# Patient Record
Sex: FEMALE | Race: WHITE | Employment: OTHER | URBAN - METROPOLITAN AREA
[De-identification: names, ages, dates, MRNs, and addresses within clinical notes are randomized per-mention and may not be internally consistent; named-entity substitution may affect disease eponyms.]

---

## 2017-02-07 ENCOUNTER — ANESTHESIA (OUTPATIENT)
Dept: SURGERY | Facility: CLINIC | Age: 67
End: 2017-02-07

## 2017-02-07 ENCOUNTER — HOSPITAL ENCOUNTER (OUTPATIENT)
Facility: CLINIC | Age: 67
Discharge: HOME | End: 2017-02-07
Attending: ANESTHESIOLOGY | Admitting: ANESTHESIOLOGY

## 2017-02-07 ENCOUNTER — SURGERY (OUTPATIENT)
Age: 67
End: 2017-02-07

## 2017-02-07 ENCOUNTER — ANESTHESIA EVENT (OUTPATIENT)
Dept: SURGERY | Facility: CLINIC | Age: 67
End: 2017-02-07

## 2017-02-08 NOTE — ANESTHESIA PROCEDURE NOTES
Procedures  Pt returns for evaluation of Intrathecal pump.  Site appears well healed.  Interrogation reveals nl functioning parameters.  Pt. Offers informed consent for pump reprogramming and refill.  Sterile prep of abdomen over implant site with betadine x3.   Refill kit utilized and local anesthetic infiltrated.  22 G non-coring needle advanced into membrane with ease.  Aspiration of residual volume which correlates with  prediction.  Refilled with compounding pharmacy solution on scanned record.  No evidence of bleeding at site.  Pump reprogrammed.  Refill date 6/3/2017

## 2017-06-02 ENCOUNTER — ANESTHESIA EVENT (OUTPATIENT)
Dept: SURGERY | Facility: CLINIC | Age: 67
End: 2017-06-02

## 2017-06-02 ENCOUNTER — HOSPITAL ENCOUNTER (OUTPATIENT)
Facility: CLINIC | Age: 67
Discharge: HOME | End: 2017-06-02
Attending: ANESTHESIOLOGY | Admitting: ANESTHESIOLOGY
Payer: COMMERCIAL

## 2017-06-02 ENCOUNTER — ANESTHESIA (OUTPATIENT)
Dept: SURGERY | Facility: CLINIC | Age: 67
End: 2017-06-02

## 2017-06-02 ENCOUNTER — SURGERY (OUTPATIENT)
Age: 67
End: 2017-06-02

## 2017-06-02 PROCEDURE — 40000010 ZZH STATISTIC ANES STAT CODE-CRNA PER MINUTE: Performed by: ANESTHESIOLOGY

## 2017-06-02 PROCEDURE — 27210995 ZZH RX 272

## 2017-06-02 PROCEDURE — 95990 SPIN/BRAIN PUMP REFIL & MAIN: CPT | Performed by: ANESTHESIOLOGY

## 2017-06-02 PROCEDURE — 25800025 ZZH RX 258

## 2017-06-02 PROCEDURE — 25000128 H RX IP 250 OP 636

## 2017-07-22 ENCOUNTER — APPOINTMENT (OUTPATIENT)
Dept: GENERAL RADIOLOGY | Facility: CLINIC | Age: 67
End: 2017-07-22
Attending: INTERNAL MEDICINE
Payer: COMMERCIAL

## 2017-07-22 ENCOUNTER — APPOINTMENT (OUTPATIENT)
Dept: CT IMAGING | Facility: CLINIC | Age: 67
End: 2017-07-22
Attending: INTERNAL MEDICINE
Payer: COMMERCIAL

## 2017-07-22 ENCOUNTER — HOSPITAL ENCOUNTER (OUTPATIENT)
Facility: CLINIC | Age: 67
Setting detail: OBSERVATION
Discharge: HOME | End: 2017-07-23
Attending: INTERNAL MEDICINE | Admitting: INTERNAL MEDICINE
Payer: COMMERCIAL

## 2017-07-22 DIAGNOSIS — S01.01XA LACERATION OF SCALP, INITIAL ENCOUNTER: ICD-10-CM

## 2017-07-22 DIAGNOSIS — S09.90XA CLOSED HEAD INJURY, INITIAL ENCOUNTER: ICD-10-CM

## 2017-07-22 DIAGNOSIS — R19.7 DIARRHEA, UNSPECIFIED TYPE: Primary | ICD-10-CM

## 2017-07-22 DIAGNOSIS — G40.909 RECURRENT SEIZURES (H): ICD-10-CM

## 2017-07-22 PROBLEM — R56.9 SEIZURE (H): Status: ACTIVE | Noted: 2017-07-22

## 2017-07-22 LAB
ALBUMIN SERPL-MCNC: 4.2 G/DL (ref 3.4–5)
ALBUMIN UR-MCNC: NEGATIVE MG/DL
ALP SERPL-CCNC: 125 U/L (ref 40–150)
ALT SERPL W P-5'-P-CCNC: 19 U/L (ref 0–50)
ANION GAP SERPL CALCULATED.3IONS-SCNC: 4 MMOL/L (ref 3–14)
APPEARANCE UR: CLEAR
AST SERPL W P-5'-P-CCNC: 26 U/L (ref 0–45)
BASE EXCESS BLDV CALC-SCNC: 6.8 MMOL/L
BASOPHILS # BLD AUTO: 0 10E9/L (ref 0–0.2)
BASOPHILS NFR BLD AUTO: 0.3 %
BILIRUB SERPL-MCNC: 0.4 MG/DL (ref 0.2–1.3)
BILIRUB UR QL STRIP: NEGATIVE
BUN SERPL-MCNC: 16 MG/DL (ref 7–30)
CALCIUM SERPL-MCNC: 9.4 MG/DL (ref 8.5–10.1)
CHLORIDE SERPL-SCNC: 101 MMOL/L (ref 94–109)
CO2 SERPL-SCNC: 32 MMOL/L (ref 20–32)
COLOR UR AUTO: YELLOW
CREAT SERPL-MCNC: 1.11 MG/DL (ref 0.52–1.04)
DIFFERENTIAL METHOD BLD: NORMAL
EOSINOPHIL # BLD AUTO: 0.3 10E9/L (ref 0–0.7)
EOSINOPHIL NFR BLD AUTO: 4.4 %
ERYTHROCYTE [DISTWIDTH] IN BLOOD BY AUTOMATED COUNT: 13.8 % (ref 10–15)
GFR SERPL CREATININE-BSD FRML MDRD: 49 ML/MIN/1.7M2
GLUCOSE SERPL-MCNC: 85 MG/DL (ref 70–99)
GLUCOSE UR STRIP-MCNC: NEGATIVE MG/DL
HCO3 BLDV-SCNC: 34 MMOL/L (ref 21–28)
HCT VFR BLD AUTO: 39 % (ref 35–47)
HGB BLD-MCNC: 12.4 G/DL (ref 11.7–15.7)
HGB UR QL STRIP: ABNORMAL
IMM GRANULOCYTES # BLD: 0 10E9/L (ref 0–0.4)
IMM GRANULOCYTES NFR BLD: 0.3 %
KETONES UR STRIP-MCNC: NEGATIVE MG/DL
LACTATE BLD-SCNC: 0.8 MMOL/L (ref 0.7–2.1)
LEUKOCYTE ESTERASE UR QL STRIP: NEGATIVE
LYMPHOCYTES # BLD AUTO: 1.7 10E9/L (ref 0.8–5.3)
LYMPHOCYTES NFR BLD AUTO: 29.8 %
MCH RBC QN AUTO: 29.9 PG (ref 26.5–33)
MCHC RBC AUTO-ENTMCNC: 31.8 G/DL (ref 31.5–36.5)
MCV RBC AUTO: 94 FL (ref 78–100)
MONOCYTES # BLD AUTO: 0.3 10E9/L (ref 0–1.3)
MONOCYTES NFR BLD AUTO: 4.5 %
MUCOUS THREADS #/AREA URNS LPF: PRESENT /LPF
NEUTROPHILS # BLD AUTO: 3.5 10E9/L (ref 1.6–8.3)
NEUTROPHILS NFR BLD AUTO: 60.7 %
NITRATE UR QL: NEGATIVE
NRBC # BLD AUTO: 0 10*3/UL
NRBC BLD AUTO-RTO: 0 /100
O2/TOTAL GAS SETTING VFR VENT: 4 %
OXYHGB MFR BLDV: 44 %
PCO2 BLDV: 62 MM HG (ref 40–50)
PH BLDV: 7.34 PH (ref 7.32–7.43)
PH UR STRIP: 6 PH (ref 5–7)
PLATELET # BLD AUTO: 251 10E9/L (ref 150–450)
PO2 BLDV: 25 MM HG (ref 25–47)
POTASSIUM SERPL-SCNC: 4.3 MMOL/L (ref 3.4–5.3)
PROT SERPL-MCNC: 8.1 G/DL (ref 6.8–8.8)
RBC # BLD AUTO: 4.15 10E12/L (ref 3.8–5.2)
RBC #/AREA URNS AUTO: 3 /HPF (ref 0–2)
SODIUM SERPL-SCNC: 137 MMOL/L (ref 133–144)
SP GR UR STRIP: 1.01 (ref 1–1.03)
SQUAMOUS #/AREA URNS AUTO: <1 /HPF (ref 0–1)
TROPONIN I SERPL-MCNC: NORMAL UG/L (ref 0–0.04)
URN SPEC COLLECT METH UR: ABNORMAL
UROBILINOGEN UR STRIP-MCNC: 0 MG/DL (ref 0–2)
WBC # BLD AUTO: 5.7 10E9/L (ref 4–11)
WBC #/AREA URNS AUTO: 1 /HPF (ref 0–2)

## 2017-07-22 PROCEDURE — 80175 DRUG SCREEN QUAN LAMOTRIGINE: CPT | Performed by: INTERNAL MEDICINE

## 2017-07-22 PROCEDURE — 99285 EMERGENCY DEPT VISIT HI MDM: CPT | Mod: 25

## 2017-07-22 PROCEDURE — 93005 ELECTROCARDIOGRAM TRACING: CPT

## 2017-07-22 PROCEDURE — 87040 BLOOD CULTURE FOR BACTERIA: CPT | Performed by: INTERNAL MEDICINE

## 2017-07-22 PROCEDURE — 36415 COLL VENOUS BLD VENIPUNCTURE: CPT

## 2017-07-22 PROCEDURE — 99207 ZZC CDG-MDM COMPONENT: MEETS MODERATE - UP CODED: CPT | Performed by: INTERNAL MEDICINE

## 2017-07-22 PROCEDURE — 70450 CT HEAD/BRAIN W/O DYE: CPT

## 2017-07-22 PROCEDURE — 71020 XR CHEST 2 VW: CPT

## 2017-07-22 PROCEDURE — 12001 RPR S/N/AX/GEN/TRNK 2.5CM/<: CPT

## 2017-07-22 PROCEDURE — 81001 URINALYSIS AUTO W/SCOPE: CPT | Performed by: INTERNAL MEDICINE

## 2017-07-22 PROCEDURE — 84484 ASSAY OF TROPONIN QUANT: CPT | Performed by: INTERNAL MEDICINE

## 2017-07-22 PROCEDURE — 82805 BLOOD GASES W/O2 SATURATION: CPT | Performed by: INTERNAL MEDICINE

## 2017-07-22 PROCEDURE — 85025 COMPLETE CBC W/AUTO DIFF WBC: CPT | Performed by: INTERNAL MEDICINE

## 2017-07-22 PROCEDURE — 99220 ZZC INITIAL OBSERVATION CARE,LEVL III: CPT | Performed by: INTERNAL MEDICINE

## 2017-07-22 PROCEDURE — 25000132 ZZH RX MED GY IP 250 OP 250 PS 637: Performed by: INTERNAL MEDICINE

## 2017-07-22 PROCEDURE — 80053 COMPREHEN METABOLIC PANEL: CPT | Performed by: INTERNAL MEDICINE

## 2017-07-22 PROCEDURE — 80299 QUANTITATIVE ASSAY DRUG: CPT | Performed by: INTERNAL MEDICINE

## 2017-07-22 PROCEDURE — 83605 ASSAY OF LACTIC ACID: CPT | Performed by: INTERNAL MEDICINE

## 2017-07-22 RX ORDER — LAMOTRIGINE 100 MG/1
100 TABLET ORAL ONCE
Status: COMPLETED | OUTPATIENT
Start: 2017-07-22 | End: 2017-07-22

## 2017-07-22 RX ORDER — ALBUTEROL SULFATE 0.83 MG/ML
2.5 SOLUTION RESPIRATORY (INHALATION) EVERY 4 HOURS PRN
Status: DISCONTINUED | OUTPATIENT
Start: 2017-07-22 | End: 2017-07-23 | Stop reason: HOSPADM

## 2017-07-22 RX ORDER — ONDANSETRON 4 MG/1
4 TABLET, ORALLY DISINTEGRATING ORAL EVERY 6 HOURS PRN
Status: DISCONTINUED | OUTPATIENT
Start: 2017-07-22 | End: 2017-07-23 | Stop reason: HOSPADM

## 2017-07-22 RX ORDER — FELBAMATE 600 MG/5ML
600 SUSPENSION ORAL ONCE
Status: DISCONTINUED | OUTPATIENT
Start: 2017-07-22 | End: 2017-07-22

## 2017-07-22 RX ORDER — IPRATROPIUM BROMIDE AND ALBUTEROL SULFATE 2.5; .5 MG/3ML; MG/3ML
3 SOLUTION RESPIRATORY (INHALATION) EVERY 4 HOURS PRN
Status: DISCONTINUED | OUTPATIENT
Start: 2017-07-22 | End: 2017-07-23 | Stop reason: HOSPADM

## 2017-07-22 RX ORDER — LAMOTRIGINE 100 MG/1
100 TABLET ORAL 3 TIMES DAILY
Status: DISCONTINUED | OUTPATIENT
Start: 2017-07-23 | End: 2017-07-23 | Stop reason: HOSPADM

## 2017-07-22 RX ORDER — NALOXONE HYDROCHLORIDE 0.4 MG/ML
.1-.4 INJECTION, SOLUTION INTRAMUSCULAR; INTRAVENOUS; SUBCUTANEOUS
Status: DISCONTINUED | OUTPATIENT
Start: 2017-07-22 | End: 2017-07-23 | Stop reason: HOSPADM

## 2017-07-22 RX ORDER — FELBAMATE 600 MG/1
600 TABLET ORAL 2 TIMES DAILY
Status: DISCONTINUED | OUTPATIENT
Start: 2017-07-23 | End: 2017-07-23 | Stop reason: HOSPADM

## 2017-07-22 RX ORDER — ACETAMINOPHEN 325 MG/1
650 TABLET ORAL EVERY 4 HOURS PRN
Status: DISCONTINUED | OUTPATIENT
Start: 2017-07-22 | End: 2017-07-23 | Stop reason: HOSPADM

## 2017-07-22 RX ORDER — ONDANSETRON 2 MG/ML
4 INJECTION INTRAMUSCULAR; INTRAVENOUS EVERY 6 HOURS PRN
Status: DISCONTINUED | OUTPATIENT
Start: 2017-07-22 | End: 2017-07-23 | Stop reason: HOSPADM

## 2017-07-22 RX ORDER — FELBAMATE 600 MG/1
600 TABLET ORAL ONCE
Status: COMPLETED | OUTPATIENT
Start: 2017-07-22 | End: 2017-07-22

## 2017-07-22 RX ORDER — SODIUM CHLORIDE 9 MG/ML
INJECTION, SOLUTION INTRAVENOUS CONTINUOUS
Status: DISCONTINUED | OUTPATIENT
Start: 2017-07-22 | End: 2017-07-23 | Stop reason: HOSPADM

## 2017-07-22 RX ADMIN — FELBAMATE 600 MG: 600 TABLET ORAL at 22:32

## 2017-07-22 RX ADMIN — LAMOTRIGINE 100 MG: 100 TABLET ORAL at 22:32

## 2017-07-22 NOTE — IP AVS SNAPSHOT
[facility]    [address]    [facility] [address]    Phone:  [phone]                                       After Visit Summary   7/22/2017    Shana Horne    MRN: [id number]           After Visit Summary Signature Page     I have received my discharge instructions, and my questions have been answered. I have discussed any challenges I see with this plan with the nurse or doctor.    ..........................................................................................................................................  Patient/Patient Representative Signature      ..........................................................................................................................................  Patient Representative Print Name and Relationship to Patient    ..................................................               ................................................  Date                                            Time    ..........................................................................................................................................  Reviewed by Signature/Title    ...................................................              ..............................................  Date                                                            Time

## 2017-07-22 NOTE — IP AVS SNAPSHOT
MRN:[id number]                      After Visit Summary   7/22/2017    Shana Horne    MRN: [id number]           Thank you!     Thank you for choosing [facility] for your care. Our goal is always to provide you with excellent care. Hearing back from our patients is one way we can continue to improve our services. Please take a few minutes to complete the written survey that you may receive in the mail after you visit. If you would like to speak to someone directly about your visit please contact Patient Relations at [phone]. Thank you!          Patient Information     Date Of Birth          1950        About your hospital stay     You were admitted on:  July 22, 2017 You last received care in the:  [facility] Observation Department    You were discharged on:  July 23, 2017        Reason for your hospital stay       You were hospitalized after having a seizure and a fall hitting your head which required 3 staples.  Your mentation improved and your preferred to be discharged to follow up with your outpatient Neurology clinic rather than wait for a Neurology consult here.  You have been having diarrhea for which you deferred any further work up here.  Stool sample ordered were placed and you can return your sample to the [facility] lab or follow up with your PCP for further testing.                  Who to Call     For medical emergencies, please call 911.  For non-urgent questions about your medical care, please call your primary care provider or clinic, [phone]          Attending Provider     Provider Specialty    Shala Piña MD Emergency Medicine    Wong, Sandi Baird MD Internal Medicine    Deangelo, Erlin Manriquez MD Internal Medicine       Primary Care Provider Office Phone # Fax #    Otoniel Capellan [phone]       When to contact your care team       Notify for fever >101.5; black or bloody stools; severe, persistent, or  worsening nausea, vomiting, abdominal pain or distention, diarrhea, constipation; chest pain, difficulty breathing, swelling; dizziness, weakness, lightheadedness, fainting.  Proceed to the nearest emergency room for any urgent concerns.                  After Care Instructions     Activity       Your activity upon discharge: activity as tolerated with supervision in the home.  No driving.            Diet       Follow this diet upon discharge: Orders Placed This Encounter      Advance Diet as Tolerated: Regular Diet Adult            Wound care and dressings       Instructions to care for your wound at home: keep wound clean and dry and may get incision wet in shower but do not soak or scrub.                  Follow-up Appointments     Follow-up and recommended labs and tests        Follow up with your Neurology clinic this week.      Follow up with your PCP in 7-10 days for staple removal.                   Follow-up Information     Follow up with Otoniel Capellan MD In 2 days.    Specialty:  Family Practice    Contact information:    [facility]  [address] [id number] [phone]          Follow up with Sosa Lira MD In 2 days.    Specialty:  Neurology    Contact information:    [facility]  [address]  [facility] [address] [phone]                  Future tests that were ordered for you     C. difficile, PCR           Enteric Bacteria and Virus Panel by [facility] Stool                 Further instructions from your care team       Discharge Instructions  Laceration (Cut)    You were seen today for a laceration (cut).  Your doctor examined your laceration for any problems such a buried foreign body (like glass, a splinter, or gravel), or injury to blood vessels, tendons, and nerves.  Your doctor may have also rinsed and/or scrubbed your laceration to help prevent an infection.  Your laceration may have been closed with glue, staples  or sutures (stitches).      It may not be possible to find all problems with your laceration on the first visit, and we can't always prevent infections.  Antibiotics are only given when the benefit is more than the risk, and don't prevent all infections. Some lacerations are too high risk to close, and are left open to heal.  All lacerations, no matter how expertly repaired, will cause scarring.    Return to the Emergency Department right away if:    You have more redness, swelling, pain, drainage (pus), a bad smell, or red streaking from your laceration.      You have a fever of 101oF or more.    You have bleeding that you can t stop at home. If your cut starts to bleed, hold pressure on the bleeding area with a clean cloth or put pressure over the bandage.  If the bleeding doesn t stop after using constant pressure for 30 minutes, you should return to the Emergency Department for further treatment.    An area past the laceration is cool, pale, or blue compared with the other side, or has a slower return of color when squeezed.    Your dressing seems too tight or starts to get uncomfortable or painful.    You have loss of normal function or use of an area, such as being unable to straighten or bend a finger normally.    You have a numb area past the laceration.    Return to the Emergency Department or see your regular doctor if:    The laceration starts to come open.     You have something coming out of the cut or a feeling that there is something in the laceration.    Your wound will not heal, or keeps breaking open. There can always be glass, wood, dirt or other things in any wound.  They won t always show up, even on x-rays.  If a wound doesn t heal, this may be why, and it is important to follow-up with your regular doctor.    Home Care:    Take your dressing off in 12 hours, or as instructed by your doctor, to check your laceration. Remove the dressing sooner if it seems too tight or painful, or if it is  "getting numb, tingly, or pale past the dressing.    Gently wash your laceration 2 times a day with clean cloth and soap.     It is okay to shower, but do not let the laceration soak in water.      If your laceration was closed with wound adhesive or strips: pat it dry and leave it open to the air.     For all other repairs: after you wash your laceration, or at least 2 times a day, apply bacitracin or other antibiotic ointment to the laceration, then cover it with a Band-Aid  or gauze.    Keep the laceration clean. Wear gloves or other protective clothing if you are around dirt.    Follow-up:    You need to follow-up with your regular doctor in 9 days.    Your sutures or staples need to be removed in 9 days. Schedule an appointment with your regular doctor to have this done.    Scars:  To help minimize scarring:    Wear sunscreen over the healed laceration when out in the sun.    Massage the area regularly.    You may use Vitamin E oil.    Wait a year.  Most scars will start to fade within a year.    Probiotics: If you have been given an antibiotic, you may want to also take a probiotic pill or eat yogurt with live cultures. Probiotics have \"good bacteria\" to help your intestines stay healthy. Studies have shown that probiotics help prevent diarrhea and other intestine problems (including C. diff infection) when you take antibiotics. You can buy these without a prescription in the pharmacy section of the store.     If you were given a prescription for medicine here today, be sure to read all of the information (including the package insert) that comes with your prescription.  This will include important information about the medicine, its side effects, and any warnings that you need to know about.  The pharmacist who fills the prescription can provide more information and answer questions you may have about the medicine.  If you have questions or concerns that the pharmacist cannot address, please call or return to " the Emergency Department.     Remember that you can always come back to the Emergency Department if you are not able to see your regular doctor in the amount of time listed above, if you get any new symptoms, or if there is anything that worries you.  Discharge Instructions  Recurrent Seizure (Convulsion)    You were seen today for a seizure. The most common reason for a recurrent seizure is having missed a dose of your medication or taken it at a different time than normal. Other things that increase the risk of seizures include fever, sleep deprivation, alcohol, and stress. Although anti-seizure medications (anti-epileptic drugs) work for many people with seizure disorders, some people continue to have seizures even after trying several medications.    You need to follow-up with your doctor within 1-2 days.    Return to the Emergency Department if:     You develop a fever over 101.    You feel much more ill, or develop new symptoms like severe headache.    You have severe nausea or vomiting.    You have trouble walking, seeing, or develop weakness or numbness in your arms or legs.     What can I do to help myself?    Take your medication exactly as directed, at the right times, and the right doses.     If you develop uncomfortable side effects, don't stop taking your anti-seizure medication without first speaking to your physician.     Do not let your prescription run out. Stopping anti-seizure medication abruptly can put your at risk of a seizure.    While taking an anti-seizure medication, do not start taking any other medications including over-the-counter medications and herbal supplements without first checking with your physician because mixing them can be dangerous.    Do not drive until you have been rechecked by your doctor and have been told it is safe to drive.  If you have a seizure while driving you may cause a motor vehicle accident with injury or death to yourself or others.     Do not swim, climb  ladders, or do anything else that would be dangerous if you had another seizure or spell of loss of consciousness, until you are cleared by your doctor.      Check your state driving requirements for patients with seizures on the Epilepsy Foundation Website at www.epilepsyfoundation.org/resources/drivingandtravel.cfm.    Do not drink alcohol.  Drinking alcohol increases the risk of seizures and can interfere with the effect of anti-seizure medications.    Start a seizure calendar to record any seizure triggers, such as days when you were sleep-deprived, stressed, drank alcohol, or (if you are a woman) had your period.    Remember, if one medication does not work for you, either because you cannot tolerate the side effects or because you continue to have seizures, your physician can suggest alternate medications or alternate methods of taking the medication.  If you were given a prescription for medicine here today, be sure to read all of the information (including the package insert) that comes with your prescription.  This will include important information about the medicine, its side effects, and any warnings that you need to know about.  The pharmacist who fills the prescription can provide more information and answer questions you may have about the medicine.  If you have questions or concerns that the pharmacist cannot address, please call or return to the Emergency Department.     Remember that you can always come back to the Emergency Department if you are not able to see your regular doctor in the amount of time listed above, if you get any new symptoms, or if there is anything that worries you.        Pending Results     Date and Time Order Name Status Description    7/22/2017 2120 Lamotrigine Level In process     7/22/2017 2120 Felbamate Level In process     7/22/2017 2107 EKG 12-lead, tracing only Preliminary     7/22/2017 2107 Blood culture Preliminary             Statement of Approval     Ordered        "   17 1056  I have reviewed and agree with all the recommendations and orders detailed in this document.  EFFECTIVE NOW     Approved and electronically signed by:  Jaycee Rodney PA-C             Admission Information     Date & Time Provider Department Dept. Phone    2017 Erlin Bright MD [facility] Observation Department [phone]      Your Vitals Were     Blood Pressure Pulse Temperature Respirations Pulse Oximetry       116/65 (BP Location: Left arm) 84 98.5  F (36.9  C) (Oral) 18 92%       MyChart Information     [employer] lets you send messages to your doctor, view your test results, renew your prescriptions, schedule appointments and more. To sign up, go to [email or web address] . Click on \"Log in\" on the left side of the screen, which will take you to the Welcome page. Then click on \"Sign up Now\" on the right side of the page.     You will be asked to enter the access code listed below, as well as some personal information. Please follow the directions to create your username and password.     Your access code is: [email or web address]  Expires: 10/20/2017 10:37 PM     Your access code will  in 90 days. If you need help or a new code, please call your [facility] clinic or [phone].        Care EveryWhere ID     This is your Care EveryWhere ID. This could be used by other organizations to access your [facility] medical records  [id number]        Equal Access to Services     DAVIDSON KENDALL AH: Hadii darion griggso Sochrisali, waaxda luqadaha, qaybta kaalmada adeegyada, roxi scales . So [facility] [phone].    ATENCIÓN: Si habla español, tiene a early disposición servicios gratuitos de asistencia lingüística. Llame al [phone].    We comply with applicable federal civil rights laws and [place] laws. We do not discriminate on the basis of race, color, national origin, age, disability sex, sexual orientation or gender identity.               Review of " your medicines      START taking        Dose / Directions    TRIPLE ANTIBIOTIC 3.5-400-5000 Oint ointment   Used for:  Laceration of scalp, initial encounter        Dose:  1 applicator   Apply 1 g topically 2 times daily Apply to scalp wound   Quantity:  28.3 g   Refills:  0         CONTINUE these medicines which have NOT CHANGED        Dose / Directions    amitriptyline 75 MG tablet   Commonly known as:  ELAVIL        Dose:  75 mg   Take 75 mg by mouth At Bedtime.   Refills:  0       * FELBATOL PO        Dose:  1200 mg   Take 1,200 mg by mouth every morning   Refills:  0       * FELBATOL PO        Dose:  600 mg   Take 600 mg by mouth 2 times daily At Noon and Evening   Refills:  0       FERROUS GLUCONATE PO        Dose:  324 mg   Take 324 mg by mouth 2 times daily (with meals)   Refills:  0       GABAPENTIN PO        Dose:  200 mg   Take 200 mg by mouth 2 times daily Am and Noon   Refills:  0       lamoTRIgine 100 MG tablet   Commonly known as:  LaMICtal        Dose:  100 mg   Take 100 mg by mouth 3 times daily.   Refills:  0       Multi-vitamin Tabs tablet   Generic drug:  multivitamin, therapeutic with minerals        Dose:  1 tablet   Take 1 tablet by mouth daily.   Refills:  0       SEROQUEL PO        Dose:  25 mg   Take 25 mg by mouth At Bedtime   Refills:  0       simvastatin 40 MG tablet   Commonly known as:  ZOCOR        Dose:  40 mg   Take 40 mg by mouth every morning.   Refills:  0       * Notice:  This list has 2 medication(s) that are the same as other medications prescribed for you. Read the directions carefully, and ask your doctor or other care provider to review them with you.         Where to get your medicines      Some of these will need a paper prescription and others can be bought over the counter. Ask your nurse if you have questions.     You don't need a prescription for these medications     TRIPLE ANTIBIOTIC 3.5-400-5000 Oint ointment                Protect others around you: Learn how to  safely use, store and throw away your medicines at www.disposemymeds.org.             Medication List: This is a list of all your medications and when to take them. Check marks below indicate your daily home schedule. Keep this list as a reference.      Medications           Morning Afternoon Evening Bedtime As Needed    amitriptyline 75 MG tablet   Commonly known as:  ELAVIL   Take 75 mg by mouth At Bedtime.                                * FELBATOL PO   Take 1,200 mg by mouth every morning   Last time this was given:  1,200 mg on 7/23/2017  8:06 AM                                * FELBATOL PO   Take 600 mg by mouth 2 times daily At Noon and Evening   Last time this was given:  1,200 mg on 7/23/2017  8:06 AM                                FERROUS GLUCONATE PO   Take 324 mg by mouth 2 times daily (with meals)                                GABAPENTIN PO   Take 200 mg by mouth 2 times daily Am and Noon   Last time this was given:  200 mg on 7/23/2017  8:06 AM                                lamoTRIgine 100 MG tablet   Commonly known as:  LaMICtal   Take 100 mg by mouth 3 times daily.   Last time this was given:  100 mg on 7/23/2017  8:06 AM                                Multi-vitamin Tabs tablet   Take 1 tablet by mouth daily.   Generic drug:  multivitamin, therapeutic with minerals                                SEROQUEL PO   Take 25 mg by mouth At Bedtime                                simvastatin 40 MG tablet   Commonly known as:  ZOCOR   Take 40 mg by mouth every morning.                                TRIPLE ANTIBIOTIC 3.5-400-5000 Oint ointment   Apply 1 g topically 2 times daily Apply to scalp wound                                * Notice:  This list has 2 medication(s) that are the same as other medications prescribed for you. Read the directions carefully, and ask your doctor or other care provider to review them with you.              More Information        Uncertain Causes of Diarrhea (Adult)    Diarrhea  is when stools are loose and watery. This can be caused by:    Viral infections    Bacterial infections    Food poisoning    Parasites    Irritable bowel syndrome (IBS)    Inflammatory bowel diseases such as ulcerative colitis, Crohn's disease, and celiac disease    Food intolerance, such as to lactose, the sugar found in milk and milk products    Reaction to medicines like antibiotics, laxatives, cancer drugs, and antacids  Along with diarrhea, you may also have:    Abdominal pain and cramping    Nausea and vomiting    Loss of bowel control    Fever and chills    Bloody stools  In some cases, antibiotics may help to treat diarrhea. You may have a stool sample test. This is done to see what is causing your diarrhea, and if antibiotics will help treat it. The results of a stool sample test may take up to 2 days. The healthcare provider may not give you antibiotics until he or she has the stool test results.  Diarrhea can cause dehydration. This is the loss of too much water and other fluids from the body. When this occurs, body fluid must be replaced. This can be done with oral rehydration solutions. Oral rehydration solutions are available at drugstores and grocery stores without a prescription.  Home care  Follow all instructions given by your healthcare provider. Rest at home for the next 24 hours, or until you feel better. Avoid caffeine, tobacco, and alcohol. These can make diarrhea, cramping, and pain worse.  If taking medicines:    Don t take over-the-counter diarrhea or nausea medicines unless your healthcare provider tells you to.    You may use acetaminophen or NSAID medicines like ibuprofen or naproxen to reduce pain and fever. Don t use these if you have chronic liver or kidney disease, or ever had a stomach ulcer or gastrointestinal bleeding. Don't use NSAID medicines if you are already taking one for another condition (like arthritis) or are on daily aspirin therapy (such as for heart disease or after a  stroke). Talk with your healthcare provider first.    If antibiotics were prescribed, be sure you take them until they are finished. Don t stop taking them even when you feel better. Antibiotics must be taken as a full course.  To prevent the spread of illness:    Remember that washing with soap and water and using alcohol-based  is the best way to prevent the spread of infection.    Clean the toilet after each use.    Wash your hands before eating.    Wash your hands before and after preparing food. Keep in mind that people with diarrhea or vomiting should not prepare food for others.    Wash your hands after using cutting boards, countertops, and knives that have been in contact with raw foods.    Wash and then peel fruits and vegetables.    Keep uncooked meats away from cooked and ready-to-eat foods.    Use a food thermometer when cooking. Cook poultry to at least 165 F (74 C). Cook ground meat (beef, veal, pork, lamb) to at least 160 F (71 C). Cook fresh beef, veal, lamb, and pork to at least 145 F (63 C).    Don t eat raw or undercooked eggs (poached or spike side up), poultry, meat, or unpasteurized milk and juices.  Food and drinks  The main goal while treating vomiting or diarrhea is to prevent dehydration. This is done by taking small amounts of liquids often.    Keep in mind that liquids are more important than food right now.    Drink only small amounts of liquids at a time.    Don t force yourself to eat, especially if you are having cramping, vomiting, or diarrhea. Don t eat large amounts at a time, even if you are hungry.    If you eat, avoid fatty, greasy, spicy, or fried foods.    Don t eat dairy foods or drink milk if you have diarrhea. These can make diarrhea worse.  During the first 24 hours you can try:    Oral rehydration solutions. Do not use sports drinks. They have too much sugar and not enough electrolytes.    Soft drinks without caffeine    Ginger ale    Water (plain or  flavored)    Decaf tea or coffee    Clear broth, consommé, or bouillon    Gelatin, popsicles, or frozen fruit juice bars  The second 24 hours, if you are feeling better, you can add:    Hot cereal, plain toast, bread, rolls, or crackers    Plain noodles, rice, mashed potatoes, chicken noodle soup, or rice soup    Unsweetened canned fruit (no pineapple)    Bananas  As you recover:    Limit fat intake to less than 15 grams per day. Don t eat margarine, butter, oils, mayonnaise, sauces, gravies, fried foods, peanut butter, meat, poultry, or fish.    Limit fiber. Don t eat raw or cooked vegetables, fresh fruits except bananas, or bran cereals.    Limit caffeine and chocolate.    Limit dairy.    Don t use spices or seasonings except salt.    Go back to your normal diet over time, as you feel better and your symptoms improve.    If the symptoms come back, go back to a simple diet or clear liquids.  Follow-up care  Follow up with your healthcare provider, or as advised. If a stool sample was taken or cultures were done, call the healthcare provider for the results as instructed.  Call 911  Call 911 if you have any of these symptoms:    Trouble breathing    Confusion    Extreme drowsiness or trouble walking    Loss of consciousness    Rapid heart rate    Chest pain    Stiff neck    Seizure  When to seek medical advice  Call your healthcare provider right away if any of these occur:    Abdominal pain that gets worse    Constant lower right abdominal pain    Continued vomiting and inability to keep liquids down    Diarrhea more than 5 times a day    Blood in vomit or stool    Dark urine or no urine for 8 hours, dry mouth and tongue, tiredness, weakness, or dizziness    Drowsiness    New rash    You don t get better in 2 to 3 days    Fever of 100.4 F (38 C) or higher that doesn t get lower with medicine  Date Last Reviewed: 1/3/2016    9264-9475 [employer]. [address] [place] [address], [place] [address]. All rights  reserved. This information is not intended as a substitute for professional medical care. Always follow your healthcare professional's instructions.

## 2017-07-23 VITALS
HEART RATE: 84 BPM | OXYGEN SATURATION: 90 % | TEMPERATURE: 98 F | SYSTOLIC BLOOD PRESSURE: 124 MMHG | DIASTOLIC BLOOD PRESSURE: 57 MMHG | RESPIRATION RATE: 16 BRPM

## 2017-07-23 PROCEDURE — 25000132 ZZH RX MED GY IP 250 OP 250 PS 637: Performed by: INTERNAL MEDICINE

## 2017-07-23 PROCEDURE — G0378 HOSPITAL OBSERVATION PER HR: HCPCS

## 2017-07-23 PROCEDURE — 25000132 ZZH RX MED GY IP 250 OP 250 PS 637: Performed by: PHYSICIAN ASSISTANT

## 2017-07-23 PROCEDURE — 99217 ZZC OBSERVATION CARE DISCHARGE: CPT | Performed by: PHYSICIAN ASSISTANT

## 2017-07-23 PROCEDURE — 25000128 H RX IP 250 OP 636: Performed by: INTERNAL MEDICINE

## 2017-07-23 RX ORDER — FELBAMATE 600 MG/1
1200 TABLET ORAL EVERY MORNING
Status: DISCONTINUED | OUTPATIENT
Start: 2017-07-23 | End: 2017-07-23 | Stop reason: HOSPADM

## 2017-07-23 RX ORDER — IBUPROFEN 200 MG
1 CAPSULE ORAL 2 TIMES DAILY
Qty: 28.3 G | Refills: 0 | COMMUNITY
Start: 2017-07-23 | End: 2017-10-17

## 2017-07-23 RX ORDER — GABAPENTIN 100 MG/1
200 CAPSULE ORAL 2 TIMES DAILY
Status: DISCONTINUED | OUTPATIENT
Start: 2017-07-23 | End: 2017-07-23 | Stop reason: HOSPADM

## 2017-07-23 RX ADMIN — FELBAMATE 1200 MG: 600 TABLET ORAL at 08:06

## 2017-07-23 RX ADMIN — GABAPENTIN 200 MG: 100 CAPSULE ORAL at 08:06

## 2017-07-23 RX ADMIN — SODIUM CHLORIDE: 9 INJECTION, SOLUTION INTRAVENOUS at 00:21

## 2017-07-23 RX ADMIN — LAMOTRIGINE 100 MG: 100 TABLET ORAL at 08:06

## 2017-07-23 RX ADMIN — SODIUM CHLORIDE: 9 INJECTION, SOLUTION INTRAVENOUS at 09:20

## 2017-07-23 NOTE — PLAN OF CARE
Problem: Discharge Planning  Goal: Discharge Planning (Adult, OB, Behavioral, Peds)  Outcome: Improving  PRIMARY DIAGNOSIS:  FAll, AMS, Seizures  OUTPATIENT/OBSERVATION GOALS TO BE MET BEFORE DISCHARGE:  1. ADLs back to baseline: No                 2.  Activity and level of assistance: patient was up to bathroom with assistance of 2 with elmer steady and patient was able to sit up at bedside and stand without assistance.  Patient had control of upper and lower                               extremities without signs of not being able to hold herself up.  Next time will try uses walker and assess how pt tolerates activity.      3. Pain status:  Patient has head laceration that was sutured in ED.  Denies discomfort at this time.  Does have tylenol ordered for mild pain      4. Return to near baseline physical activity:  Will need to reassess her tolerance to standing and walking.  Was not able to ambulate in ED      Discharge Planner Nurse   Safe discharge environment identified: Yes  Barriers to discharge: family is very involved with her care.  Her  states he helps with her decision making when she is not able to        Entered by: Willa Harper 07/23/2017 1:34 AM     Please review provider order for any additional goals.   Nurse to notify provider when observation goals have been met and patient is ready for discharge.  Pt to be assessed this am by neurology.  Family was given unit business cards before they left.   Did tell family that they can call number on card of unit for an update of patient's status.  Will continue to monitor and assess patient.

## 2017-07-23 NOTE — PLAN OF CARE
Problem: Discharge Planning  Goal: Discharge Planning (Adult, OB, Behavioral, Peds)  Outcome: Improving  Problem: Discharge Planning  Goal: Discharge Planning (Adult, OB, Behavioral, Peds)  Outcome: Improving  PRIMARY DIAGNOSIS:  FAll, AMS, Seizures  OUTPATIENT/OBSERVATION GOALS TO BE MET BEFORE DISCHARGE:  1. ADLs back to baseline: Pt alert and oriented x4, will assess mobility this morning                 2.  Activity and level of assistance: elmer steady was used overnight. Will assess mobility this am. Pt reports being indep at baseline      3. Pain status:  Patient has head laceration that was sutured in ED.  pt reports head discomfort and tingling in toes and feet, scheduled gabapentin given.      4. Return to near baseline physical activity:  Will need to reassess this am.      Discharge Planner Nurse   Safe discharge environment identified: Yes  Barriers to discharge: Neurology consult  family is very involved with her care.  Her  states he helps with her decision making when she is not able to        Entered by: Rosmery Singh RN 7/23/17 0837      Please review provider order for any additional goals.   Nurse to notify provider when observation goals have been met and patient is ready for discharge.     Vitals stable. Pt alert and oriented x4. Baseline indep. Elmer steady used overnight. Will assess mobility this am. Pt reports some head discomfort from laceration and R knee discomfort from fall. Denies need for pain medication. Seizure precautions in place. No seizure activity since admission. Scheduled anticonvulsants given. Pt does report new numbness and tingling in toes and fingers, MD notified. Pt very eager to discharge. Plan for neurology consult this am. Pt resting comfortably. Will continue to monitor and provide supportive cares.

## 2017-07-23 NOTE — ED NOTES
"Pt presents to ED with complaints of 3 grand mal seizures today. Pt has hx of epilepsy but usually has about 3 seizures a year although pt has had diarrhea in the last week and has had about 5 total seizures this week. Pt fell in her room this afternoon and hit her head and was \"bleeding everywhere\" per . Family says pt is usually talkative. Pt responds very slowly but appropriately at this time. Pt on 4L O2 at home. ABCs intact.   "

## 2017-07-23 NOTE — ED NOTES
"[facility]  ED Nurse Handoff Report    Shana Horne is a 66 year old female   ED Chief complaint: Seizures and Fall  . ED Diagnosis:   Final diagnoses:   Closed head injury, initial encounter   Laceration of scalp, initial encounter   Recurrent seizures (H)     Allergies: No Known Allergies    Code Status: Full Code  Activity level - Baseline/Home:  Independent. Activity Level - Current:   Stand with Assist of 2. Lift room needed: No. Bariatric: No   Needed: No   Isolation: No. Infection: Not Applicable.     Vital Signs:   Vitals:    07/22/17 2058 07/22/17 2115 07/22/17 2215 07/22/17 2230   BP: 163/90  (!) 150/91 (!) 149/94   Pulse: 94      Resp: 18      Temp:  97.9  F (36.6  C)     SpO2: 98%   98%       Cardiac Rhythm:  ,      Pain level:    Patient confused: Yes. Patient Falls Risk: Yes.   Elimination Status: Has voided   Patient Report - Initial Complaint: Pt presents to ED with complaints of 3 grand mal seizures today. Pt has hx of epilepsy but usually has about 3 seizures a year although pt has had diarrhea in the last week and has had about 5 total seizures this week. Pt fell in her room this afternoon and hit her head and was \"bleeding everywhere\" per . Family says pt is usually talkative. Pt responds very slowly but appropriately at this time. Pt on 4L O2 at home. ABCs intact. . Focused Assessment: Gastrointestinal - GI WDL:  WDL except; all All Quadrants Palpation: soft/nontender GI Signs/Symptoms: diarrhea   Cognitive/Perceptual/Neuro - Cognitive/Neuro/Behavioral WDL:  WDL except; all Level Of Consciousness: lethargic Arousal Level: arouses to touch/gentle shaking Orientation: oriented x 4 Best Language:  (speech is delayed but appropriate)  Pupils (CN II) - Pupil PERRLA: yes  Motor Strength - Left Upper: 3 - active movement against gravity Right Upper: 3 - active movement against gravity Left Lower: 3 - active movement against gravity Right Lower: 3 - active movement " against gravity  Seizure Episode - Seizure Activity: reported Seizure Presentation:  (grand mal)  Marina Coma Scale - Best Eye Response: 3-->(E3) to speech Best Motor Response: 6-->(M6) obeys commands Best Verbal Response: 5-->(V5) oriented Marina Coma Scale Score: 14  Tests Performed: labs, head CT, chest xray, UA, EKG. Abnormal Results:   Labs Ordered and Resulted from Time of ED Arrival Up to the Time of Departure from the ED   COMPREHENSIVE METABOLIC PANEL - Abnormal; Notable for the following:        Result Value    Creatinine 1.11 (*)     GFR Estimate 49 (*)     GFR Estimate If Black 60 (*)     All other components within normal limits   ROUTINE UA WITH MICROSCOPIC - Abnormal; Notable for the following:     Blood Urine Small (*)     RBC Urine 3 (*)     Mucous Urine Present (*)     All other components within normal limits   BLOOD GAS VENOUS AND OXYHGB - Abnormal; Notable for the following:     PCO2 Venous 62 (*)     Bicarbonate Venous 34 (*)     All other components within normal limits   CBC WITH PLATELETS DIFFERENTIAL   TROPONIN I   LACTIC ACID WHOLE BLOOD   FELBAMATE LEVEL   LAMOTRIGINE LEVEL   CARDIAC CONTINUOUS MONITORING   PULSE OXIMETRY NURSING   BLOOD CULTURE     .   Treatments provided: 4L O2 NC  Family Comments: daughter and  at bedside  OBS brochure/video discussed/provided to patient:  Yes  ED Medications:   Medications   lamoTRIgine (LaMICtal) tablet 100 mg (100 mg Oral Given 7/22/17 2232)   felbamate (FELBATOL) tablet 600 mg (600 mg Oral Given 7/22/17 2232)     Drips infusing:  No         ED Nurse Name/Phone Number: Yenni Lakecorinne,   11:00 PM    RECEIVING UNIT ED HANDOFF REVIEW    Above ED Nurse Handoff Report was reviewed: Yes  Reviewed by: Willa Harper on July 22, 2017 at 11:29 PM

## 2017-07-23 NOTE — PLAN OF CARE
Problem: Discharge Planning  Goal: Discharge Planning (Adult, OB, Behavioral, Peds)  Outcome: Adequate for Discharge Date Met:  07/23/17  Patient's After Visit Summary was reviewed with patient   Patient verbalized understanding of After Visit Summary, recommended follow up and was given an opportunity to ask questions.   Discharge medications sent home with patient/family: Not applicable   Discharged with spouse          OBSERVATION patient END time: 1130

## 2017-07-23 NOTE — ED PROVIDER NOTES
History     Chief Complaint:  Multiple seizures    HPI   History limited secondary to mental status change. History provided by family.     Shana Horne is a 66 year old female with a history of epilepsy and COPD (on 4 liters of at home oxygen) who presents after multiple seizures. Family notes, the patient has been experiencing worsening balance issues for some time, but has not been followed for this medically. For the past 2 weeks, the patient has been experiencing diarrhea and has not been eating normally. This morning around 0400, the patient was found on the bathroom floor after having a seizure. She had another seizure around 1330 this afternoon while speaking with her daughter. Around 45 minutes ago, the patient's  heard a thud in the bedroom and found the patient on the ground confused, disoriented, and bleeding from her forehead. Currently, the patient is not behaving at her baseline per family. Family states that usually after a seizure she sleeps for around 24 hours. The patient usually has around 3 seizures a year and has had 3 seizures in the past 24 hours and 5 in the past week. She has been taking all of her seizure medications. No fevers.     Cardiac Risk Factors   Sex: female   Tobacco: Negative   Hypertension: Negative  Diabetes: Negative  Hyperlipidemia: Negative  Family History: Negative    Allergies:  No known drug allergies.     Medications:    Felbatol  Lamictal  Simvastatin  Multivitamin  Elavil  Fluoxetine     Past Medical History:    Arthritis  Blood transfusion  Chronic pain  Seizures  COPD    Past Surgical History:    Colonoscopy x2  Gyn surgery  Insert pump morphine  Mouth surgery  Orthopedic surgery left foot  Revise pump surgery    Family History:    History reviewed. No pertinent family history.    Social History:  Marital Status:   Presents to the ED with her  and daughter  Tobacco Use: 0.50 packs/day  Alcohol Use: negative  PCP: BERNARDA WAITE      Review of Systems   Unable to perform ROS: Mental status change     Physical Exam   First Vitals:  BP: 163/90 mmHg  Heart Rate: 94   Resp: 18  SpO2: 98% RA  Temp: 97.9  F (oral)       Physical Exam   Constitutional: She appears listless. She is cooperative.   HENT:   Head:       Right Ear: Tympanic membrane normal.   Left Ear: Tympanic membrane normal.   Mouth/Throat: Oropharynx is clear and moist and mucous membranes are normal.   1.5 cm irregular laceration anterior scalp in hairline, gapes slightly   Eyes: Conjunctivae are normal.   Neck: Normal range of motion.   Cardiovascular: Regular rhythm and normal heart sounds.    Pulmonary/Chest: Effort normal and breath sounds normal.   Abdominal: Soft. Normal appearance and bowel sounds are normal. There is no rebound and no guarding.   Musculoskeletal: Normal range of motion.   Lymphadenopathy:     She has no cervical adenopathy.   Neurological: She appears listless.   Unable to tell me the name of her daughter    Skin: Skin is warm and dry.       Emergency Department Course   ECG:  @ 2116  Indication: seizures  Vent. Rate 90 bpm. OR interval 160 ms. QRS duration 100 ms. QT/QTc 350/428 ms. P-R-T axis 83 71 58.   Normal sinus rhythm. Septal infarct, age undetermined. Abnormal ECG.    Read @ 2118 by Dr. Piña.    Imaging:  Radiographic findings were communicated with the patient who voiced understanding of the findings.    Chest XR, PA & LAT  Cardiomegaly, hyperinflated lung zones, and a stimulator  device with its tip projecting left lower cervical region are noted.  There is also some prominent interstitial markings which appear  chronic. There is also a calcified granuloma in the left base. No  alveolar infiltrates are present. No pleural effusions.   Preliminary radiology read.      Head CT w/o contrast  Normal CT scan of the head.  Preliminary radiology read.      Laboratory:  UA: Clear yellow urine, blood small, RBC 3 (H), mucous present, otherwise  WNL    CBC:  WBC 5.7, HGB 12.4, , otherwise WNL  CMP: creatinine 1.11 (H), GFR 49 (L), otherwise WNL   Troponin: <0.015  Blood gas venous and oxyhgb: pH 7.34 PCO2 62 (H) PO2 25 Bicarbonate 34 (H) Oxyhemoglobin 44 Base excess 6.8 FlO2 4 liters/minute.     Lactic acid: 0.8  Blood culture: pending  Felbamate level: pending  Lamotrigine level: pending    Interventions:  () Lamictal, 100 mg, PO  () Felbamate, 600 mg, PO    Procedures:     Laceration Repair      LACERATION:  A simple clean 1.5 cm laceration.    LOCATION:  Scalp.    PREPARATION:  Irrigation and Scrubbing with Normal Saline and Shur Clens.    DEBRIDEMENT:  no debridement.    CLOSURE:  Wound was closed with 3 Staples.      Emergency Department Course:  Nursing notes and vitals reviewed.  I performed an exam of the patient as documented above.   EKG was done, interpretation as above.  A peripheral IV was established. Blood was drawn from the patient. This was sent for laboratory testing, findings above.   Urine sample was obtained and sent for laboratory analysis, findings above.  The patient was sent for a head CT and chest x-ray while in the emergency department, findings above.   I performed a laceration repair as documented above.  Findings and plan explained to the patient's family who consents to admission.   () I discussed the patient with Dr. Wong of the hospitalist service, who will admit the patient to a observation bed for further monitoring, evaluation, and treatment.  I personally reviewed the laboratory results with the patient and answered all related questions prior to discharge.     Impression & Plan    Medical Decision Makin year old woman with a complex past medical history including oxygen dependent COPD and epilepsy brought in by family with worsening balance issues, diarrhea, now increased frequency of seizures and disorientation. After evaluation here, the etiology of her symptoms is not entirely clear. There is  no definitive evidence of dehydration or electrolyte abnormality. She does have a mildly elevated creatinine, but there are no old values for comparison. There is no acidosis or elevation of the BUN to creatinine ratio. Drug levels of antiepileptics were ordered and are pending. CT of the head does not show any evidence of intracranial bleed. She is oxygenating adequately on her supplemental oxygen and chest x-ray does not show any acute infiltrate. While here, the patient continued to be altered according to family. Initially, her  requested discharge home, but on trying to get the patient up for discharge, she was unable to bear weight. I spoke with the family again, and they agreed that we should keep her for observation. I discussed he case with Dr. Wong who will accept the patient to the observation service for further evaluation and management.     Diagnosis:    ICD-10-CM    1. Closed head injury, initial encounter S09.90XA    2. Laceration of scalp, initial encounter S01.01XA    3. Recurrent seizures (H) G40.909        Disposition:  Admit to observation.    I, Justin Ag, am serving as a scribe on 7/22/2017 at 8:55 PM to personally document services performed by Dr. Piña based on my observations and the provider's statements to me.      Tomas Sorto, Shala Alatorre MD  07/23/17 0028

## 2017-07-23 NOTE — DISCHARGE INSTRUCTIONS
Discharge Instructions  Laceration (Cut)    You were seen today for a laceration (cut).  Your doctor examined your laceration for any problems such a buried foreign body (like glass, a splinter, or gravel), or injury to blood vessels, tendons, and nerves.  Your doctor may have also rinsed and/or scrubbed your laceration to help prevent an infection.  Your laceration may have been closed with glue, staples or sutures (stitches).      It may not be possible to find all problems with your laceration on the first visit, and we can't always prevent infections.  Antibiotics are only given when the benefit is more than the risk, and don't prevent all infections. Some lacerations are too high risk to close, and are left open to heal.  All lacerations, no matter how expertly repaired, will cause scarring.    Return to the Emergency Department right away if:    You have more redness, swelling, pain, drainage (pus), a bad smell, or red streaking from your laceration.      You have a fever of 101oF or more.    You have bleeding that you can t stop at home. If your cut starts to bleed, hold pressure on the bleeding area with a clean cloth or put pressure over the bandage.  If the bleeding doesn t stop after using constant pressure for 30 minutes, you should return to the Emergency Department for further treatment.    An area past the laceration is cool, pale, or blue compared with the other side, or has a slower return of color when squeezed.    Your dressing seems too tight or starts to get uncomfortable or painful.    You have loss of normal function or use of an area, such as being unable to straighten or bend a finger normally.    You have a numb area past the laceration.    Return to the Emergency Department or see your regular doctor if:    The laceration starts to come open.     You have something coming out of the cut or a feeling that there is something in the laceration.    Your wound will not heal, or keeps breaking  "open. There can always be glass, wood, dirt or other things in any wound.  They won t always show up, even on x-rays.  If a wound doesn t heal, this may be why, and it is important to follow-up with your regular doctor.    Home Care:    Take your dressing off in 12 hours, or as instructed by your doctor, to check your laceration. Remove the dressing sooner if it seems too tight or painful, or if it is getting numb, tingly, or pale past the dressing.    Gently wash your laceration 2 times a day with clean cloth and soap.     It is okay to shower, but do not let the laceration soak in water.      If your laceration was closed with wound adhesive or strips: pat it dry and leave it open to the air.     For all other repairs: after you wash your laceration, or at least 2 times a day, apply bacitracin or other antibiotic ointment to the laceration, then cover it with a Band-Aid  or gauze.    Keep the laceration clean. Wear gloves or other protective clothing if you are around dirt.    Follow-up:    You need to follow-up with your regular doctor in 9 days.    Your sutures or staples need to be removed in 9 days. Schedule an appointment with your regular doctor to have this done.    Scars:  To help minimize scarring:    Wear sunscreen over the healed laceration when out in the sun.    Massage the area regularly.    You may use Vitamin E oil.    Wait a year.  Most scars will start to fade within a year.    Probiotics: If you have been given an antibiotic, you may want to also take a probiotic pill or eat yogurt with live cultures. Probiotics have \"good bacteria\" to help your intestines stay healthy. Studies have shown that probiotics help prevent diarrhea and other intestine problems (including C. diff infection) when you take antibiotics. You can buy these without a prescription in the pharmacy section of the store.     If you were given a prescription for medicine here today, be sure to read all of the information " (including the package insert) that comes with your prescription.  This will include important information about the medicine, its side effects, and any warnings that you need to know about.  The pharmacist who fills the prescription can provide more information and answer questions you may have about the medicine.  If you have questions or concerns that the pharmacist cannot address, please call or return to the Emergency Department.     Remember that you can always come back to the Emergency Department if you are not able to see your regular doctor in the amount of time listed above, if you get any new symptoms, or if there is anything that worries you.  Discharge Instructions  Recurrent Seizure (Convulsion)    You were seen today for a seizure. The most common reason for a recurrent seizure is having missed a dose of your medication or taken it at a different time than normal. Other things that increase the risk of seizures include fever, sleep deprivation, alcohol, and stress. Although anti-seizure medications (anti-epileptic drugs) work for many people with seizure disorders, some people continue to have seizures even after trying several medications.    You need to follow-up with your doctor within 1-2 days.    Return to the Emergency Department if:     You develop a fever over 101.    You feel much more ill, or develop new symptoms like severe headache.    You have severe nausea or vomiting.    You have trouble walking, seeing, or develop weakness or numbness in your arms or legs.     What can I do to help myself?    Take your medication exactly as directed, at the right times, and the right doses.     If you develop uncomfortable side effects, don't stop taking your anti-seizure medication without first speaking to your physician.     Do not let your prescription run out. Stopping anti-seizure medication abruptly can put your at risk of a seizure.    While taking an anti-seizure medication, do not start  taking any other medications including over-the-counter medications and herbal supplements without first checking with your physician because mixing them can be dangerous.    Do not drive until you have been rechecked by your doctor and have been told it is safe to drive.  If you have a seizure while driving you may cause a motor vehicle accident with injury or death to yourself or others.     Do not swim, climb ladders, or do anything else that would be dangerous if you had another seizure or spell of loss of consciousness, until you are cleared by your doctor.      Check your state driving requirements for patients with seizures on the [employer]ile[employer] Website at www.epilepsyfoundation.org/resources/drivingandtravel.cfm.    Do not drink alcohol.  Drinking alcohol increases the risk of seizures and can interfere with the effect of anti-seizure medications.    Start a seizure calendar to record any seizure triggers, such as days when you were sleep-deprived, stressed, drank alcohol, or (if you are a woman) had your period.    Remember, if one medication does not work for you, either because you cannot tolerate the side effects or because you continue to have seizures, your physician can suggest alternate medications or alternate methods of taking the medication.  If you were given a prescription for medicine here today, be sure to read all of the information (including the package insert) that comes with your prescription.  This will include important information about the medicine, its side effects, and any warnings that you need to know about.  The pharmacist who fills the prescription can provide more information and answer questions you may have about the medicine.  If you have questions or concerns that the pharmacist cannot address, please call or return to the Emergency Department.     Remember that you can always come back to the Emergency Department if you are not able to see your regular doctor in the  amount of time listed above, if you get any new symptoms, or if there is anything that worries you.

## 2017-07-23 NOTE — PLAN OF CARE
Problem: Discharge Planning  Goal: Discharge Planning (Adult, OB, Behavioral, Peds)  Outcome: Improving  ROOM # 215     Living Situation (if not independent, order SW consult): lives with   [facility]     Activity level at baseline: ind  Activity level on admit: assist of 2 staff        Patient registered to observation; given Patient Bill of Rights; given the opportunity to ask questions about observation status and their plan of care.  Patient has been oriented to the observation room, bathroom and call light is in place.     Discussed discharge goals and expectations with patient/family.  They were made aware of a neurology consult for Sunday.   asked when MD would be here and did tell him that hopefully md would be here by noon but was unable to say time for sure.  He did voice concerns about her not being seen and incurring costs being on the observation unit.  Did give him and his daughter unit business cards with phone numbers.  Md had been called regarding their concerns and he said that there was nothing that would be changed at this time and that Dr Mcnamara and staff would see patient in am.  This was reiterated to patient and family.  Patient is hungry and is eating at this time.  Advancing diet.

## 2017-07-23 NOTE — PROGRESS NOTES
When asked of family to describe patient's seizures that she has, daughter stated that patient will start to jerk and stare off without focusing.  Then her eyes may roll back and she becomes stiff or rigid of her extremities.  Daughter states that this may last a minute or more.  Patient will then slowly awaken and not remember event.  She will also be very fatigued.  This may last for 3 hours or more.  Family states she has had epilepsy for over 28 years.  Will continue to monitor and assess.

## 2017-07-23 NOTE — PLAN OF CARE
Problem: Discharge Planning  Goal: Discharge Planning (Adult, OB, Behavioral, Peds)  Outcome: Improving  PRIMARY DIAGNOSIS:  FAll, AMS, Seizures  OUTPATIENT/OBSERVATION GOALS TO BE MET BEFORE DISCHARGE:  1. ADLs back to baseline: No      2. Activity and level of assistance: Up with maximum assistance. Consider SW and/or PT evaluation.  Patient unable to bear weight to stand when in ED.       3. Pain status: Improved-controlled with oral pain medications.  Patient has head laceration that was sutured in ED.  Denies discomfort at this time.  Does have tylenol ordered for mild pain      4. Return to near baseline physical activity:  Will need to reassess her tolerance to standing and walking.  Was not able to ambulate in ED          Discharge Planner Nurse   Safe discharge environment identified: Yes  Barriers to discharge: family is very involved with her care.  Her  states he helps with her decision making when she is not able to        Entered by: Willa Harper 07/23/2017 1:34 AM     Please review provider order for any additional goals.   Nurse to notify provider when observation goals have been met and patient is ready for discharge.     She will be reassessed in am by neurology.  Gave family unit business cards.   had a lot of questions in regards to when patient would be seen today.  Did reiterate with him about neurology would be seeing her today.  Tried to offer answers within scope of this writer's practice.  Will continue to monitor and assess patient.

## 2017-07-23 NOTE — PROGRESS NOTES
"Pt walked in patrick, tolerated well. Daughter reports pt is back to baseline. When asking pt's family about bringing in home oxygen for discharge. Pt's family reported \"we live close by, she doesn't need it\". When RN asked how far away pt's family reported \"10 minutes\". Pt is on 4L O2 baseline. Pt reported \"I do it all the time\". PA notified and discussed risk of refusing to use home oxygen for discharge/travel\".  "

## 2017-07-23 NOTE — H&P
[facility]  Hospitalist Admission Note  Name: Shana Horne    MRN: [id number]  YOB: 1950    Age: 66 year old  Date of admission: 7/22/2017  Primary care provider: Otoniel Capellan    Chief Complaint:  Seizures, Weakness, Diarrhea    Assessment and Plan:     Shana Horne is a 66 year old female with PMH including seizure disorder, RSD with chronic pain (morphine pump), COPD on home oxygen (4L continuously) and depression who was admitted 07/22/17 with 1-2 weeks of diarrhea and increased seizures (2-3 today) now with profound weakness.    1. Seizures: History of seizure disorder.  Normally has 4-5 total seizures per year but lately they seem to be increasing.  Had 2 or 3 seizures today which resulted in significant weakness and prompted ER visit.  She is confused at this time, suspect she is still post ictal. She was given her regular antiepileptics in the ER.  No further seizure activity since here.  Will monitor overnight.  Neurology will also be consulted to see if medications need to be adjusted.  Of note  is very nervous about changes with meds as any changes have resulted in a period of increased seizures in the past.  If she is clear by tomorrow could consider discharge and follow up with her regular Neurologist through the [facility].  - Continue PTA Felbamate and Lamotrigine  - Neurology consult  - Seizure Precautions    2. Encephalopathy: Patient is confused and cannot provide accurate history.  She had seizures today as above.  Labs largely unremarkable.  She is afebrile.  I suspect that her encephalopathy is due to a post ictal statue.  See above for treatment.    3. Diffuse Weakness: Has had imbalance for the past few weeks but had been able to ambulate.  After seizures today was unable to ambulate independently.  Still unable to do this in the ER.  Suspect that this is due to seizures.  CT head without acute abnormalities.  Will monitor, if weakness is  persistent can have PT see her.    4. Diarrhea: Reportedly 1-2 weeks of diarrhea.  Patient cannot give further information about this.   states she has been eating little other than sweets for the past few weeks.  No fever, leukocytosis and abdominal exam benign.  Monitor and if significant diarrhea then can check stool studies.  - NS at 125 mL/hour    5. COPD: On home oxygen at 4L continuously.  Denies recent changes in breathing, CXR without acute infiltrates.  PRN Duonebs and Albuterol nebs ordered.  She does have mild hypercarbia but expect this is chronic.  She is still smoking.  - Continue supplemental oxygen    6. Depression: Hold Amitriptyline and Prozac given observation status.    7. Tobacco Use Disorder: Not interested in quitting per her family, still actively smoking.    DVT Prophylaxis: Low Risk/Ambulatory with no VTE prophylaxis indicated  Code Status: Full Code  Discharge Dispo: Admit to observation status  Estimated Disch Date / # of Days until Disch: Suspect less than 48 hours      History of Present Illness:  Shana Horne is a 66 year old female with PMH including seizure disorder, RSD with chronic pain (morphine pump), COPD on home oxygen (4L continuously) and depression who was admitted 07/22/17 with 1-2 weeks of diarrhea and increased seizures (2-3 today) now with profound weakness.  History was obtained through patient interview (very limited), discussion with patient's  and daughter, chart review and discussion with Dr. Piña in the ER.    The patient has been unsteady for the past few weeks.  She has still been ambulating but her family notes that her equilibrium seems off.  She has also not been eating well.  Her  reports that she really has just been eating sweets and not drinking much.  She has had diarrhea for the past 1-2 weeks but has not been complaining of fevers, chills or abdominal pain.      Today her  found her in the bathroom after a seizure  between 4 and 5 AM.  Usually when she has a seizure she is completely wiped out for the rest of the day.  She had another seizure at about 1:30 PM today.  Around 7-8 PM he heard a loud thump and found her on the base of the bed with blood on her head as well as in the bathroom.  He is not sure if she had another seizure at that time as he did not witness it.  At that time she was so weak she could not ambulate independently so he brought her to the ER.      She normally has 4-5 seizures per year.  Her daughter moved out in May and had been her primary care giver.  She did have a few seizures around that time but things seemed to settle down.  She has either her daughter  give her her medications to ensure that she is taking them.  She has not missed medications recently.  She last saw her Neurologist about 2 months ago, at that time no medication changes were made.      The patient repeatedly states she wants to go home and cannot really provide any history.     Past Medical History:  Past Medical History:   Diagnosis Date     Arthritis      Blood transfusion     38 yrs ago     Chronic pain     painful feet     Numbness and tingling     fingertips occas     Seizures (H)     LAST SEIZURE 2 MOS AGO     Past Surgical History:  Past Surgical History:   Procedure Laterality Date     COLONOSCOPY  11/20/2014    Dr. Schaeffer [facility]     COLONOSCOPY N/A 11/20/2014    Procedure: COLONOSCOPY;  Surgeon: Steven Schaeffer MD;  Location:  GI     GYN SURGERY      tear repair after delivery child     INSERT PUMP MORPHINE      pump replacement x2     MOUTH SURGERY      numerous times     ORTHOPEDIC SURGERY      foot surg left     REVISE PUMP MORPHINE  11/29/2011    Procedure:REVISE PUMP MORPHINE; Pump Replacement, abdominal area; Surgeon:ALIRIO RIVERA; Location: OR     Social History:  Social History   Substance Use Topics     Smoking status: Current Every Day Smoker     Packs/day: 0.50     Smokeless tobacco: Not on file      Alcohol use No     Social History     Social History Narrative     Family History:  Reviewed and non contributory to the case.    Allergies:  No Known Allergies  Medications:  No current facility-administered medications for this encounter.      Current Outpatient Prescriptions   Medication     felbamate (FELBATOL) 600 MG tablet     LamoTRIgine (LAMICTAL) 100 MG tablet     simvastatin (ZOCOR) 40 MG tablet     Multiple Vitamin (MULTI-VITAMIN) per tablet     amitriptyline (ELAVIL) 75 MG tablet     FLUoxetine (PROZAC) 40 MG capsule      Review of Systems:  A Comprehensive greater than 10 system review of systems was carried out.  Pertinent positives and negatives are noted above.  Otherwise negative for contributory information.     Physical Exam:  Blood pressure (!) 149/94, pulse 94, temperature 97.9  F (36.6  C), resp. rate 18, SpO2 98 %.  Wt Readings from Last 1 Encounters:   11/20/14 54.4 kg (120 lb)     Exam:   General: Alert, awake, confused but no acute distress.  HEENT: NC/AT, eyes anicteric and without injection, EOMI, face symmetric.  Dentition WNL, MMM.  Cardiac: RRR, normal S1, S2.  No murmurs/g/r.  No LE edema  Pulmonary: Normal chest rise, normal work of breathing.  Decreased breath sounds diffusely but no wheezing or crackles  Abdomen: soft, non-tender, non-distended.  Normoactive BS.  No guarding or rebound tenderness.  Pain pump in left mid/lower abdomen  Extremities: no deformities.  Warm, well perfused.  Skin: no rashes or lesions noted.  Warm and Dry.  Neuro: No focal deficits noted.  Slow to respond and at times speech slightly slurred.  Spontaneously moving all extremities in bed but diffusely weak and unable to ambulate  Psych: Flat affect. Oriented to person and place but not situation.    Data Reviewed Today:  Imaging:  Results for orders placed or performed during the hospital encounter of 07/22/17   Head CT w/o contrast    Narrative    CT SCAN OF THE HEAD WITHOUT CONTRAST   7/22/2017 9:49  PM     HISTORY: Seizures, altered mental status.    TECHNIQUE:  Axial images of the head and coronal reformations without  IV contrast material.  Radiation dose for this scan was reduced using  automated exposure control, adjustment of the mA and/or kV according  to patient size, or iterative reconstruction technique.    COMPARISON: None.    FINDINGS:  The ventricles are normal in size, shape and configuration.   The brain parenchyma and subarachnoid spaces are normal. There is no  evidence of intracranial hemorrhage, mass, acute infarct or anomaly.     The visualized portions of the sinuses and mastoids appear normal.  There is no evidence of trauma.      Impression    IMPRESSION: Normal CT scan of the head.         DENIA ROY MD   Chest XR,  PA & LAT    Narrative    CHEST TWO VIEWS    7/22/2017 10:00 PM     HISTORY: COPD, altered mental status.    COMPARISON: None.      Impression    IMPRESSION: Cardiomegaly, hyperinflated lung zones, and a stimulator  device with its tip projecting left lower cervical region are noted.  There is also some prominent interstitial markings which appear  chronic. There is also a calcified granuloma in the left base. No  alveolar infiltrates are present. No pleural effusions.     DENIA ROY MD     Labs:    Recent Labs  Lab 07/22/17  2120   WBC 5.7   HGB 12.4   HCT 39.0   MCV 94          Recent Labs  Lab 07/22/17  2120      POTASSIUM 4.3   CHLORIDE 101   CO2 32   ANIONGAP 4   GLC 85   BUN 16   CR 1.11*   GFRESTIMATED 49*   GFRESTBLACK 60*   BIBIANA 9.4       Sandi Wong MD  Hospitalist  [facility]

## 2017-07-23 NOTE — DISCHARGE SUMMARY
[facility] Observation Unit Discharge Summary          Shana Horne MRN# [id number]   Age: 66 year old YOB: 1950     Date of Admission:  7/22/2017  Date of Discharge::  7/23/2017  Admitting Physician:  Sandi Wong MD  Discharge Physician:  Jaycee Rodney PA-C  Primary Physician: Otoniel Capellan     Primary Discharge Diagnoses:   Recurrent Seizure  Closed Head Injury with Scalp Laceration s/p 3 staples  Encephalopathy, resolved  Diffuse Weakness, resolved  Diarrhea     Secondary Discharge Diagnoses:   Epilepsy  RSD with chronic Morphine pain pump  COPD on home oxygen 4 liters  Depression  Tobacco Use     Hospital Course:   For detail history, please refer to H & P from 7/22/2017. In brief, this is a 66 year old female with PMH including seizure disorder, RSD with chronic pain (morphine pump), COPD on home oxygen (4L continuously) and depression who was admitted 07/22/17 with 1-2 weeks of diarrhea and increased seizures (2-3 the day of admission) with profound weakness.    Patient is known to have a hx of a seizure disorder.  Normally has 4-5 total seizures per year but lately they seem to be increasing.  Had 2 or 3 seizures on 7/22 which resulted in significant weakness and prompted ER visit.  She was confused at the time of admission with diffuse weakness, suspected to be post ictal.  CT head and CXR were negative.  She received 3 staples to her scalp for a laceration. She was given her regular antiepileptics in the ER.      Patient was admitted to the observation unit.  She remained hemodynamically stable and afebrile.  She had no further seizure activity.  Her mental status improved to her baseline and she was able to ambulate and tolerate a diet.  She had one episode of diarrhea without abdominal pain.  Neurology had been consulted.  Patient and family preferred to discharge home to follow up with their regular Neurology clinic.  No medication changes were made.   Lamotrigine and Felbamate levels are pending at the time of discharge.  Patient and family deferred any further work up for her diarrhea while hospitalized.   Patient is chronically on 4 liters of oxygen at baseline for COPD which was stable while hospitalized.  Family presented for discharge and did not bring an oxygen tank.  Family and patient refused to use oxygen while transporting the patient home.  I educated them on the risks of death transporting without oxygen.  They understood and requested discharge.  Patient to follow up with Neurology within one week.  She is to follow up with her PCP for staple removal within 7-10 days.    Procedures/Imaging:     Results for orders placed or performed during the hospital encounter of 07/22/17   Head CT w/o contrast    Narrative    CT SCAN OF THE HEAD WITHOUT CONTRAST   7/22/2017 9:49 PM     HISTORY: Seizures, altered mental status.    TECHNIQUE:  Axial images of the head and coronal reformations without  IV contrast material.  Radiation dose for this scan was reduced using  automated exposure control, adjustment of the mA and/or kV according  to patient size, or iterative reconstruction technique.    COMPARISON: None.    FINDINGS:  The ventricles are normal in size, shape and configuration.   The brain parenchyma and subarachnoid spaces are normal. There is no  evidence of intracranial hemorrhage, mass, acute infarct or anomaly.     The visualized portions of the sinuses and mastoids appear normal.  There is no evidence of trauma.      Impression    IMPRESSION: Normal CT scan of the head.         DENIA ROY MD   Chest XR,  PA & LAT    Narrative    CHEST TWO VIEWS    7/22/2017 10:00 PM     HISTORY: COPD, altered mental status.    COMPARISON: None.      Impression    IMPRESSION: Cardiomegaly, hyperinflated lung zones, and a stimulator  device with its tip projecting left lower cervical region are noted.  There is also some prominent interstitial markings which  appear  chronic. There is also a calcified granuloma in the left base. No  alveolar infiltrates are present. No pleural effusions.     DENIA ROY MD       Consultations:   None    Code Status:   Full    Allergies:   No Known Allergies     Subjective:   Patient reports a mild headache at the site of the suture placement.  No chest pain, shortness of breath, abdominal pain, nausea, emesis.  She had one loose stool today.  Tolerated a diet well and ambulated without difficulty.  Reports she does not like her current Neurologist and has seen several in the past she has not cared for in that group.  Although, she does not want to stay to obtain a Neurology consult here today.  Would rather discharge home with follow up with her home clinic and PCP.    Physical Exam:   /65 (BP Location: Left arm)  Pulse 84  Temp 98.5  F (36.9  C) (Oral)  Resp 18  SpO2 92%  Temp (24hrs), Av.4  F (36.9  C), Min:97.9  F (36.6  C), Max:98.8  F (37.1  C)    Blood pressure 116/65, pulse 84, temperature 98.5  F (36.9  C), temperature source Oral, resp. rate 18, SpO2 92 %. on 4L.    General: Alert, interactive, NAD, lying in bed.   at the bedside  HEENT: Anterior mid scalp with 3 staples in place with dried blood present.  Resp: clear to auscultation bilaterally, no crackles or wheezes  Cardiac: regular rate and rhythm, no murmur  Abdomen: Soft, nontender, nondistended. +BS.  No HSM or masses, no rebound or guarding.  Pump in place under the skin.  Extremities: No LE edema  Skin: Warm and dry  Neuro: Alert & oriented x 3, Cns 2-12 intact, moves all extremities equally     Discharge Medicatios:        Discharge Medication List as of 2017 10:57 AM      START taking these medications    Details   Neomycin-Bacitracin-Polymyxin (TRIPLE ANTIBIOTIC) 3.5-400-5000 OINT ointment Apply 1 g topically 2 times daily Apply to scalp woundDisp-28.3 g, [id number]         CONTINUE these medications which have NOT CHANGED    Details    QUEtiapine Fumarate (SEROQUEL PO) Take 25 mg by mouth At Bedtime, Historical      FERROUS GLUCONATE PO Take 324 mg by mouth 2 times daily (with meals), Historical      GABAPENTIN PO Take 200 mg by mouth 2 times daily Am and Noon, Historical      !! Felbamate (FELBATOL PO) Take 1,200 mg by mouth every morning, Historical      !! Felbamate (FELBATOL PO) Take 600 mg by mouth 2 times daily At Noon and Evening, Historical      LamoTRIgine (LAMICTAL) 100 MG tablet Take 100 mg by mouth 3 times daily., Historical      simvastatin (ZOCOR) 40 MG tablet Take 40 mg by mouth every morning., Historical      amitriptyline (ELAVIL) 75 MG tablet Take 75 mg by mouth At Bedtime., Historical      Multiple Vitamin (MULTI-VITAMIN) per tablet Take 1 tablet by mouth daily., Historical       !! - Potential duplicate medications found. Please discuss with provider.          Instructions Given to Patient as Discharge:     Discharge Procedure Orders  Follow-up and recommended labs and tests    Order Comments: Follow up with your Neurology clinic this week.      Follow up with your PCP in 7-10 days for staple removal.     Activity   Order Comments: Your activity upon discharge: activity as tolerated with supervision in the home.  No driving.   Order Specific Question Answer Comments   Is discharge order? Yes      When to contact your care team   Order Comments: Notify for fever >101.5; black or bloody stools; severe, persistent, or worsening nausea, vomiting, abdominal pain or distention, diarrhea, constipation; chest pain, difficulty breathing, swelling; dizziness, weakness, lightheadedness, fainting.  Proceed to the nearest emergency room for any urgent concerns.     Wound care and dressings   Order Comments: Instructions to care for your wound at home: keep wound clean and dry and may get incision wet in shower but do not soak or scrub.     Reason for your hospital stay   Order Comments: You were hospitalized after having a seizure and a  fall hitting your head which required 3 staples.  Your mentation improved and your preferred to be discharged to follow up with your outpatient Neurology clinic rather than wait for a Neurology consult here.  You have been having diarrhea for which you deferred any further work up here.  Stool sample ordered were placed and you can return your sample to the [facility] lab or follow up with your PCP for further testing.     Full Code     C. difficile, PCR   Standing Status: Future  Standing Exp. Date: 09/21/17     Enteric Bacteria and Virus Panel by [facility] Stool   Standing Status: Future  Standing Exp. Date: 07/23/18     Diet   Order Comments: Follow this diet upon discharge: Orders Placed This Encounter     Advance Diet as Tolerated: Regular Diet Adult   Order Specific Question Answer Comments   Is discharge order? Yes          Pending Tests at Discharge:   Lamotrigine Level  Felbamate Level    Discharge Disposition:   Discharged to home     Jaycee Rodney MS, PA-C  Hospitalist Service  Pager [phone]    >30 minutes was spent in discharge planning, care coordination, physical examination and medication reconciliation on the date of discharge, 7/23/2017

## 2017-07-23 NOTE — ED NOTES
Discussed with pt family about options for staying in observation or going home. My recommendation was to stay since pt is still a heavy two person transfer. While getting up to commode pt was unable to hold own body weight without assistance. Discussed layout of home with  and how he was planning on getting her into the car and up the stairs at home since they live in a split level house.  said they would use wheelchair and he would carry her. Daughter expressing concern as well about her father being able to keep her in bed all night without her trying to get up and having another accident. Allowed family time to discuss their decision and spoke with MD about my concerns. Pt still has not returned to baseline neuro status per hx from family. Still strong speech delays and lethargy.

## 2017-07-23 NOTE — PROGRESS NOTES
Md was made aware of pt have low bp readings with normal hr and map of 68.  No orders, this is documented as her baseline from previous notes.  Also states until she has a loose stool not to proceed with isolation, to let md know when or if she has a loose stool.

## 2017-07-23 NOTE — PHARMACY-ADMISSION MEDICATION HISTORY
Admission medication history interview status for this patient is complete. See [facility] admission navigator for allergy information, prior to admission medications and immunization status.     Medication history interview source(s):daughter  Medication history resources (including written lists, pill bottles, clinic record):None    Changes made to PTA medication list:  Added: Seroquel, Gabapentin, Ferrous Gluconate  Deleted: Prozac  Changed: Felbatol     Actions taken by pharmacist (provider contacted, etc):None     Additional medication history information:called [facility] [phone] to verify dose of seroquel, gabapentin, Ferrous Glucoante    Medication reconciliation/reorder completed by provider prior to medication history? Yes    For patients on insulin therapy: no (Yes/No)   Lantus/levemir/NPH/Mix 70/30 dose: _____ in AM/PM or twice daily   Sliding scale Novolog Y/N   If Yes, do you have a baseline novolog pre-meal dose: ______units with meals   Patients eat three meals a day: Y/N   Any Barriers to therapy: cost of medications/comfortable with giving injections (if applicable)/ comfortable and confident with current diabetes regimen       Prior to Admission medications    Medication Sig Last Dose Taking? Auth Provider   QUEtiapine Fumarate (SEROQUEL PO) Take 25 mg by mouth At Bedtime 7/21/2017 at Unknown time Yes Unknown, Entered By History   FERROUS GLUCONATE PO Take 324 mg by mouth 2 times daily (with meals) 7/22/2017 at am Yes Unknown, Entered By History   GABAPENTIN PO Take 200 mg by mouth 2 times daily Am and Noon 7/22/2017 at 2nd-1200 Yes Unknown, Entered By History   Felbamate (FELBATOL PO) Take 1,200 mg by mouth every morning 7/22/2017 at AM Yes Unknown, Entered By History   Felbamate (FELBATOL PO) Take 600 mg by mouth 2 times daily At Noon and Evening 7/22/2017 at 2230 Yes Unknown, Entered By History   LamoTRIgine (LAMICTAL) 100 MG tablet Take 100 mg by mouth 3 times daily. 7/22/2017 at 3rd-2230 Yes  Reported, Patient   simvastatin (ZOCOR) 40 MG tablet Take 40 mg by mouth every morning. 7/22/2017 at am Yes Reported, Patient   amitriptyline (ELAVIL) 75 MG tablet Take 75 mg by mouth At Bedtime. 7/21/2017 at Unknown time Yes Reported, Patient   Multiple Vitamin (MULTI-VITAMIN) per tablet Take 1 tablet by mouth daily. More than a month at Unknown time  Reported, Patient

## 2017-07-24 LAB
INTERPRETATION ECG - MUSE: NORMAL
LAMOTRIGINE SERPL-MCNC: 7 UG/ML

## 2017-07-26 LAB — FELBAMATE SERPL-MCNC: 118 UG/ML

## 2017-07-28 LAB
BACTERIA SPEC CULT: NO GROWTH
Lab: NORMAL
MICRO REPORT STATUS: NORMAL
SPECIMEN SOURCE: NORMAL

## 2017-10-10 ENCOUNTER — HOSPITAL ENCOUNTER (OUTPATIENT)
Facility: CLINIC | Age: 67
Discharge: HOME | End: 2017-10-10
Attending: ANESTHESIOLOGY | Admitting: ANESTHESIOLOGY
Payer: COMMERCIAL

## 2017-10-10 ENCOUNTER — SURGERY (OUTPATIENT)
Age: 67
End: 2017-10-10

## 2017-10-10 ENCOUNTER — ANESTHESIA (OUTPATIENT)
Dept: SURGERY | Facility: CLINIC | Age: 67
End: 2017-10-10
Payer: COMMERCIAL

## 2017-10-10 ENCOUNTER — ANESTHESIA EVENT (OUTPATIENT)
Dept: SURGERY | Facility: CLINIC | Age: 67
End: 2017-10-10
Payer: COMMERCIAL

## 2017-10-10 PROCEDURE — 40000010 ZZH STATISTIC ANES STAT CODE-CRNA PER MINUTE: Performed by: ANESTHESIOLOGY

## 2017-10-10 PROCEDURE — 96522 REFILL/MAINT PUMP/RESVR SYST: CPT

## 2017-10-10 PROCEDURE — 25000128 H RX IP 250 OP 636

## 2017-10-10 PROCEDURE — 25800025 ZZH RX 258

## 2017-10-10 PROCEDURE — 27210995 ZZH RX 272

## 2017-10-18 NOTE — PHARMACY-ADMISSION MEDICATION HISTORY
Admission medication history interview status for this patient is complete. See [facility] admission navigator for allergy information, prior to admission medications and immunization status.     PTA med list completed by pre-admitting nurse,   Rebecca Meza RN Tue Oct 17, 2017 11:56 AM       Prior to Admission medications    Medication Sig Last Dose Taking? Auth Provider   QUEtiapine Fumarate (SEROQUEL PO) Take 25 mg by mouth At Bedtime  Yes Unknown, Entered By History   FERROUS GLUCONATE PO Take 324 mg by mouth 2 times daily (with meals)  Yes Unknown, Entered By History   GABAPENTIN PO Take 200 mg by mouth 2 times daily Am and Noon  Yes Unknown, Entered By History   Felbamate (FELBATOL PO) Take 1,200 mg by mouth every morning  Yes Unknown, Entered By History   Felbamate (FELBATOL PO) Take 600 mg by mouth 2 times daily At Noon and Evening  Yes Unknown, Entered By History   LamoTRIgine (LAMICTAL) 100 MG tablet Take 100 mg by mouth 3 times daily.  Yes Reported, Patient   simvastatin (ZOCOR) 40 MG tablet Take 40 mg by mouth At Bedtime   Yes Reported, Patient   Multiple Vitamin (MULTI-VITAMIN) per tablet Take 1 tablet by mouth daily.  Yes Reported, Patient   amitriptyline (ELAVIL) 75 MG tablet Take 75 mg by mouth At Bedtime.  Yes Reported, Patient

## 2017-10-19 ENCOUNTER — HOSPITAL ENCOUNTER (INPATIENT)
Facility: CLINIC | Age: 67
LOS: 1 days | Discharge: HOME | DRG: 041 | End: 2017-10-20
Attending: ORTHOPAEDIC SURGERY | Admitting: INTERNAL MEDICINE
Payer: COMMERCIAL

## 2017-10-19 ENCOUNTER — ANESTHESIA EVENT (OUTPATIENT)
Dept: SURGERY | Facility: CLINIC | Age: 67
DRG: 041 | End: 2017-10-19
Payer: COMMERCIAL

## 2017-10-19 ENCOUNTER — ANESTHESIA (OUTPATIENT)
Dept: SURGERY | Facility: CLINIC | Age: 67
DRG: 041 | End: 2017-10-19
Payer: COMMERCIAL

## 2017-10-19 DIAGNOSIS — M25.571 ARTHRALGIA OF RIGHT FOOT: Primary | ICD-10-CM

## 2017-10-19 PROBLEM — R56.9 SEIZURES (H): Status: ACTIVE | Noted: 2017-10-19

## 2017-10-19 LAB
ANION GAP SERPL CALCULATED.3IONS-SCNC: 4 MMOL/L (ref 3–14)
BUN SERPL-MCNC: 23 MG/DL (ref 7–30)
CALCIUM SERPL-MCNC: 8.6 MG/DL (ref 8.5–10.1)
CHLORIDE SERPL-SCNC: 100 MMOL/L (ref 94–109)
CO2 SERPL-SCNC: 31 MMOL/L (ref 20–32)
CREAT SERPL-MCNC: 1.02 MG/DL (ref 0.52–1.04)
ERYTHROCYTE [DISTWIDTH] IN BLOOD BY AUTOMATED COUNT: 13.9 % (ref 10–15)
GFR SERPL CREATININE-BSD FRML MDRD: 54 ML/MIN/1.7M2
GLUCOSE SERPL-MCNC: 115 MG/DL (ref 70–99)
HCT VFR BLD AUTO: 32.4 % (ref 35–47)
HGB BLD-MCNC: 10.5 G/DL (ref 11.7–15.7)
MAGNESIUM SERPL-MCNC: 2.1 MG/DL (ref 1.6–2.3)
MCH RBC QN AUTO: 30 PG (ref 26.5–33)
MCHC RBC AUTO-ENTMCNC: 32.4 G/DL (ref 31.5–36.5)
MCV RBC AUTO: 93 FL (ref 78–100)
PLATELET # BLD AUTO: 220 10E9/L (ref 150–450)
POTASSIUM SERPL-SCNC: 4.6 MMOL/L (ref 3.4–5.3)
RBC # BLD AUTO: 3.5 10E12/L (ref 3.8–5.2)
SODIUM SERPL-SCNC: 135 MMOL/L (ref 133–144)
WBC # BLD AUTO: 5.5 10E9/L (ref 4–11)

## 2017-10-19 PROCEDURE — 80048 BASIC METABOLIC PNL TOTAL CA: CPT | Performed by: INTERNAL MEDICINE

## 2017-10-19 PROCEDURE — 12000007 ZZH R&B INTERMEDIATE

## 2017-10-19 PROCEDURE — 99207 ZZC CDG-MDM COMPONENT: MEETS LOW - DOWN CODED: CPT | Performed by: INTERNAL MEDICINE

## 2017-10-19 PROCEDURE — 71000027 ZZH RECOVERY PHASE 2 EACH 15 MINS: Performed by: ORTHOPAEDIC SURGERY

## 2017-10-19 PROCEDURE — 25000128 H RX IP 250 OP 636: Performed by: ANESTHESIOLOGY

## 2017-10-19 PROCEDURE — 85027 COMPLETE CBC AUTOMATED: CPT | Performed by: INTERNAL MEDICINE

## 2017-10-19 PROCEDURE — 25800025 ZZH RX 258: Performed by: INTERNAL MEDICINE

## 2017-10-19 PROCEDURE — 83735 ASSAY OF MAGNESIUM: CPT | Performed by: INTERNAL MEDICINE

## 2017-10-19 PROCEDURE — 36000050 ZZH SURGERY LEVEL 2 1ST 30 MIN: Performed by: ORTHOPAEDIC SURGERY

## 2017-10-19 PROCEDURE — 37000009 ZZH ANESTHESIA TECHNICAL FEE, EACH ADDTL 15 MIN: Performed by: ORTHOPAEDIC SURGERY

## 2017-10-19 PROCEDURE — 25000132 ZZH RX MED GY IP 250 OP 250 PS 637: Performed by: INTERNAL MEDICINE

## 2017-10-19 PROCEDURE — 0Y6R0Z0 DETACHMENT AT RIGHT 2ND TOE, COMPLETE, OPEN APPROACH: ICD-10-PCS | Performed by: ORTHOPAEDIC SURGERY

## 2017-10-19 PROCEDURE — 25000125 ZZHC RX 250: Performed by: NURSE ANESTHETIST, CERTIFIED REGISTERED

## 2017-10-19 PROCEDURE — 88311 DECALCIFY TISSUE: CPT | Mod: 26 | Performed by: ORTHOPAEDIC SURGERY

## 2017-10-19 PROCEDURE — 36415 COLL VENOUS BLD VENIPUNCTURE: CPT | Performed by: INTERNAL MEDICINE

## 2017-10-19 PROCEDURE — 88311 DECALCIFY TISSUE: CPT | Performed by: ORTHOPAEDIC SURGERY

## 2017-10-19 PROCEDURE — 99222 1ST HOSP IP/OBS MODERATE 55: CPT | Performed by: INTERNAL MEDICINE

## 2017-10-19 PROCEDURE — 27210794 ZZH OR GENERAL SUPPLY STERILE: Performed by: ORTHOPAEDIC SURGERY

## 2017-10-19 PROCEDURE — 40000306 ZZH STATISTIC PRE PROC ASSESS II: Performed by: ORTHOPAEDIC SURGERY

## 2017-10-19 PROCEDURE — 36000052 ZZH SURGERY LEVEL 2 EA 15 ADDTL MIN: Performed by: ORTHOPAEDIC SURGERY

## 2017-10-19 PROCEDURE — 37000008 ZZH ANESTHESIA TECHNICAL FEE, 1ST 30 MIN: Performed by: ORTHOPAEDIC SURGERY

## 2017-10-19 PROCEDURE — 25000566 ZZH SEVOFLURANE, EA 15 MIN: Performed by: ORTHOPAEDIC SURGERY

## 2017-10-19 PROCEDURE — 25000132 ZZH RX MED GY IP 250 OP 250 PS 637: Performed by: ORTHOPAEDIC SURGERY

## 2017-10-19 PROCEDURE — 71000013 ZZH RECOVERY PHASE 1 LEVEL 1 EA ADDTL HR: Performed by: ORTHOPAEDIC SURGERY

## 2017-10-19 PROCEDURE — 88305 TISSUE EXAM BY PATHOLOGIST: CPT | Performed by: ORTHOPAEDIC SURGERY

## 2017-10-19 PROCEDURE — 88305 TISSUE EXAM BY PATHOLOGIST: CPT | Mod: 26 | Performed by: ORTHOPAEDIC SURGERY

## 2017-10-19 PROCEDURE — 25000128 H RX IP 250 OP 636: Performed by: NURSE ANESTHETIST, CERTIFIED REGISTERED

## 2017-10-19 PROCEDURE — 25000125 ZZHC RX 250: Performed by: ANESTHESIOLOGY

## 2017-10-19 PROCEDURE — 25000125 ZZHC RX 250: Performed by: ORTHOPAEDIC SURGERY

## 2017-10-19 PROCEDURE — 71000012 ZZH RECOVERY PHASE 1 LEVEL 1 FIRST HR: Performed by: ORTHOPAEDIC SURGERY

## 2017-10-19 PROCEDURE — 25000128 H RX IP 250 OP 636: Performed by: INTERNAL MEDICINE

## 2017-10-19 RX ORDER — NALOXONE HYDROCHLORIDE 0.4 MG/ML
.1-.4 INJECTION, SOLUTION INTRAMUSCULAR; INTRAVENOUS; SUBCUTANEOUS
Status: DISCONTINUED | OUTPATIENT
Start: 2017-10-19 | End: 2017-10-20 | Stop reason: HOSPADM

## 2017-10-19 RX ORDER — ONDANSETRON 4 MG/1
4 TABLET, ORALLY DISINTEGRATING ORAL EVERY 6 HOURS PRN
Status: DISCONTINUED | OUTPATIENT
Start: 2017-10-19 | End: 2017-10-20 | Stop reason: HOSPADM

## 2017-10-19 RX ORDER — POTASSIUM CL/LIDO/0.9 % NACL 10MEQ/0.1L
10 INTRAVENOUS SOLUTION, PIGGYBACK (ML) INTRAVENOUS
Status: DISCONTINUED | OUTPATIENT
Start: 2017-10-19 | End: 2017-10-20 | Stop reason: HOSPADM

## 2017-10-19 RX ORDER — FENTANYL CITRATE 50 UG/ML
INJECTION, SOLUTION INTRAMUSCULAR; INTRAVENOUS PRN
Status: DISCONTINUED | OUTPATIENT
Start: 2017-10-19 | End: 2017-10-19

## 2017-10-19 RX ORDER — ONDANSETRON 2 MG/ML
INJECTION INTRAMUSCULAR; INTRAVENOUS PRN
Status: DISCONTINUED | OUTPATIENT
Start: 2017-10-19 | End: 2017-10-19

## 2017-10-19 RX ORDER — SODIUM CHLORIDE, SODIUM LACTATE, POTASSIUM CHLORIDE, CALCIUM CHLORIDE 600; 310; 30; 20 MG/100ML; MG/100ML; MG/100ML; MG/100ML
INJECTION, SOLUTION INTRAVENOUS CONTINUOUS
Status: DISCONTINUED | OUTPATIENT
Start: 2017-10-19 | End: 2017-10-19 | Stop reason: HOSPADM

## 2017-10-19 RX ORDER — GABAPENTIN 100 MG/1
200 CAPSULE ORAL
Status: DISCONTINUED | OUTPATIENT
Start: 2017-10-19 | End: 2017-10-20 | Stop reason: HOSPADM

## 2017-10-19 RX ORDER — DROPERIDOL 2.5 MG/ML
0.62 INJECTION, SOLUTION INTRAMUSCULAR; INTRAVENOUS
Status: DISCONTINUED | OUTPATIENT
Start: 2017-10-19 | End: 2017-10-19 | Stop reason: RX

## 2017-10-19 RX ORDER — FELBAMATE 600 MG/1
1200 TABLET ORAL EVERY MORNING
Status: DISCONTINUED | OUTPATIENT
Start: 2017-10-20 | End: 2017-10-20 | Stop reason: HOSPADM

## 2017-10-19 RX ORDER — MAGNESIUM SULFATE HEPTAHYDRATE 40 MG/ML
4 INJECTION, SOLUTION INTRAVENOUS EVERY 4 HOURS PRN
Status: DISCONTINUED | OUTPATIENT
Start: 2017-10-19 | End: 2017-10-20 | Stop reason: HOSPADM

## 2017-10-19 RX ORDER — EPHEDRINE SULFATE 50 MG/ML
INJECTION, SOLUTION INTRAVENOUS PRN
Status: DISCONTINUED | OUTPATIENT
Start: 2017-10-19 | End: 2017-10-19

## 2017-10-19 RX ORDER — LAMOTRIGINE 100 MG/1
100 TABLET ORAL 3 TIMES DAILY
Status: DISCONTINUED | OUTPATIENT
Start: 2017-10-19 | End: 2017-10-20 | Stop reason: HOSPADM

## 2017-10-19 RX ORDER — FENTANYL CITRATE 50 UG/ML
25-50 INJECTION, SOLUTION INTRAMUSCULAR; INTRAVENOUS
Status: DISCONTINUED | OUTPATIENT
Start: 2017-10-19 | End: 2017-10-19 | Stop reason: HOSPADM

## 2017-10-19 RX ORDER — CEFAZOLIN SODIUM 1 G/3ML
INJECTION, POWDER, FOR SOLUTION INTRAMUSCULAR; INTRAVENOUS PRN
Status: DISCONTINUED | OUTPATIENT
Start: 2017-10-19 | End: 2017-10-19

## 2017-10-19 RX ORDER — DIMENHYDRINATE 50 MG/ML
25 INJECTION, SOLUTION INTRAMUSCULAR; INTRAVENOUS
Status: DISCONTINUED | OUTPATIENT
Start: 2017-10-19 | End: 2017-10-19 | Stop reason: HOSPADM

## 2017-10-19 RX ORDER — DEXTROSE MONOHYDRATE, SODIUM CHLORIDE, AND POTASSIUM CHLORIDE 50; 1.49; 4.5 G/1000ML; G/1000ML; G/1000ML
INJECTION, SOLUTION INTRAVENOUS CONTINUOUS
Status: DISCONTINUED | OUTPATIENT
Start: 2017-10-19 | End: 2017-10-20 | Stop reason: HOSPADM

## 2017-10-19 RX ORDER — ONDANSETRON 4 MG/1
4 TABLET, ORALLY DISINTEGRATING ORAL EVERY 30 MIN PRN
Status: DISCONTINUED | OUTPATIENT
Start: 2017-10-19 | End: 2017-10-19 | Stop reason: HOSPADM

## 2017-10-19 RX ORDER — POTASSIUM CHLORIDE 7.45 MG/ML
10 INJECTION INTRAVENOUS
Status: DISCONTINUED | OUTPATIENT
Start: 2017-10-19 | End: 2017-10-20 | Stop reason: HOSPADM

## 2017-10-19 RX ORDER — POTASSIUM CHLORIDE 1500 MG/1
20-40 TABLET, EXTENDED RELEASE ORAL
Status: DISCONTINUED | OUTPATIENT
Start: 2017-10-19 | End: 2017-10-20 | Stop reason: HOSPADM

## 2017-10-19 RX ORDER — POTASSIUM CHLORIDE 1.5 G/1.58G
20-40 POWDER, FOR SOLUTION ORAL
Status: DISCONTINUED | OUTPATIENT
Start: 2017-10-19 | End: 2017-10-20 | Stop reason: HOSPADM

## 2017-10-19 RX ORDER — OXYCODONE HYDROCHLORIDE 5 MG/1
5-10 TABLET ORAL
Status: COMPLETED | OUTPATIENT
Start: 2017-10-19 | End: 2017-10-19

## 2017-10-19 RX ORDER — ACETAMINOPHEN 10 MG/ML
1000 INJECTION, SOLUTION INTRAVENOUS ONCE
Status: COMPLETED | OUTPATIENT
Start: 2017-10-19 | End: 2017-10-19

## 2017-10-19 RX ORDER — LABETALOL HYDROCHLORIDE 5 MG/ML
10 INJECTION, SOLUTION INTRAVENOUS
Status: DISCONTINUED | OUTPATIENT
Start: 2017-10-19 | End: 2017-10-19 | Stop reason: HOSPADM

## 2017-10-19 RX ORDER — ACETAMINOPHEN 325 MG/1
650 TABLET ORAL EVERY 4 HOURS PRN
Qty: 100 TABLET | Refills: 0 | Status: SHIPPED | OUTPATIENT
Start: 2017-10-19 | End: 2018-01-01

## 2017-10-19 RX ORDER — ONDANSETRON 2 MG/ML
4 INJECTION INTRAMUSCULAR; INTRAVENOUS EVERY 6 HOURS PRN
Status: DISCONTINUED | OUTPATIENT
Start: 2017-10-19 | End: 2017-10-20 | Stop reason: HOSPADM

## 2017-10-19 RX ORDER — LIDOCAINE 40 MG/G
CREAM TOPICAL
Status: DISCONTINUED | OUTPATIENT
Start: 2017-10-19 | End: 2017-10-19 | Stop reason: HOSPADM

## 2017-10-19 RX ORDER — GLYCOPYRROLATE 0.2 MG/ML
INJECTION, SOLUTION INTRAMUSCULAR; INTRAVENOUS PRN
Status: DISCONTINUED | OUTPATIENT
Start: 2017-10-19 | End: 2017-10-19

## 2017-10-19 RX ORDER — LORAZEPAM 2 MG/ML
1 INJECTION INTRAMUSCULAR
Status: ACTIVE | OUTPATIENT
Start: 2017-10-19 | End: 2017-10-20

## 2017-10-19 RX ORDER — FELBAMATE 600 MG/1
600 TABLET ORAL
Status: DISCONTINUED | OUTPATIENT
Start: 2017-10-19 | End: 2017-10-20 | Stop reason: HOSPADM

## 2017-10-19 RX ORDER — BUPIVACAINE HYDROCHLORIDE AND EPINEPHRINE 2.5; 5 MG/ML; UG/ML
INJECTION, SOLUTION EPIDURAL; INFILTRATION; INTRACAUDAL; PERINEURAL PRN
Status: DISCONTINUED | OUTPATIENT
Start: 2017-10-19 | End: 2017-10-19 | Stop reason: HOSPADM

## 2017-10-19 RX ORDER — DEXAMETHASONE SODIUM PHOSPHATE 4 MG/ML
INJECTION, SOLUTION INTRA-ARTICULAR; INTRALESIONAL; INTRAMUSCULAR; INTRAVENOUS; SOFT TISSUE PRN
Status: DISCONTINUED | OUTPATIENT
Start: 2017-10-19 | End: 2017-10-19

## 2017-10-19 RX ORDER — MEPERIDINE HYDROCHLORIDE 25 MG/ML
12.5 INJECTION INTRAMUSCULAR; INTRAVENOUS; SUBCUTANEOUS EVERY 5 MIN PRN
Status: DISCONTINUED | OUTPATIENT
Start: 2017-10-19 | End: 2017-10-19 | Stop reason: HOSPADM

## 2017-10-19 RX ORDER — DIAZEPAM 10 MG/2ML
2.5 INJECTION, SOLUTION INTRAMUSCULAR; INTRAVENOUS
Status: DISCONTINUED | OUTPATIENT
Start: 2017-10-19 | End: 2017-10-19 | Stop reason: HOSPADM

## 2017-10-19 RX ORDER — PROPOFOL 10 MG/ML
INJECTION, EMULSION INTRAVENOUS PRN
Status: DISCONTINUED | OUTPATIENT
Start: 2017-10-19 | End: 2017-10-19

## 2017-10-19 RX ORDER — PROCHLORPERAZINE 25 MG
12.5 SUPPOSITORY, RECTAL RECTAL EVERY 12 HOURS PRN
Status: DISCONTINUED | OUTPATIENT
Start: 2017-10-19 | End: 2017-10-20 | Stop reason: HOSPADM

## 2017-10-19 RX ORDER — ALBUTEROL SULFATE 0.83 MG/ML
2.5 SOLUTION RESPIRATORY (INHALATION) EVERY 4 HOURS PRN
Status: DISCONTINUED | OUTPATIENT
Start: 2017-10-19 | End: 2017-10-19 | Stop reason: HOSPADM

## 2017-10-19 RX ORDER — ONDANSETRON 2 MG/ML
4 INJECTION INTRAMUSCULAR; INTRAVENOUS EVERY 30 MIN PRN
Status: DISCONTINUED | OUTPATIENT
Start: 2017-10-19 | End: 2017-10-19 | Stop reason: HOSPADM

## 2017-10-19 RX ORDER — OXYCODONE HYDROCHLORIDE 5 MG/1
5-10 TABLET ORAL
Qty: 30 TABLET | Refills: 0 | Status: SHIPPED | OUTPATIENT
Start: 2017-10-19 | End: 2018-01-01

## 2017-10-19 RX ORDER — LORAZEPAM 2 MG/ML
1 INJECTION INTRAMUSCULAR ONCE
Status: COMPLETED | OUTPATIENT
Start: 2017-10-19 | End: 2017-10-19

## 2017-10-19 RX ORDER — PROCHLORPERAZINE MALEATE 5 MG
5 TABLET ORAL EVERY 6 HOURS PRN
Status: DISCONTINUED | OUTPATIENT
Start: 2017-10-19 | End: 2017-10-20 | Stop reason: HOSPADM

## 2017-10-19 RX ORDER — CEPHALEXIN 500 MG/1
500 CAPSULE ORAL 4 TIMES DAILY
Qty: 40 CAPSULE | Refills: 0 | Status: SHIPPED | OUTPATIENT
Start: 2017-10-19 | End: 2018-01-01

## 2017-10-19 RX ORDER — POTASSIUM CHLORIDE 29.8 MG/ML
20 INJECTION INTRAVENOUS
Status: DISCONTINUED | OUTPATIENT
Start: 2017-10-19 | End: 2017-10-20 | Stop reason: HOSPADM

## 2017-10-19 RX ADMIN — FENTANYL CITRATE 25 MCG: 50 INJECTION INTRAMUSCULAR; INTRAVENOUS at 14:22

## 2017-10-19 RX ADMIN — GLYCOPYRROLATE 0.1 MG: 0.2 INJECTION, SOLUTION INTRAMUSCULAR; INTRAVENOUS at 13:26

## 2017-10-19 RX ADMIN — DEXAMETHASONE SODIUM PHOSPHATE 4 MG: 4 INJECTION, SOLUTION INTRA-ARTICULAR; INTRALESIONAL; INTRAMUSCULAR; INTRAVENOUS; SOFT TISSUE at 13:26

## 2017-10-19 RX ADMIN — OXYCODONE HYDROCHLORIDE 10 MG: 5 TABLET ORAL at 17:49

## 2017-10-19 RX ADMIN — SODIUM CHLORIDE, POTASSIUM CHLORIDE, SODIUM LACTATE AND CALCIUM CHLORIDE: 600; 310; 30; 20 INJECTION, SOLUTION INTRAVENOUS at 12:37

## 2017-10-19 RX ADMIN — ONDANSETRON 4 MG: 2 INJECTION INTRAMUSCULAR; INTRAVENOUS at 13:26

## 2017-10-19 RX ADMIN — EPHEDRINE SULFATE 10 MG: 50 INJECTION, SOLUTION INTRAVENOUS at 13:37

## 2017-10-19 RX ADMIN — FENTANYL CITRATE 50 MCG: 50 INJECTION, SOLUTION INTRAMUSCULAR; INTRAVENOUS at 13:26

## 2017-10-19 RX ADMIN — ACETAMINOPHEN 1000 MG: 10 INJECTION, SOLUTION INTRAVENOUS at 15:13

## 2017-10-19 RX ADMIN — PHENYLEPHRINE HYDROCHLORIDE 200 MCG: 10 INJECTION, SOLUTION INTRAMUSCULAR; INTRAVENOUS; SUBCUTANEOUS at 13:54

## 2017-10-19 RX ADMIN — LORAZEPAM 1 MG: 2 INJECTION INTRAMUSCULAR; INTRAVENOUS at 19:59

## 2017-10-19 RX ADMIN — PHENYLEPHRINE HYDROCHLORIDE 200 MCG: 10 INJECTION, SOLUTION INTRAMUSCULAR; INTRAVENOUS; SUBCUTANEOUS at 13:45

## 2017-10-19 RX ADMIN — SODIUM CHLORIDE, POTASSIUM CHLORIDE, SODIUM LACTATE AND CALCIUM CHLORIDE: 600; 310; 30; 20 INJECTION, SOLUTION INTRAVENOUS at 14:03

## 2017-10-19 RX ADMIN — FENTANYL CITRATE 25 MCG: 50 INJECTION INTRAMUSCULAR; INTRAVENOUS at 14:47

## 2017-10-19 RX ADMIN — FENTANYL CITRATE 25 MCG: 50 INJECTION INTRAMUSCULAR; INTRAVENOUS at 14:50

## 2017-10-19 RX ADMIN — HYDROMORPHONE HYDROCHLORIDE 0.5 MG: 1 INJECTION, SOLUTION INTRAMUSCULAR; INTRAVENOUS; SUBCUTANEOUS at 15:27

## 2017-10-19 RX ADMIN — PROPOFOL 200 MG: 10 INJECTION, EMULSION INTRAVENOUS at 13:26

## 2017-10-19 RX ADMIN — FELBAMATE 600 MG: 600 TABLET ORAL at 22:49

## 2017-10-19 RX ADMIN — FENTANYL CITRATE 50 MCG: 50 INJECTION, SOLUTION INTRAMUSCULAR; INTRAVENOUS at 14:07

## 2017-10-19 RX ADMIN — CEFAZOLIN 2 G: 1 INJECTION, POWDER, FOR SOLUTION INTRAMUSCULAR; INTRAVENOUS at 13:39

## 2017-10-19 RX ADMIN — EPHEDRINE SULFATE 15 MG: 50 INJECTION, SOLUTION INTRAVENOUS at 13:34

## 2017-10-19 RX ADMIN — LAMOTRIGINE 100 MG: 100 TABLET ORAL at 22:50

## 2017-10-19 RX ADMIN — POTASSIUM CHLORIDE, DEXTROSE MONOHYDRATE AND SODIUM CHLORIDE: 150; 5; 450 INJECTION, SOLUTION INTRAVENOUS at 20:57

## 2017-10-19 RX ADMIN — HYDROMORPHONE HYDROCHLORIDE 0.5 MG: 1 INJECTION, SOLUTION INTRAMUSCULAR; INTRAVENOUS; SUBCUTANEOUS at 15:41

## 2017-10-19 RX ADMIN — Medication 30 MG: at 13:26

## 2017-10-19 RX ADMIN — FENTANYL CITRATE 25 MCG: 50 INJECTION INTRAMUSCULAR; INTRAVENOUS at 14:26

## 2017-10-19 ASSESSMENT — COPD QUESTIONNAIRES
CAT_SEVERITY: MODERATE
COPD: 1

## 2017-10-19 ASSESSMENT — LIFESTYLE VARIABLES: TOBACCO_USE: 1

## 2017-10-19 ASSESSMENT — ACTIVITIES OF DAILY LIVING (ADL): ADLS_ACUITY_SCORE: 22

## 2017-10-19 ASSESSMENT — ENCOUNTER SYMPTOMS: SEIZURES: 1

## 2017-10-19 NOTE — IP AVS SNAPSHOT
[place] [id number] [facility]    [address]    [facility] [address]    Phone:  [phone]    Fax:  [phone]                                       After Visit Summary   10/19/2017    Shana Horne    MRN: [id number]           After Visit Summary Signature Page     I have received my discharge instructions, and my questions have been answered. I have discussed any challenges I see with this plan with the nurse or doctor.    ..........................................................................................................................................  Patient/Patient Representative Signature      ..........................................................................................................................................  Patient Representative Print Name and Relationship to Patient    ..................................................               ................................................  Date                                            Time    ..........................................................................................................................................  Reviewed by Signature/Title    ...................................................              ..............................................  Date                                                            Time

## 2017-10-19 NOTE — ADDENDUM NOTE
Addendum  created 10/19/17 0837 by Gurpreet Farooq MD    Anesthesia Event edited, Procedure Event Log accessed

## 2017-10-19 NOTE — DISCHARGE INSTRUCTIONS
Post Operative Instructions for DR. LOUANN DANIELLE M.D.  [facility]    DIET and NUTRITIONAL SUPPLEMENTATION:  1. Begin with liquids and light foods (jello, soups, etc).  Rehydration is important the first few days after surgery.  Gradually resume a normal diet; avoiding foods with a label.   2. To aid in both surgical wound and bone healing here are several recommendations:  ?    Eat 3 or more servings per day of foods high in Protein and Calories.              ? Examples include: fish, poultry, meat, pork, nuts, and dairy products  ?    Take Calcium 1500 mg per day from a combination of dairy products and Calcium supplements  ?    Take Vitamin C 500 mg 3 times per day (total of 1500 mg) or eat 5 servings of citrus fruits   ?    Take Zinc 50 mg 2 times per day (total of 100 mg)  ?    Take Betacarotene 25,000 IU each day  3. It is best not to smoke, use tobacco, or nicotine patches during your healing period.    FOR 24 HOURS FOLLOWING SURGERY:  1. Be in the care of a responsible adult.  2. Do not drive or operate any machinery.  3. Do not make important personal or business decisions or sign legal documents.  4. Avoid alcoholic beverages.    MEDICATIONS:  1. Strong oral pain medication has been prescribed for the first few days. Use only as directed.  2. Do not drink alcoholic beverages while using this medication.  3. When taking pain medication, be careful as you walk or climb stairs. Mild dizziness is not unusual.  4. Continue to take your routine medications as prescribed by your internist/family practitioner. Please ask if you are unsure about continuing these medications after surgery.  5. Do not take any medications that have not been prescribed by your physician.    ACTIVITIES:  1. While seated or lying, keep the operated limb at the level of your heart, toes at the level of your nose, for 23 hours per day for three full days after surgery.  This is the time when the most swelling occurs while  the wound attempts to seal.   2. Over the next 10 days if the limb begins to throb, you may have been up for too long or been trying to do too much. Take a break and elevate your leg.   3. Place ice packs over the operative site on the cast or dressing.  Use for 20 minutes every 2-3 hours while you are awake-it will help alleviate pain and swelling.  4. Return to work:  timing depends on your type of employment.    WOUND CARE:   1. Unless you have been instructed by Dr. Arce, do not remove your cast or dressings.  2. To help keep your dressing clean and dry use a waterproof cast sock (Dry-pro)                  [email or web address] or a plastic bag and duct tape for bathing.  3. Do not place foreign objects into your cast or dressing even if you are itchy-they can get stuck and cause ulcers and infections.  Use a hairdryer if skin becomes moist from sweat.    WHEN TO CALL YOUR PHYSICIAN:  1. While a low-grade fever in the first 24-48 post-operative hours is not unusual, a temperature above 101.50F, an elevation in temperature more than 3 days after surgery, or associated with chills, merits evaluation.   2. Increasing or a change in type of pain, swelling, numbness, pain with constant toe stretching.  3. Continuous drainage or bleeding from the incision site. A small amount is expected.  4. Any other worrisome condition.    FOLLOW-UP CARE:  If you have not scheduled a follow-up appointment, please call my  at [phone] or email her at   [email or web address] or through our patient portal.  To improve your overall experience routine care dressing or cast changes may be scheduled with an orthopedic assistant.      Follow up appointment_______________________________________________________________________    Nurse signature____________________________________________Date________________Time_________    Patient  signature___________________________________________Dare________________Time_________      Additional information: IF taking pain medications  You may have been prescribed a multi-modal pain medication regimen.  Inform your physician of any drug allergies.  Longer-Acting Narcotic (example MS contin)                 Directions:  take 1 tablet by mouth every 12 hours only if needed for pain.  Long-Acting Anti-Inflammatory (example ibuprofen, motrin)                 Directions:  take 1 tablet by mouth every 8 hours for the next five days and then only if needed for pain.  Short-Acting Narcotic (example oxycodone immediate release, dilaudid)                 Directions:  take prescribed amount by mouth every 3 hours as needed for breakthrough pain.    What is breakthrough pain?  Breakthrough pain is pain that is NOT controlled by the longer-acting pain pill.    Directions for Use:  1. Start taking the longer-acting pain medication at bedtime on the day of your surgery; regardless of if you   have pain.  Since this medication is taken every 12 hours, you don t want to wait until the middle of the night to start it, or you will continue to have to take it in the middle of the night to stay on schedule.      2. DO NOT CRUSH, CHEW OR DISSOLVE THE TABLET.    3. If you have uncontrolled pain (breakthrough) during the 12 hours, take the short acting pain pill as prescribed.    4. Some patients need to take both the longer acting and short acting pain pills for the first few days to control their pain.    5. ALWAYS take with food (soup, jello, fruit or nuts).    6. Pain pills can cause constipation.  Start taking the Miralax one packet or capful daily the morning after surgery.  Continue until you stop using narcotics.    7. If the pain pill causes nausea, take the medication for nausea (if prescribed).  Wait for one hour after taking the anti-nausea medication before you resume your pain pills.    8. If you have itching, take  over-the-counter diphenhydramine (Benadryl) as directed on the label.  If you have a rash or hives, do not resume your pain medication until an allergic reaction is ruled out.  Call physician for further instructions.    9. STOP TAKING THE MEDICATIONS AND CALL YOUR DOCTOR IF:       ?  You have uncontrolled nausea and or vomiting     ?  You have a rash or hives     ?   IF YOU HAVE TROUBLE BREATHING OR SWALLOWING, CALL 911           GENERAL ANESTHESIA OR SEDATION ADULT DISCHARGE INSTRUCTIONS   SPECIAL PRECAUTIONS FOR 24 HOURS AFTER SURGERY    IT IS NOT UNUSUAL TO FEEL LIGHT-HEADED OR FAINT, UP TO 24 HOURS AFTER SURGERY OR WHILE TAKING PAIN MEDICATION.  IF YOU HAVE THESE SYMPTOMS; SIT FOR A FEW MINUTES BEFORE STANDING AND HAVE SOMEONE ASSIST YOU WHEN YOU GET UP TO WALK OR USE THE BATHROOM.    YOU SHOULD REST AND RELAX FOR THE NEXT 24 HOURS AND YOU MUST MAKE ARRANGEMENTS TO HAVE SOMEONE STAY WITH YOU FOR AT LEAST 24 HOURS AFTER YOUR DISCHARGE.  AVOID HAZARDOUS AND STRENUOUS ACTIVITIES.  DO NOT MAKE IMPORTANT DECISIONS FOR 24 HOURS.    DO NOT DRIVE ANY VEHICLE OR OPERATE MECHANICAL EQUIPMENT FOR 24 HOURS FOLLOWING THE END OF YOUR SURGERY.  EVEN THOUGH YOU MAY FEEL NORMAL, YOUR REACTIONS MAY BE AFFECTED BY THE MEDICATION YOU HAVE RECEIVED.    DO NOT DRINK ALCOHOLIC BEVERAGES FOR 24 HOURS FOLLOWING YOUR SURGERY.    DRINK CLEAR LIQUIDS (APPLE JUICE, GINGER ALE, 7-UP, BROTH, ETC.).  PROGRESS TO YOUR REGULAR DIET AS YOU FEEL ABLE.    YOU MAY HAVE A DRY MOUTH, A SORE THROAT, MUSCLES ACHES OR TROUBLE SLEEPING.  THESE SHOULD GO AWAY AFTER 24 HOURS.    CALL YOUR DOCTOR FOR ANY OF THE FOLLOWING:  SIGNS OF INFECTION (FEVER, GROWING TENDERNESS AT THE SURGERY SITE, A LARGE AMOUNT OF DRAINAGE OR BLEEDING, SEVERE PAIN, FOUL-SMELLING DRAINAGE, REDNESS OR SWELLING.    IT HAS BEEN OVER 8 TO 10 HOURS SINCE SURGERY AND YOU ARE STILL NOT ABLE TO URINATE (PASS WATER).     Maximum acetaminophen (Tylenol) dose from all sources should not exceed  4 grams (4000 mg) per day.  You received 1,000mg of IV tylenol at 3:15pm. You received oxycodone at _______.

## 2017-10-19 NOTE — ANESTHESIA POSTPROCEDURE EVALUATION
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 Arce    Anesthesia Type:  General, LMA    Note:  Anesthesia Post Evaluation    Patient location during evaluation: PACU  Patient participation: Able to fully participate in evaluation  Level of consciousness: awake  Pain management: adequate  Airway patency: patent  Cardiovascular status: acceptable  Respiratory status: acceptable  Hydration status: acceptable  PONV: none             Last vitals:  Vitals:    10/19/17 1141 10/19/17 1226 10/19/17 1410   BP: 154/83 141/79 101/64   Pulse: 72     Resp: 18  23   Temp: 97.7  F (36.5  C)  98.1  F (36.7  C)   SpO2: 96%  94%         Electronically Signed By: Gurpreet Farooq MD  October 19, 2017  2:24 PM

## 2017-10-19 NOTE — OR NURSING
Pt noted with seizure like activity, family at bedside during seizure. Pt tachycardic and hypertensive. Dr. Farooq at bedside to assess pt. Per Dr. Farooq pt needs to be admitted to medical floor. Dr. Arce notified. Pt currently unresponsive, vital signs stable, awaiting 3rd floor bed.

## 2017-10-19 NOTE — ANESTHESIA CARE TRANSFER NOTE
Patient: Shana Horne    Procedure(s):  right 2nd toe amputation          - Wound Class: III-Contaminated    Diagnosis: staphylococcal arthritis  Diagnosis Additional Information: No value filed.    Anesthesia Type:   General, LMA     Note:  Airway :Face Mask  Patient transferred to:PACU  Handoff Report: Identifed the Patient, Identified the Reponsible Provider, Reviewed the pertinent medical history, Discussed the surgical course, Reviewed Intra-OP anesthesia mangement and issues during anesthesia, Set expectations for post-procedure period and Allowed opportunity for questions and acknowledgement of understanding      Vitals: (Last set prior to Anesthesia Care Transfer)    CRNA VITALS  10/19/2017 1336 - 10/19/2017 1414      10/19/2017             NIBP: (!)  74/49    NIBP Mean: 61                Electronically Signed By: JIM Boyce CRNA  October 19, 2017  2:14 PM

## 2017-10-19 NOTE — OR NURSING
Pt moved to cart, currently moaning but not responsive. Pain medications and discharge instructions gone over with patient and family prior to seizure. Family will take medications home with them tonight.

## 2017-10-19 NOTE — IP AVS SNAPSHOT
MRN:[id number]                      After Visit Summary   10/19/2017    Shana Horne    MRN: [id number]           Thank you!     Thank you for choosing [facility] for your care. Our goal is always to provide you with excellent care. Hearing back from our patients is one way we can continue to improve our services. Please take a few minutes to complete the written survey that you may receive in the mail after you visit. If you would like to speak to someone directly about your visit please contact Patient Relations at [phone]. Thank you!          Patient Information     Date Of Birth          1950        Designated Caregiver       Most Recent Value    Caregiver    Will someone help with your care after discharge? yes    Name of designated caregiver Spouse Dru     Phone number of caregiver same as pt     Caregiver address same as pt [place]       About your hospital stay     You were admitted on:  October 19, 2017 You last received care in the:  [facility]    You were discharged on:  October 20, 2017        Reason for your hospital stay       Seizures after surgery                  Who to Call     For medical emergencies, please call 911.  For non-urgent questions about your medical care, please call your primary care provider or clinic, [phone]  For questions related to your surgery, please call your surgery clinic        Attending Provider     Provider Specialty    Montrell Arce MD Orthopedics    Elijah Gu MD Internal Medicine       Primary Care Provider Office Phone # Fax #    Otoniel Capellan [phone]      After Care Instructions     Activity       Your activity upon discharge: activity as tolerated, restrictions per Ortho            Diet       Follow this diet upon discharge: Regular            Discharge Instructions       Review discharge instructions as directed by Provider.             Elevate affected extremity           No dressing change       until follow up clinic appointment.            Oxygen Adult       Renew Home Oxygen Order  Renew previous prescription.  Expected treatment length is indefinite (99 months).    Attending Provider: Elijah Liriano  Physician signature: See electronic signature associated with these discharge orders  Date of Order: October 20, 2017            Weight bearing status - Partial       Heel only                  Follow-up Appointments     Follow-up and recommended labs and tests        Follow up with Dr. Arce as directed                  Your next 10 appointments already scheduled     Feb 02, 2018   Procedure with Gurpreet Farooq MD   [facility] (--)    [address] [facility] [id number] [phone]              Further instructions from your care team       Post Operative Instructions for DR. LOUANN ARCE M.D.  [facility]    DIET and NUTRITIONAL SUPPLEMENTATION:  1. Begin with liquids and light foods (jello, soups, etc).  Rehydration is important the first few days after surgery.  Gradually resume a normal diet; avoiding foods with a label.   2. To aid in both surgical wound and bone healing here are several recommendations:  ?    Eat 3 or more servings per day of foods high in Protein and Calories.              ? Examples include: fish, poultry, meat, pork, nuts, and dairy products  ?    Take Calcium 1500 mg per day from a combination of dairy products and Calcium supplements  ?    Take Vitamin C 500 mg 3 times per day (total of 1500 mg) or eat 5 servings of citrus fruits   ?    Take Zinc 50 mg 2 times per day (total of 100 mg)  ?    Take Betacarotene 25,000 IU each day  3. It is best not to smoke, use tobacco, or nicotine patches during your healing period.    FOR 24 HOURS FOLLOWING SURGERY:  1. Be in the care of a responsible adult.  2. Do not drive or operate any machinery.  3. Do not make  important personal or business decisions or sign legal documents.  4. Avoid alcoholic beverages.    MEDICATIONS:  1. Strong oral pain medication has been prescribed for the first few days. Use only as directed.  2. Do not drink alcoholic beverages while using this medication.  3. When taking pain medication, be careful as you walk or climb stairs. Mild dizziness is not unusual.  4. Continue to take your routine medications as prescribed by your internist/family practitioner. Please ask if you are unsure about continuing these medications after surgery.  5. Do not take any medications that have not been prescribed by your physician.    ACTIVITIES:  1. While seated or lying, keep the operated limb at the level of your heart, toes at the level of your nose, for 23 hours per day for three full days after surgery.  This is the time when the most swelling occurs while the wound attempts to seal.   2. Over the next 10 days if the limb begins to throb, you may have been up for too long or been trying to do too much. Take a break and elevate your leg.   3. Place ice packs over the operative site on the cast or dressing.  Use for 20 minutes every 2-3 hours while you are awake-it will help alleviate pain and swelling.  4. Return to work:  timing depends on your type of employment.    WOUND CARE:   1. Unless you have been instructed by Dr. rAce, do not remove your cast or dressings.  2. To help keep your dressing clean and dry use a waterproof cast sock (Dry-pro)                  [email or web address] or a plastic bag and duct tape for bathing.  3. Do not place foreign objects into your cast or dressing even if you are itchy-they can get stuck and cause ulcers and infections.  Use a hairdryer if skin becomes moist from sweat.    WHEN TO CALL YOUR PHYSICIAN:  1. While a low-grade fever in the first 24-48 post-operative hours is not unusual, a temperature above 101.50F, an elevation in temperature more than 3 days after  surgery, or associated with chills, merits evaluation.   2. Increasing or a change in type of pain, swelling, numbness, pain with constant toe stretching.  3. Continuous drainage or bleeding from the incision site. A small amount is expected.  4. Any other worrisome condition.    FOLLOW-UP CARE:  If you have not scheduled a follow-up appointment, please call my  at [phone] or email her at   [email or web address] or through our patient portal.  To improve your overall experience routine care dressing or cast changes may be scheduled with an orthopedic assistant.      Follow up appointment_______________________________________________________________________    Nurse signature____________________________________________Date________________Time_________    Patient signature___________________________________________Dare________________Time_________      Additional information: IF taking pain medications  You may have been prescribed a multi-modal pain medication regimen.  Inform your physician of any drug allergies.  Longer-Acting Narcotic (example MS contin)                 Directions:  take 1 tablet by mouth every 12 hours only if needed for pain.  Long-Acting Anti-Inflammatory (example ibuprofen, motrin)                 Directions:  take 1 tablet by mouth every 8 hours for the next five days and then only if needed for pain.  Short-Acting Narcotic (example oxycodone immediate release, dilaudid)                 Directions:  take prescribed amount by mouth every 3 hours as needed for breakthrough pain.    What is breakthrough pain?  Breakthrough pain is pain that is NOT controlled by the longer-acting pain pill.    Directions for Use:  1. Start taking the longer-acting pain medication at bedtime on the day of your surgery; regardless of if you   have pain.  Since this medication is taken every 12 hours, you don t want to wait until the middle of the night to start it, or you will continue to have to take  it in the middle of the night to stay on schedule.      2. DO NOT CRUSH, CHEW OR DISSOLVE THE TABLET.    3. If you have uncontrolled pain (breakthrough) during the 12 hours, take the short acting pain pill as prescribed.    4. Some patients need to take both the longer acting and short acting pain pills for the first few days to control their pain.    5. ALWAYS take with food (soup, jello, fruit or nuts).    6. Pain pills can cause constipation.  Start taking the Miralax one packet or capful daily the morning after surgery.  Continue until you stop using narcotics.    7. If the pain pill causes nausea, take the medication for nausea (if prescribed).  Wait for one hour after taking the anti-nausea medication before you resume your pain pills.    8. If you have itching, take over-the-counter diphenhydramine (Benadryl) as directed on the label.  If you have a rash or hives, do not resume your pain medication until an allergic reaction is ruled out.  Call physician for further instructions.    9. STOP TAKING THE MEDICATIONS AND CALL YOUR DOCTOR IF:       ?  You have uncontrolled nausea and or vomiting     ?  You have a rash or hives     ?   IF YOU HAVE TROUBLE BREATHING OR SWALLOWING, CALL 911           GENERAL ANESTHESIA OR SEDATION ADULT DISCHARGE INSTRUCTIONS   SPECIAL PRECAUTIONS FOR 24 HOURS AFTER SURGERY    IT IS NOT UNUSUAL TO FEEL LIGHT-HEADED OR FAINT, UP TO 24 HOURS AFTER SURGERY OR WHILE TAKING PAIN MEDICATION.  IF YOU HAVE THESE SYMPTOMS; SIT FOR A FEW MINUTES BEFORE STANDING AND HAVE SOMEONE ASSIST YOU WHEN YOU GET UP TO WALK OR USE THE BATHROOM.    YOU SHOULD REST AND RELAX FOR THE NEXT 24 HOURS AND YOU MUST MAKE ARRANGEMENTS TO HAVE SOMEONE STAY WITH YOU FOR AT LEAST 24 HOURS AFTER YOUR DISCHARGE.  AVOID HAZARDOUS AND STRENUOUS ACTIVITIES.  DO NOT MAKE IMPORTANT DECISIONS FOR 24 HOURS.    DO NOT DRIVE ANY VEHICLE OR OPERATE MECHANICAL EQUIPMENT FOR 24 HOURS FOLLOWING THE END OF YOUR SURGERY.  EVEN THOUGH  "YOU MAY FEEL NORMAL, YOUR REACTIONS MAY BE AFFECTED BY THE MEDICATION YOU HAVE RECEIVED.    DO NOT DRINK ALCOHOLIC BEVERAGES FOR 24 HOURS FOLLOWING YOUR SURGERY.    DRINK CLEAR LIQUIDS (APPLE JUICE, GINGER ALE, 7-UP, BROTH, ETC.).  PROGRESS TO YOUR REGULAR DIET AS YOU FEEL ABLE.    YOU MAY HAVE A DRY MOUTH, A SORE THROAT, MUSCLES ACHES OR TROUBLE SLEEPING.  THESE SHOULD GO AWAY AFTER 24 HOURS.    CALL YOUR DOCTOR FOR ANY OF THE FOLLOWING:  SIGNS OF INFECTION (FEVER, GROWING TENDERNESS AT THE SURGERY SITE, A LARGE AMOUNT OF DRAINAGE OR BLEEDING, SEVERE PAIN, FOUL-SMELLING DRAINAGE, REDNESS OR SWELLING.    IT HAS BEEN OVER 8 TO 10 HOURS SINCE SURGERY AND YOU ARE STILL NOT ABLE TO URINATE (PASS WATER).     Maximum acetaminophen (Tylenol) dose from all sources should not exceed 4 grams (4000 mg) per day.  You received 1,000mg of IV tylenol at 3:15pm. You received oxycodone at _______.              Pending Results     Date and Time Order Name Status Description    10/19/2017 1347 Surgical pathology exam In process             Statement of Approval     Ordered          10/20/17 1417  I have reviewed and agree with all the recommendations and orders detailed in this document.  EFFECTIVE NOW     Approved and electronically signed by:  Elijah Liriano MD             Admission Information     Date & Time Provider Department Dept. Phone    10/19/2017 Elijah Gu MD [place] [address] Medical Surgical [phone]      Your Vitals Were     Blood Pressure Pulse Temperature Respirations Height Weight    108/55 (BP Location: Right arm) 72 99  F (37.2  C) (Oral) 20 1.651 m (5' 5\") 47.2 kg (104 lb)    Pulse Oximetry BMI (Body Mass Index)                92% 17.31 kg/m2          MyChart Information     "[employer] lets you send messages to your doctor, view your test results, renew your prescriptions, schedule appointments and more. To sign up, go to [email or web address] . Click on \"Log in\" on the left side of the " "screen, which will take you to the Welcome page. Then click on \"Sign up Now\" on the right side of the page.     You will be asked to enter the access code listed below, as well as some personal information. Please follow the directions to create your username and password.     Your access code is: [email or web address]  Expires: 10/20/2017 10:37 PM     Your access code will  in 90 days. If you need help or a new code, please call your [facility] clinic or [phone].        Care EveryWhere ID     This is your Care EveryWhere ID. This could be used by other organizations to access your [facility] medical records  [id number]        Equal Access to Services     ELAINE KENDALL : Tim Cornejo, roya wilson, clemencia blanchard, roxi mclaughlin. So [facility] [phone].    ATENCIÓN: Si habla español, tiene a early disposición servicios gratuitos de asistencia lingüística. Llame al [phone].    We comply with applicable federal civil rights laws and [place] laws. We do not discriminate on the basis of race, color, national origin, age, disability, sex, sexual orientation, or gender identity.               Review of your medicines      START taking        Dose / Directions    acetaminophen 325 MG tablet   Commonly known as:  TYLENOL   Used for:  Arthralgia of right foot        Dose:  650 mg   Take 2 tablets (650 mg) by mouth every 4 hours as needed for other (mild pain)   Quantity:  100 tablet   Refills:  0       cephALEXin 500 MG capsule   Commonly known as:  KEFLEX   Used for:  Arthralgia of right foot        Dose:  500 mg   Take 1 capsule (500 mg) by mouth 4 times daily   Quantity:  40 capsule   Refills:  0       oxyCODONE 5 MG IR tablet   Commonly known as:  ROXICODONE   Used for:  Arthralgia of right foot        Dose:  5-10 mg   Take 1-2 tablets (5-10 mg) by mouth every 3 hours as needed for pain or other (Moderate to Severe)   Quantity:  30 tablet   Refills:  0       "   CONTINUE these medicines which have NOT CHANGED        Dose / Directions    amitriptyline 75 MG tablet   Commonly known as:  ELAVIL        Dose:  75 mg   Take 75 mg by mouth At Bedtime.   Refills:  0       * FELBATOL PO        Dose:  1200 mg   Take 1,200 mg by mouth every morning   Refills:  0       * FELBATOL PO        Dose:  600 mg   Take 600 mg by mouth 2 times daily At Noon and Evening   Refills:  0       FERROUS GLUCONATE PO        Dose:  324 mg   Take 324 mg by mouth 2 times daily (with meals)   Refills:  0       GABAPENTIN PO        Dose:  200 mg   Take 200 mg by mouth 2 times daily Am and Noon   Refills:  0       lamoTRIgine 100 MG tablet   Commonly known as:  LaMICtal        Dose:  100 mg   Take 100 mg by mouth 3 times daily.   Refills:  0       Multi-vitamin Tabs tablet   Generic drug:  multivitamin, therapeutic with minerals        Dose:  1 tablet   Take 1 tablet by mouth daily.   Refills:  0       SEROQUEL PO        Dose:  25 mg   Take 25 mg by mouth At Bedtime   Refills:  0       simvastatin 40 MG tablet   Commonly known as:  ZOCOR        Dose:  40 mg   Take 40 mg by mouth At Bedtime   Refills:  0       * Notice:  This list has 2 medication(s) that are the same as other medications prescribed for you. Read the directions carefully, and ask your doctor or other care provider to review them with you.         Where to get your medicines      These medications were sent to [facility] [address] [place] [id number] [facility] [address]     Phone:  [phone]     acetaminophen 325 MG tablet    cephALEXin 500 MG capsule         Some of these will need a paper prescription and others can be bought over the counter. Ask your nurse if you have questions.     Bring a paper prescription for each of these medications     oxyCODONE 5 MG IR tablet               ANTIBIOTIC INSTRUCTION     You've Been Prescribed an Antibiotic - Now What?  Your healthcare team  thinks that you or your loved one might have an infection. Some infections can be treated with antibiotics, which are powerful, life-saving drugs. Like all medications, antibiotics have side effects and should only be used when necessary. There are some important things you should know about your antibiotic treatment.      Your healthcare team may run tests before you start taking an antibiotic.    Your team may take samples (e.g., from your blood, urine or other areas) to run tests to look for bacteria. These test can be important to determine if you need an antibiotic at all and, if you do, which antibiotic will work best.      Within a few days, your healthcare team might change or even stop your antibiotic.    Your team may start you on an antibiotic while they are working to find out what is making you sick.    Your team might change your antibiotic because test results show that a different antibiotic would be better to treat your infection.    In some cases, once your team has more information, they learn that you do not need an antibiotic at all. They may find out that you don't have an infection, or that the antibiotic you're taking won't work against your infection. For example, an infection caused by a virus can't be treated with antibiotics. Staying on an antibiotic when you don't need it is more likely to be harmful than helpful.      You may experience side effects from your antibiotic.    Like all medications, antibiotics have side effects. Some of these can be serious.    Let you healthcare team know if you have any known allergies when you are admitted to the hospital.    One significant side effect of nearly all antibiotics is the risk of severe and sometimes deadly diarrhea caused by Clostridium difficile (C. Difficile). This occurs when a person takes antibiotics because some good germs are destroyed. Antibiotic use allows C. diificile to take over, putting patients at high risk for this serious  infection.    As a patient or caregiver, it is important to understand your or your loved one's antibiotic treatment. It is especially important for caregivers to speak up when patients can't speak for themselves. Here are some important questions to ask your healthcare team.    What infection is this antibiotic treating and how do you know I have that infection?    What side effects might occur from this antibiotic?    How long will I need to take this antibiotic?    Is it safe to take this antibiotic with other medications or supplements (e.g., vitamins) that I am taking?     Are there any special directions I need to know about taking this antibiotic? For example, should I take it with food?    How will I be monitored to know whether my infection is responding to the antibiotic?    What tests may help to make sure the right antibiotic is prescribed for me?      Information provided by:  www.cdc.gov/getsmart  U.S. Department of Health and Human Services  Centers for disease Control and Prevention  National Center for Emerging and Zoonotic Infectious Diseases  Division of Healthcare Quality Promotion         Protect others around you: Learn how to safely use, store and throw away your medicines at www.disposemymeds.org.             Medication List: This is a list of all your medications and when to take them. Check marks below indicate your daily home schedule. Keep this list as a reference.      Medications           Morning Afternoon Evening Bedtime As Needed    acetaminophen 325 MG tablet   Commonly known as:  TYLENOL   Take 2 tablets (650 mg) by mouth every 4 hours as needed for other (mild pain)                                   amitriptyline 75 MG tablet   Commonly known as:  ELAVIL   Take 75 mg by mouth At Bedtime.                                   cephALEXin 500 MG capsule   Commonly known as:  KEFLEX   Take 1 capsule (500 mg) by mouth 4 times daily                                * FELBATOL PO   Take 1,200  mg by mouth every morning   Last time this was given:  600 mg on 10/20/2017  2:06 PM                                   * FELBATOL PO   Take 600 mg by mouth 2 times daily At Noon and Evening   Last time this was given:  600 mg on 10/20/2017  2:06 PM                                      FERROUS GLUCONATE PO   Take 324 mg by mouth 2 times daily (with meals)                                      GABAPENTIN PO   Take 200 mg by mouth 2 times daily Am and Noon   Last time this was given:  200 mg on 10/20/2017  2:05 PM                                   lamoTRIgine 100 MG tablet   Commonly known as:  LaMICtal   Take 100 mg by mouth 3 times daily.   Last time this was given:  100 mg on 10/20/2017  2:19 PM                                         Multi-vitamin Tabs tablet   Take 1 tablet by mouth daily.   Generic drug:  multivitamin, therapeutic with minerals                                   oxyCODONE 5 MG IR tablet   Commonly known as:  ROXICODONE   Take 1-2 tablets (5-10 mg) by mouth every 3 hours as needed for pain or other (Moderate to Severe)   Last time this was given:  10 mg on 10/20/2017  2:17 PM                                   SEROQUEL PO   Take 25 mg by mouth At Bedtime                                   simvastatin 40 MG tablet   Commonly known as:  ZOCOR   Take 40 mg by mouth At Bedtime                                   * Notice:  This list has 2 medication(s) that are the same as other medications prescribed for you. Read the directions carefully, and ask your doctor or other care provider to review them with you.

## 2017-10-19 NOTE — BRIEF OP NOTE
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

## 2017-10-19 NOTE — OR NURSING
Patient noted to have seizure like activity.  Family and this nurse at bedside.  Patient now sleeping.

## 2017-10-20 ENCOUNTER — APPOINTMENT (OUTPATIENT)
Dept: GENERAL RADIOLOGY | Facility: CLINIC | Age: 67
DRG: 041 | End: 2017-10-20
Attending: INTERNAL MEDICINE
Payer: COMMERCIAL

## 2017-10-20 VITALS
HEART RATE: 72 BPM | TEMPERATURE: 99 F | WEIGHT: 104 LBS | BODY MASS INDEX: 17.33 KG/M2 | DIASTOLIC BLOOD PRESSURE: 55 MMHG | HEIGHT: 65 IN | SYSTOLIC BLOOD PRESSURE: 108 MMHG | RESPIRATION RATE: 20 BRPM | OXYGEN SATURATION: 92 %

## 2017-10-20 LAB
MAGNESIUM SERPL-MCNC: 2.2 MG/DL (ref 1.6–2.3)
POTASSIUM SERPL-SCNC: 4.5 MMOL/L (ref 3.4–5.3)

## 2017-10-20 PROCEDURE — 83735 ASSAY OF MAGNESIUM: CPT | Performed by: INTERNAL MEDICINE

## 2017-10-20 PROCEDURE — 36415 COLL VENOUS BLD VENIPUNCTURE: CPT | Performed by: INTERNAL MEDICINE

## 2017-10-20 PROCEDURE — 71010 XR CHEST PORT 1 VW: CPT

## 2017-10-20 PROCEDURE — 25000132 ZZH RX MED GY IP 250 OP 250 PS 637: Performed by: INTERNAL MEDICINE

## 2017-10-20 PROCEDURE — 99238 HOSP IP/OBS DSCHRG MGMT 30/<: CPT | Performed by: INTERNAL MEDICINE

## 2017-10-20 PROCEDURE — 90662 IIV NO PRSV INCREASED AG IM: CPT | Performed by: INTERNAL MEDICINE

## 2017-10-20 PROCEDURE — 90732 PPSV23 VACC 2 YRS+ SUBQ/IM: CPT | Performed by: INTERNAL MEDICINE

## 2017-10-20 PROCEDURE — 25000128 H RX IP 250 OP 636: Performed by: INTERNAL MEDICINE

## 2017-10-20 PROCEDURE — 84132 ASSAY OF SERUM POTASSIUM: CPT | Performed by: INTERNAL MEDICINE

## 2017-10-20 RX ORDER — CEPHALEXIN 500 MG/1
500 CAPSULE ORAL 3 TIMES DAILY
Status: DISCONTINUED | OUTPATIENT
Start: 2017-10-20 | End: 2017-10-20 | Stop reason: HOSPADM

## 2017-10-20 RX ORDER — OXYCODONE HYDROCHLORIDE 5 MG/1
5-10 TABLET ORAL
Status: DISCONTINUED | OUTPATIENT
Start: 2017-10-20 | End: 2017-10-20 | Stop reason: HOSPADM

## 2017-10-20 RX ORDER — MULTIPLE VITAMINS W/ MINERALS TAB 9MG-400MCG
1 TAB ORAL DAILY
Status: DISCONTINUED | OUTPATIENT
Start: 2017-10-20 | End: 2017-10-20 | Stop reason: HOSPADM

## 2017-10-20 RX ADMIN — PNEUMOCOCCAL VACCINE POLYVALENT 0.5 ML
25; 25; 25; 25; 25; 25; 25; 25; 25; 25; 25; 25; 25; 25; 25; 25; 25; 25; 25; 25; 25; 25; 25 INJECTION, SOLUTION INTRAMUSCULAR; SUBCUTANEOUS at 15:27

## 2017-10-20 RX ADMIN — FELBAMATE 1200 MG: 600 TABLET ORAL at 08:19

## 2017-10-20 RX ADMIN — GABAPENTIN 200 MG: 100 CAPSULE ORAL at 14:05

## 2017-10-20 RX ADMIN — GABAPENTIN 200 MG: 100 CAPSULE ORAL at 08:19

## 2017-10-20 RX ADMIN — OXYCODONE HYDROCHLORIDE 10 MG: 5 TABLET ORAL at 14:17

## 2017-10-20 RX ADMIN — FELBAMATE 600 MG: 600 TABLET ORAL at 14:06

## 2017-10-20 RX ADMIN — LAMOTRIGINE 100 MG: 100 TABLET ORAL at 08:19

## 2017-10-20 RX ADMIN — GABAPENTIN 200 MG: 100 CAPSULE ORAL at 00:49

## 2017-10-20 RX ADMIN — MULTIPLE VITAMINS W/ MINERALS TAB 1 TABLET: TAB at 14:06

## 2017-10-20 RX ADMIN — INFLUENZA A VIRUSA/MICHIGAN/45/2015 X-275 (H1N1) ANTIGEN (FORMALDEHYDE INACTIVATED), INFLUENZA A VIRUS A/HONG KONG/4801/2014 X-263B (H3N2) ANTIGEN (FORMALDEHYDE INACTIVATED), AND INFLUENZA B VIRUS B/BRISBANE/60/2008 ANTIGEN (FORMALDEHYDE INACTIVATED) 0.5 ML: 60; 60; 60 INJECTION, SUSPENSION INTRAMUSCULAR at 15:28

## 2017-10-20 RX ADMIN — LAMOTRIGINE 100 MG: 100 TABLET ORAL at 14:19

## 2017-10-20 ASSESSMENT — ACTIVITIES OF DAILY LIVING (ADL)
ADLS_ACUITY_SCORE: 22

## 2017-10-20 NOTE — H&P
CHIEF COMPLAINT:  Seizures.      HISTORY OF PRESENT ILLNESS:  Mrs. Shana Horne is a 67-year-old woman with a history of seizure disorder who initially presented today for an outpatient toe amputation.  She had been having problems with a hammertoe, and had her right second toe amputated.  Postoperatively, she was getting ready to go home and she had a seizure.  Her family reports that she has seizures periodically, and the last one was a couple of weeks ago.  A few months ago she had a day where she had three or four seizures.  Her seizure activity is quite variable.  Her family administers her medications and she has received all of them as scheduled.  She is now postictal and unable to give any history.  She had two more seizures in the postoperative area which were brief in duration.      PAST MEDICAL HISTORY:   1.  Regional pain syndrome of the left foot.  This began after a ganglion cyst removal many years ago.  She has had an intrathecal pump with morphine infusion for many years which has helped control her pain.   2.  Epilepsy.  She has been treated for 45 years.  She reports that stressful situations certainly can contribute to her having seizures.  She has also had VNS therapy, which is apparently some sort of device that is used to control seizures when the patient feels an aura.   3.  Gait instability with frequent falls.   4.  Knee arthroscopy.   5.  Bilateral tubal ligation.   6.  Rectovaginal fistula.   7.  Chronic obstructive pulmonary disease, chronically on 4 liters of oxygen per nasal cannula.   8.  Dyslipidemia.      MEDICATIONS:   1.  Morphine and bupivacaine by intrathecal pump.   2.  Amitriptyline 75 mg a day.   3.  Felbamate 1200 mg in the morning, 600 mg at noon, and 600 mg in the evening.   4.  Ferrous gluconate 324 mg twice a day.   5.  Gabapentin 200 mg twice a day.   6.  Lamictal 100 mg three times a day.   7.  Multivitamin 1 tab per day.   8.  Seroquel 25 mg at bedtime.   9.   Simvastatin 40 mg a day.      ALLERGIES:  No known drug allergies.      FAMILY HISTORY:  Her father had Alzheimer's and seizure disorder.      SOCIAL HISTORY:  She continues to be a smoker.  She lives with her .  Her daughter previously was her caregiver, but moved out a few months ago, and she is present today.      REVIEW OF SYSTEMS:  Unable to obtain due to the patient's mental status.      PHYSICAL EXAM:   VITAL SIGNS:  Blood pressure is 131/71, pulse 72, respirations 12, oxygen saturation 98% on 4 liters by nasal oxygen, temperature 98.2.   GENERAL:  She is somnolent, not rouseable.   EYES:  Pupils are round and equal.  Conjunctivae, sclerae and lids are within normal limits.   NECK:  Supple, with no mass, no thyromegaly.   OROPHARYNX:  Unable to examine oropharynx or teeth.  Lips are within normal limits.   HEART:  Regular rhythm.  No murmurs.   LUNGS:  Clear to auscultation bilaterally with normal respiratory effort.   ABDOMEN:  Soft.  Active bowel sounds.   EXTREMITIES:  There is no edema.  There is a walking boot on the right foot with second toe amputated.  Left foot is deviated a bit laterally.   NEUROLOGIC:  Unable to assess due to somnolence.   SKIN:  No rashes are noted on exam.      LABS:  None.      I discussed the case with Dr. Farooq from Anesthesia.  I reviewed previous medical records in Epic, and also discussed with the patient's  and daughter.      ASSESSMENT AND PLAN:  Ms. Horne is a 67-year-old woman with a history of seizure disorder, regional pain syndrome and COPD, who presents with seizures after a toe amputation.   1.  Seizures:  These seem most likely to have been brought on by the stress related to her surgery.  Her  says that she has a prolonged postictal period after her seizures, and sometimes is somnolent for 24 hours.  They report that she has been taking her medications as prescribed, and they are not aware of any adverse effects of benzodiazepines.  Since  she has now had three seizures in the PACU and one on the medical floor, we will give her a dose of Ativan.  She is too somnolent to take her oral medications, and there are not any IV equivalents.  We will try to manage with benzodiazepines overnight, and as she recovers her mental status, hopefully we will be able to resume her oral medications.  It sounds like she has had very difficult to control seizures, and the family does not want to make any changes in her medication regimen.  We will closely monitor her respiratory status.  We will check electrolytes and blood counts to try to assess if there is any acute issue which, beyond stress, is contributing to her seizures.   2.  Chronic pain of the left foot, and postoperative right second toe amputation:  Certainly, she will have some increased pain related to her surgery.  Dr. Farooq has cared for her in the outpatient setting for her chronic pain and intrathecal pump for over 20 years.  He increased the infusion of the pain medication after surgery.  We will therefore try to avoid introducing any other pain medications postoperatively.  I will continue with wound cares as ordered by Dr. Arce.   3.  Chronic obstructive pulmonary disease:  Her respiratory status appears stable.  Continue supplemental oxygen and closely monitor.         DENIA RHODES MD             D: 10/19/2017 20:15   T: 10/20/2017 04:07   MT: JANEL#[id number]      Name:     CRYSTAL PIERCE   MRN:      [id number]        Account:      [id number]   :      1950           Admitted:     [id number]      Document: [id number]       cc: Montrell Farooq MD

## 2017-10-20 NOTE — PLAN OF CARE
Problem: Seizure Disorder/Epilepsy (Adult)  Goal: Signs and Symptoms of Listed Potential Problems Will be Absent, Minimized or Managed (Seizure Disorder/Epilepsy)  Signs and symptoms of listed potential problems will be absent, minimized or managed by discharge/transition of care (reference Seizure Disorder/Epilepsy (Adult) CPG).   Outcome: Improving  AOX3 but forgetful. On 4 liters nasal canula. Gabapentin administered this shift for 7/10 foot pain. Neuro q4 stable. IV Fluids infusing at 75 ml/hr. Up with assist of 1. On clear liquid. Left foot elevated and iced.

## 2017-10-20 NOTE — CONSULTS
"CLINICAL NUTRITION SERVICES  -  ASSESSMENT NOTE      Malnutrition: Non-severe        REASON FOR ASSESSMENT  Shana Horne is a 67 year old female seen by Registered Dietitian for Admission Nutrition Risk Screen - Unintentional weight loss of 10# or more in past 2 months, Reduced oral intake over the last month.      NUTRITION HISTORY  - Information obtained from patient and EMR.  - Patient reports following a regular diet at home.  Typical pattern is meals TID.    - Patient denies a decrease in appetite, frequency of meals, or portion sizes PTA.   - She reports consuming a \"chocolate protein shake\" inconsistently though is not able to recall brand name.  - NKFA.       CURRENT NUTRITION ORDERS  Diet Order:     Regular    Current Intake/Tolerance:  Ordered a large breakfast meal while in room.        PHYSICAL FINDINGS  Observed  See fat and muscle loss below, patient denies full physical exam therefore limited  Obtained from Chart/Interdisciplinary Team  2nd R toe amputation 10/19/2017    ANTHROPOMETRICS  Height: 5' 5\"  Weight: 47.3 kg (104#)  Body mass index is 17.31 kg/(m^2).  Weight Status:  Underweight BMI <18.5  IBW: 56.8 kg (125#)  % IBW: 83%  Weight History:  Wt Readings from Last 10 Encounters:   10/19/17 47.2 kg (104 lb)   11/20/14 54.4 kg (120 lb)   11/29/11 54.4 kg (120 lb)   - Patient denies recent wt loss.  Quite vague when discussing.  Comments \"I lost a lot of weight awhile back\".  Was not able to recall when wt loss occurred or amount of weight lost.  Patient believes this may have been \"years ago\".  Denies wt loss over the past year, suspect chronically underweight.     LABS  Labs reviewed    MEDICATIONS  Medications reviewed    Dosing Weight 47.3 kg - admit wt    ASSESSED NUTRITION NEEDS PER APPROVED PRACTICE GUIDELINES:  Estimated Energy Needs: 3967-9644+ kcals (25-30 Kcal/Kg+)  Justification: repletion  Estimated Protein Needs: 57-71 grams protein (1.2-1.5 g pro/Kg)  Justification: preservation " of lean body mass  Estimated Fluid Needs: 1120-2316 mL (1 mL/Kcal)  Justification: maintenance and per provider pending fluid status    MALNUTRITION:  % Weight Loss:  None noted --> patient denies w/in the past year  % Intake:  <75% for >/= 3 months (non-severe malnutrition) --> suspect chronically decreased oral intakes to maintain wt at 83% IBW  Subcutaneous Fat Loss:  Unable to observe  Muscle Loss:  Clavicle bone region at least mild depletion and Acromion bone region at least mild depletion  Fluid Retention:  None noted    Malnutrition Diagnosis: Non-Severe malnutrition  In Context of:  Chronic illness or disease    NUTRITION DIAGNOSIS:  Inadequate oral intake related to suspected baseline decreased appetite as evidenced by chronically underweight with BMI of 17 kg/m^2 and at 83% of IBW.      NUTRITION INTERVENTIONS  Recommendations / Nutrition Prescription  Change to high calorie/high protein diet order.     Prn Ensure with meals --> patient denies scheduled supplements.     Daily MVI/M.      Implementation  Nutrition education: Provided education on importance of nutrition for strength/maintenance and healing given recent procedure as above.  Encouraged calorically dense food items and a protein source with each meal.  Reviewed protein sources and also Ensure supplement.     Medical Food Supplement, Multivitamin/Mineral: As above.     Collaboration and Referral of Nutrition care: To discuss POC with team during rounds.       Nutrition Goals  Patient to consume at least 50-75% of meals BID-TID or the equivalent with use of supplements.       MONITORING AND EVALUATION:  Progress towards goals will be monitored and evaluated per protocol and Practice Guidelines        Jo Manzanares RD, LD  Clinical Dietitian  3rd floor/ICU: [phone]  All other floors: [phone]  Weekend/holiday: [phone]

## 2017-10-20 NOTE — PLAN OF CARE
Problem: Patient Care Overview  Goal: Plan of Care/Patient Progress Review  Outcome: Improving  Patient had seizure at 2000, Ativan IV given. Patient woke up at 2300, alert to self only. Continue to monitor.

## 2017-10-20 NOTE — OP NOTE
PREOPERATIVE DIAGNOSES:   1.  Right chronic second toe open proximal interphalangeal joint dislocation.   2.  Right chronic second toe infection.   3.  Right forefoot bunion deformity.   4.  Chronic recalcitrant chronic pain disorder.      SURGICAL PROCEDURE:  Right second toe amputation at metatarsophalangeal joint.      SURGEON:  Montrell Arce MD      ANESTHESIA:  General and local ray block.      COMPLICATIONS:  None.      BLOOD LOSS:  Minimal.      TOURNIQUET TIME:  10 minutes.      INDICATIONS:  Ms. Shana Horne had presented to my office with an open PIP joint dislocation that probed down to bone.  I discussed with her and her family the risks, benefits, advantages, disadvantages, complications and alternatives, the possibility of performing the amputation through the proximal phalanx versus a full-level amputation, if they would, I felt it was best.  She understood and agreed to proceed.      PROCEDURE IN DETAIL:  She was appropriately prepped, draped and positioned on the operating table, and after satisfactory induction of anesthesia and administration of IV antibiotics and observation of universal timeout protocols, I made a tennis racquet incision on the proximal phalanx, taken sharply through the skin and subcutaneous tissues, care taken to identify and protect sensory nerves, hemostasis obtained with electrocautery.  I identified that the second metatarsal head was itself dorsiflexed, and the second MTP joint deviated into varus.  I decided, at this point, given the chronicity of the infection and her chronic pain disorder, that a second toe amputation was her safest option at the MTP joint.  I released the collaterals in the extensor and flexor tendons and excised the second toe at the base, thoroughly irrigated the wound, fashioned the flaps, identified the sensory nerves and pulled them and allowed them to retract, and then released the tourniquet and maintained meticulous hemostasis.  The wound  was irrigated again, closed in layers, sterilely dressed.  She was transferred to the recovery room in excellent condition.  All lap, sponge and needle counts were correct at the end of the case.        PLAN:  We will place her on an empiric antibiotic of Keflex.  Given that her toe infection looked like it had been chronic and distal to the level of the amputation, I felt this was the best choice for her.  She will come back in a week for a dressing change, 3 weeks for stitch removal, and we will follow up if she has any further troubles.         LOUANN DANIELLE MD             D: 10/19/2017 14:16   T: 10/20/2017 09:55   MT:       Name:     CRYSTAL PIERCE   MRN:      [id number]        Account:        [id number]   :      1950           Procedure Date: 10/19/2017      Document: [id number]

## 2017-10-20 NOTE — PROGRESS NOTES
D/c'd to home- daughter Briseida providing transport- at baseline uses [facility] at [facility]- states does not need for ride home as only lives 6 miles away .   Reviewed AVS with pt/daughter & both voice understanding. Daughter will make F/U appt with dr frazier . Has ace wrap & velcro & heel boot on. Transfers with SBA & heel touch to rt . Has filled RX's already at home as daughter took home last melia post-op

## 2017-10-20 NOTE — PHARMACY - DISCHARGE MEDICATION RECONCILIATION
Discharge medication review for this patient is complete.   See EPIC for allergy information, prior to admission medications and immunization status.   Pharmacist assisted with medication reconciliation of discharge medications with PTA medications.    MD was contacted with any questions/concerns:None    Additional medication history information:None    Discharge Medication List     Review of your medicines      START taking       Dose / Directions    acetaminophen 325 MG tablet   Commonly known as:  TYLENOL   Used for:  Arthralgia of right foot        Dose:  650 mg   Take 2 tablets (650 mg) by mouth every 4 hours as needed for other (mild pain)   Quantity:  100 tablet   Refills:  0       cephALEXin 500 MG capsule   Commonly known as:  KEFLEX   Used for:  Arthralgia of right foot        Dose:  500 mg   Take 1 capsule (500 mg) by mouth 4 times daily   Quantity:  40 capsule   Refills:  0       oxyCODONE 5 MG IR tablet   Commonly known as:  ROXICODONE   Used for:  Arthralgia of right foot        Dose:  5-10 mg   Take 1-2 tablets (5-10 mg) by mouth every 3 hours as needed for pain or other (Moderate to Severe)   Quantity:  30 tablet   Refills:  0         CONTINUE these medicines which have NOT CHANGED       Dose / Directions    amitriptyline 75 MG tablet   Commonly known as:  ELAVIL        Dose:  75 mg   Take 75 mg by mouth At Bedtime.   Refills:  0       * FELBATOL PO        Dose:  1200 mg   Take 1,200 mg by mouth every morning   Refills:  0       * FELBATOL PO        Dose:  600 mg   Take 600 mg by mouth 2 times daily At Noon and Evening   Refills:  0       FERROUS GLUCONATE PO        Dose:  324 mg   Take 324 mg by mouth 2 times daily (with meals)   Refills:  0       GABAPENTIN PO        Dose:  200 mg   Take 200 mg by mouth 2 times daily Am and Noon   Refills:  0       lamoTRIgine 100 MG tablet   Commonly known as:  LaMICtal        Dose:  100 mg   Take 100 mg by mouth 3 times daily.   Refills:  0       Multi-vitamin  Tabs tablet   Generic drug:  multivitamin, therapeutic with minerals        Dose:  1 tablet   Take 1 tablet by mouth daily.   Refills:  0       SEROQUEL PO        Dose:  25 mg   Take 25 mg by mouth At Bedtime   Refills:  0       simvastatin 40 MG tablet   Commonly known as:  ZOCOR        Dose:  40 mg   Take 40 mg by mouth At Bedtime   Refills:  0       * Notice:  This list has 2 medication(s) that are the same as other medications prescribed for you. Read the directions carefully, and ask your doctor or other care provider to review them with you.         Where to get your medicines      These medications were sent to [facility] [place], [place] - [address] [address] [facility]  [address] [address] [facility] [address]     Phone:  [phone]      acetaminophen 325 MG tablet     cephALEXin 500 MG capsule         Some of these will need a paper prescription and others can be bought over the counter. Ask your nurse if you have questions.     Bring a paper prescription for each of these medications      oxyCODONE 5 MG IR tablet

## 2017-10-20 NOTE — PLAN OF CARE
Problem: Seizure Disorder/Epilepsy (Adult)  Goal: Signs and Symptoms of Listed Potential Problems Will be Absent, Minimized or Managed (Seizure Disorder/Epilepsy)  Signs and symptoms of listed potential problems will be absent, minimized or managed by discharge/transition of care (reference Seizure Disorder/Epilepsy (Adult) CPG).   RN SHIFT SUMMARY :  DX/STORY : admit post op 10/19 with rt 2nd toe amp. >>had 2-3  seizures post op   HX : epilepsy, chronic pain-,has implanted Morphine pump   LABS/PROTOCOLS :   TELE : d/c'ed   ASSESS : a/o, no seizure activity witnessed , rates rt foot & rt hand pain as 5-8 -had oxy x1 + has morphine pump   TEACHING : discussed POC with pt/daughter   PLAN : at baseline is from home in [facility] - spouse Oliverio cares for her. Has [facility] at baseline   D/C goal:  Today

## 2017-10-20 NOTE — DISCHARGE SUMMARY
"Discharge Summary    Shana Horne MRN# [id number]   YOB: 1950 Age: 67 year old     Date of Admission:  10/19/2017  Date of Discharge:  10/20/2017  Admitting Physician:  Elijah Gu MD  Discharge Physician:  Elijah Liriano MD  Discharging Service:  Hospitalist     Home clinic: [facility]  Primary Provider: Otoniel Capellan          Discharge Diagnosis:   1.  Seizures after right second toe amputation  2.  S//p right second toe amputation  3.  Chronic hypoxia due to COPD, on home oxygen 4 lpm  4.  Seizure disorder  5.  Dyslipidemia             Discharge Disposition:   Discharged to home           Allergies:   No Known Allergies             Condition on Discharge:   Discharge condition: Stable   Discharge vitals: Blood pressure 108/55, pulse 72, temperature 99  F (37.2  C), temperature source Oral, resp. rate 20, height 1.651 m (5' 5\"), weight 47.2 kg (104 lb), SpO2 92 %.   Code status on discharge: Full Code   Physical exam on day of discharge:   GENERAL:  Comfortable. Cooperative.  PSYCH: pleasant, oriented, No acute distress.  EYES: PERRLA, Normal conjunctiva.  HEART:  Regular rate and rhythm. No JVD. Pulses normal. No edema.  LUNGS:  Clear to auscultation, normal Respiratory effort.  ABDOMEN:  Soft, no hepatosplenomegaly, normal bowel sounds.  EXTREMETIES: No clubbing, cyanosis or ischemia  SKIN:  Dry to touch, No rash.         History of Present Illness and Hospital Course:     See detailed admission note for full details.  Mrs. Shana Horne is a 67-year-old woman with a history of seizure disorder.  She presented to [facility] on 10/19/17 for an outpatient right second toe amputation.  She had been having problems with a hammertoe, and had her right second toe amputated.  Postoperatively, she was getting ready to go home and she had a group of seizures.  Her family reports that she has seizures periodically, and the last one was a couple of weeks ago.  A few months " ago she had a day where she had three or four seizures.  Her seizure activity is quite variable.  Her family administers her medications and she has received all of them as scheduled.  She was admitted to the hospital for monitoring after her seizures.  Her post-ictal period was not as long as expected.  She is back at baseline except for expected pain in her right foot.  She will discharge home this afternoon.           Procedures / Imaging:   None           Consultations:   No consultations were requested during this admission             Pending Results:   None           Discharge Instructions and Follow-Up:   Discharge diet: Regular   Discharge activity: Activity as tolerated   Discharge follow-up: Follow up with Dr. Arce as directed   Outpatient therapy: None    Other instructions: Wound care per Dr. Arce             Discharge Medications:   Current Discharge Medication List      START taking these medications    Details   oxyCODONE (ROXICODONE) 5 MG IR tablet Take 1-2 tablets (5-10 mg) by mouth every 3 hours as needed for pain or other (Moderate to Severe)  Qty: 30 tablet, Refills: 0    Associated Diagnoses: Arthralgia of right foot      acetaminophen (TYLENOL) 325 MG tablet Take 2 tablets (650 mg) by mouth every 4 hours as needed for other (mild pain)  Qty: 100 tablet, Refills: 0    Associated Diagnoses: Arthralgia of right foot      cephALEXin (KEFLEX) 500 MG capsule Take 1 capsule (500 mg) by mouth 4 times daily  Qty: 40 capsule, Refills: 0    Associated Diagnoses: Arthralgia of right foot         CONTINUE these medications which have NOT CHANGED    Details   QUEtiapine Fumarate (SEROQUEL PO) Take 25 mg by mouth At Bedtime      FERROUS GLUCONATE PO Take 324 mg by mouth 2 times daily (with meals)      GABAPENTIN PO Take 200 mg by mouth 2 times daily Am and Noon      !! Felbamate (FELBATOL PO) Take 1,200 mg by mouth every morning      !! Felbamate (FELBATOL PO) Take 600 mg by mouth 2 times daily At  Noon and Evening      LamoTRIgine (LAMICTAL) 100 MG tablet Take 100 mg by mouth 3 times daily.      simvastatin (ZOCOR) 40 MG tablet Take 40 mg by mouth At Bedtime       Multiple Vitamin (MULTI-VITAMIN) per tablet Take 1 tablet by mouth daily.      amitriptyline (ELAVIL) 75 MG tablet Take 75 mg by mouth At Bedtime.       !! - Potential duplicate medications found. Please discuss with provider.             Total time spent in face to face contact with the patient and coordinating discharge was:  25 Minutes

## 2017-10-23 LAB — COPATH REPORT: NORMAL

## 2018-01-01 ENCOUNTER — COMMUNICATION - HEALTHEAST (OUTPATIENT)
Dept: GERIATRICS | Facility: CLINIC | Age: 68
End: 2018-01-01

## 2018-01-01 ENCOUNTER — OFFICE VISIT - HEALTHEAST (OUTPATIENT)
Dept: GERIATRICS | Facility: CLINIC | Age: 68
End: 2018-01-01

## 2018-01-01 ENCOUNTER — HOSPITAL ENCOUNTER (OUTPATIENT)
Facility: CLINIC | Age: 68
Discharge: HOME | End: 2018-04-11
Attending: ANESTHESIOLOGY | Admitting: ANESTHESIOLOGY
Payer: COMMERCIAL

## 2018-01-01 ENCOUNTER — APPOINTMENT (OUTPATIENT)
Dept: GENERAL RADIOLOGY | Facility: CLINIC | Age: 68
DRG: 870 | End: 2018-01-01
Attending: EMERGENCY MEDICINE
Payer: COMMERCIAL

## 2018-01-01 ENCOUNTER — APPOINTMENT (OUTPATIENT)
Dept: GENERAL RADIOLOGY | Facility: CLINIC | Age: 68
DRG: 870 | End: 2018-01-01
Attending: INTERNAL MEDICINE
Payer: COMMERCIAL

## 2018-01-01 ENCOUNTER — HOSPITAL ENCOUNTER (OUTPATIENT)
Facility: CLINIC | Age: 68
End: 2018-01-01
Payer: COMMERCIAL

## 2018-01-01 ENCOUNTER — ANESTHESIA (OUTPATIENT)
Dept: ORTHOPEDICS | Facility: CLINIC | Age: 68
DRG: 870 | End: 2018-01-01
Payer: COMMERCIAL

## 2018-01-01 ENCOUNTER — TELEPHONE (OUTPATIENT)
Dept: PHARMACY | Facility: OTHER | Age: 68
End: 2018-01-01

## 2018-01-01 ENCOUNTER — AMBULATORY - HEALTHEAST (OUTPATIENT)
Dept: GERIATRICS | Facility: CLINIC | Age: 68
End: 2018-01-01

## 2018-01-01 ENCOUNTER — ANESTHESIA EVENT (OUTPATIENT)
Dept: INTENSIVE CARE | Facility: CLINIC | Age: 68
DRG: 870 | End: 2018-01-01
Payer: COMMERCIAL

## 2018-01-01 ENCOUNTER — HOSPITAL ENCOUNTER (OUTPATIENT)
Facility: CLINIC | Age: 68
Discharge: HOME | End: 2018-10-05
Attending: SURGERY | Admitting: SURGERY
Payer: COMMERCIAL

## 2018-01-01 ENCOUNTER — RECORDS - HEALTHEAST (OUTPATIENT)
Dept: LAB | Facility: CLINIC | Age: 68
End: 2018-01-01

## 2018-01-01 ENCOUNTER — ANESTHESIA (OUTPATIENT)
Dept: INTENSIVE CARE | Facility: CLINIC | Age: 68
DRG: 870 | End: 2018-01-01
Payer: COMMERCIAL

## 2018-01-01 ENCOUNTER — APPOINTMENT (OUTPATIENT)
Dept: ULTRASOUND IMAGING | Facility: CLINIC | Age: 68
DRG: 870 | End: 2018-01-01
Attending: EMERGENCY MEDICINE
Payer: COMMERCIAL

## 2018-01-01 ENCOUNTER — ANESTHESIA (OUTPATIENT)
Dept: SURGERY | Facility: CLINIC | Age: 68
End: 2018-01-01
Payer: COMMERCIAL

## 2018-01-01 ENCOUNTER — APPOINTMENT (OUTPATIENT)
Dept: GENERAL RADIOLOGY | Facility: CLINIC | Age: 68
DRG: 870 | End: 2018-01-01
Attending: HOSPITALIST
Payer: COMMERCIAL

## 2018-01-01 ENCOUNTER — SURGERY (OUTPATIENT)
Age: 68
End: 2018-01-01

## 2018-01-01 ENCOUNTER — APPOINTMENT (OUTPATIENT)
Dept: SPEECH THERAPY | Facility: CLINIC | Age: 68
DRG: 870 | End: 2018-01-01
Attending: INTERNAL MEDICINE
Payer: COMMERCIAL

## 2018-01-01 ENCOUNTER — APPOINTMENT (OUTPATIENT)
Dept: CT IMAGING | Facility: CLINIC | Age: 68
DRG: 870 | End: 2018-01-01
Attending: EMERGENCY MEDICINE
Payer: COMMERCIAL

## 2018-01-01 ENCOUNTER — HOSPITAL ENCOUNTER (INPATIENT)
Facility: CLINIC | Age: 68
LOS: 17 days | Discharge: NURSING, REHAB OR LONG-TERM CARE | DRG: 870 | End: 2018-07-09
Attending: EMERGENCY MEDICINE | Admitting: INTERNAL MEDICINE
Payer: COMMERCIAL

## 2018-01-01 ENCOUNTER — APPOINTMENT (OUTPATIENT)
Dept: SURGERY | Facility: PHYSICIAN GROUP | Age: 68
End: 2018-01-01
Payer: COMMERCIAL

## 2018-01-01 ENCOUNTER — ANESTHESIA EVENT (OUTPATIENT)
Dept: SURGERY | Facility: CLINIC | Age: 68
End: 2018-01-01
Payer: COMMERCIAL

## 2018-01-01 ENCOUNTER — ANESTHESIA EVENT (OUTPATIENT)
Dept: ORTHOPEDICS | Facility: CLINIC | Age: 68
DRG: 870 | End: 2018-01-01
Payer: COMMERCIAL

## 2018-01-01 ENCOUNTER — APPOINTMENT (OUTPATIENT)
Dept: CT IMAGING | Facility: CLINIC | Age: 68
DRG: 870 | End: 2018-01-01
Attending: INTERNAL MEDICINE
Payer: COMMERCIAL

## 2018-01-01 ENCOUNTER — APPOINTMENT (OUTPATIENT)
Dept: SPEECH THERAPY | Facility: CLINIC | Age: 68
DRG: 870 | End: 2018-01-01
Payer: COMMERCIAL

## 2018-01-01 VITALS
OXYGEN SATURATION: 90 % | TEMPERATURE: 98.1 F | WEIGHT: 133 LBS | SYSTOLIC BLOOD PRESSURE: 135 MMHG | HEIGHT: 64 IN | DIASTOLIC BLOOD PRESSURE: 70 MMHG | BODY MASS INDEX: 22.71 KG/M2 | RESPIRATION RATE: 16 BRPM

## 2018-01-01 VITALS
HEIGHT: 63 IN | BODY MASS INDEX: 22.79 KG/M2 | SYSTOLIC BLOOD PRESSURE: 134 MMHG | TEMPERATURE: 98.9 F | HEART RATE: 81 BPM | DIASTOLIC BLOOD PRESSURE: 74 MMHG | RESPIRATION RATE: 16 BRPM | OXYGEN SATURATION: 91 % | WEIGHT: 128.6 LBS

## 2018-01-01 DIAGNOSIS — G90.529 COMPLEX REGIONAL PAIN SYNDROME TYPE 1 OF LOWER EXTREMITY, UNSPECIFIED LATERALITY: ICD-10-CM

## 2018-01-01 DIAGNOSIS — R53.81 PHYSICAL DECONDITIONING: ICD-10-CM

## 2018-01-01 DIAGNOSIS — R41.0 CONFUSION: ICD-10-CM

## 2018-01-01 DIAGNOSIS — G40.909 SEIZURE DISORDER (H): ICD-10-CM

## 2018-01-01 DIAGNOSIS — J18.9 PNEUMONIA OF RIGHT LUNG DUE TO INFECTIOUS ORGANISM, UNSPECIFIED PART OF LUNG: ICD-10-CM

## 2018-01-01 DIAGNOSIS — F03.90 DEMENTIA (H): ICD-10-CM

## 2018-01-01 DIAGNOSIS — J96.10 CHRONIC RESPIRATORY FAILURE (H): ICD-10-CM

## 2018-01-01 DIAGNOSIS — J96.01 ACUTE RESPIRATORY FAILURE WITH HYPOXIA AND HYPERCAPNIA (H): ICD-10-CM

## 2018-01-01 DIAGNOSIS — J18.9 COMMUNITY ACQUIRED PNEUMONIA OF LEFT LUNG, UNSPECIFIED PART OF LUNG: Primary | ICD-10-CM

## 2018-01-01 DIAGNOSIS — J44.9 COPD (CHRONIC OBSTRUCTIVE PULMONARY DISEASE) (H): ICD-10-CM

## 2018-01-01 DIAGNOSIS — J44.1 COPD EXACERBATION (H): ICD-10-CM

## 2018-01-01 DIAGNOSIS — J42 CHRONIC BRONCHITIS, UNSPECIFIED CHRONIC BRONCHITIS TYPE (H): ICD-10-CM

## 2018-01-01 DIAGNOSIS — J96.02 ACUTE RESPIRATORY FAILURE WITH HYPOXIA AND HYPERCAPNIA (H): ICD-10-CM

## 2018-01-01 DIAGNOSIS — J96.21 ACUTE AND CHRONIC RESPIRATORY FAILURE WITH HYPOXIA (H): ICD-10-CM

## 2018-01-01 DIAGNOSIS — M62.81 GENERALIZED MUSCLE WEAKNESS: ICD-10-CM

## 2018-01-01 LAB
ABO + RH BLD: NORMAL
ABO + RH BLD: NORMAL
ACID FAST STN SPEC QL: NORMAL
ACID FAST STN SPEC QL: NORMAL
ALBUMIN SERPL-MCNC: 1.9 G/DL (ref 3.4–5)
ALBUMIN SERPL-MCNC: 3.7 G/DL (ref 3.4–5)
ALBUMIN SERPL-MCNC: 3.9 G/DL (ref 3.5–5)
ALBUMIN UR-MCNC: 100 MG/DL
ALP SERPL-CCNC: 108 U/L (ref 45–120)
ALP SERPL-CCNC: 119 U/L (ref 40–150)
ALP SERPL-CCNC: 74 U/L (ref 40–150)
ALT SERPL W P-5'-P-CCNC: 16 U/L (ref 0–45)
ALT SERPL W P-5'-P-CCNC: 27 U/L (ref 0–50)
ALT SERPL W P-5'-P-CCNC: 28 U/L (ref 0–50)
AMMONIA PLAS-SCNC: 17 UMOL/L (ref 10–50)
ANION GAP SERPL CALCULATED.3IONS-SCNC: 11 MMOL/L (ref 5–18)
ANION GAP SERPL CALCULATED.3IONS-SCNC: 2 MMOL/L (ref 3–14)
ANION GAP SERPL CALCULATED.3IONS-SCNC: 3 MMOL/L (ref 3–14)
ANION GAP SERPL CALCULATED.3IONS-SCNC: 4 MMOL/L (ref 3–14)
ANION GAP SERPL CALCULATED.3IONS-SCNC: 4 MMOL/L (ref 3–14)
ANION GAP SERPL CALCULATED.3IONS-SCNC: 5 MMOL/L (ref 3–14)
ANION GAP SERPL CALCULATED.3IONS-SCNC: 8 MMOL/L (ref 3–14)
ANION GAP SERPL CALCULATED.3IONS-SCNC: 8 MMOL/L (ref 3–14)
APPEARANCE UR: CLEAR
AST SERPL W P-5'-P-CCNC: 18 U/L (ref 0–40)
AST SERPL W P-5'-P-CCNC: 25 U/L (ref 0–45)
AST SERPL W P-5'-P-CCNC: 27 U/L (ref 0–45)
BACTERIA SPEC CULT: ABNORMAL
BACTERIA SPEC CULT: NO GROWTH
BACTERIA SPEC CULT: NORMAL
BASE DEFICIT BLDA-SCNC: NORMAL MMOL/L
BASE EXCESS BLDA CALC-SCNC: 0.9 MMOL/L
BASE EXCESS BLDA CALC-SCNC: 1.7 MMOL/L
BASE EXCESS BLDA CALC-SCNC: 14.2 MMOL/L
BASE EXCESS BLDA CALC-SCNC: 2.4 MMOL/L
BASE EXCESS BLDA CALC-SCNC: 2.8 MMOL/L
BASE EXCESS BLDA CALC-SCNC: 2.9 MMOL/L
BASE EXCESS BLDA CALC-SCNC: 3.8 MMOL/L
BASE EXCESS BLDA CALC-SCNC: 4.4 MMOL/L
BASE EXCESS BLDA CALC-SCNC: 5.5 MMOL/L
BASE EXCESS BLDA CALC-SCNC: 5.9 MMOL/L
BASE EXCESS BLDA CALC-SCNC: 7.9 MMOL/L
BASE EXCESS BLDA CALC-SCNC: NORMAL MMOL/L
BASOPHILS # BLD AUTO: 0 10E9/L (ref 0–0.2)
BASOPHILS # BLD AUTO: 0 THOU/UL (ref 0–0.2)
BASOPHILS # BLD AUTO: 0.1 10E9/L (ref 0–0.2)
BASOPHILS NFR BLD AUTO: 0 %
BASOPHILS NFR BLD AUTO: 0 % (ref 0–2)
BASOPHILS NFR BLD AUTO: 0.1 %
BASOPHILS NFR BLD AUTO: 0.2 %
BASOPHILS NFR BLD AUTO: 0.3 %
BASOPHILS NFR BLD AUTO: 0.3 %
BILIRUB SERPL-MCNC: 0.3 MG/DL (ref 0–1)
BILIRUB SERPL-MCNC: 0.4 MG/DL (ref 0.2–1.3)
BILIRUB SERPL-MCNC: <0.1 MG/DL (ref 0.2–1.3)
BILIRUB UR QL STRIP: NEGATIVE
BLD GP AB SCN SERPL QL: NORMAL
BLOOD BANK CMNT PATIENT-IMP: NORMAL
BRONCHOSCOPY: NORMAL
BUN SERPL-MCNC: 10 MG/DL (ref 7–30)
BUN SERPL-MCNC: 11 MG/DL (ref 7–30)
BUN SERPL-MCNC: 13 MG/DL (ref 7–30)
BUN SERPL-MCNC: 18 MG/DL (ref 7–30)
BUN SERPL-MCNC: 21 MG/DL (ref 7–30)
BUN SERPL-MCNC: 23 MG/DL (ref 7–30)
BUN SERPL-MCNC: 25 MG/DL (ref 7–30)
BUN SERPL-MCNC: 27 MG/DL (ref 7–30)
BUN SERPL-MCNC: 28 MG/DL (ref 8–22)
BUN SERPL-MCNC: 29 MG/DL (ref 7–30)
BUN SERPL-MCNC: 29 MG/DL (ref 7–30)
BUN SERPL-MCNC: 31 MG/DL (ref 7–30)
BUN SERPL-MCNC: 34 MG/DL (ref 7–30)
BUN SERPL-MCNC: 34 MG/DL (ref 7–30)
BUN SERPL-MCNC: 35 MG/DL (ref 7–30)
BUN SERPL-MCNC: 36 MG/DL (ref 7–30)
BUN SERPL-MCNC: 37 MG/DL (ref 7–30)
BUN SERPL-MCNC: 38 MG/DL (ref 7–30)
BUN SERPL-MCNC: 9 MG/DL (ref 7–30)
CALCIUM SERPL-MCNC: 10 MG/DL (ref 8.5–10.5)
CALCIUM SERPL-MCNC: 7.5 MG/DL (ref 8.5–10.1)
CALCIUM SERPL-MCNC: 7.6 MG/DL (ref 8.5–10.1)
CALCIUM SERPL-MCNC: 7.7 MG/DL (ref 8.5–10.1)
CALCIUM SERPL-MCNC: 7.8 MG/DL (ref 8.5–10.1)
CALCIUM SERPL-MCNC: 7.8 MG/DL (ref 8.5–10.1)
CALCIUM SERPL-MCNC: 7.9 MG/DL (ref 8.5–10.1)
CALCIUM SERPL-MCNC: 8 MG/DL (ref 8.5–10.1)
CALCIUM SERPL-MCNC: 8.4 MG/DL (ref 8.5–10.1)
CALCIUM SERPL-MCNC: 8.5 MG/DL (ref 8.5–10.1)
CALCIUM SERPL-MCNC: 8.5 MG/DL (ref 8.5–10.1)
CALCIUM SERPL-MCNC: 8.6 MG/DL (ref 8.5–10.1)
CALCIUM SERPL-MCNC: 8.7 MG/DL (ref 8.5–10.1)
CALCIUM SERPL-MCNC: 8.8 MG/DL (ref 8.5–10.1)
CALCIUM SERPL-MCNC: 8.9 MG/DL (ref 8.5–10.1)
CALCIUM SERPL-MCNC: 9 MG/DL (ref 8.5–10.1)
CALCIUM SERPL-MCNC: 9 MG/DL (ref 8.5–10.1)
CALCIUM SERPL-MCNC: 9.1 MG/DL (ref 8.5–10.1)
CALCIUM SERPL-MCNC: 9.7 MG/DL (ref 8.5–10.1)
CHLORIDE BLD-SCNC: 99 MMOL/L (ref 98–107)
CHLORIDE SERPL-SCNC: 100 MMOL/L (ref 94–109)
CHLORIDE SERPL-SCNC: 104 MMOL/L (ref 94–109)
CHLORIDE SERPL-SCNC: 105 MMOL/L (ref 94–109)
CHLORIDE SERPL-SCNC: 108 MMOL/L (ref 94–109)
CHLORIDE SERPL-SCNC: 109 MMOL/L (ref 94–109)
CHLORIDE SERPL-SCNC: 109 MMOL/L (ref 94–109)
CHLORIDE SERPL-SCNC: 111 MMOL/L (ref 94–109)
CHLORIDE SERPL-SCNC: 95 MMOL/L (ref 94–109)
CHLORIDE SERPL-SCNC: 97 MMOL/L (ref 94–109)
CHLORIDE SERPL-SCNC: 98 MMOL/L (ref 94–109)
CHLORIDE SERPL-SCNC: 99 MMOL/L (ref 94–109)
CHLORIDE SERPL-SCNC: 99 MMOL/L (ref 94–109)
CK SERPL-CCNC: 50 U/L (ref 30–225)
CO2 SERPL-SCNC: 27 MMOL/L (ref 20–32)
CO2 SERPL-SCNC: 28 MMOL/L (ref 20–32)
CO2 SERPL-SCNC: 29 MMOL/L (ref 20–32)
CO2 SERPL-SCNC: 29 MMOL/L (ref 20–32)
CO2 SERPL-SCNC: 29 MMOL/L (ref 22–31)
CO2 SERPL-SCNC: 30 MMOL/L (ref 20–32)
CO2 SERPL-SCNC: 30 MMOL/L (ref 20–32)
CO2 SERPL-SCNC: 32 MMOL/L (ref 20–32)
CO2 SERPL-SCNC: 33 MMOL/L (ref 20–32)
CO2 SERPL-SCNC: 33 MMOL/L (ref 20–32)
CO2 SERPL-SCNC: 34 MMOL/L (ref 20–32)
CO2 SERPL-SCNC: 37 MMOL/L (ref 20–32)
CO2 SERPL-SCNC: 39 MMOL/L (ref 20–32)
CO2 SERPL-SCNC: 40 MMOL/L (ref 20–32)
CO2 SERPL-SCNC: 43 MMOL/L (ref 20–32)
CO2 SERPL-SCNC: 44 MMOL/L (ref 20–32)
CO2 SERPL-SCNC: 44 MMOL/L (ref 20–32)
COLOR UR AUTO: YELLOW
COPATH REPORT: NORMAL
CREAT SERPL-MCNC: 0.62 MG/DL (ref 0.52–1.04)
CREAT SERPL-MCNC: 0.63 MG/DL (ref 0.52–1.04)
CREAT SERPL-MCNC: 0.64 MG/DL (ref 0.52–1.04)
CREAT SERPL-MCNC: 0.65 MG/DL (ref 0.52–1.04)
CREAT SERPL-MCNC: 0.66 MG/DL (ref 0.52–1.04)
CREAT SERPL-MCNC: 0.67 MG/DL (ref 0.52–1.04)
CREAT SERPL-MCNC: 0.68 MG/DL (ref 0.52–1.04)
CREAT SERPL-MCNC: 0.68 MG/DL (ref 0.52–1.04)
CREAT SERPL-MCNC: 0.69 MG/DL (ref 0.52–1.04)
CREAT SERPL-MCNC: 0.7 MG/DL (ref 0.52–1.04)
CREAT SERPL-MCNC: 0.72 MG/DL (ref 0.52–1.04)
CREAT SERPL-MCNC: 0.72 MG/DL (ref 0.52–1.04)
CREAT SERPL-MCNC: 0.73 MG/DL (ref 0.52–1.04)
CREAT SERPL-MCNC: 0.73 MG/DL (ref 0.52–1.04)
CREAT SERPL-MCNC: 0.86 MG/DL (ref 0.52–1.04)
CREAT SERPL-MCNC: 0.89 MG/DL (ref 0.6–1.1)
CREAT SERPL-MCNC: 1.06 MG/DL (ref 0.52–1.04)
CRP SERPL-MCNC: 16.6 MG/L (ref 0–8)
CRP SERPL-MCNC: 6.9 MG/L (ref 0–8)
CRP SERPL-MCNC: 6.9 MG/L (ref 0–8)
DIFFERENTIAL METHOD BLD: ABNORMAL
EOSINOPHIL # BLD AUTO: 0 10E9/L (ref 0–0.7)
EOSINOPHIL # BLD AUTO: 0.1 10E9/L (ref 0–0.7)
EOSINOPHIL # BLD AUTO: 0.3 THOU/UL (ref 0–0.4)
EOSINOPHIL NFR BLD AUTO: 0 %
EOSINOPHIL NFR BLD AUTO: 0.1 %
EOSINOPHIL NFR BLD AUTO: 0.2 %
EOSINOPHIL NFR BLD AUTO: 0.3 %
EOSINOPHIL NFR BLD AUTO: 0.3 %
EOSINOPHIL NFR BLD AUTO: 0.4 %
EOSINOPHIL NFR BLD AUTO: 0.8 %
EOSINOPHIL NFR BLD AUTO: 0.9 %
EOSINOPHIL NFR BLD AUTO: 1 %
EOSINOPHIL NFR BLD AUTO: 1.2 %
EOSINOPHIL NFR BLD AUTO: 1.3 %
EOSINOPHIL NFR BLD AUTO: 4 % (ref 0–6)
ERYTHROCYTE [DISTWIDTH] IN BLOOD BY AUTOMATED COUNT: 13.7 % (ref 11–14.5)
ERYTHROCYTE [DISTWIDTH] IN BLOOD BY AUTOMATED COUNT: 13.8 % (ref 10–15)
ERYTHROCYTE [DISTWIDTH] IN BLOOD BY AUTOMATED COUNT: 13.9 % (ref 10–15)
ERYTHROCYTE [DISTWIDTH] IN BLOOD BY AUTOMATED COUNT: 14 % (ref 10–15)
ERYTHROCYTE [DISTWIDTH] IN BLOOD BY AUTOMATED COUNT: 14.2 % (ref 10–15)
ERYTHROCYTE [DISTWIDTH] IN BLOOD BY AUTOMATED COUNT: 14.3 % (ref 10–15)
ERYTHROCYTE [DISTWIDTH] IN BLOOD BY AUTOMATED COUNT: 14.3 % (ref 10–15)
ERYTHROCYTE [DISTWIDTH] IN BLOOD BY AUTOMATED COUNT: 14.4 % (ref 10–15)
ERYTHROCYTE [DISTWIDTH] IN BLOOD BY AUTOMATED COUNT: 14.4 % (ref 10–15)
ERYTHROCYTE [SEDIMENTATION RATE] IN BLOOD BY WESTERGREN METHOD: 26 MM/H (ref 0–30)
FUNGUS SPEC CULT: ABNORMAL
FUNGUS SPEC CULT: ABNORMAL
GFR SERPL CREATININE-BSD FRML MDRD: 52 ML/MIN/1.7M2
GFR SERPL CREATININE-BSD FRML MDRD: 66 ML/MIN/1.7M2
GFR SERPL CREATININE-BSD FRML MDRD: 80 ML/MIN/1.7M2
GFR SERPL CREATININE-BSD FRML MDRD: 80 ML/MIN/1.7M2
GFR SERPL CREATININE-BSD FRML MDRD: 81 ML/MIN/1.7M2
GFR SERPL CREATININE-BSD FRML MDRD: 82 ML/MIN/1.7M2
GFR SERPL CREATININE-BSD FRML MDRD: 84 ML/MIN/1.7M2
GFR SERPL CREATININE-BSD FRML MDRD: 85 ML/MIN/1.7M2
GFR SERPL CREATININE-BSD FRML MDRD: 86 ML/MIN/1.7M2
GFR SERPL CREATININE-BSD FRML MDRD: 87 ML/MIN/1.7M2
GFR SERPL CREATININE-BSD FRML MDRD: 89 ML/MIN/1.7M2
GFR SERPL CREATININE-BSD FRML MDRD: 89 ML/MIN/1.7M2
GFR SERPL CREATININE-BSD FRML MDRD: >60 ML/MIN/1.73M2
GFR SERPL CREATININE-BSD FRML MDRD: >90 ML/MIN/1.7M2
GLUCOSE BLD-MCNC: 109 MG/DL (ref 70–125)
GLUCOSE BLDC GLUCOMTR-MCNC: 101 MG/DL (ref 70–99)
GLUCOSE BLDC GLUCOMTR-MCNC: 101 MG/DL (ref 70–99)
GLUCOSE BLDC GLUCOMTR-MCNC: 102 MG/DL (ref 70–99)
GLUCOSE BLDC GLUCOMTR-MCNC: 105 MG/DL (ref 70–99)
GLUCOSE BLDC GLUCOMTR-MCNC: 107 MG/DL (ref 70–99)
GLUCOSE BLDC GLUCOMTR-MCNC: 109 MG/DL (ref 70–99)
GLUCOSE BLDC GLUCOMTR-MCNC: 110 MG/DL (ref 70–99)
GLUCOSE BLDC GLUCOMTR-MCNC: 110 MG/DL (ref 70–99)
GLUCOSE BLDC GLUCOMTR-MCNC: 111 MG/DL (ref 70–99)
GLUCOSE BLDC GLUCOMTR-MCNC: 112 MG/DL (ref 70–99)
GLUCOSE BLDC GLUCOMTR-MCNC: 113 MG/DL (ref 70–99)
GLUCOSE BLDC GLUCOMTR-MCNC: 114 MG/DL (ref 70–99)
GLUCOSE BLDC GLUCOMTR-MCNC: 116 MG/DL (ref 70–99)
GLUCOSE BLDC GLUCOMTR-MCNC: 116 MG/DL (ref 70–99)
GLUCOSE BLDC GLUCOMTR-MCNC: 118 MG/DL (ref 70–99)
GLUCOSE BLDC GLUCOMTR-MCNC: 118 MG/DL (ref 70–99)
GLUCOSE BLDC GLUCOMTR-MCNC: 119 MG/DL (ref 70–99)
GLUCOSE BLDC GLUCOMTR-MCNC: 119 MG/DL (ref 70–99)
GLUCOSE BLDC GLUCOMTR-MCNC: 120 MG/DL (ref 70–99)
GLUCOSE BLDC GLUCOMTR-MCNC: 121 MG/DL (ref 70–99)
GLUCOSE BLDC GLUCOMTR-MCNC: 121 MG/DL (ref 70–99)
GLUCOSE BLDC GLUCOMTR-MCNC: 122 MG/DL (ref 70–99)
GLUCOSE BLDC GLUCOMTR-MCNC: 122 MG/DL (ref 70–99)
GLUCOSE BLDC GLUCOMTR-MCNC: 123 MG/DL (ref 70–99)
GLUCOSE BLDC GLUCOMTR-MCNC: 125 MG/DL (ref 70–99)
GLUCOSE BLDC GLUCOMTR-MCNC: 125 MG/DL (ref 70–99)
GLUCOSE BLDC GLUCOMTR-MCNC: 126 MG/DL (ref 70–99)
GLUCOSE BLDC GLUCOMTR-MCNC: 127 MG/DL (ref 70–99)
GLUCOSE BLDC GLUCOMTR-MCNC: 132 MG/DL (ref 70–99)
GLUCOSE BLDC GLUCOMTR-MCNC: 133 MG/DL (ref 70–99)
GLUCOSE BLDC GLUCOMTR-MCNC: 134 MG/DL (ref 70–99)
GLUCOSE BLDC GLUCOMTR-MCNC: 134 MG/DL (ref 70–99)
GLUCOSE BLDC GLUCOMTR-MCNC: 135 MG/DL (ref 70–99)
GLUCOSE BLDC GLUCOMTR-MCNC: 136 MG/DL (ref 70–99)
GLUCOSE BLDC GLUCOMTR-MCNC: 139 MG/DL (ref 70–99)
GLUCOSE BLDC GLUCOMTR-MCNC: 139 MG/DL (ref 70–99)
GLUCOSE BLDC GLUCOMTR-MCNC: 142 MG/DL (ref 70–99)
GLUCOSE BLDC GLUCOMTR-MCNC: 142 MG/DL (ref 70–99)
GLUCOSE BLDC GLUCOMTR-MCNC: 143 MG/DL (ref 70–99)
GLUCOSE BLDC GLUCOMTR-MCNC: 144 MG/DL (ref 70–99)
GLUCOSE BLDC GLUCOMTR-MCNC: 144 MG/DL (ref 70–99)
GLUCOSE BLDC GLUCOMTR-MCNC: 147 MG/DL (ref 70–99)
GLUCOSE BLDC GLUCOMTR-MCNC: 148 MG/DL (ref 70–99)
GLUCOSE BLDC GLUCOMTR-MCNC: 148 MG/DL (ref 70–99)
GLUCOSE BLDC GLUCOMTR-MCNC: 152 MG/DL (ref 70–99)
GLUCOSE BLDC GLUCOMTR-MCNC: 153 MG/DL (ref 70–99)
GLUCOSE BLDC GLUCOMTR-MCNC: 153 MG/DL (ref 70–99)
GLUCOSE BLDC GLUCOMTR-MCNC: 154 MG/DL (ref 70–99)
GLUCOSE BLDC GLUCOMTR-MCNC: 154 MG/DL (ref 70–99)
GLUCOSE BLDC GLUCOMTR-MCNC: 159 MG/DL (ref 70–99)
GLUCOSE BLDC GLUCOMTR-MCNC: 162 MG/DL (ref 70–99)
GLUCOSE BLDC GLUCOMTR-MCNC: 163 MG/DL (ref 70–99)
GLUCOSE BLDC GLUCOMTR-MCNC: 167 MG/DL (ref 70–99)
GLUCOSE BLDC GLUCOMTR-MCNC: 175 MG/DL (ref 70–99)
GLUCOSE BLDC GLUCOMTR-MCNC: 186 MG/DL (ref 70–99)
GLUCOSE BLDC GLUCOMTR-MCNC: 227 MG/DL (ref 70–99)
GLUCOSE BLDC GLUCOMTR-MCNC: 230 MG/DL (ref 70–99)
GLUCOSE BLDC GLUCOMTR-MCNC: 87 MG/DL (ref 70–99)
GLUCOSE BLDC GLUCOMTR-MCNC: 91 MG/DL (ref 70–99)
GLUCOSE BLDC GLUCOMTR-MCNC: 94 MG/DL (ref 70–99)
GLUCOSE BLDC GLUCOMTR-MCNC: 96 MG/DL (ref 70–99)
GLUCOSE BLDC GLUCOMTR-MCNC: 96 MG/DL (ref 70–99)
GLUCOSE BLDC GLUCOMTR-MCNC: 97 MG/DL (ref 70–99)
GLUCOSE BLDC GLUCOMTR-MCNC: 97 MG/DL (ref 70–99)
GLUCOSE BLDC GLUCOMTR-MCNC: 99 MG/DL (ref 70–99)
GLUCOSE SERPL-MCNC: 100 MG/DL (ref 70–99)
GLUCOSE SERPL-MCNC: 100 MG/DL (ref 70–99)
GLUCOSE SERPL-MCNC: 105 MG/DL (ref 70–99)
GLUCOSE SERPL-MCNC: 106 MG/DL (ref 70–99)
GLUCOSE SERPL-MCNC: 107 MG/DL (ref 70–99)
GLUCOSE SERPL-MCNC: 110 MG/DL (ref 70–99)
GLUCOSE SERPL-MCNC: 115 MG/DL (ref 70–99)
GLUCOSE SERPL-MCNC: 116 MG/DL (ref 70–99)
GLUCOSE SERPL-MCNC: 117 MG/DL (ref 70–99)
GLUCOSE SERPL-MCNC: 119 MG/DL (ref 70–99)
GLUCOSE SERPL-MCNC: 121 MG/DL (ref 70–99)
GLUCOSE SERPL-MCNC: 126 MG/DL (ref 70–99)
GLUCOSE SERPL-MCNC: 135 MG/DL (ref 70–99)
GLUCOSE SERPL-MCNC: 141 MG/DL (ref 70–99)
GLUCOSE SERPL-MCNC: 148 MG/DL (ref 70–99)
GLUCOSE SERPL-MCNC: 152 MG/DL (ref 70–99)
GLUCOSE SERPL-MCNC: 94 MG/DL (ref 70–99)
GLUCOSE SERPL-MCNC: 99 MG/DL (ref 70–99)
GLUCOSE UR STRIP-MCNC: NEGATIVE MG/DL
GP B STREP AG SPEC QL: NORMAL
GP B STREP AG SPEC QL: NORMAL
GRAM STN SPEC: ABNORMAL
GRAM STN SPEC: NORMAL
HBA1C MFR BLD: 5.8 % (ref 0–5.6)
HCO3 BLD-SCNC: 27 MMOL/L (ref 21–28)
HCO3 BLD-SCNC: 28 MMOL/L (ref 21–28)
HCO3 BLD-SCNC: 29 MMOL/L (ref 21–28)
HCO3 BLD-SCNC: 30 MMOL/L (ref 21–28)
HCO3 BLD-SCNC: 31 MMOL/L (ref 21–28)
HCO3 BLD-SCNC: 32 MMOL/L (ref 21–28)
HCO3 BLD-SCNC: 33 MMOL/L (ref 21–28)
HCO3 BLD-SCNC: 34 MMOL/L (ref 21–28)
HCO3 BLD-SCNC: 40 MMOL/L (ref 21–28)
HCO3 BLD-SCNC: NORMAL MMOL/L (ref 21–28)
HCT VFR BLD AUTO: 30.5 % (ref 35–47)
HCT VFR BLD AUTO: 30.6 % (ref 35–47)
HCT VFR BLD AUTO: 31.2 % (ref 35–47)
HCT VFR BLD AUTO: 31.4 % (ref 35–47)
HCT VFR BLD AUTO: 31.7 % (ref 35–47)
HCT VFR BLD AUTO: 31.8 % (ref 35–47)
HCT VFR BLD AUTO: 31.9 % (ref 35–47)
HCT VFR BLD AUTO: 31.9 % (ref 35–47)
HCT VFR BLD AUTO: 32.8 % (ref 35–47)
HCT VFR BLD AUTO: 32.8 % (ref 35–47)
HCT VFR BLD AUTO: 32.9 % (ref 35–47)
HCT VFR BLD AUTO: 32.9 % (ref 35–47)
HCT VFR BLD AUTO: 33 % (ref 35–47)
HCT VFR BLD AUTO: 33.2 % (ref 35–47)
HCT VFR BLD AUTO: 33.6 % (ref 35–47)
HCT VFR BLD AUTO: 34.2 % (ref 35–47)
HCT VFR BLD AUTO: 35.3 % (ref 35–47)
HCT VFR BLD AUTO: 38 % (ref 35–47)
HCT VFR BLD AUTO: 44.8 % (ref 35–47)
HGB BLD-MCNC: 10 G/DL (ref 11.7–15.7)
HGB BLD-MCNC: 10 G/DL (ref 11.7–15.7)
HGB BLD-MCNC: 10.1 G/DL (ref 11.7–15.7)
HGB BLD-MCNC: 10.2 G/DL (ref 11.7–15.7)
HGB BLD-MCNC: 10.2 G/DL (ref 11.7–15.7)
HGB BLD-MCNC: 10.3 G/DL (ref 11.7–15.7)
HGB BLD-MCNC: 10.3 G/DL (ref 11.7–15.7)
HGB BLD-MCNC: 10.7 G/DL (ref 11.7–15.7)
HGB BLD-MCNC: 10.9 G/DL (ref 11.7–15.7)
HGB BLD-MCNC: 12.1 G/DL (ref 12–16)
HGB BLD-MCNC: 14.1 G/DL (ref 11.7–15.7)
HGB BLD-MCNC: 9.4 G/DL (ref 11.7–15.7)
HGB BLD-MCNC: 9.6 G/DL (ref 11.7–15.7)
HGB BLD-MCNC: 9.7 G/DL (ref 11.7–15.7)
HGB BLD-MCNC: 9.7 G/DL (ref 11.7–15.7)
HGB BLD-MCNC: 9.8 G/DL (ref 11.7–15.7)
HGB BLD-MCNC: 9.9 G/DL (ref 11.7–15.7)
HGB UR QL STRIP: ABNORMAL
IMM GRANULOCYTES # BLD: 0 10E9/L (ref 0–0.4)
IMM GRANULOCYTES # BLD: 0.1 10E9/L (ref 0–0.4)
IMM GRANULOCYTES NFR BLD: 0.2 %
IMM GRANULOCYTES NFR BLD: 0.3 %
IMM GRANULOCYTES NFR BLD: 0.4 %
IMM GRANULOCYTES NFR BLD: 0.5 %
IMM GRANULOCYTES NFR BLD: 0.5 %
IMM GRANULOCYTES NFR BLD: 0.6 %
IMM GRANULOCYTES NFR BLD: 0.7 %
IMM GRANULOCYTES NFR BLD: 0.8 %
INR PPP: 0.97 (ref 0.86–1.14)
INTERPRETATION ECG - MUSE: NORMAL
INTERPRETATION ECG - MUSE: NORMAL
KETONES UR STRIP-MCNC: NEGATIVE MG/DL
KOH PREP SPEC: NORMAL
L PNEUMO1 AG UR QL IA: NORMAL
LACTATE BLD-SCNC: 1 MMOL/L (ref 0.7–2)
LACTATE BLD-SCNC: 1.1 MMOL/L (ref 0.7–2)
LACTATE BLD-SCNC: NORMAL MMOL/L (ref 0.7–2)
LAMOTRIGINE SERPL-MCNC: 6.7 UG/ML (ref 2.5–15)
LEUKOCYTE ESTERASE UR QL STRIP: NEGATIVE
LYMPHOCYTES # BLD AUTO: 0.7 10E9/L (ref 0.8–5.3)
LYMPHOCYTES # BLD AUTO: 0.8 10E9/L (ref 0.8–5.3)
LYMPHOCYTES # BLD AUTO: 1 10E9/L (ref 0.8–5.3)
LYMPHOCYTES # BLD AUTO: 1 10E9/L (ref 0.8–5.3)
LYMPHOCYTES # BLD AUTO: 1.1 10E9/L (ref 0.8–5.3)
LYMPHOCYTES # BLD AUTO: 1.2 10E9/L (ref 0.8–5.3)
LYMPHOCYTES # BLD AUTO: 1.8 10E9/L (ref 0.8–5.3)
LYMPHOCYTES # BLD AUTO: 1.9 10E9/L (ref 0.8–5.3)
LYMPHOCYTES # BLD AUTO: 1.9 10E9/L (ref 0.8–5.3)
LYMPHOCYTES # BLD AUTO: 2 10E9/L (ref 0.8–5.3)
LYMPHOCYTES # BLD AUTO: 2.2 10E9/L (ref 0.8–5.3)
LYMPHOCYTES # BLD AUTO: 2.2 10E9/L (ref 0.8–5.3)
LYMPHOCYTES # BLD AUTO: 2.3 10E9/L (ref 0.8–5.3)
LYMPHOCYTES # BLD AUTO: 2.4 10E9/L (ref 0.8–5.3)
LYMPHOCYTES # BLD AUTO: 2.6 THOU/UL (ref 0.8–4.4)
LYMPHOCYTES NFR BLD AUTO: 10 %
LYMPHOCYTES NFR BLD AUTO: 13.1 %
LYMPHOCYTES NFR BLD AUTO: 15.6 %
LYMPHOCYTES NFR BLD AUTO: 16.7 %
LYMPHOCYTES NFR BLD AUTO: 18.5 %
LYMPHOCYTES NFR BLD AUTO: 18.9 %
LYMPHOCYTES NFR BLD AUTO: 19.7 %
LYMPHOCYTES NFR BLD AUTO: 20 %
LYMPHOCYTES NFR BLD AUTO: 21.1 %
LYMPHOCYTES NFR BLD AUTO: 24 %
LYMPHOCYTES NFR BLD AUTO: 25.4 %
LYMPHOCYTES NFR BLD AUTO: 36 % (ref 20–40)
LYMPHOCYTES NFR BLD AUTO: 4.7 %
LYMPHOCYTES NFR BLD AUTO: 5.4 %
LYMPHOCYTES NFR BLD AUTO: 6.4 %
LYMPHOCYTES NFR BLD AUTO: 7.1 %
LYMPHOCYTES NFR BLD AUTO: 7.4 %
LYMPHOCYTES NFR BLD AUTO: 7.5 %
LYMPHOCYTES NFR BLD AUTO: 9.9 %
Lab: ABNORMAL
Lab: ABNORMAL
Lab: NORMAL
MAGNESIUM SERPL-MCNC: 2 MG/DL (ref 1.6–2.3)
MAGNESIUM SERPL-MCNC: 2.1 MG/DL (ref 1.6–2.3)
MAGNESIUM SERPL-MCNC: 2.2 MG/DL (ref 1.6–2.3)
MAGNESIUM SERPL-MCNC: 2.2 MG/DL (ref 1.6–2.3)
MAGNESIUM SERPL-MCNC: 2.2 MG/DL (ref 1.8–2.6)
MCH RBC QN AUTO: 29.8 PG (ref 27–34)
MCH RBC QN AUTO: 29.9 PG (ref 26.5–33)
MCH RBC QN AUTO: 30 PG (ref 26.5–33)
MCH RBC QN AUTO: 30.1 PG (ref 26.5–33)
MCH RBC QN AUTO: 30.1 PG (ref 26.5–33)
MCH RBC QN AUTO: 30.2 PG (ref 26.5–33)
MCH RBC QN AUTO: 30.3 PG (ref 26.5–33)
MCH RBC QN AUTO: 30.4 PG (ref 26.5–33)
MCH RBC QN AUTO: 30.7 PG (ref 26.5–33)
MCH RBC QN AUTO: 30.7 PG (ref 26.5–33)
MCH RBC QN AUTO: 30.8 PG (ref 26.5–33)
MCH RBC QN AUTO: 30.9 PG (ref 26.5–33)
MCH RBC QN AUTO: 31.1 PG (ref 26.5–33)
MCH RBC QN AUTO: 31.3 PG (ref 26.5–33)
MCH RBC QN AUTO: 31.4 PG (ref 26.5–33)
MCHC RBC AUTO-ENTMCNC: 30.1 G/DL (ref 31.5–36.5)
MCHC RBC AUTO-ENTMCNC: 30.4 G/DL (ref 31.5–36.5)
MCHC RBC AUTO-ENTMCNC: 30.6 G/DL (ref 31.5–36.5)
MCHC RBC AUTO-ENTMCNC: 30.6 G/DL (ref 31.5–36.5)
MCHC RBC AUTO-ENTMCNC: 30.7 G/DL (ref 31.5–36.5)
MCHC RBC AUTO-ENTMCNC: 30.8 G/DL (ref 31.5–36.5)
MCHC RBC AUTO-ENTMCNC: 30.9 G/DL (ref 31.5–36.5)
MCHC RBC AUTO-ENTMCNC: 31 G/DL (ref 31.5–36.5)
MCHC RBC AUTO-ENTMCNC: 31.1 G/DL (ref 31.5–36.5)
MCHC RBC AUTO-ENTMCNC: 31.2 G/DL (ref 31.5–36.5)
MCHC RBC AUTO-ENTMCNC: 31.3 G/DL (ref 31.5–36.5)
MCHC RBC AUTO-ENTMCNC: 31.4 G/DL (ref 31.5–36.5)
MCHC RBC AUTO-ENTMCNC: 31.4 G/DL (ref 31.5–36.5)
MCHC RBC AUTO-ENTMCNC: 31.5 G/DL (ref 31.5–36.5)
MCHC RBC AUTO-ENTMCNC: 31.8 G/DL (ref 32–36)
MCV RBC AUTO: 100 FL (ref 78–100)
MCV RBC AUTO: 101 FL (ref 78–100)
MCV RBC AUTO: 94 FL (ref 80–100)
MCV RBC AUTO: 97 FL (ref 78–100)
MCV RBC AUTO: 97 FL (ref 78–100)
MCV RBC AUTO: 98 FL (ref 78–100)
MCV RBC AUTO: 99 FL (ref 78–100)
MONOCYTES # BLD AUTO: 0.2 10E9/L (ref 0–1.3)
MONOCYTES # BLD AUTO: 0.3 10E9/L (ref 0–1.3)
MONOCYTES # BLD AUTO: 0.4 10E9/L (ref 0–1.3)
MONOCYTES # BLD AUTO: 0.5 10E9/L (ref 0–1.3)
MONOCYTES # BLD AUTO: 0.5 THOU/UL (ref 0–0.9)
MONOCYTES # BLD AUTO: 0.6 10E9/L (ref 0–1.3)
MONOCYTES # BLD AUTO: 0.7 10E9/L (ref 0–1.3)
MONOCYTES NFR BLD AUTO: 1.7 %
MONOCYTES NFR BLD AUTO: 1.8 %
MONOCYTES NFR BLD AUTO: 1.9 %
MONOCYTES NFR BLD AUTO: 2.5 %
MONOCYTES NFR BLD AUTO: 2.6 %
MONOCYTES NFR BLD AUTO: 2.9 %
MONOCYTES NFR BLD AUTO: 3.3 %
MONOCYTES NFR BLD AUTO: 3.7 %
MONOCYTES NFR BLD AUTO: 3.7 %
MONOCYTES NFR BLD AUTO: 4.6 %
MONOCYTES NFR BLD AUTO: 4.9 %
MONOCYTES NFR BLD AUTO: 5.5 %
MONOCYTES NFR BLD AUTO: 5.7 %
MONOCYTES NFR BLD AUTO: 6 %
MONOCYTES NFR BLD AUTO: 6 %
MONOCYTES NFR BLD AUTO: 6.1 %
MONOCYTES NFR BLD AUTO: 6.5 %
MONOCYTES NFR BLD AUTO: 6.8 %
MONOCYTES NFR BLD AUTO: 7 % (ref 2–10)
MRSA DNA SPEC QL NAA+PROBE: NEGATIVE
MUCOUS THREADS #/AREA URNS LPF: PRESENT /LPF
MYCOBACTERIUM SPEC CULT: NORMAL
MYCOBACTERIUM SPEC CULT: NORMAL
NEUTROPHILS # BLD AUTO: 12.9 10E9/L (ref 1.6–8.3)
NEUTROPHILS # BLD AUTO: 13.8 10E9/L (ref 1.6–8.3)
NEUTROPHILS # BLD AUTO: 14.8 10E9/L (ref 1.6–8.3)
NEUTROPHILS # BLD AUTO: 16.1 10E9/L (ref 1.6–8.3)
NEUTROPHILS # BLD AUTO: 3.7 THOU/UL (ref 2–7.7)
NEUTROPHILS # BLD AUTO: 5.2 10E9/L (ref 1.6–8.3)
NEUTROPHILS # BLD AUTO: 5.6 10E9/L (ref 1.6–8.3)
NEUTROPHILS # BLD AUTO: 5.7 10E9/L (ref 1.6–8.3)
NEUTROPHILS # BLD AUTO: 6.3 10E9/L (ref 1.6–8.3)
NEUTROPHILS # BLD AUTO: 6.9 10E9/L (ref 1.6–8.3)
NEUTROPHILS # BLD AUTO: 6.9 10E9/L (ref 1.6–8.3)
NEUTROPHILS # BLD AUTO: 7 10E9/L (ref 1.6–8.3)
NEUTROPHILS # BLD AUTO: 7.3 10E9/L (ref 1.6–8.3)
NEUTROPHILS # BLD AUTO: 7.8 10E9/L (ref 1.6–8.3)
NEUTROPHILS # BLD AUTO: 7.9 10E9/L (ref 1.6–8.3)
NEUTROPHILS # BLD AUTO: 8.1 10E9/L (ref 1.6–8.3)
NEUTROPHILS # BLD AUTO: 8.6 10E9/L (ref 1.6–8.3)
NEUTROPHILS # BLD AUTO: 9.1 10E9/L (ref 1.6–8.3)
NEUTROPHILS # BLD AUTO: 9.6 10E9/L (ref 1.6–8.3)
NEUTROPHILS NFR BLD AUTO: 52 % (ref 50–70)
NEUTROPHILS NFR BLD AUTO: 66.9 %
NEUTROPHILS NFR BLD AUTO: 68 %
NEUTROPHILS NFR BLD AUTO: 71.2 %
NEUTROPHILS NFR BLD AUTO: 72.7 %
NEUTROPHILS NFR BLD AUTO: 72.8 %
NEUTROPHILS NFR BLD AUTO: 73.2 %
NEUTROPHILS NFR BLD AUTO: 73.7 %
NEUTROPHILS NFR BLD AUTO: 77.7 %
NEUTROPHILS NFR BLD AUTO: 79.6 %
NEUTROPHILS NFR BLD AUTO: 81 %
NEUTROPHILS NFR BLD AUTO: 85.3 %
NEUTROPHILS NFR BLD AUTO: 85.8 %
NEUTROPHILS NFR BLD AUTO: 89 %
NEUTROPHILS NFR BLD AUTO: 89.4 %
NEUTROPHILS NFR BLD AUTO: 90 %
NEUTROPHILS NFR BLD AUTO: 90.9 %
NEUTROPHILS NFR BLD AUTO: 91.5 %
NEUTROPHILS NFR BLD AUTO: 92.7 %
NITRATE UR QL: NEGATIVE
NRBC # BLD AUTO: 0 10*3/UL
NRBC BLD AUTO-RTO: 0 /100
NT-PROBNP SERPL-MCNC: 1290 PG/ML (ref 0–900)
O2/TOTAL GAS SETTING VFR VENT: 15 %
O2/TOTAL GAS SETTING VFR VENT: ABNORMAL %
O2/TOTAL GAS SETTING VFR VENT: NORMAL %
OXYHGB MFR BLD: 86 % (ref 92–100)
OXYHGB MFR BLD: 92 % (ref 92–100)
OXYHGB MFR BLD: 92 % (ref 92–100)
OXYHGB MFR BLD: 93 % (ref 92–100)
OXYHGB MFR BLD: 94 % (ref 92–100)
OXYHGB MFR BLD: 95 % (ref 92–100)
OXYHGB MFR BLD: 96 % (ref 92–100)
OXYHGB MFR BLD: 96 % (ref 92–100)
OXYHGB MFR BLD: 97 % (ref 92–100)
OXYHGB MFR BLD: 97 % (ref 92–100)
OXYHGB MFR BLD: 99 % (ref 92–100)
PCO2 BLD: 51 MM HG (ref 35–45)
PCO2 BLD: 52 MM HG (ref 35–45)
PCO2 BLD: 53 MM HG (ref 35–45)
PCO2 BLD: 53 MM HG (ref 35–45)
PCO2 BLD: 54 MM HG (ref 35–45)
PCO2 BLD: 54 MM HG (ref 35–45)
PCO2 BLD: 55 MM HG (ref 35–45)
PCO2 BLD: 57 MM HG (ref 35–45)
PCO2 BLD: 59 MM HG (ref 35–45)
PCO2 BLD: NORMAL MM HG (ref 35–45)
PH BLD: 7.33 PH (ref 7.35–7.45)
PH BLD: 7.34 PH (ref 7.35–7.45)
PH BLD: 7.35 PH (ref 7.35–7.45)
PH BLD: 7.36 PH (ref 7.35–7.45)
PH BLD: 7.36 PH (ref 7.35–7.45)
PH BLD: 7.37 PH (ref 7.35–7.45)
PH BLD: 7.38 PH (ref 7.35–7.45)
PH BLD: 7.42 PH (ref 7.35–7.45)
PH BLD: 7.46 PH (ref 7.35–7.45)
PH BLD: NORMAL PH (ref 7.35–7.45)
PH UR STRIP: 5 PH (ref 5–7)
PHOSPHATE SERPL-MCNC: 1.8 MG/DL (ref 2.5–4.5)
PHOSPHATE SERPL-MCNC: 2.6 MG/DL (ref 2.5–4.5)
PHOSPHATE SERPL-MCNC: 2.9 MG/DL (ref 2.5–4.5)
PHOSPHATE SERPL-MCNC: 3 MG/DL (ref 2.5–4.5)
PHOSPHATE SERPL-MCNC: 3.1 MG/DL (ref 2.5–4.5)
PHOSPHATE SERPL-MCNC: 3.5 MG/DL (ref 2.5–4.5)
PHOSPHATE SERPL-MCNC: 4 MG/DL (ref 2.5–4.5)
PLATELET # BLD AUTO: 226 10E9/L (ref 150–450)
PLATELET # BLD AUTO: 226 10E9/L (ref 150–450)
PLATELET # BLD AUTO: 231 10E9/L (ref 150–450)
PLATELET # BLD AUTO: 239 10E9/L (ref 150–450)
PLATELET # BLD AUTO: 264 10E9/L (ref 150–450)
PLATELET # BLD AUTO: 287 10E9/L (ref 150–450)
PLATELET # BLD AUTO: 287 THOU/UL (ref 140–440)
PLATELET # BLD AUTO: 299 10E9/L (ref 150–450)
PLATELET # BLD AUTO: 306 10E9/L (ref 150–450)
PLATELET # BLD AUTO: 319 10E9/L (ref 150–450)
PLATELET # BLD AUTO: 355 10E9/L (ref 150–450)
PLATELET # BLD AUTO: 359 10E9/L (ref 150–450)
PLATELET # BLD AUTO: 364 10E9/L (ref 150–450)
PLATELET # BLD AUTO: 369 10E9/L (ref 150–450)
PLATELET # BLD AUTO: 374 10E9/L (ref 150–450)
PLATELET # BLD AUTO: 378 10E9/L (ref 150–450)
PLATELET # BLD AUTO: 400 10E9/L (ref 150–450)
PLATELET # BLD AUTO: 403 10E9/L (ref 150–450)
PLATELET # BLD AUTO: 412 10E9/L (ref 150–450)
PMV BLD AUTO: 9.2 FL (ref 8.5–12.5)
PO2 BLD: 102 MM HG (ref 80–105)
PO2 BLD: 140 MM HG (ref 80–105)
PO2 BLD: 52 MM HG (ref 80–105)
PO2 BLD: 63 MM HG (ref 80–105)
PO2 BLD: 66 MM HG (ref 80–105)
PO2 BLD: 69 MM HG (ref 80–105)
PO2 BLD: 74 MM HG (ref 80–105)
PO2 BLD: 76 MM HG (ref 80–105)
PO2 BLD: 83 MM HG (ref 80–105)
PO2 BLD: 87 MM HG (ref 80–105)
PO2 BLD: 93 MM HG (ref 80–105)
PO2 BLD: NORMAL MM HG (ref 80–105)
POTASSIUM BLD-SCNC: 4 MMOL/L (ref 3.5–5)
POTASSIUM SERPL-SCNC: 2.9 MMOL/L (ref 3.4–5.3)
POTASSIUM SERPL-SCNC: 3.2 MMOL/L (ref 3.4–5.3)
POTASSIUM SERPL-SCNC: 3.4 MMOL/L (ref 3.4–5.3)
POTASSIUM SERPL-SCNC: 3.6 MMOL/L (ref 3.4–5.3)
POTASSIUM SERPL-SCNC: 3.6 MMOL/L (ref 3.4–5.3)
POTASSIUM SERPL-SCNC: 3.8 MMOL/L (ref 3.4–5.3)
POTASSIUM SERPL-SCNC: 3.8 MMOL/L (ref 3.4–5.3)
POTASSIUM SERPL-SCNC: 3.9 MMOL/L (ref 3.4–5.3)
POTASSIUM SERPL-SCNC: 4 MMOL/L (ref 3.4–5.3)
POTASSIUM SERPL-SCNC: 4.2 MMOL/L (ref 3.4–5.3)
POTASSIUM SERPL-SCNC: 4.3 MMOL/L (ref 3.4–5.3)
POTASSIUM SERPL-SCNC: 4.4 MMOL/L (ref 3.4–5.3)
POTASSIUM SERPL-SCNC: 4.4 MMOL/L (ref 3.4–5.3)
POTASSIUM SERPL-SCNC: 4.5 MMOL/L (ref 3.4–5.3)
POTASSIUM SERPL-SCNC: 4.8 MMOL/L (ref 3.4–5.3)
PROCALCITONIN SERPL-MCNC: 0.17 NG/ML
PROCALCITONIN SERPL-MCNC: 0.35 NG/ML
PROCALCITONIN SERPL-MCNC: 0.43 NG/ML
PROT SERPL-MCNC: 5.5 G/DL (ref 6.8–8.8)
PROT SERPL-MCNC: 7.4 G/DL (ref 6–8)
PROT SERPL-MCNC: 9.1 G/DL (ref 6.8–8.8)
RBC # BLD AUTO: 3.05 10E12/L (ref 3.8–5.2)
RBC # BLD AUTO: 3.11 10E12/L (ref 3.8–5.2)
RBC # BLD AUTO: 3.12 10E12/L (ref 3.8–5.2)
RBC # BLD AUTO: 3.13 10E12/L (ref 3.8–5.2)
RBC # BLD AUTO: 3.2 10E12/L (ref 3.8–5.2)
RBC # BLD AUTO: 3.22 10E12/L (ref 3.8–5.2)
RBC # BLD AUTO: 3.25 10E12/L (ref 3.8–5.2)
RBC # BLD AUTO: 3.28 10E12/L (ref 3.8–5.2)
RBC # BLD AUTO: 3.28 10E12/L (ref 3.8–5.2)
RBC # BLD AUTO: 3.29 10E12/L (ref 3.8–5.2)
RBC # BLD AUTO: 3.31 10E12/L (ref 3.8–5.2)
RBC # BLD AUTO: 3.32 10E12/L (ref 3.8–5.2)
RBC # BLD AUTO: 3.32 10E12/L (ref 3.8–5.2)
RBC # BLD AUTO: 3.35 10E12/L (ref 3.8–5.2)
RBC # BLD AUTO: 3.43 10E12/L (ref 3.8–5.2)
RBC # BLD AUTO: 3.49 10E12/L (ref 3.8–5.2)
RBC # BLD AUTO: 3.62 10E12/L (ref 3.8–5.2)
RBC # BLD AUTO: 4.06 MILL/UL (ref 3.8–5.4)
RBC # BLD AUTO: 4.58 10E12/L (ref 3.8–5.2)
RBC #/AREA URNS AUTO: 38 /HPF (ref 0–2)
S PNEUM AG SPEC QL: ABNORMAL
S PNEUM AG SPEC QL: NORMAL
SODIUM SERPL-SCNC: 136 MMOL/L (ref 133–144)
SODIUM SERPL-SCNC: 138 MMOL/L (ref 133–144)
SODIUM SERPL-SCNC: 139 MMOL/L (ref 133–144)
SODIUM SERPL-SCNC: 139 MMOL/L (ref 133–144)
SODIUM SERPL-SCNC: 139 MMOL/L (ref 136–145)
SODIUM SERPL-SCNC: 140 MMOL/L (ref 133–144)
SODIUM SERPL-SCNC: 141 MMOL/L (ref 133–144)
SODIUM SERPL-SCNC: 142 MMOL/L (ref 133–144)
SODIUM SERPL-SCNC: 142 MMOL/L (ref 133–144)
SODIUM SERPL-SCNC: 143 MMOL/L (ref 133–144)
SODIUM SERPL-SCNC: 146 MMOL/L (ref 133–144)
SODIUM SERPL-SCNC: 147 MMOL/L (ref 133–144)
SOURCE: ABNORMAL
SP GR UR STRIP: 1.02 (ref 1–1.03)
SPECIMEN EXP DATE BLD: NORMAL
SPECIMEN SOURCE: ABNORMAL
SPECIMEN SOURCE: NORMAL
TRIGL SERPL-MCNC: 64 MG/DL
TROPONIN I BLD-MCNC: 0 UG/L (ref 0–0.1)
TSH SERPL DL<=0.005 MIU/L-ACNC: 0.23 MU/L (ref 0.4–4)
TSH SERPL DL<=0.005 MIU/L-ACNC: 2.12 UIU/ML (ref 0.3–5)
UROBILINOGEN UR STRIP-MCNC: 0 MG/DL (ref 0–2)
VANCOMYCIN SERPL-MCNC: 8.2 MG/L
VIT B12 SERPL-MCNC: 757 PG/ML (ref 213–816)
WBC # BLD AUTO: 10 10E9/L (ref 4–11)
WBC # BLD AUTO: 10.2 10E9/L (ref 4–11)
WBC # BLD AUTO: 10.2 10E9/L (ref 4–11)
WBC # BLD AUTO: 10.6 10E9/L (ref 4–11)
WBC # BLD AUTO: 10.6 10E9/L (ref 4–11)
WBC # BLD AUTO: 11 10E9/L (ref 4–11)
WBC # BLD AUTO: 11 10E9/L (ref 4–11)
WBC # BLD AUTO: 14.5 10E9/L (ref 4–11)
WBC # BLD AUTO: 15.1 10E9/L (ref 4–11)
WBC # BLD AUTO: 16 10E9/L (ref 4–11)
WBC # BLD AUTO: 18.9 10E9/L (ref 4–11)
WBC # BLD AUTO: 6.5 10E9/L (ref 4–11)
WBC # BLD AUTO: 7.2 10E9/L (ref 4–11)
WBC # BLD AUTO: 7.7 10E9/L (ref 4–11)
WBC # BLD AUTO: 8.1 10E9/L (ref 4–11)
WBC # BLD AUTO: 8.5 10E9/L (ref 4–11)
WBC # BLD AUTO: 9.5 10E9/L (ref 4–11)
WBC # BLD AUTO: 9.6 10E9/L (ref 4–11)
WBC #/AREA URNS AUTO: 1 /HPF (ref 0–5)
WBC: 7.3 THOU/UL (ref 4–11)

## 2018-01-01 PROCEDURE — 94668 MNPJ CHEST WALL SBSQ: CPT

## 2018-01-01 PROCEDURE — 80048 BASIC METABOLIC PNL TOTAL CA: CPT | Performed by: INTERNAL MEDICINE

## 2018-01-01 PROCEDURE — 94660 CPAP INITIATION&MGMT: CPT

## 2018-01-01 PROCEDURE — 25000132 ZZH RX MED GY IP 250 OP 250 PS 637: Performed by: INTERNAL MEDICINE

## 2018-01-01 PROCEDURE — 00000146 ZZHCL STATISTIC GLUCOSE BY METER IP

## 2018-01-01 PROCEDURE — 71250 CT THORAX DX C-: CPT

## 2018-01-01 PROCEDURE — 84132 ASSAY OF SERUM POTASSIUM: CPT | Performed by: INTERNAL MEDICINE

## 2018-01-01 PROCEDURE — 93010 ELECTROCARDIOGRAM REPORT: CPT | Performed by: INTERNAL MEDICINE

## 2018-01-01 PROCEDURE — 25000128 H RX IP 250 OP 636: Performed by: INTERNAL MEDICINE

## 2018-01-01 PROCEDURE — 80053 COMPREHEN METABOLIC PANEL: CPT | Performed by: HOSPITALIST

## 2018-01-01 PROCEDURE — 40000983 ZZH STATISTIC HFNC ADULT NON-CPAP

## 2018-01-01 PROCEDURE — 99232 SBSQ HOSP IP/OBS MODERATE 35: CPT | Performed by: CLINICAL NURSE SPECIALIST

## 2018-01-01 PROCEDURE — 36600 WITHDRAWAL OF ARTERIAL BLOOD: CPT

## 2018-01-01 PROCEDURE — 99233 SBSQ HOSP IP/OBS HIGH 50: CPT | Performed by: INTERNAL MEDICINE

## 2018-01-01 PROCEDURE — 70450 CT HEAD/BRAIN W/O DYE: CPT

## 2018-01-01 PROCEDURE — 40000275 ZZH STATISTIC RCP TIME EA 10 MIN

## 2018-01-01 PROCEDURE — 25000132 ZZH RX MED GY IP 250 OP 250 PS 637

## 2018-01-01 PROCEDURE — 99291 CRITICAL CARE FIRST HOUR: CPT | Performed by: SURGERY

## 2018-01-01 PROCEDURE — 99291 CRITICAL CARE FIRST HOUR: CPT | Performed by: INTERNAL MEDICINE

## 2018-01-01 PROCEDURE — 25000125 ZZHC RX 250: Performed by: INTERNAL MEDICINE

## 2018-01-01 PROCEDURE — 86900 BLOOD TYPING SEROLOGIC ABO: CPT | Performed by: EMERGENCY MEDICINE

## 2018-01-01 PROCEDURE — 87086 URINE CULTURE/COLONY COUNT: CPT | Performed by: EMERGENCY MEDICINE

## 2018-01-01 PROCEDURE — 94640 AIRWAY INHALATION TREATMENT: CPT

## 2018-01-01 PROCEDURE — 25000128 H RX IP 250 OP 636: Performed by: EMERGENCY MEDICINE

## 2018-01-01 PROCEDURE — 94003 VENT MGMT INPAT SUBQ DAY: CPT

## 2018-01-01 PROCEDURE — 94002 VENT MGMT INPAT INIT DAY: CPT

## 2018-01-01 PROCEDURE — 40000257 ZZH STATISTIC CONSULT NO CHARGE VASC ACCESS

## 2018-01-01 PROCEDURE — 25000132 ZZH RX MED GY IP 250 OP 250 PS 637: Performed by: HOSPITALIST

## 2018-01-01 PROCEDURE — 94640 AIRWAY INHALATION TREATMENT: CPT | Mod: 76

## 2018-01-01 PROCEDURE — 27210300 ZZH CANNULA HIGH FLOW, ADULT

## 2018-01-01 PROCEDURE — 87205 SMEAR GRAM STAIN: CPT | Performed by: INTERNAL MEDICINE

## 2018-01-01 PROCEDURE — 92610 EVALUATE SWALLOWING FUNCTION: CPT | Mod: GN

## 2018-01-01 PROCEDURE — 25000125 ZZHC RX 250: Performed by: NURSE ANESTHETIST, CERTIFIED REGISTERED

## 2018-01-01 PROCEDURE — 87641 MR-STAPH DNA AMP PROBE: CPT | Performed by: INTERNAL MEDICINE

## 2018-01-01 PROCEDURE — 12000007 ZZH R&B INTERMEDIATE

## 2018-01-01 PROCEDURE — 85025 COMPLETE CBC W/AUTO DIFF WBC: CPT | Performed by: INTERNAL MEDICINE

## 2018-01-01 PROCEDURE — 20000003 ZZH R&B ICU

## 2018-01-01 PROCEDURE — 85025 COMPLETE CBC W/AUTO DIFF WBC: CPT | Performed by: HOSPITALIST

## 2018-01-01 PROCEDURE — 99223 1ST HOSP IP/OBS HIGH 75: CPT | Mod: AI | Performed by: INTERNAL MEDICINE

## 2018-01-01 PROCEDURE — 96365 THER/PROPH/DIAG IV INF INIT: CPT | Mod: 59

## 2018-01-01 PROCEDURE — 82805 BLOOD GASES W/O2 SATURATION: CPT | Performed by: INTERNAL MEDICINE

## 2018-01-01 PROCEDURE — 25000128 H RX IP 250 OP 636: Performed by: NURSE ANESTHETIST, CERTIFIED REGISTERED

## 2018-01-01 PROCEDURE — 27210437 ZZH NUTRITION PRODUCT SEMIELEM INTERMED LITER

## 2018-01-01 PROCEDURE — 88305 TISSUE EXAM BY PATHOLOGIST: CPT | Mod: 26 | Performed by: INTERNAL MEDICINE

## 2018-01-01 PROCEDURE — 27210429 ZZH NUTRITION PRODUCT INTERMEDIATE LITER

## 2018-01-01 PROCEDURE — 99231 SBSQ HOSP IP/OBS SF/LOW 25: CPT | Performed by: CLINICAL NURSE SPECIALIST

## 2018-01-01 PROCEDURE — C1772 INFUSION PUMP, PROGRAMMABLE: HCPCS | Performed by: ANESTHESIOLOGY

## 2018-01-01 PROCEDURE — 25000566 ZZH SEVOFLURANE, EA 15 MIN: Performed by: ANESTHESIOLOGY

## 2018-01-01 PROCEDURE — 99207 ZZC CDG-MDM COMPONENT: MEETS LOW - DOWN CODED: CPT | Performed by: INTERNAL MEDICINE

## 2018-01-01 PROCEDURE — 87116 MYCOBACTERIA CULTURE: CPT | Performed by: INTERNAL MEDICINE

## 2018-01-01 PROCEDURE — 83735 ASSAY OF MAGNESIUM: CPT | Performed by: EMERGENCY MEDICINE

## 2018-01-01 PROCEDURE — 87070 CULTURE OTHR SPECIMN AEROBIC: CPT | Performed by: INTERNAL MEDICINE

## 2018-01-01 PROCEDURE — 80175 DRUG SCREEN QUAN LAMOTRIGINE: CPT | Performed by: EMERGENCY MEDICINE

## 2018-01-01 PROCEDURE — 86140 C-REACTIVE PROTEIN: CPT | Performed by: INTERNAL MEDICINE

## 2018-01-01 PROCEDURE — 71045 X-RAY EXAM CHEST 1 VIEW: CPT

## 2018-01-01 PROCEDURE — 84100 ASSAY OF PHOSPHORUS: CPT | Performed by: INTERNAL MEDICINE

## 2018-01-01 PROCEDURE — 25000128 H RX IP 250 OP 636

## 2018-01-01 PROCEDURE — 27210995 ZZH RX 272

## 2018-01-01 PROCEDURE — 83735 ASSAY OF MAGNESIUM: CPT | Performed by: INTERNAL MEDICINE

## 2018-01-01 PROCEDURE — 82140 ASSAY OF AMMONIA: CPT | Performed by: EMERGENCY MEDICINE

## 2018-01-01 PROCEDURE — 95990 SPIN/BRAIN PUMP REFIL & MAIN: CPT | Performed by: ANESTHESIOLOGY

## 2018-01-01 PROCEDURE — 72170 X-RAY EXAM OF PELVIS: CPT

## 2018-01-01 PROCEDURE — 37000009 ZZH ANESTHESIA TECHNICAL FEE, EACH ADDTL 15 MIN: Performed by: ANESTHESIOLOGY

## 2018-01-01 PROCEDURE — 87077 CULTURE AEROBIC IDENTIFY: CPT | Performed by: INTERNAL MEDICINE

## 2018-01-01 PROCEDURE — 40000986 XR ABDOMEN PORT 1 VW

## 2018-01-01 PROCEDURE — 36415 COLL VENOUS BLD VENIPUNCTURE: CPT | Performed by: INTERNAL MEDICINE

## 2018-01-01 PROCEDURE — 31500 INSERT EMERGENCY AIRWAY: CPT

## 2018-01-01 PROCEDURE — 84478 ASSAY OF TRIGLYCERIDES: CPT | Performed by: INTERNAL MEDICINE

## 2018-01-01 PROCEDURE — 82805 BLOOD GASES W/O2 SATURATION: CPT | Performed by: HOSPITALIST

## 2018-01-01 PROCEDURE — 25000128 H RX IP 250 OP 636: Performed by: ANESTHESIOLOGY

## 2018-01-01 PROCEDURE — 31645 BRNCHSC W/THER ASPIR 1ST: CPT | Performed by: INTERNAL MEDICINE

## 2018-01-01 PROCEDURE — 84145 PROCALCITONIN (PCT): CPT | Performed by: HOSPITALIST

## 2018-01-01 PROCEDURE — 36620 INSERTION CATHETER ARTERY: CPT | Performed by: INTERNAL MEDICINE

## 2018-01-01 PROCEDURE — 71000027 ZZH RECOVERY PHASE 2 EACH 15 MINS: Performed by: ANESTHESIOLOGY

## 2018-01-01 PROCEDURE — 86140 C-REACTIVE PROTEIN: CPT | Performed by: HOSPITALIST

## 2018-01-01 PROCEDURE — 93971 EXTREMITY STUDY: CPT | Mod: LT

## 2018-01-01 PROCEDURE — 87015 SPECIMEN INFECT AGNT CONCNTJ: CPT | Performed by: INTERNAL MEDICINE

## 2018-01-01 PROCEDURE — 40000671 ZZH STATISTIC ANESTHESIA CASE

## 2018-01-01 PROCEDURE — 40000914 ZZH STATISTIC SITTER, DAY HOURS

## 2018-01-01 PROCEDURE — 99233 SBSQ HOSP IP/OBS HIGH 50: CPT | Performed by: HOSPITALIST

## 2018-01-01 PROCEDURE — 84145 PROCALCITONIN (PCT): CPT | Performed by: INTERNAL MEDICINE

## 2018-01-01 PROCEDURE — 40000010 ZZH STATISTIC ANES STAT CODE-CRNA PER MINUTE: Performed by: ANESTHESIOLOGY

## 2018-01-01 PROCEDURE — 99232 SBSQ HOSP IP/OBS MODERATE 35: CPT | Performed by: INTERNAL MEDICINE

## 2018-01-01 PROCEDURE — 81001 URINALYSIS AUTO W/SCOPE: CPT | Performed by: EMERGENCY MEDICINE

## 2018-01-01 PROCEDURE — 82565 ASSAY OF CREATININE: CPT | Performed by: INTERNAL MEDICINE

## 2018-01-01 PROCEDURE — 85610 PROTHROMBIN TIME: CPT | Performed by: EMERGENCY MEDICINE

## 2018-01-01 PROCEDURE — 87210 SMEAR WET MOUNT SALINE/INK: CPT | Performed by: INTERNAL MEDICINE

## 2018-01-01 PROCEDURE — 40000916 ZZH STATISTIC SITTER, NIGHT HOURS

## 2018-01-01 PROCEDURE — 40000225 ZZH STATISTIC SLP WARD VISIT: Performed by: SPEECH-LANGUAGE PATHOLOGIST

## 2018-01-01 PROCEDURE — 71000012 ZZH RECOVERY PHASE 1 LEVEL 1 FIRST HR: Performed by: ANESTHESIOLOGY

## 2018-01-01 PROCEDURE — 82803 BLOOD GASES ANY COMBINATION: CPT | Performed by: EMERGENCY MEDICINE

## 2018-01-01 PROCEDURE — 83605 ASSAY OF LACTIC ACID: CPT | Performed by: EMERGENCY MEDICINE

## 2018-01-01 PROCEDURE — 85652 RBC SED RATE AUTOMATED: CPT | Performed by: EMERGENCY MEDICINE

## 2018-01-01 PROCEDURE — 5A1955Z RESPIRATORY VENTILATION, GREATER THAN 96 CONSECUTIVE HOURS: ICD-10-PCS | Performed by: INTERNAL MEDICINE

## 2018-01-01 PROCEDURE — 99239 HOSP IP/OBS DSCHRG MGMT >30: CPT | Performed by: INTERNAL MEDICINE

## 2018-01-01 PROCEDURE — 40000274 ZZH STATISTIC RCP CONSULT EA 30 MIN

## 2018-01-01 PROCEDURE — 88108 CYTOPATH CONCENTRATE TECH: CPT | Mod: 26 | Performed by: INTERNAL MEDICINE

## 2018-01-01 PROCEDURE — 62362 IMPLANT SPINE INFUSION PUMP: CPT | Mod: 80 | Performed by: SURGERY

## 2018-01-01 PROCEDURE — 27211040 ZZH CONTINUOUS NEBULIZER MICRO PUMP

## 2018-01-01 PROCEDURE — 87106 FUNGI IDENTIFICATION YEAST: CPT | Performed by: INTERNAL MEDICINE

## 2018-01-01 PROCEDURE — 87040 BLOOD CULTURE FOR BACTERIA: CPT | Performed by: EMERGENCY MEDICINE

## 2018-01-01 PROCEDURE — 73552 X-RAY EXAM OF FEMUR 2/>: CPT | Mod: LT

## 2018-01-01 PROCEDURE — 87899 AGENT NOS ASSAY W/OPTIC: CPT | Performed by: INTERNAL MEDICINE

## 2018-01-01 PROCEDURE — 87206 SMEAR FLUORESCENT/ACID STAI: CPT | Performed by: INTERNAL MEDICINE

## 2018-01-01 PROCEDURE — 37000008 ZZH ANESTHESIA TECHNICAL FEE, 1ST 30 MIN: Performed by: ANESTHESIOLOGY

## 2018-01-01 PROCEDURE — 25000125 ZZHC RX 250

## 2018-01-01 PROCEDURE — 86901 BLOOD TYPING SEROLOGIC RH(D): CPT | Performed by: EMERGENCY MEDICINE

## 2018-01-01 PROCEDURE — 99291 CRITICAL CARE FIRST HOUR: CPT | Mod: 25 | Performed by: INTERNAL MEDICINE

## 2018-01-01 PROCEDURE — 94667 MNPJ CHEST WALL 1ST: CPT

## 2018-01-01 PROCEDURE — 80202 ASSAY OF VANCOMYCIN: CPT | Performed by: INTERNAL MEDICINE

## 2018-01-01 PROCEDURE — 99207 ZZC APP CREDIT; MD BILLING SHARED VISIT: CPT | Performed by: INTERNAL MEDICINE

## 2018-01-01 PROCEDURE — 80053 COMPREHEN METABOLIC PANEL: CPT | Performed by: EMERGENCY MEDICINE

## 2018-01-01 PROCEDURE — 83880 ASSAY OF NATRIURETIC PEPTIDE: CPT | Performed by: EMERGENCY MEDICINE

## 2018-01-01 PROCEDURE — 40000281 ZZH STATISTIC TRANSPORT TIME EA 15 MIN

## 2018-01-01 PROCEDURE — 40000306 ZZH STATISTIC PRE PROC ASSESS II: Performed by: ANESTHESIOLOGY

## 2018-01-01 PROCEDURE — 88108 CYTOPATH CONCENTRATE TECH: CPT | Performed by: INTERNAL MEDICINE

## 2018-01-01 PROCEDURE — 87102 FUNGUS ISOLATION CULTURE: CPT | Performed by: INTERNAL MEDICINE

## 2018-01-01 PROCEDURE — 84443 ASSAY THYROID STIM HORMONE: CPT | Performed by: EMERGENCY MEDICINE

## 2018-01-01 PROCEDURE — 84484 ASSAY OF TROPONIN QUANT: CPT

## 2018-01-01 PROCEDURE — 40000809 ZZH STATISTIC NO DOCUMENTATION TO SUPPORT CHARGE

## 2018-01-01 PROCEDURE — 25000125 ZZHC RX 250: Performed by: EMERGENCY MEDICINE

## 2018-01-01 PROCEDURE — 96375 TX/PRO/DX INJ NEW DRUG ADDON: CPT

## 2018-01-01 PROCEDURE — 40000225 ZZH STATISTIC SLP WARD VISIT

## 2018-01-01 PROCEDURE — 93005 ELECTROCARDIOGRAM TRACING: CPT

## 2018-01-01 PROCEDURE — 85025 COMPLETE CBC W/AUTO DIFF WBC: CPT | Performed by: EMERGENCY MEDICINE

## 2018-01-01 PROCEDURE — 36000093 ZZH SURGERY LEVEL 4 1ST 30 MIN: Performed by: ANESTHESIOLOGY

## 2018-01-01 PROCEDURE — 25000131 ZZH RX MED GY IP 250 OP 636 PS 637: Performed by: INTERNAL MEDICINE

## 2018-01-01 PROCEDURE — 83605 ASSAY OF LACTIC ACID: CPT | Performed by: INTERNAL MEDICINE

## 2018-01-01 PROCEDURE — 86850 RBC ANTIBODY SCREEN: CPT | Performed by: EMERGENCY MEDICINE

## 2018-01-01 PROCEDURE — 94669 MECHANICAL CHEST WALL OSCILL: CPT

## 2018-01-01 PROCEDURE — 27210195 ZZH KIT POWER PICC DOUBLE LUMEN

## 2018-01-01 PROCEDURE — 27210794 ZZH OR GENERAL SUPPLY STERILE: Performed by: ANESTHESIOLOGY

## 2018-01-01 PROCEDURE — 83036 HEMOGLOBIN GLYCOSYLATED A1C: CPT | Performed by: INTERNAL MEDICINE

## 2018-01-01 PROCEDURE — 40000915 ZZH STATISTIC SITTER, EVENING HOURS

## 2018-01-01 PROCEDURE — 99221 1ST HOSP IP/OBS SF/LOW 40: CPT | Performed by: CLINICAL NURSE SPECIALIST

## 2018-01-01 PROCEDURE — 36000063 ZZH SURGERY LEVEL 4 EA 15 ADDTL MIN: Performed by: ANESTHESIOLOGY

## 2018-01-01 PROCEDURE — 40000986 XR CHEST PORT 1 VW

## 2018-01-01 PROCEDURE — 87640 STAPH A DNA AMP PROBE: CPT | Performed by: INTERNAL MEDICINE

## 2018-01-01 PROCEDURE — 0BC78ZZ EXTIRPATION OF MATTER FROM LEFT MAIN BRONCHUS, VIA NATURAL OR ARTIFICIAL OPENING ENDOSCOPIC: ICD-10-PCS | Performed by: INTERNAL MEDICINE

## 2018-01-01 PROCEDURE — 92526 ORAL FUNCTION THERAPY: CPT | Mod: GN | Performed by: SPEECH-LANGUAGE PATHOLOGIST

## 2018-01-01 PROCEDURE — 82550 ASSAY OF CK (CPK): CPT | Performed by: INTERNAL MEDICINE

## 2018-01-01 PROCEDURE — 88305 TISSUE EXAM BY PATHOLOGIST: CPT | Performed by: INTERNAL MEDICINE

## 2018-01-01 PROCEDURE — 99285 EMERGENCY DEPT VISIT HI MDM: CPT | Mod: 25

## 2018-01-01 PROCEDURE — 31624 DX BRONCHOSCOPE/LAVAGE: CPT | Performed by: INTERNAL MEDICINE

## 2018-01-01 PROCEDURE — 25000125 ZZHC RX 250: Performed by: ANESTHESIOLOGY

## 2018-01-01 PROCEDURE — 00000155 ZZHCL STATISTIC H-CELL BLOCK W/STAIN: Performed by: INTERNAL MEDICINE

## 2018-01-01 PROCEDURE — 12000000 ZZH R&B MED SURG/OB

## 2018-01-01 PROCEDURE — 82805 BLOOD GASES W/O2 SATURATION: CPT | Performed by: EMERGENCY MEDICINE

## 2018-01-01 PROCEDURE — G0463 HOSPITAL OUTPT CLINIC VISIT: HCPCS

## 2018-01-01 PROCEDURE — 36569 INSJ PICC 5 YR+ W/O IMAGING: CPT

## 2018-01-01 PROCEDURE — 36415 COLL VENOUS BLD VENIPUNCTURE: CPT | Performed by: EMERGENCY MEDICINE

## 2018-01-01 DEVICE — PUMP SYNCHROMED II 20ML 8637-20: Type: IMPLANTABLE DEVICE | Site: ABDOMEN | Status: FUNCTIONAL

## 2018-01-01 RX ORDER — ALBUTEROL SULFATE 0.83 MG/ML
2.5 SOLUTION RESPIRATORY (INHALATION) 4 TIMES DAILY
Status: DISCONTINUED | OUTPATIENT
Start: 2018-01-01 | End: 2018-01-01

## 2018-01-01 RX ORDER — CEFTRIAXONE 2 G/1
2 INJECTION, POWDER, FOR SOLUTION INTRAMUSCULAR; INTRAVENOUS ONCE
Status: COMPLETED | OUTPATIENT
Start: 2018-01-01 | End: 2018-01-01

## 2018-01-01 RX ORDER — IPRATROPIUM BROMIDE AND ALBUTEROL SULFATE 2.5; .5 MG/3ML; MG/3ML
3 SOLUTION RESPIRATORY (INHALATION)
Status: COMPLETED | OUTPATIENT
Start: 2018-01-01 | End: 2018-01-01

## 2018-01-01 RX ORDER — IPRATROPIUM BROMIDE AND ALBUTEROL SULFATE 2.5; .5 MG/3ML; MG/3ML
3 SOLUTION RESPIRATORY (INHALATION)
Status: DISCONTINUED | OUTPATIENT
Start: 2018-01-01 | End: 2018-01-01 | Stop reason: HOSPADM

## 2018-01-01 RX ORDER — ALBUTEROL SULFATE 0.83 MG/ML
2.5 SOLUTION RESPIRATORY (INHALATION)
Status: DISCONTINUED | OUTPATIENT
Start: 2018-01-01 | End: 2018-01-01 | Stop reason: HOSPADM

## 2018-01-01 RX ORDER — ALBUTEROL SULFATE 0.83 MG/ML
2.5 SOLUTION RESPIRATORY (INHALATION) EVERY 4 HOURS
Status: DISCONTINUED | OUTPATIENT
Start: 2018-01-01 | End: 2018-01-01

## 2018-01-01 RX ORDER — MULTIPLE VITAMINS W/ MINERALS TAB 9MG-400MCG
1 TAB ORAL DAILY
Status: ON HOLD | COMMUNITY
End: 2019-01-01

## 2018-01-01 RX ORDER — SODIUM CHLORIDE, SODIUM LACTATE, POTASSIUM CHLORIDE, CALCIUM CHLORIDE 600; 310; 30; 20 MG/100ML; MG/100ML; MG/100ML; MG/100ML
INJECTION, SOLUTION INTRAVENOUS CONTINUOUS
Status: DISCONTINUED | OUTPATIENT
Start: 2018-01-01 | End: 2018-01-01 | Stop reason: HOSPADM

## 2018-01-01 RX ORDER — CEFAZOLIN SODIUM 1 G/50ML
1250 SOLUTION INTRAVENOUS EVERY 24 HOURS
Status: DISCONTINUED | OUTPATIENT
Start: 2018-01-01 | End: 2018-01-01

## 2018-01-01 RX ORDER — FENTANYL CITRATE 50 UG/ML
INJECTION, SOLUTION INTRAMUSCULAR; INTRAVENOUS PRN
Status: DISCONTINUED | OUTPATIENT
Start: 2018-01-01 | End: 2018-01-01

## 2018-01-01 RX ORDER — METHYLPREDNISOLONE SODIUM SUCCINATE 125 MG/2ML
125 INJECTION, POWDER, LYOPHILIZED, FOR SOLUTION INTRAMUSCULAR; INTRAVENOUS ONCE
Status: COMPLETED | OUTPATIENT
Start: 2018-01-01 | End: 2018-01-01

## 2018-01-01 RX ORDER — HYDROMORPHONE HYDROCHLORIDE 1 MG/ML
.3-.5 INJECTION, SOLUTION INTRAMUSCULAR; INTRAVENOUS; SUBCUTANEOUS EVERY 10 MIN PRN
Status: DISCONTINUED | OUTPATIENT
Start: 2018-01-01 | End: 2018-01-01 | Stop reason: HOSPADM

## 2018-01-01 RX ORDER — METHYLPREDNISOLONE SODIUM SUCCINATE 40 MG/ML
40 INJECTION, POWDER, LYOPHILIZED, FOR SOLUTION INTRAMUSCULAR; INTRAVENOUS EVERY 24 HOURS
Status: DISCONTINUED | OUTPATIENT
Start: 2018-01-01 | End: 2018-01-01

## 2018-01-01 RX ORDER — LORAZEPAM 2 MG/ML
1 INJECTION INTRAMUSCULAR EVERY 4 HOURS PRN
Status: DISCONTINUED | OUTPATIENT
Start: 2018-01-01 | End: 2018-01-01

## 2018-01-01 RX ORDER — MEPERIDINE HYDROCHLORIDE 50 MG/ML
12.5 INJECTION INTRAMUSCULAR; INTRAVENOUS; SUBCUTANEOUS
Status: DISCONTINUED | OUTPATIENT
Start: 2018-01-01 | End: 2018-01-01 | Stop reason: HOSPADM

## 2018-01-01 RX ORDER — LIDOCAINE HYDROCHLORIDE 10 MG/ML
INJECTION, SOLUTION INFILTRATION; PERINEURAL PRN
Status: DISCONTINUED | OUTPATIENT
Start: 2018-01-01 | End: 2018-01-01

## 2018-01-01 RX ORDER — POTASSIUM CHLORIDE 1500 MG/1
20-40 TABLET, EXTENDED RELEASE ORAL
Status: DISCONTINUED | OUTPATIENT
Start: 2018-01-01 | End: 2018-01-01 | Stop reason: HOSPADM

## 2018-01-01 RX ORDER — PROPOFOL 10 MG/ML
INJECTION, EMULSION INTRAVENOUS
Status: COMPLETED
Start: 2018-01-01 | End: 2018-01-01

## 2018-01-01 RX ORDER — PROPOFOL 10 MG/ML
INJECTION, EMULSION INTRAVENOUS PRN
Status: DISCONTINUED | OUTPATIENT
Start: 2018-01-01 | End: 2018-01-01

## 2018-01-01 RX ORDER — PREDNISONE 20 MG/1
40 TABLET ORAL DAILY
Status: DISCONTINUED | OUTPATIENT
Start: 2018-01-01 | End: 2018-01-01

## 2018-01-01 RX ORDER — LORAZEPAM 2 MG/ML
1-2 INJECTION INTRAMUSCULAR
Status: DISCONTINUED | OUTPATIENT
Start: 2018-01-01 | End: 2018-01-01

## 2018-01-01 RX ORDER — IPRATROPIUM BROMIDE AND ALBUTEROL SULFATE 2.5; .5 MG/3ML; MG/3ML
3 SOLUTION RESPIRATORY (INHALATION) 4 TIMES DAILY
Status: DISCONTINUED | OUTPATIENT
Start: 2018-01-01 | End: 2018-01-01

## 2018-01-01 RX ORDER — GABAPENTIN 100 MG/1
200 CAPSULE ORAL 2 TIMES DAILY WITH MEALS
COMMUNITY
End: 2018-01-01

## 2018-01-01 RX ORDER — HYDROMORPHONE HYDROCHLORIDE 1 MG/ML
.3-.5 INJECTION, SOLUTION INTRAMUSCULAR; INTRAVENOUS; SUBCUTANEOUS
Status: DISCONTINUED | OUTPATIENT
Start: 2018-01-01 | End: 2018-01-01 | Stop reason: HOSPADM

## 2018-01-01 RX ORDER — NICOTINE POLACRILEX 4 MG
15-30 LOZENGE BUCCAL
Status: DISCONTINUED | OUTPATIENT
Start: 2018-01-01 | End: 2018-01-01 | Stop reason: HOSPADM

## 2018-01-01 RX ORDER — ALBUTEROL SULFATE 5 MG/ML
2.5 SOLUTION RESPIRATORY (INHALATION) EVERY 4 HOURS
Status: DISCONTINUED | OUTPATIENT
Start: 2018-01-01 | End: 2018-01-01

## 2018-01-01 RX ORDER — MULTIVITAMIN,THERAPEUTIC
1 TABLET ORAL DAILY
Status: DISCONTINUED | OUTPATIENT
Start: 2018-01-01 | End: 2018-01-01

## 2018-01-01 RX ORDER — CEFTRIAXONE 1 G/1
1 INJECTION, POWDER, FOR SOLUTION INTRAMUSCULAR; INTRAVENOUS ONCE
Status: DISCONTINUED | OUTPATIENT
Start: 2018-01-01 | End: 2018-01-01

## 2018-01-01 RX ORDER — POTASSIUM CHLORIDE 7.45 MG/ML
10 INJECTION INTRAVENOUS
Status: DISCONTINUED | OUTPATIENT
Start: 2018-01-01 | End: 2018-01-01 | Stop reason: HOSPADM

## 2018-01-01 RX ORDER — VANCOMYCIN HYDROCHLORIDE 1 G/200ML
1000 INJECTION, SOLUTION INTRAVENOUS EVERY 12 HOURS
Status: DISCONTINUED | OUTPATIENT
Start: 2018-01-01 | End: 2018-01-01

## 2018-01-01 RX ORDER — FELBAMATE 600 MG/5ML
600 SUSPENSION ORAL EVERY 8 HOURS SCHEDULED
Status: DISCONTINUED | OUTPATIENT
Start: 2018-01-01 | End: 2018-01-01 | Stop reason: HOSPADM

## 2018-01-01 RX ORDER — NITROGLYCERIN 0.4 MG/1
0.4 TABLET SUBLINGUAL EVERY 5 MIN PRN
Status: DISCONTINUED | OUTPATIENT
Start: 2018-01-01 | End: 2018-01-01 | Stop reason: HOSPADM

## 2018-01-01 RX ORDER — DEXAMETHASONE SODIUM PHOSPHATE 4 MG/ML
INJECTION, SOLUTION INTRA-ARTICULAR; INTRALESIONAL; INTRAMUSCULAR; INTRAVENOUS; SOFT TISSUE PRN
Status: DISCONTINUED | OUTPATIENT
Start: 2018-01-01 | End: 2018-01-01

## 2018-01-01 RX ORDER — ONDANSETRON 2 MG/ML
4 INJECTION INTRAMUSCULAR; INTRAVENOUS EVERY 30 MIN PRN
Status: DISCONTINUED | OUTPATIENT
Start: 2018-01-01 | End: 2018-01-01 | Stop reason: HOSPADM

## 2018-01-01 RX ORDER — PROPOFOL 10 MG/ML
5-75 INJECTION, EMULSION INTRAVENOUS CONTINUOUS
Status: DISCONTINUED | OUTPATIENT
Start: 2018-01-01 | End: 2018-01-01

## 2018-01-01 RX ORDER — LIDOCAINE 40 MG/G
CREAM TOPICAL
Status: DISCONTINUED | OUTPATIENT
Start: 2018-01-01 | End: 2018-01-01

## 2018-01-01 RX ORDER — CEFAZOLIN SODIUM 1 G/50ML
1250 SOLUTION INTRAVENOUS EVERY 12 HOURS
Status: DISCONTINUED | OUTPATIENT
Start: 2018-01-01 | End: 2018-01-01

## 2018-01-01 RX ORDER — ONDANSETRON 4 MG/1
4 TABLET, ORALLY DISINTEGRATING ORAL EVERY 6 HOURS PRN
Status: DISCONTINUED | OUTPATIENT
Start: 2018-01-01 | End: 2018-01-01 | Stop reason: HOSPADM

## 2018-01-01 RX ORDER — NICOTINE POLACRILEX 4 MG
15-30 LOZENGE BUCCAL
Status: DISCONTINUED | OUTPATIENT
Start: 2018-01-01 | End: 2018-01-01

## 2018-01-01 RX ORDER — ONDANSETRON 4 MG/1
4 TABLET, ORALLY DISINTEGRATING ORAL EVERY 30 MIN PRN
Status: DISCONTINUED | OUTPATIENT
Start: 2018-01-01 | End: 2018-01-01 | Stop reason: HOSPADM

## 2018-01-01 RX ORDER — POTASSIUM CHLORIDE 29.8 MG/ML
20 INJECTION INTRAVENOUS
Status: DISCONTINUED | OUTPATIENT
Start: 2018-01-01 | End: 2018-01-01

## 2018-01-01 RX ORDER — NALOXONE HYDROCHLORIDE 0.4 MG/ML
.1-.4 INJECTION, SOLUTION INTRAMUSCULAR; INTRAVENOUS; SUBCUTANEOUS
Status: DISCONTINUED | OUTPATIENT
Start: 2018-01-01 | End: 2018-01-01 | Stop reason: HOSPADM

## 2018-01-01 RX ORDER — DEXTROSE MONOHYDRATE 25 G/50ML
25-50 INJECTION, SOLUTION INTRAVENOUS
Status: DISCONTINUED | OUTPATIENT
Start: 2018-01-01 | End: 2018-01-01 | Stop reason: HOSPADM

## 2018-01-01 RX ORDER — FUROSEMIDE 10 MG/ML
20 INJECTION INTRAMUSCULAR; INTRAVENOUS EVERY 8 HOURS
Status: DISCONTINUED | OUTPATIENT
Start: 2018-01-01 | End: 2018-01-01

## 2018-01-01 RX ORDER — GABAPENTIN 250 MG/5ML
200 SOLUTION ORAL 2 TIMES DAILY
Status: DISCONTINUED | OUTPATIENT
Start: 2018-01-01 | End: 2018-01-01 | Stop reason: HOSPADM

## 2018-01-01 RX ORDER — LAMOTRIGINE 100 MG/1
100 TABLET ORAL 3 TIMES DAILY
Status: DISCONTINUED | OUTPATIENT
Start: 2018-01-01 | End: 2018-01-01 | Stop reason: HOSPADM

## 2018-01-01 RX ORDER — GABAPENTIN 100 MG/1
100 CAPSULE ORAL 2 TIMES DAILY
Status: DISCONTINUED | OUTPATIENT
Start: 2018-01-01 | End: 2018-01-01

## 2018-01-01 RX ORDER — POTASSIUM CHLORIDE 1.5 G/1.58G
20-40 POWDER, FOR SOLUTION ORAL
Status: DISCONTINUED | OUTPATIENT
Start: 2018-01-01 | End: 2018-01-01 | Stop reason: HOSPADM

## 2018-01-01 RX ORDER — FELBAMATE 600 MG/1
600 TABLET ORAL 3 TIMES DAILY
Status: DISCONTINUED | OUTPATIENT
Start: 2018-01-01 | End: 2018-01-01 | Stop reason: ALTCHOICE

## 2018-01-01 RX ORDER — CYCLOSPORINE 0.5 MG/ML
1 EMULSION OPHTHALMIC 2 TIMES DAILY
Status: ON HOLD | COMMUNITY
End: 2019-01-01

## 2018-01-01 RX ORDER — METHYLPREDNISOLONE SODIUM SUCCINATE 40 MG/ML
40 INJECTION, POWDER, LYOPHILIZED, FOR SOLUTION INTRAMUSCULAR; INTRAVENOUS EVERY 12 HOURS
Status: DISCONTINUED | OUTPATIENT
Start: 2018-01-01 | End: 2018-01-01

## 2018-01-01 RX ORDER — AMINO ACIDS/PROTEIN HYDROLYS 11G-40/45
1 LIQUID IN PACKET (ML) ORAL DAILY
Status: DISCONTINUED | OUTPATIENT
Start: 2018-01-01 | End: 2018-01-01 | Stop reason: HOSPADM

## 2018-01-01 RX ORDER — CEFTRIAXONE 2 G/1
2 INJECTION, POWDER, FOR SOLUTION INTRAMUSCULAR; INTRAVENOUS EVERY 24 HOURS
Status: DISCONTINUED | OUTPATIENT
Start: 2018-01-01 | End: 2018-01-01

## 2018-01-01 RX ORDER — POLYETHYLENE GLYCOL 3350 17 G/17G
17 POWDER, FOR SOLUTION ORAL DAILY
Status: DISCONTINUED | OUTPATIENT
Start: 2018-01-01 | End: 2018-01-01 | Stop reason: HOSPADM

## 2018-01-01 RX ORDER — PROPOFOL 10 MG/ML
INJECTION, EMULSION INTRAVENOUS
Status: DISCONTINUED
Start: 2018-01-01 | End: 2018-01-01 | Stop reason: HOSPADM

## 2018-01-01 RX ORDER — ONDANSETRON 2 MG/ML
4 INJECTION INTRAMUSCULAR; INTRAVENOUS EVERY 6 HOURS PRN
Status: DISCONTINUED | OUTPATIENT
Start: 2018-01-01 | End: 2018-01-01 | Stop reason: HOSPADM

## 2018-01-01 RX ORDER — FENTANYL CITRATE 50 UG/ML
25-50 INJECTION, SOLUTION INTRAMUSCULAR; INTRAVENOUS
Status: DISCONTINUED | OUTPATIENT
Start: 2018-01-01 | End: 2018-01-01 | Stop reason: HOSPADM

## 2018-01-01 RX ORDER — SODIUM CHLORIDE 9 MG/ML
INJECTION, SOLUTION INTRAVENOUS CONTINUOUS
Status: DISCONTINUED | OUTPATIENT
Start: 2018-01-01 | End: 2018-01-01 | Stop reason: HOSPADM

## 2018-01-01 RX ORDER — CEFTRIAXONE 1 G/1
1 INJECTION, POWDER, FOR SOLUTION INTRAMUSCULAR; INTRAVENOUS EVERY 24 HOURS
Status: DISCONTINUED | OUTPATIENT
Start: 2018-01-01 | End: 2018-01-01

## 2018-01-01 RX ORDER — CHLORHEXIDINE GLUCONATE ORAL RINSE 1.2 MG/ML
15 SOLUTION DENTAL 2 TIMES DAILY
Status: DISCONTINUED | OUTPATIENT
Start: 2018-01-01 | End: 2018-01-01

## 2018-01-01 RX ORDER — FENTANYL CITRATE 50 UG/ML
75 INJECTION, SOLUTION INTRAMUSCULAR; INTRAVENOUS ONCE
Status: COMPLETED | OUTPATIENT
Start: 2018-01-01 | End: 2018-01-01

## 2018-01-01 RX ORDER — EPHEDRINE SULFATE 50 MG/ML
INJECTION, SOLUTION INTRAVENOUS PRN
Status: DISCONTINUED | OUTPATIENT
Start: 2018-01-01 | End: 2018-01-01

## 2018-01-01 RX ORDER — FUROSEMIDE 10 MG/ML
20 INJECTION INTRAMUSCULAR; INTRAVENOUS EVERY 12 HOURS
Status: DISCONTINUED | OUTPATIENT
Start: 2018-01-01 | End: 2018-01-01

## 2018-01-01 RX ORDER — FAMOTIDINE 20 MG/1
20 TABLET, FILM COATED ORAL 2 TIMES DAILY
Status: DISCONTINUED | OUTPATIENT
Start: 2018-01-01 | End: 2018-01-01 | Stop reason: ALTCHOICE

## 2018-01-01 RX ORDER — IPRATROPIUM BROMIDE AND ALBUTEROL SULFATE 2.5; .5 MG/3ML; MG/3ML
3 SOLUTION RESPIRATORY (INHALATION) 4 TIMES DAILY
Qty: 120 ML | Refills: 0 | Status: SHIPPED | OUTPATIENT
Start: 2018-01-01 | End: 2018-01-01

## 2018-01-01 RX ORDER — CEFEPIME HYDROCHLORIDE 1 G/1
1 INJECTION, POWDER, FOR SOLUTION INTRAMUSCULAR; INTRAVENOUS EVERY 12 HOURS
Status: DISCONTINUED | OUTPATIENT
Start: 2018-01-01 | End: 2018-01-01

## 2018-01-01 RX ORDER — GABAPENTIN 100 MG/1
200 CAPSULE ORAL 2 TIMES DAILY
Status: DISCONTINUED | OUTPATIENT
Start: 2018-01-01 | End: 2018-01-01

## 2018-01-01 RX ORDER — PREDNISONE 20 MG/1
40 TABLET ORAL DAILY
Status: DISCONTINUED | OUTPATIENT
Start: 2018-01-01 | End: 2018-01-01 | Stop reason: HOSPADM

## 2018-01-01 RX ORDER — HYDROMORPHONE HYDROCHLORIDE 1 MG/ML
.3-.5 INJECTION, SOLUTION INTRAMUSCULAR; INTRAVENOUS; SUBCUTANEOUS
Status: DISCONTINUED | OUTPATIENT
Start: 2018-01-01 | End: 2018-01-01

## 2018-01-01 RX ORDER — POTASSIUM CL/LIDO/0.9 % NACL 10MEQ/0.1L
10 INTRAVENOUS SOLUTION, PIGGYBACK (ML) INTRAVENOUS
Status: DISCONTINUED | OUTPATIENT
Start: 2018-01-01 | End: 2018-01-01 | Stop reason: HOSPADM

## 2018-01-01 RX ORDER — LAMOTRIGINE 100 MG/1
100 TABLET ORAL 3 TIMES DAILY
Status: DISCONTINUED | OUTPATIENT
Start: 2018-01-01 | End: 2018-01-01

## 2018-01-01 RX ORDER — ALBUTEROL SULFATE 0.83 MG/ML
2.5 SOLUTION RESPIRATORY (INHALATION)
Qty: 120 ML | Refills: 0 | Status: SHIPPED | OUTPATIENT
Start: 2018-01-01 | End: 2018-01-01

## 2018-01-01 RX ORDER — GINSENG 100 MG
CAPSULE ORAL 2 TIMES DAILY
Status: DISCONTINUED | OUTPATIENT
Start: 2018-01-01 | End: 2018-01-01 | Stop reason: HOSPADM

## 2018-01-01 RX ORDER — LIDOCAINE 40 MG/G
CREAM TOPICAL
Status: DISCONTINUED | OUTPATIENT
Start: 2018-01-01 | End: 2018-01-01 | Stop reason: HOSPADM

## 2018-01-01 RX ORDER — NALOXONE HYDROCHLORIDE 0.4 MG/ML
.1-.4 INJECTION, SOLUTION INTRAMUSCULAR; INTRAVENOUS; SUBCUTANEOUS
Status: DISCONTINUED | OUTPATIENT
Start: 2018-01-01 | End: 2018-01-01

## 2018-01-01 RX ORDER — GABAPENTIN 100 MG/1
200 CAPSULE ORAL 2 TIMES DAILY WITH MEALS
Status: DISCONTINUED | OUTPATIENT
Start: 2018-01-01 | End: 2018-01-01

## 2018-01-01 RX ORDER — PREDNISONE 20 MG/1
TABLET ORAL
Qty: 12 TABLET | Refills: 0 | Status: SHIPPED | OUTPATIENT
Start: 2018-01-01 | End: 2018-01-01

## 2018-01-01 RX ORDER — ACETYLCYSTEINE 200 MG/ML
2 SOLUTION ORAL; RESPIRATORY (INHALATION) EVERY 4 HOURS
Status: DISCONTINUED | OUTPATIENT
Start: 2018-01-01 | End: 2018-01-01

## 2018-01-01 RX ORDER — SODIUM CHLORIDE, SODIUM LACTATE, POTASSIUM CHLORIDE, CALCIUM CHLORIDE 600; 310; 30; 20 MG/100ML; MG/100ML; MG/100ML; MG/100ML
INJECTION, SOLUTION INTRAVENOUS CONTINUOUS PRN
Status: DISCONTINUED | OUTPATIENT
Start: 2018-01-01 | End: 2018-01-01

## 2018-01-01 RX ORDER — OLOPATADINE HYDROCHLORIDE 2 MG/ML
1 SOLUTION/ DROPS OPHTHALMIC 2 TIMES DAILY
Status: ON HOLD | COMMUNITY
End: 2019-01-01

## 2018-01-01 RX ORDER — PROPOFOL 10 MG/ML
5-75 INJECTION, EMULSION INTRAVENOUS CONTINUOUS
Status: DISCONTINUED | OUTPATIENT
Start: 2018-01-01 | End: 2018-01-01 | Stop reason: CLARIF

## 2018-01-01 RX ORDER — CEFAZOLIN SODIUM 1 G/3ML
INJECTION, POWDER, FOR SOLUTION INTRAMUSCULAR; INTRAVENOUS PRN
Status: DISCONTINUED | OUTPATIENT
Start: 2018-01-01 | End: 2018-01-01

## 2018-01-01 RX ORDER — ONDANSETRON 2 MG/ML
INJECTION INTRAMUSCULAR; INTRAVENOUS PRN
Status: DISCONTINUED | OUTPATIENT
Start: 2018-01-01 | End: 2018-01-01

## 2018-01-01 RX ORDER — DEXTROSE MONOHYDRATE 25 G/50ML
25-50 INJECTION, SOLUTION INTRAVENOUS
Status: DISCONTINUED | OUTPATIENT
Start: 2018-01-01 | End: 2018-01-01

## 2018-01-01 RX ADMIN — METHYLPREDNISOLONE SODIUM SUCCINATE 40 MG: 40 INJECTION, POWDER, FOR SOLUTION INTRAMUSCULAR; INTRAVENOUS at 20:43

## 2018-01-01 RX ADMIN — MULTIVITAMIN 15 ML: LIQUID ORAL at 09:31

## 2018-01-01 RX ADMIN — ALBUTEROL SULFATE 2.5 MG: 2.5 SOLUTION RESPIRATORY (INHALATION) at 20:35

## 2018-01-01 RX ADMIN — DEXTROSE AND SODIUM CHLORIDE: 5; 900 INJECTION, SOLUTION INTRAVENOUS at 01:25

## 2018-01-01 RX ADMIN — DEXTRAN 70, AND HYPROMELLOSE 2910 2 DROP: 1; 3 SOLUTION/ DROPS OPHTHALMIC at 10:19

## 2018-01-01 RX ADMIN — METRONIDAZOLE 500 MG: 500 INJECTION, SOLUTION INTRAVENOUS at 12:54

## 2018-01-01 RX ADMIN — GABAPENTIN 200 MG: 250 SOLUTION ORAL at 14:29

## 2018-01-01 RX ADMIN — THERA TABS 1 TABLET: TAB at 08:58

## 2018-01-01 RX ADMIN — RANITIDINE HYDROCHLORIDE 150 MG: 15 SOLUTION ORAL at 08:21

## 2018-01-01 RX ADMIN — IPRATROPIUM BROMIDE AND ALBUTEROL SULFATE 3 ML: .5; 3 SOLUTION RESPIRATORY (INHALATION) at 11:13

## 2018-01-01 RX ADMIN — FAMOTIDINE 20 MG: 20 TABLET, FILM COATED ORAL at 21:38

## 2018-01-01 RX ADMIN — INSULIN ASPART 1 UNITS: 100 INJECTION, SOLUTION INTRAVENOUS; SUBCUTANEOUS at 08:58

## 2018-01-01 RX ADMIN — LAMOTRIGINE 100 MG: 100 TABLET ORAL at 08:22

## 2018-01-01 RX ADMIN — POTASSIUM CHLORIDE 40 MEQ: 1.5 POWDER, FOR SOLUTION ORAL at 08:43

## 2018-01-01 RX ADMIN — PROPOFOL 60 MCG/KG/MIN: 10 INJECTION, EMULSION INTRAVENOUS at 18:17

## 2018-01-01 RX ADMIN — RANITIDINE HYDROCHLORIDE 150 MG: 15 SOLUTION ORAL at 08:14

## 2018-01-01 RX ADMIN — PROPOFOL 25 MCG/KG/MIN: 10 INJECTION, EMULSION INTRAVENOUS at 12:19

## 2018-01-01 RX ADMIN — ALBUTEROL SULFATE 2.5 MG: 2.5 SOLUTION RESPIRATORY (INHALATION) at 07:21

## 2018-01-01 RX ADMIN — PROPOFOL 45 MCG/KG/MIN: 10 INJECTION, EMULSION INTRAVENOUS at 16:03

## 2018-01-01 RX ADMIN — METHYLPREDNISOLONE SODIUM SUCCINATE 40 MG: 40 INJECTION, POWDER, FOR SOLUTION INTRAMUSCULAR; INTRAVENOUS at 09:04

## 2018-01-01 RX ADMIN — ALBUTEROL SULFATE 2.5 MG: 2.5 SOLUTION RESPIRATORY (INHALATION) at 11:34

## 2018-01-01 RX ADMIN — POLYETHYLENE GLYCOL 3350 17 G: 17 POWDER, FOR SOLUTION ORAL at 12:18

## 2018-01-01 RX ADMIN — LAMOTRIGINE 100 MG: 100 TABLET ORAL at 10:31

## 2018-01-01 RX ADMIN — METRONIDAZOLE 500 MG: 500 INJECTION, SOLUTION INTRAVENOUS at 00:28

## 2018-01-01 RX ADMIN — MULTIVITAMIN 15 ML: LIQUID ORAL at 08:23

## 2018-01-01 RX ADMIN — ONDANSETRON 4 MG: 4 TABLET, ORALLY DISINTEGRATING ORAL at 13:42

## 2018-01-01 RX ADMIN — Medication 0.2 MG/HR: at 13:00

## 2018-01-01 RX ADMIN — Medication 1 PACKET: at 09:05

## 2018-01-01 RX ADMIN — METRONIDAZOLE 500 MG: 500 INJECTION, SOLUTION INTRAVENOUS at 18:26

## 2018-01-01 RX ADMIN — GABAPENTIN 200 MG: 250 SOLUTION ORAL at 10:48

## 2018-01-01 RX ADMIN — DEXTROSE AND SODIUM CHLORIDE: 5; 900 INJECTION, SOLUTION INTRAVENOUS at 05:46

## 2018-01-01 RX ADMIN — IPRATROPIUM BROMIDE AND ALBUTEROL SULFATE 3 ML: .5; 3 SOLUTION RESPIRATORY (INHALATION) at 08:35

## 2018-01-01 RX ADMIN — RANITIDINE HYDROCHLORIDE 150 MG: 15 SOLUTION ORAL at 09:05

## 2018-01-01 RX ADMIN — LAMOTRIGINE 100 MG: 100 TABLET ORAL at 08:43

## 2018-01-01 RX ADMIN — ALBUTEROL SULFATE 2.5 MG: 2.5 SOLUTION RESPIRATORY (INHALATION) at 23:57

## 2018-01-01 RX ADMIN — IPRATROPIUM BROMIDE AND ALBUTEROL SULFATE 3 ML: .5; 3 SOLUTION RESPIRATORY (INHALATION) at 19:29

## 2018-01-01 RX ADMIN — ALBUTEROL SULFATE 2.5 MG: 2.5 SOLUTION RESPIRATORY (INHALATION) at 07:09

## 2018-01-01 RX ADMIN — ALBUTEROL SULFATE 2.5 MG: 2.5 SOLUTION RESPIRATORY (INHALATION) at 12:05

## 2018-01-01 RX ADMIN — ONDANSETRON 4 MG: 2 INJECTION INTRAMUSCULAR; INTRAVENOUS at 08:20

## 2018-01-01 RX ADMIN — ALBUTEROL SULFATE 2.5 MG: 2.5 SOLUTION RESPIRATORY (INHALATION) at 23:21

## 2018-01-01 RX ADMIN — METRONIDAZOLE 500 MG: 500 INJECTION, SOLUTION INTRAVENOUS at 05:23

## 2018-01-01 RX ADMIN — GABAPENTIN 200 MG: 250 SOLUTION ORAL at 08:14

## 2018-01-01 RX ADMIN — MULTIVITAMIN 15 ML: LIQUID ORAL at 08:42

## 2018-01-01 RX ADMIN — DEXMEDETOMIDINE HYDROCHLORIDE 0.6 MCG/KG/HR: 100 INJECTION, SOLUTION INTRAVENOUS at 18:14

## 2018-01-01 RX ADMIN — LAMOTRIGINE 100 MG: 100 TABLET ORAL at 22:15

## 2018-01-01 RX ADMIN — VANCOMYCIN HYDROCHLORIDE 1250 MG: 10 INJECTION, POWDER, LYOPHILIZED, FOR SOLUTION INTRAVENOUS at 17:31

## 2018-01-01 RX ADMIN — IPRATROPIUM BROMIDE AND ALBUTEROL SULFATE 3 ML: .5; 3 SOLUTION RESPIRATORY (INHALATION) at 19:37

## 2018-01-01 RX ADMIN — AZITHROMYCIN MONOHYDRATE 250 MG: 500 INJECTION, POWDER, LYOPHILIZED, FOR SOLUTION INTRAVENOUS at 00:22

## 2018-01-01 RX ADMIN — MIDAZOLAM 4 MG/HR: 5 INJECTION INTRAMUSCULAR; INTRAVENOUS at 03:13

## 2018-01-01 RX ADMIN — AMITRIPTYLINE HYDROCHLORIDE 75 MG: 50 TABLET, FILM COATED ORAL at 22:04

## 2018-01-01 RX ADMIN — ENOXAPARIN SODIUM 40 MG: 40 INJECTION SUBCUTANEOUS at 08:12

## 2018-01-01 RX ADMIN — VANCOMYCIN HYDROCHLORIDE 1250 MG: 10 INJECTION, POWDER, LYOPHILIZED, FOR SOLUTION INTRAVENOUS at 04:20

## 2018-01-01 RX ADMIN — GABAPENTIN 200 MG: 250 SOLUTION ORAL at 08:21

## 2018-01-01 RX ADMIN — ALBUTEROL SULFATE 2.5 MG: 2.5 SOLUTION RESPIRATORY (INHALATION) at 12:17

## 2018-01-01 RX ADMIN — GABAPENTIN 200 MG: 100 CAPSULE ORAL at 16:03

## 2018-01-01 RX ADMIN — RANITIDINE HYDROCHLORIDE 150 MG: 15 SOLUTION ORAL at 21:26

## 2018-01-01 RX ADMIN — FELBAMATE 600 MG: 600 SUSPENSION ORAL at 21:03

## 2018-01-01 RX ADMIN — MULTIVITAMIN 15 ML: LIQUID ORAL at 09:05

## 2018-01-01 RX ADMIN — CEFTRIAXONE SODIUM 2 G: 2 INJECTION, POWDER, FOR SOLUTION INTRAMUSCULAR; INTRAVENOUS at 22:07

## 2018-01-01 RX ADMIN — LAMOTRIGINE 100 MG: 100 TABLET ORAL at 16:04

## 2018-01-01 RX ADMIN — CHLORHEXIDINE GLUCONATE 15 ML: 1.2 RINSE ORAL at 08:28

## 2018-01-01 RX ADMIN — CHLORHEXIDINE GLUCONATE 15 ML: 1.2 RINSE ORAL at 09:09

## 2018-01-01 RX ADMIN — METRONIDAZOLE 500 MG: 500 INJECTION, SOLUTION INTRAVENOUS at 11:13

## 2018-01-01 RX ADMIN — METHYLPREDNISOLONE SODIUM SUCCINATE 40 MG: 40 INJECTION, POWDER, FOR SOLUTION INTRAMUSCULAR; INTRAVENOUS at 21:21

## 2018-01-01 RX ADMIN — LAMOTRIGINE 100 MG: 100 TABLET ORAL at 23:49

## 2018-01-01 RX ADMIN — LAMOTRIGINE 100 MG: 100 TABLET ORAL at 17:04

## 2018-01-01 RX ADMIN — FELBAMATE 600 MG: 600 TABLET ORAL at 22:12

## 2018-01-01 RX ADMIN — SODIUM CHLORIDE, POTASSIUM CHLORIDE, SODIUM LACTATE AND CALCIUM CHLORIDE 1000 ML: 600; 310; 30; 20 INJECTION, SOLUTION INTRAVENOUS at 03:08

## 2018-01-01 RX ADMIN — LAMOTRIGINE 100 MG: 100 TABLET ORAL at 16:19

## 2018-01-01 RX ADMIN — HYDROMORPHONE HYDROCHLORIDE 0.5 MG: 1 INJECTION, SOLUTION INTRAMUSCULAR; INTRAVENOUS; SUBCUTANEOUS at 00:46

## 2018-01-01 RX ADMIN — RANITIDINE HYDROCHLORIDE 150 MG: 15 SOLUTION ORAL at 21:10

## 2018-01-01 RX ADMIN — GABAPENTIN 200 MG: 250 SOLUTION ORAL at 08:23

## 2018-01-01 RX ADMIN — METRONIDAZOLE 500 MG: 500 INJECTION, SOLUTION INTRAVENOUS at 17:32

## 2018-01-01 RX ADMIN — DEXMEDETOMIDINE HYDROCHLORIDE 0.6 MCG/KG/HR: 100 INJECTION, SOLUTION INTRAVENOUS at 20:27

## 2018-01-01 RX ADMIN — LAMOTRIGINE 100 MG: 100 TABLET ORAL at 16:48

## 2018-01-01 RX ADMIN — GABAPENTIN 200 MG: 250 SOLUTION ORAL at 08:25

## 2018-01-01 RX ADMIN — METRONIDAZOLE 500 MG: 500 INJECTION, SOLUTION INTRAVENOUS at 11:19

## 2018-01-01 RX ADMIN — ALBUTEROL SULFATE 2.5 MG: 2.5 SOLUTION RESPIRATORY (INHALATION) at 20:00

## 2018-01-01 RX ADMIN — FELBAMATE 600 MG: 600 TABLET ORAL at 17:11

## 2018-01-01 RX ADMIN — GABAPENTIN 200 MG: 250 SOLUTION ORAL at 13:28

## 2018-01-01 RX ADMIN — DORNASE ALFA 2.5 MG: 1 SOLUTION RESPIRATORY (INHALATION) at 20:31

## 2018-01-01 RX ADMIN — ALBUTEROL SULFATE 2.5 MG: 2.5 SOLUTION RESPIRATORY (INHALATION) at 12:20

## 2018-01-01 RX ADMIN — BACITRACIN ZINC: 500 OINTMENT TOPICAL at 22:16

## 2018-01-01 RX ADMIN — ENOXAPARIN SODIUM 40 MG: 40 INJECTION SUBCUTANEOUS at 08:30

## 2018-01-01 RX ADMIN — IPRATROPIUM BROMIDE AND ALBUTEROL SULFATE 3 ML: .5; 3 SOLUTION RESPIRATORY (INHALATION) at 07:30

## 2018-01-01 RX ADMIN — LAMOTRIGINE 100 MG: 100 TABLET ORAL at 17:11

## 2018-01-01 RX ADMIN — DORNASE ALFA 2.5 MG: 1 SOLUTION RESPIRATORY (INHALATION) at 20:25

## 2018-01-01 RX ADMIN — FELBAMATE 600 MG: 600 SUSPENSION ORAL at 05:50

## 2018-01-01 RX ADMIN — LAMOTRIGINE 100 MG: 100 TABLET ORAL at 08:21

## 2018-01-01 RX ADMIN — FELBAMATE 600 MG: 600 TABLET ORAL at 08:14

## 2018-01-01 RX ADMIN — POLYETHYLENE GLYCOL 3350 17 G: 17 POWDER, FOR SOLUTION ORAL at 09:09

## 2018-01-01 RX ADMIN — GABAPENTIN 200 MG: 250 SOLUTION ORAL at 09:05

## 2018-01-01 RX ADMIN — LAMOTRIGINE 100 MG: 100 TABLET ORAL at 16:07

## 2018-01-01 RX ADMIN — AZITHROMYCIN MONOHYDRATE 250 MG: 500 INJECTION, POWDER, LYOPHILIZED, FOR SOLUTION INTRAVENOUS at 23:23

## 2018-01-01 RX ADMIN — METHYLPREDNISOLONE SODIUM SUCCINATE 40 MG: 40 INJECTION, POWDER, FOR SOLUTION INTRAMUSCULAR; INTRAVENOUS at 08:53

## 2018-01-01 RX ADMIN — ALBUTEROL SULFATE 2.5 MG: 2.5 SOLUTION RESPIRATORY (INHALATION) at 07:53

## 2018-01-01 RX ADMIN — PROPOFOL 35 MCG/KG/MIN: 10 INJECTION, EMULSION INTRAVENOUS at 10:37

## 2018-01-01 RX ADMIN — DEXTROSE AND SODIUM CHLORIDE: 5; 900 INJECTION, SOLUTION INTRAVENOUS at 19:41

## 2018-01-01 RX ADMIN — GABAPENTIN 200 MG: 250 SOLUTION ORAL at 18:12

## 2018-01-01 RX ADMIN — CHLORHEXIDINE GLUCONATE 15 ML: 1.2 RINSE ORAL at 20:53

## 2018-01-01 RX ADMIN — IPRATROPIUM BROMIDE AND ALBUTEROL SULFATE 3 ML: .5; 3 SOLUTION RESPIRATORY (INHALATION) at 11:50

## 2018-01-01 RX ADMIN — ALBUTEROL SULFATE 2.5 MG: 2.5 SOLUTION RESPIRATORY (INHALATION) at 23:24

## 2018-01-01 RX ADMIN — GABAPENTIN 200 MG: 100 CAPSULE ORAL at 13:43

## 2018-01-01 RX ADMIN — PROPOFOL 35 MCG/KG/MIN: 10 INJECTION, EMULSION INTRAVENOUS at 17:55

## 2018-01-01 RX ADMIN — FELBAMATE 600 MG: 600 SUSPENSION ORAL at 21:18

## 2018-01-01 RX ADMIN — CHLORHEXIDINE GLUCONATE 15 ML: 1.2 RINSE ORAL at 08:14

## 2018-01-01 RX ADMIN — PROPOFOL 40 MCG/KG/MIN: 10 INJECTION, EMULSION INTRAVENOUS at 21:42

## 2018-01-01 RX ADMIN — GABAPENTIN 200 MG: 100 CAPSULE ORAL at 17:11

## 2018-01-01 RX ADMIN — POTASSIUM PHOSPHATE, MONOBASIC AND POTASSIUM PHOSPHATE, DIBASIC 20 MMOL: 224; 236 INJECTION, SOLUTION, CONCENTRATE INTRAVENOUS at 16:41

## 2018-01-01 RX ADMIN — ALBUTEROL SULFATE 2.5 MG: 2.5 SOLUTION RESPIRATORY (INHALATION) at 03:33

## 2018-01-01 RX ADMIN — FELBAMATE 600 MG: 600 TABLET ORAL at 08:58

## 2018-01-01 RX ADMIN — PROPOFOL 30 MCG/KG/MIN: 10 INJECTION, EMULSION INTRAVENOUS at 03:28

## 2018-01-01 RX ADMIN — CEFTRIAXONE SODIUM 2 G: 2 INJECTION, POWDER, FOR SOLUTION INTRAMUSCULAR; INTRAVENOUS at 22:13

## 2018-01-01 RX ADMIN — DORNASE ALFA 2.5 MG: 1 SOLUTION RESPIRATORY (INHALATION) at 20:45

## 2018-01-01 RX ADMIN — CEFTRIAXONE SODIUM 2 G: 2 INJECTION, POWDER, FOR SOLUTION INTRAMUSCULAR; INTRAVENOUS at 21:50

## 2018-01-01 RX ADMIN — POTASSIUM CHLORIDE 40 MEQ: 1.5 POWDER, FOR SOLUTION ORAL at 10:44

## 2018-01-01 RX ADMIN — ALBUTEROL SULFATE 2.5 MG: 2.5 SOLUTION RESPIRATORY (INHALATION) at 11:42

## 2018-01-01 RX ADMIN — METRONIDAZOLE 500 MG: 500 INJECTION, SOLUTION INTRAVENOUS at 00:54

## 2018-01-01 RX ADMIN — IPRATROPIUM BROMIDE AND ALBUTEROL SULFATE 3 ML: .5; 3 SOLUTION RESPIRATORY (INHALATION) at 15:49

## 2018-01-01 RX ADMIN — PROPOFOL 150 MG: 10 INJECTION, EMULSION INTRAVENOUS at 07:32

## 2018-01-01 RX ADMIN — ALBUTEROL SULFATE 2.5 MG: 2.5 SOLUTION RESPIRATORY (INHALATION) at 03:32

## 2018-01-01 RX ADMIN — CEFAZOLIN 2 G: 1 INJECTION, POWDER, FOR SOLUTION INTRAMUSCULAR; INTRAVENOUS at 07:38

## 2018-01-01 RX ADMIN — ALBUTEROL SULFATE 2.5 MG: 2.5 SOLUTION RESPIRATORY (INHALATION) at 15:14

## 2018-01-01 RX ADMIN — METRONIDAZOLE 500 MG: 500 INJECTION, SOLUTION INTRAVENOUS at 00:47

## 2018-01-01 RX ADMIN — IPRATROPIUM BROMIDE AND ALBUTEROL SULFATE 3 ML: .5; 3 SOLUTION RESPIRATORY (INHALATION) at 21:08

## 2018-01-01 RX ADMIN — FUROSEMIDE 20 MG: 10 INJECTION, SOLUTION INTRAVENOUS at 21:18

## 2018-01-01 RX ADMIN — METRONIDAZOLE 500 MG: 500 INJECTION, SOLUTION INTRAVENOUS at 18:49

## 2018-01-01 RX ADMIN — METRONIDAZOLE 500 MG: 500 INJECTION, SOLUTION INTRAVENOUS at 11:51

## 2018-01-01 RX ADMIN — IPRATROPIUM BROMIDE AND ALBUTEROL SULFATE 3 ML: .5; 3 SOLUTION RESPIRATORY (INHALATION) at 15:57

## 2018-01-01 RX ADMIN — METHYLPREDNISOLONE SODIUM SUCCINATE 40 MG: 40 INJECTION, POWDER, FOR SOLUTION INTRAMUSCULAR; INTRAVENOUS at 08:43

## 2018-01-01 RX ADMIN — METHYLPREDNISOLONE SODIUM SUCCINATE 40 MG: 40 INJECTION, POWDER, FOR SOLUTION INTRAMUSCULAR; INTRAVENOUS at 20:27

## 2018-01-01 RX ADMIN — VANCOMYCIN HYDROCHLORIDE 1250 MG: 10 INJECTION, POWDER, LYOPHILIZED, FOR SOLUTION INTRAVENOUS at 17:29

## 2018-01-01 RX ADMIN — METRONIDAZOLE 500 MG: 500 INJECTION, SOLUTION INTRAVENOUS at 00:40

## 2018-01-01 RX ADMIN — ACETYLCYSTEINE 2 ML: 200 SOLUTION ORAL; RESPIRATORY (INHALATION) at 07:36

## 2018-01-01 RX ADMIN — IPRATROPIUM BROMIDE AND ALBUTEROL SULFATE 3 ML: .5; 3 SOLUTION RESPIRATORY (INHALATION) at 15:42

## 2018-01-01 RX ADMIN — LAMOTRIGINE 100 MG: 100 TABLET ORAL at 08:58

## 2018-01-01 RX ADMIN — AMITRIPTYLINE HYDROCHLORIDE 75 MG: 25 TABLET, FILM COATED ORAL at 22:28

## 2018-01-01 RX ADMIN — GABAPENTIN 200 MG: 250 SOLUTION ORAL at 14:20

## 2018-01-01 RX ADMIN — FELBAMATE 600 MG: 600 SUSPENSION ORAL at 06:03

## 2018-01-01 RX ADMIN — LAMOTRIGINE 100 MG: 100 TABLET ORAL at 21:18

## 2018-01-01 RX ADMIN — CEFTRIAXONE SODIUM 2 G: 2 INJECTION, POWDER, FOR SOLUTION INTRAMUSCULAR; INTRAVENOUS at 23:49

## 2018-01-01 RX ADMIN — FELBAMATE 600 MG: 600 SUSPENSION ORAL at 22:16

## 2018-01-01 RX ADMIN — ENOXAPARIN SODIUM 40 MG: 40 INJECTION SUBCUTANEOUS at 08:23

## 2018-01-01 RX ADMIN — RANITIDINE HYDROCHLORIDE 150 MG: 15 SOLUTION ORAL at 21:18

## 2018-01-01 RX ADMIN — ALBUTEROL SULFATE 2.5 MG: 2.5 SOLUTION RESPIRATORY (INHALATION) at 04:42

## 2018-01-01 RX ADMIN — PREDNISONE 40 MG: 20 TABLET ORAL at 09:05

## 2018-01-01 RX ADMIN — DORNASE ALFA 2.5 MG: 1 SOLUTION RESPIRATORY (INHALATION) at 20:10

## 2018-01-01 RX ADMIN — METHYLPREDNISOLONE SODIUM SUCCINATE 40 MG: 40 INJECTION, POWDER, FOR SOLUTION INTRAMUSCULAR; INTRAVENOUS at 20:53

## 2018-01-01 RX ADMIN — LAMOTRIGINE 100 MG: 100 TABLET ORAL at 22:05

## 2018-01-01 RX ADMIN — FELBAMATE 600 MG: 600 SUSPENSION ORAL at 21:10

## 2018-01-01 RX ADMIN — METRONIDAZOLE 500 MG: 500 INJECTION, SOLUTION INTRAVENOUS at 06:47

## 2018-01-01 RX ADMIN — DORNASE ALFA 2.5 MG: 1 SOLUTION RESPIRATORY (INHALATION) at 23:31

## 2018-01-01 RX ADMIN — METRONIDAZOLE 500 MG: 500 INJECTION, SOLUTION INTRAVENOUS at 06:55

## 2018-01-01 RX ADMIN — FELBAMATE 600 MG: 600 SUSPENSION ORAL at 14:41

## 2018-01-01 RX ADMIN — HYDROMORPHONE HYDROCHLORIDE 0.5 MG: 1 INJECTION, SOLUTION INTRAMUSCULAR; INTRAVENOUS; SUBCUTANEOUS at 08:31

## 2018-01-01 RX ADMIN — ALBUTEROL SULFATE 2.5 MG: 2.5 SOLUTION RESPIRATORY (INHALATION) at 07:58

## 2018-01-01 RX ADMIN — ENOXAPARIN SODIUM 40 MG: 40 INJECTION SUBCUTANEOUS at 08:56

## 2018-01-01 RX ADMIN — ALBUTEROL SULFATE 2.5 MG: 2.5 SOLUTION RESPIRATORY (INHALATION) at 07:36

## 2018-01-01 RX ADMIN — METHYLPREDNISOLONE SODIUM SUCCINATE 40 MG: 40 INJECTION, POWDER, FOR SOLUTION INTRAMUSCULAR; INTRAVENOUS at 09:09

## 2018-01-01 RX ADMIN — ALBUTEROL SULFATE 2.5 MG: 2.5 SOLUTION RESPIRATORY (INHALATION) at 19:13

## 2018-01-01 RX ADMIN — METRONIDAZOLE 500 MG: 500 INJECTION, SOLUTION INTRAVENOUS at 19:07

## 2018-01-01 RX ADMIN — ALBUTEROL SULFATE 2.5 MG: 2.5 SOLUTION RESPIRATORY (INHALATION) at 11:16

## 2018-01-01 RX ADMIN — LAMOTRIGINE 100 MG: 100 TABLET ORAL at 22:07

## 2018-01-01 RX ADMIN — EPHEDRINE SULFATE 5 MG: 50 INJECTION, SOLUTION INTRAVENOUS at 08:06

## 2018-01-01 RX ADMIN — METRONIDAZOLE 500 MG: 500 INJECTION, SOLUTION INTRAVENOUS at 11:45

## 2018-01-01 RX ADMIN — ALBUTEROL SULFATE 2.5 MG: 2.5 SOLUTION RESPIRATORY (INHALATION) at 07:34

## 2018-01-01 RX ADMIN — DORNASE ALFA 2.5 MG: 1 SOLUTION RESPIRATORY (INHALATION) at 20:00

## 2018-01-01 RX ADMIN — FUROSEMIDE 20 MG: 10 INJECTION, SOLUTION INTRAVENOUS at 11:12

## 2018-01-01 RX ADMIN — METHYLPREDNISOLONE SODIUM SUCCINATE 40 MG: 40 INJECTION, POWDER, FOR SOLUTION INTRAMUSCULAR; INTRAVENOUS at 08:20

## 2018-01-01 RX ADMIN — ALBUTEROL SULFATE 2.5 MG: 2.5 SOLUTION RESPIRATORY (INHALATION) at 19:46

## 2018-01-01 RX ADMIN — GABAPENTIN 200 MG: 100 CAPSULE ORAL at 10:30

## 2018-01-01 RX ADMIN — LAMOTRIGINE 100 MG: 100 TABLET ORAL at 09:09

## 2018-01-01 RX ADMIN — FENTANYL CITRATE 50 MCG: 50 INJECTION INTRAMUSCULAR; INTRAVENOUS at 11:17

## 2018-01-01 RX ADMIN — ENOXAPARIN SODIUM 40 MG: 40 INJECTION SUBCUTANEOUS at 09:05

## 2018-01-01 RX ADMIN — METHYLPREDNISOLONE SODIUM SUCCINATE 40 MG: 40 INJECTION, POWDER, FOR SOLUTION INTRAMUSCULAR; INTRAVENOUS at 08:12

## 2018-01-01 RX ADMIN — METRONIDAZOLE 500 MG: 500 INJECTION, SOLUTION INTRAVENOUS at 05:33

## 2018-01-01 RX ADMIN — FAMOTIDINE 20 MG: 10 INJECTION, SOLUTION INTRAVENOUS at 22:15

## 2018-01-01 RX ADMIN — LAMOTRIGINE 100 MG: 100 TABLET ORAL at 16:34

## 2018-01-01 RX ADMIN — ALBUTEROL SULFATE 2.5 MG: 2.5 SOLUTION RESPIRATORY (INHALATION) at 15:51

## 2018-01-01 RX ADMIN — LAMOTRIGINE 100 MG: 100 TABLET ORAL at 16:00

## 2018-01-01 RX ADMIN — ENOXAPARIN SODIUM 40 MG: 40 INJECTION SUBCUTANEOUS at 09:40

## 2018-01-01 RX ADMIN — FELBAMATE 600 MG: 600 TABLET ORAL at 17:04

## 2018-01-01 RX ADMIN — LAMOTRIGINE 100 MG: 100 TABLET ORAL at 22:40

## 2018-01-01 RX ADMIN — AMITRIPTYLINE HYDROCHLORIDE 75 MG: 50 TABLET, FILM COATED ORAL at 21:09

## 2018-01-01 RX ADMIN — AMITRIPTYLINE HYDROCHLORIDE 75 MG: 50 TABLET, FILM COATED ORAL at 22:15

## 2018-01-01 RX ADMIN — LAMOTRIGINE 100 MG: 100 TABLET ORAL at 22:28

## 2018-01-01 RX ADMIN — ALBUTEROL SULFATE 2.5 MG: 2.5 SOLUTION RESPIRATORY (INHALATION) at 15:39

## 2018-01-01 RX ADMIN — PROPOFOL 5 MCG/KG/MIN: 10 INJECTION, EMULSION INTRAVENOUS at 16:36

## 2018-01-01 RX ADMIN — METHYLPREDNISOLONE SODIUM SUCCINATE 40 MG: 40 INJECTION, POWDER, FOR SOLUTION INTRAMUSCULAR; INTRAVENOUS at 21:38

## 2018-01-01 RX ADMIN — FELBAMATE 600 MG: 600 TABLET ORAL at 16:07

## 2018-01-01 RX ADMIN — CEFTRIAXONE SODIUM 2 G: 2 INJECTION, POWDER, FOR SOLUTION INTRAMUSCULAR; INTRAVENOUS at 21:36

## 2018-01-01 RX ADMIN — FELBAMATE 600 MG: 600 SUSPENSION ORAL at 21:34

## 2018-01-01 RX ADMIN — GABAPENTIN 200 MG: 250 SOLUTION ORAL at 08:31

## 2018-01-01 RX ADMIN — GABAPENTIN 200 MG: 250 SOLUTION ORAL at 08:44

## 2018-01-01 RX ADMIN — FELBAMATE 600 MG: 600 TABLET ORAL at 08:22

## 2018-01-01 RX ADMIN — RANITIDINE HYDROCHLORIDE 150 MG: 15 SOLUTION ORAL at 21:34

## 2018-01-01 RX ADMIN — FUROSEMIDE 20 MG: 10 INJECTION, SOLUTION INTRAVENOUS at 22:24

## 2018-01-01 RX ADMIN — ALBUTEROL SULFATE 2.5 MG: 2.5 SOLUTION RESPIRATORY (INHALATION) at 11:11

## 2018-01-01 RX ADMIN — DORNASE ALFA 2.5 MG: 1 SOLUTION RESPIRATORY (INHALATION) at 23:06

## 2018-01-01 RX ADMIN — RANITIDINE HYDROCHLORIDE 150 MG: 15 SOLUTION ORAL at 08:23

## 2018-01-01 RX ADMIN — AMITRIPTYLINE HYDROCHLORIDE 75 MG: 25 TABLET, FILM COATED ORAL at 23:49

## 2018-01-01 RX ADMIN — HYDROMORPHONE HYDROCHLORIDE 0.5 MG: 1 INJECTION, SOLUTION INTRAMUSCULAR; INTRAVENOUS; SUBCUTANEOUS at 11:03

## 2018-01-01 RX ADMIN — ACETYLCYSTEINE 2 ML: 200 SOLUTION ORAL; RESPIRATORY (INHALATION) at 20:31

## 2018-01-01 RX ADMIN — LAMOTRIGINE 100 MG: 100 TABLET ORAL at 22:04

## 2018-01-01 RX ADMIN — FELBAMATE 600 MG: 600 TABLET ORAL at 22:07

## 2018-01-01 RX ADMIN — ALBUTEROL SULFATE 2.5 MG: 2.5 SOLUTION RESPIRATORY (INHALATION) at 23:14

## 2018-01-01 RX ADMIN — LAMOTRIGINE 100 MG: 100 TABLET ORAL at 08:30

## 2018-01-01 RX ADMIN — METHYLPREDNISOLONE SODIUM SUCCINATE 40 MG: 40 INJECTION, POWDER, FOR SOLUTION INTRAMUSCULAR; INTRAVENOUS at 07:57

## 2018-01-01 RX ADMIN — FELBAMATE 600 MG: 600 TABLET ORAL at 08:30

## 2018-01-01 RX ADMIN — FELBAMATE 600 MG: 600 SUSPENSION ORAL at 13:28

## 2018-01-01 RX ADMIN — MULTIVITAMIN 15 ML: LIQUID ORAL at 09:30

## 2018-01-01 RX ADMIN — LAMOTRIGINE 100 MG: 100 TABLET ORAL at 08:14

## 2018-01-01 RX ADMIN — ALBUTEROL SULFATE 2.5 MG: 2.5 SOLUTION RESPIRATORY (INHALATION) at 12:18

## 2018-01-01 RX ADMIN — CHLORHEXIDINE GLUCONATE 15 ML: 1.2 RINSE ORAL at 22:06

## 2018-01-01 RX ADMIN — ACETYLCYSTEINE 2 ML: 200 SOLUTION ORAL; RESPIRATORY (INHALATION) at 12:04

## 2018-01-01 RX ADMIN — ALBUTEROL SULFATE 2.5 MG: 2.5 SOLUTION RESPIRATORY (INHALATION) at 08:15

## 2018-01-01 RX ADMIN — CEFTRIAXONE SODIUM 2 G: 2 INJECTION, POWDER, FOR SOLUTION INTRAMUSCULAR; INTRAVENOUS at 21:18

## 2018-01-01 RX ADMIN — IPRATROPIUM BROMIDE AND ALBUTEROL SULFATE 3 ML: .5; 3 SOLUTION RESPIRATORY (INHALATION) at 11:46

## 2018-01-01 RX ADMIN — LAMOTRIGINE 100 MG: 100 TABLET ORAL at 09:30

## 2018-01-01 RX ADMIN — BACITRACIN ZINC: 500 OINTMENT TOPICAL at 09:31

## 2018-01-01 RX ADMIN — LAMOTRIGINE 100 MG: 100 TABLET ORAL at 16:13

## 2018-01-01 RX ADMIN — GABAPENTIN 200 MG: 100 CAPSULE ORAL at 15:26

## 2018-01-01 RX ADMIN — LAMOTRIGINE 100 MG: 100 TABLET ORAL at 22:18

## 2018-01-01 RX ADMIN — RANITIDINE HYDROCHLORIDE 150 MG: 15 SOLUTION ORAL at 10:48

## 2018-01-01 RX ADMIN — CHLORHEXIDINE GLUCONATE 15 ML: 1.2 RINSE ORAL at 21:23

## 2018-01-01 RX ADMIN — SODIUM CHLORIDE, POTASSIUM CHLORIDE, SODIUM LACTATE AND CALCIUM CHLORIDE: 600; 310; 30; 20 INJECTION, SOLUTION INTRAVENOUS at 07:00

## 2018-01-01 RX ADMIN — DEXTRAN 70, AND HYPROMELLOSE 2910 2 DROP: 1; 3 SOLUTION/ DROPS OPHTHALMIC at 08:29

## 2018-01-01 RX ADMIN — FELBAMATE 600 MG: 600 TABLET ORAL at 23:49

## 2018-01-01 RX ADMIN — HYDROMORPHONE HYDROCHLORIDE 0.5 MG: 1 INJECTION, SOLUTION INTRAMUSCULAR; INTRAVENOUS; SUBCUTANEOUS at 09:57

## 2018-01-01 RX ADMIN — FAMOTIDINE 20 MG: 10 INJECTION, SOLUTION INTRAVENOUS at 09:04

## 2018-01-01 RX ADMIN — ALBUTEROL SULFATE 2.5 MG: 2.5 SOLUTION RESPIRATORY (INHALATION) at 15:31

## 2018-01-01 RX ADMIN — ENOXAPARIN SODIUM 40 MG: 40 INJECTION SUBCUTANEOUS at 08:28

## 2018-01-01 RX ADMIN — ENOXAPARIN SODIUM 40 MG: 40 INJECTION SUBCUTANEOUS at 09:31

## 2018-01-01 RX ADMIN — HYDROMORPHONE HYDROCHLORIDE 0.5 MG: 1 INJECTION, SOLUTION INTRAMUSCULAR; INTRAVENOUS; SUBCUTANEOUS at 12:55

## 2018-01-01 RX ADMIN — MIDAZOLAM 4 MG/HR: 5 INJECTION INTRAMUSCULAR; INTRAVENOUS at 23:36

## 2018-01-01 RX ADMIN — GABAPENTIN 200 MG: 250 SOLUTION ORAL at 13:55

## 2018-01-01 RX ADMIN — ALBUTEROL SULFATE 2.5 MG: 2.5 SOLUTION RESPIRATORY (INHALATION) at 23:13

## 2018-01-01 RX ADMIN — FELBAMATE 600 MG: 600 SUSPENSION ORAL at 22:41

## 2018-01-01 RX ADMIN — CHLORHEXIDINE GLUCONATE 15 ML: 1.2 RINSE ORAL at 08:30

## 2018-01-01 RX ADMIN — METRONIDAZOLE 500 MG: 500 INJECTION, SOLUTION INTRAVENOUS at 12:00

## 2018-01-01 RX ADMIN — LAMOTRIGINE 100 MG: 100 TABLET ORAL at 15:26

## 2018-01-01 RX ADMIN — GABAPENTIN 200 MG: 250 SOLUTION ORAL at 09:10

## 2018-01-01 RX ADMIN — BACITRACIN ZINC: 500 OINTMENT TOPICAL at 08:12

## 2018-01-01 RX ADMIN — FUROSEMIDE 20 MG: 10 INJECTION, SOLUTION INTRAVENOUS at 10:48

## 2018-01-01 RX ADMIN — AMITRIPTYLINE HYDROCHLORIDE 75 MG: 25 TABLET, FILM COATED ORAL at 22:18

## 2018-01-01 RX ADMIN — CHLORHEXIDINE GLUCONATE 15 ML: 1.2 RINSE ORAL at 08:56

## 2018-01-01 RX ADMIN — RANITIDINE HYDROCHLORIDE 150 MG: 15 SOLUTION ORAL at 08:25

## 2018-01-01 RX ADMIN — GABAPENTIN 200 MG: 250 SOLUTION ORAL at 14:21

## 2018-01-01 RX ADMIN — LIDOCAINE HYDROCHLORIDE 30 MG: 10 INJECTION, SOLUTION INFILTRATION; PERINEURAL at 07:32

## 2018-01-01 RX ADMIN — FELBAMATE 600 MG: 600 TABLET ORAL at 21:43

## 2018-01-01 RX ADMIN — GABAPENTIN 200 MG: 250 SOLUTION ORAL at 09:30

## 2018-01-01 RX ADMIN — DEXTROSE AND SODIUM CHLORIDE: 5; 900 INJECTION, SOLUTION INTRAVENOUS at 09:32

## 2018-01-01 RX ADMIN — LAMOTRIGINE 100 MG: 100 TABLET ORAL at 21:44

## 2018-01-01 RX ADMIN — IPRATROPIUM BROMIDE AND ALBUTEROL SULFATE 3 ML: .5; 3 SOLUTION RESPIRATORY (INHALATION) at 11:40

## 2018-01-01 RX ADMIN — SODIUM CHLORIDE 1000 ML: 9 INJECTION, SOLUTION INTRAVENOUS at 00:55

## 2018-01-01 RX ADMIN — Medication 1 PACKET: at 09:31

## 2018-01-01 RX ADMIN — PROPOFOL 30 MCG/KG/MIN: 10 INJECTION, EMULSION INTRAVENOUS at 17:31

## 2018-01-01 RX ADMIN — ONDANSETRON 4 MG: 2 INJECTION INTRAMUSCULAR; INTRAVENOUS at 07:54

## 2018-01-01 RX ADMIN — LAMOTRIGINE 100 MG: 100 TABLET ORAL at 16:06

## 2018-01-01 RX ADMIN — FAMOTIDINE 20 MG: 20 TABLET, FILM COATED ORAL at 22:07

## 2018-01-01 RX ADMIN — DORNASE ALFA 2.5 MG: 1 SOLUTION RESPIRATORY (INHALATION) at 21:26

## 2018-01-01 RX ADMIN — ALBUTEROL SULFATE 2.5 MG: 2.5 SOLUTION RESPIRATORY (INHALATION) at 23:06

## 2018-01-01 RX ADMIN — LAMOTRIGINE 100 MG: 100 TABLET ORAL at 21:02

## 2018-01-01 RX ADMIN — GABAPENTIN 200 MG: 250 SOLUTION ORAL at 13:25

## 2018-01-01 RX ADMIN — IPRATROPIUM BROMIDE AND ALBUTEROL SULFATE 3 ML: .5; 3 SOLUTION RESPIRATORY (INHALATION) at 11:21

## 2018-01-01 RX ADMIN — LAMOTRIGINE 100 MG: 100 TABLET ORAL at 08:23

## 2018-01-01 RX ADMIN — CHLORHEXIDINE GLUCONATE 15 ML: 1.2 RINSE ORAL at 21:34

## 2018-01-01 RX ADMIN — LAMOTRIGINE 100 MG: 100 TABLET ORAL at 10:57

## 2018-01-01 RX ADMIN — METHYLPREDNISOLONE SODIUM SUCCINATE 40 MG: 40 INJECTION, POWDER, FOR SOLUTION INTRAMUSCULAR; INTRAVENOUS at 08:23

## 2018-01-01 RX ADMIN — PROPOFOL 45 MCG/KG/MIN: 10 INJECTION, EMULSION INTRAVENOUS at 03:08

## 2018-01-01 RX ADMIN — AMITRIPTYLINE HYDROCHLORIDE 75 MG: 50 TABLET, FILM COATED ORAL at 21:44

## 2018-01-01 RX ADMIN — LAMOTRIGINE 100 MG: 100 TABLET ORAL at 18:11

## 2018-01-01 RX ADMIN — METHYLPREDNISOLONE SODIUM SUCCINATE 40 MG: 40 INJECTION, POWDER, FOR SOLUTION INTRAMUSCULAR; INTRAVENOUS at 08:28

## 2018-01-01 RX ADMIN — ALBUTEROL SULFATE 2.5 MG: 2.5 SOLUTION RESPIRATORY (INHALATION) at 03:21

## 2018-01-01 RX ADMIN — MULTIVITAMIN 15 ML: LIQUID ORAL at 10:48

## 2018-01-01 RX ADMIN — PROPOFOL 80 MG: 10 INJECTION, EMULSION INTRAVENOUS at 09:17

## 2018-01-01 RX ADMIN — GABAPENTIN 200 MG: 250 SOLUTION ORAL at 14:26

## 2018-01-01 RX ADMIN — IPRATROPIUM BROMIDE AND ALBUTEROL SULFATE 3 ML: .5; 3 SOLUTION RESPIRATORY (INHALATION) at 21:10

## 2018-01-01 RX ADMIN — ENOXAPARIN SODIUM 40 MG: 40 INJECTION SUBCUTANEOUS at 08:20

## 2018-01-01 RX ADMIN — ENOXAPARIN SODIUM 40 MG: 40 INJECTION SUBCUTANEOUS at 10:12

## 2018-01-01 RX ADMIN — BACITRACIN ZINC: 500 OINTMENT TOPICAL at 09:05

## 2018-01-01 RX ADMIN — IPRATROPIUM BROMIDE AND ALBUTEROL SULFATE 3 ML: .5; 3 SOLUTION RESPIRATORY (INHALATION) at 21:09

## 2018-01-01 RX ADMIN — BACITRACIN ZINC: 500 OINTMENT TOPICAL at 22:04

## 2018-01-01 RX ADMIN — HYDROMORPHONE HYDROCHLORIDE 0.5 MG: 1 INJECTION, SOLUTION INTRAMUSCULAR; INTRAVENOUS; SUBCUTANEOUS at 11:42

## 2018-01-01 RX ADMIN — CEFAZOLIN 40 ML GIVEN: 1 INJECTION, POWDER, FOR SOLUTION INTRAMUSCULAR; INTRAVENOUS at 08:09

## 2018-01-01 RX ADMIN — ENOXAPARIN SODIUM 40 MG: 40 INJECTION SUBCUTANEOUS at 08:02

## 2018-01-01 RX ADMIN — AMITRIPTYLINE HYDROCHLORIDE 75 MG: 25 TABLET, FILM COATED ORAL at 21:43

## 2018-01-01 RX ADMIN — AMITRIPTYLINE HYDROCHLORIDE 75 MG: 25 TABLET, FILM COATED ORAL at 22:39

## 2018-01-01 RX ADMIN — IPRATROPIUM BROMIDE AND ALBUTEROL SULFATE 3 ML: .5; 3 SOLUTION RESPIRATORY (INHALATION) at 08:23

## 2018-01-01 RX ADMIN — FELBAMATE 600 MG: 600 SUSPENSION ORAL at 09:33

## 2018-01-01 RX ADMIN — PROPOFOL 35 MCG/KG/MIN: 10 INJECTION, EMULSION INTRAVENOUS at 15:48

## 2018-01-01 RX ADMIN — FELBAMATE 600 MG: 600 SUSPENSION ORAL at 21:47

## 2018-01-01 RX ADMIN — LAMOTRIGINE 100 MG: 100 TABLET ORAL at 22:12

## 2018-01-01 RX ADMIN — FELBAMATE 600 MG: 600 SUSPENSION ORAL at 22:04

## 2018-01-01 RX ADMIN — METHYLPREDNISOLONE SODIUM SUCCINATE 40 MG: 40 INJECTION, POWDER, FOR SOLUTION INTRAMUSCULAR; INTRAVENOUS at 08:14

## 2018-01-01 RX ADMIN — VANCOMYCIN HYDROCHLORIDE 1250 MG: 10 INJECTION, POWDER, LYOPHILIZED, FOR SOLUTION INTRAVENOUS at 04:44

## 2018-01-01 RX ADMIN — DEXMEDETOMIDINE HYDROCHLORIDE 0.6 MCG/KG/HR: 100 INJECTION, SOLUTION INTRAVENOUS at 07:45

## 2018-01-01 RX ADMIN — PREDNISONE 40 MG: 20 TABLET ORAL at 09:30

## 2018-01-01 RX ADMIN — ALBUTEROL SULFATE 2.5 MG: 2.5 SOLUTION RESPIRATORY (INHALATION) at 19:23

## 2018-01-01 RX ADMIN — FAMOTIDINE 20 MG: 20 TABLET, FILM COATED ORAL at 08:57

## 2018-01-01 RX ADMIN — METHYLPREDNISOLONE SODIUM SUCCINATE 40 MG: 40 INJECTION, POWDER, FOR SOLUTION INTRAMUSCULAR; INTRAVENOUS at 11:32

## 2018-01-01 RX ADMIN — METRONIDAZOLE 500 MG: 500 INJECTION, SOLUTION INTRAVENOUS at 23:38

## 2018-01-01 RX ADMIN — CEFTRIAXONE SODIUM 2 G: 2 INJECTION, POWDER, FOR SOLUTION INTRAMUSCULAR; INTRAVENOUS at 22:35

## 2018-01-01 RX ADMIN — LAMOTRIGINE 100 MG: 100 TABLET ORAL at 21:34

## 2018-01-01 RX ADMIN — DEXMEDETOMIDINE HYDROCHLORIDE 0.2 MCG/KG/HR: 100 INJECTION, SOLUTION INTRAVENOUS at 11:11

## 2018-01-01 RX ADMIN — RANITIDINE HYDROCHLORIDE 150 MG: 15 SOLUTION ORAL at 08:29

## 2018-01-01 RX ADMIN — VANCOMYCIN HYDROCHLORIDE 1250 MG: 10 INJECTION, POWDER, LYOPHILIZED, FOR SOLUTION INTRAVENOUS at 06:21

## 2018-01-01 RX ADMIN — FELBAMATE 600 MG: 600 SUSPENSION ORAL at 06:27

## 2018-01-01 RX ADMIN — LAMOTRIGINE 100 MG: 100 TABLET ORAL at 16:37

## 2018-01-01 RX ADMIN — MULTIVITAMIN 15 ML: LIQUID ORAL at 08:14

## 2018-01-01 RX ADMIN — PROPOFOL 60 MCG/KG/MIN: 10 INJECTION, EMULSION INTRAVENOUS at 08:51

## 2018-01-01 RX ADMIN — PROPOFOL 40 MCG/KG/MIN: 10 INJECTION, EMULSION INTRAVENOUS at 02:20

## 2018-01-01 RX ADMIN — HYDROMORPHONE HYDROCHLORIDE 0.5 MG: 1 INJECTION, SOLUTION INTRAMUSCULAR; INTRAVENOUS; SUBCUTANEOUS at 07:05

## 2018-01-01 RX ADMIN — MIDAZOLAM 1 MG/HR: 5 INJECTION INTRAMUSCULAR; INTRAVENOUS at 12:53

## 2018-01-01 RX ADMIN — METRONIDAZOLE 500 MG: 500 INJECTION, SOLUTION INTRAVENOUS at 00:15

## 2018-01-01 RX ADMIN — PROPOFOL 10 MCG/KG/MIN: 10 INJECTION, EMULSION INTRAVENOUS at 09:15

## 2018-01-01 RX ADMIN — Medication 100 MG: at 09:10

## 2018-01-01 RX ADMIN — GABAPENTIN 200 MG: 100 CAPSULE ORAL at 09:03

## 2018-01-01 RX ADMIN — METRONIDAZOLE 500 MG: 500 INJECTION, SOLUTION INTRAVENOUS at 12:19

## 2018-01-01 RX ADMIN — CEFTRIAXONE SODIUM 2 G: 2 INJECTION, POWDER, FOR SOLUTION INTRAMUSCULAR; INTRAVENOUS at 21:40

## 2018-01-01 RX ADMIN — AMITRIPTYLINE HYDROCHLORIDE 75 MG: 50 TABLET, FILM COATED ORAL at 22:38

## 2018-01-01 RX ADMIN — ENOXAPARIN SODIUM 40 MG: 40 INJECTION SUBCUTANEOUS at 09:10

## 2018-01-01 RX ADMIN — ALBUTEROL SULFATE 2.5 MG: 2.5 SOLUTION RESPIRATORY (INHALATION) at 07:44

## 2018-01-01 RX ADMIN — METRONIDAZOLE 500 MG: 500 INJECTION, SOLUTION INTRAVENOUS at 05:03

## 2018-01-01 RX ADMIN — LAMOTRIGINE 100 MG: 100 TABLET ORAL at 08:28

## 2018-01-01 RX ADMIN — FELBAMATE 600 MG: 600 SUSPENSION ORAL at 14:26

## 2018-01-01 RX ADMIN — ALBUTEROL SULFATE 2.5 MG: 2.5 SOLUTION RESPIRATORY (INHALATION) at 03:40

## 2018-01-01 RX ADMIN — ALBUTEROL SULFATE 2.5 MG: 2.5 SOLUTION RESPIRATORY (INHALATION) at 16:02

## 2018-01-01 RX ADMIN — ALBUTEROL SULFATE 2.5 MG: 2.5 SOLUTION RESPIRATORY (INHALATION) at 20:25

## 2018-01-01 RX ADMIN — METHYLPREDNISOLONE SODIUM SUCCINATE 40 MG: 40 INJECTION, POWDER, FOR SOLUTION INTRAMUSCULAR; INTRAVENOUS at 21:40

## 2018-01-01 RX ADMIN — RANITIDINE HYDROCHLORIDE 150 MG: 15 SOLUTION ORAL at 21:47

## 2018-01-01 RX ADMIN — POTASSIUM CHLORIDE 40 MEQ: 1.5 POWDER, FOR SOLUTION ORAL at 06:37

## 2018-01-01 RX ADMIN — IPRATROPIUM BROMIDE AND ALBUTEROL SULFATE 3 ML: .5; 3 SOLUTION RESPIRATORY (INHALATION) at 07:41

## 2018-01-01 RX ADMIN — GABAPENTIN 200 MG: 250 SOLUTION ORAL at 14:01

## 2018-01-01 RX ADMIN — ALBUTEROL SULFATE 2.5 MG: 2.5 SOLUTION RESPIRATORY (INHALATION) at 20:45

## 2018-01-01 RX ADMIN — FAMOTIDINE 20 MG: 10 INJECTION, SOLUTION INTRAVENOUS at 10:12

## 2018-01-01 RX ADMIN — ALBUTEROL SULFATE 2.5 MG: 2.5 SOLUTION RESPIRATORY (INHALATION) at 11:47

## 2018-01-01 RX ADMIN — FELBAMATE 600 MG: 600 SUSPENSION ORAL at 14:20

## 2018-01-01 RX ADMIN — GABAPENTIN 200 MG: 100 CAPSULE ORAL at 08:22

## 2018-01-01 RX ADMIN — PROPOFOL 10 MCG/KG/MIN: 10 INJECTION, EMULSION INTRAVENOUS at 06:54

## 2018-01-01 RX ADMIN — MULTIVITAMIN 15 ML: LIQUID ORAL at 08:28

## 2018-01-01 RX ADMIN — ALBUTEROL SULFATE 2.5 MG: 2.5 SOLUTION RESPIRATORY (INHALATION) at 04:03

## 2018-01-01 RX ADMIN — Medication 0.3 MG/HR: at 09:49

## 2018-01-01 RX ADMIN — ALBUTEROL SULFATE 2.5 MG: 2.5 SOLUTION RESPIRATORY (INHALATION) at 20:28

## 2018-01-01 RX ADMIN — METHYLPREDNISOLONE SODIUM SUCCINATE 40 MG: 40 INJECTION, POWDER, FOR SOLUTION INTRAMUSCULAR; INTRAVENOUS at 20:24

## 2018-01-01 RX ADMIN — RANITIDINE HYDROCHLORIDE 150 MG: 15 SOLUTION ORAL at 22:16

## 2018-01-01 RX ADMIN — ALBUTEROL SULFATE 2.5 MG: 2.5 SOLUTION RESPIRATORY (INHALATION) at 23:20

## 2018-01-01 RX ADMIN — METHYLPREDNISOLONE SODIUM SUCCINATE 40 MG: 40 INJECTION, POWDER, FOR SOLUTION INTRAMUSCULAR; INTRAVENOUS at 20:40

## 2018-01-01 RX ADMIN — THERA TABS 1 TABLET: TAB at 09:09

## 2018-01-01 RX ADMIN — RANITIDINE HYDROCHLORIDE 150 MG: 15 SOLUTION ORAL at 08:44

## 2018-01-01 RX ADMIN — FELBAMATE 600 MG: 600 SUSPENSION ORAL at 13:55

## 2018-01-01 RX ADMIN — IPRATROPIUM BROMIDE AND ALBUTEROL SULFATE 3 ML: .5; 3 SOLUTION RESPIRATORY (INHALATION) at 07:26

## 2018-01-01 RX ADMIN — LAMOTRIGINE 100 MG: 100 TABLET ORAL at 21:09

## 2018-01-01 RX ADMIN — AMITRIPTYLINE HYDROCHLORIDE 75 MG: 50 TABLET, FILM COATED ORAL at 21:02

## 2018-01-01 RX ADMIN — FELBAMATE 600 MG: 600 SUSPENSION ORAL at 14:21

## 2018-01-01 RX ADMIN — METHYLPREDNISOLONE SODIUM SUCCINATE 40 MG: 40 INJECTION, POWDER, FOR SOLUTION INTRAMUSCULAR; INTRAVENOUS at 22:06

## 2018-01-01 RX ADMIN — ALBUTEROL SULFATE 2.5 MG: 2.5 SOLUTION RESPIRATORY (INHALATION) at 23:32

## 2018-01-01 RX ADMIN — Medication 1 PACKET: at 12:09

## 2018-01-01 RX ADMIN — ALBUTEROL SULFATE 2.5 MG: 2.5 SOLUTION RESPIRATORY (INHALATION) at 03:39

## 2018-01-01 RX ADMIN — LAMOTRIGINE 100 MG: 100 TABLET ORAL at 15:49

## 2018-01-01 RX ADMIN — ENOXAPARIN SODIUM 40 MG: 40 INJECTION SUBCUTANEOUS at 08:13

## 2018-01-01 RX ADMIN — FELBAMATE 600 MG: 600 TABLET ORAL at 16:06

## 2018-01-01 RX ADMIN — BACITRACIN ZINC: 500 OINTMENT TOPICAL at 10:57

## 2018-01-01 RX ADMIN — LAMOTRIGINE 100 MG: 100 TABLET ORAL at 16:17

## 2018-01-01 RX ADMIN — FELBAMATE 600 MG: 600 TABLET ORAL at 22:40

## 2018-01-01 RX ADMIN — FELBAMATE 600 MG: 600 SUSPENSION ORAL at 06:47

## 2018-01-01 RX ADMIN — CHLORHEXIDINE GLUCONATE 15 ML: 1.2 RINSE ORAL at 20:44

## 2018-01-01 RX ADMIN — FUROSEMIDE 20 MG: 10 INJECTION, SOLUTION INTRAVENOUS at 22:29

## 2018-01-01 RX ADMIN — BACITRACIN ZINC: 500 OINTMENT TOPICAL at 21:09

## 2018-01-01 RX ADMIN — RANITIDINE HYDROCHLORIDE 150 MG: 15 SOLUTION ORAL at 08:30

## 2018-01-01 RX ADMIN — LAMOTRIGINE 100 MG: 100 TABLET ORAL at 16:52

## 2018-01-01 RX ADMIN — LAMOTRIGINE 100 MG: 100 TABLET ORAL at 09:05

## 2018-01-01 RX ADMIN — METRONIDAZOLE 500 MG: 500 INJECTION, SOLUTION INTRAVENOUS at 17:39

## 2018-01-01 RX ADMIN — ALBUTEROL SULFATE 2.5 MG: 2.5 SOLUTION RESPIRATORY (INHALATION) at 15:54

## 2018-01-01 RX ADMIN — DEXTROSE AND SODIUM CHLORIDE: 5; 900 INJECTION, SOLUTION INTRAVENOUS at 00:23

## 2018-01-01 RX ADMIN — HYDROMORPHONE HYDROCHLORIDE 0.5 MG: 1 INJECTION, SOLUTION INTRAMUSCULAR; INTRAVENOUS; SUBCUTANEOUS at 23:09

## 2018-01-01 RX ADMIN — GABAPENTIN 200 MG: 250 SOLUTION ORAL at 14:00

## 2018-01-01 RX ADMIN — CEFTRIAXONE SODIUM 2 G: 2 INJECTION, POWDER, FOR SOLUTION INTRAMUSCULAR; INTRAVENOUS at 22:32

## 2018-01-01 RX ADMIN — FELBAMATE 600 MG: 600 TABLET ORAL at 22:28

## 2018-01-01 RX ADMIN — CHLORHEXIDINE GLUCONATE 15 ML: 1.2 RINSE ORAL at 08:23

## 2018-01-01 RX ADMIN — METRONIDAZOLE 500 MG: 500 INJECTION, SOLUTION INTRAVENOUS at 06:31

## 2018-01-01 RX ADMIN — ALBUTEROL SULFATE 2.5 MG: 2.5 SOLUTION RESPIRATORY (INHALATION) at 03:42

## 2018-01-01 RX ADMIN — RANITIDINE HYDROCHLORIDE 150 MG: 15 SOLUTION ORAL at 09:30

## 2018-01-01 RX ADMIN — METRONIDAZOLE 500 MG: 500 INJECTION, SOLUTION INTRAVENOUS at 18:16

## 2018-01-01 RX ADMIN — FELBAMATE 600 MG: 600 TABLET ORAL at 15:49

## 2018-01-01 RX ADMIN — ALBUTEROL SULFATE 2.5 MG: 2.5 SOLUTION RESPIRATORY (INHALATION) at 20:20

## 2018-01-01 RX ADMIN — POLYETHYLENE GLYCOL 3350 17 G: 17 POWDER, FOR SOLUTION ORAL at 08:28

## 2018-01-01 RX ADMIN — METHYLPREDNISOLONE SODIUM SUCCINATE 40 MG: 40 INJECTION, POWDER, FOR SOLUTION INTRAMUSCULAR; INTRAVENOUS at 08:24

## 2018-01-01 RX ADMIN — FELBAMATE 600 MG: 600 SUSPENSION ORAL at 14:01

## 2018-01-01 RX ADMIN — METRONIDAZOLE 500 MG: 500 INJECTION, SOLUTION INTRAVENOUS at 17:41

## 2018-01-01 RX ADMIN — FUROSEMIDE 20 MG: 10 INJECTION, SOLUTION INTRAVENOUS at 10:05

## 2018-01-01 RX ADMIN — MULTIVITAMIN 15 ML: LIQUID ORAL at 08:30

## 2018-01-01 RX ADMIN — METRONIDAZOLE 500 MG: 500 INJECTION, SOLUTION INTRAVENOUS at 06:13

## 2018-01-01 RX ADMIN — ALBUTEROL SULFATE 2.5 MG: 2.5 SOLUTION RESPIRATORY (INHALATION) at 15:35

## 2018-01-01 RX ADMIN — ACETYLCYSTEINE 2 ML: 200 SOLUTION ORAL; RESPIRATORY (INHALATION) at 04:02

## 2018-01-01 RX ADMIN — SODIUM CHLORIDE 0.6 MCG/KG/HR: 9 INJECTION, SOLUTION INTRAVENOUS at 06:43

## 2018-01-01 RX ADMIN — IPRATROPIUM BROMIDE AND ALBUTEROL SULFATE 3 ML: .5; 3 SOLUTION RESPIRATORY (INHALATION) at 12:23

## 2018-01-01 RX ADMIN — FELBAMATE 600 MG: 600 SUSPENSION ORAL at 05:57

## 2018-01-01 RX ADMIN — Medication 1 PACKET: at 10:49

## 2018-01-01 RX ADMIN — AZITHROMYCIN MONOHYDRATE 500 MG: 500 INJECTION, POWDER, LYOPHILIZED, FOR SOLUTION INTRAVENOUS at 21:51

## 2018-01-01 RX ADMIN — ALBUTEROL SULFATE 2.5 MG: 2.5 SOLUTION RESPIRATORY (INHALATION) at 04:00

## 2018-01-01 RX ADMIN — DEXTROSE AND SODIUM CHLORIDE: 5; 900 INJECTION, SOLUTION INTRAVENOUS at 01:24

## 2018-01-01 RX ADMIN — AMITRIPTYLINE HYDROCHLORIDE 75 MG: 50 TABLET, FILM COATED ORAL at 21:17

## 2018-01-01 RX ADMIN — IPRATROPIUM BROMIDE AND ALBUTEROL SULFATE 3 ML: .5; 3 SOLUTION RESPIRATORY (INHALATION) at 16:50

## 2018-01-01 RX ADMIN — METRONIDAZOLE 500 MG: 500 INJECTION, SOLUTION INTRAVENOUS at 06:25

## 2018-01-01 RX ADMIN — MIDAZOLAM 3 MG/HR: 5 INJECTION INTRAMUSCULAR; INTRAVENOUS at 01:18

## 2018-01-01 RX ADMIN — MIDAZOLAM 2 MG: 1 INJECTION INTRAMUSCULAR; INTRAVENOUS at 07:28

## 2018-01-01 RX ADMIN — DORNASE ALFA 2.5 MG: 1 SOLUTION RESPIRATORY (INHALATION) at 21:00

## 2018-01-01 RX ADMIN — GABAPENTIN 200 MG: 250 SOLUTION ORAL at 08:32

## 2018-01-01 RX ADMIN — DEXTRAN 70, AND HYPROMELLOSE 2910 2 DROP: 1; 3 SOLUTION/ DROPS OPHTHALMIC at 04:10

## 2018-01-01 RX ADMIN — ENOXAPARIN SODIUM 40 MG: 40 INJECTION SUBCUTANEOUS at 09:04

## 2018-01-01 RX ADMIN — FELBAMATE 600 MG: 600 SUSPENSION ORAL at 18:12

## 2018-01-01 RX ADMIN — IPRATROPIUM BROMIDE AND ALBUTEROL SULFATE 3 ML: .5; 3 SOLUTION RESPIRATORY (INHALATION) at 15:40

## 2018-01-01 RX ADMIN — EPHEDRINE SULFATE 5 MG: 50 INJECTION, SOLUTION INTRAVENOUS at 07:52

## 2018-01-01 RX ADMIN — RANITIDINE HYDROCHLORIDE 150 MG: 15 SOLUTION ORAL at 21:03

## 2018-01-01 RX ADMIN — CHLORHEXIDINE GLUCONATE 15 ML: 1.2 RINSE ORAL at 21:38

## 2018-01-01 RX ADMIN — FUROSEMIDE 20 MG: 10 INJECTION, SOLUTION INTRAVENOUS at 09:57

## 2018-01-01 RX ADMIN — ALBUTEROL SULFATE 2.5 MG: 2.5 SOLUTION RESPIRATORY (INHALATION) at 04:12

## 2018-01-01 RX ADMIN — AZITHROMYCIN MONOHYDRATE 250 MG: 500 INJECTION, POWDER, LYOPHILIZED, FOR SOLUTION INTRAVENOUS at 22:50

## 2018-01-01 RX ADMIN — RANITIDINE HYDROCHLORIDE 150 MG: 15 SOLUTION ORAL at 20:39

## 2018-01-01 RX ADMIN — RANITIDINE HYDROCHLORIDE 150 MG: 15 SOLUTION ORAL at 21:24

## 2018-01-01 RX ADMIN — PROPOFOL 120 MG: 10 INJECTION, EMULSION INTRAVENOUS at 09:10

## 2018-01-01 RX ADMIN — FELBAMATE 600 MG: 600 TABLET ORAL at 09:09

## 2018-01-01 RX ADMIN — FELBAMATE 600 MG: 600 SUSPENSION ORAL at 05:25

## 2018-01-01 RX ADMIN — METHYLPREDNISOLONE SODIUM SUCCINATE 125 MG: 125 INJECTION, POWDER, FOR SOLUTION INTRAMUSCULAR; INTRAVENOUS at 21:10

## 2018-01-01 RX ADMIN — GABAPENTIN 200 MG: 250 SOLUTION ORAL at 14:41

## 2018-01-01 RX ADMIN — FELBAMATE 600 MG: 600 TABLET ORAL at 16:04

## 2018-01-01 RX ADMIN — DORNASE ALFA 2.5 MG: 1 SOLUTION RESPIRATORY (INHALATION) at 19:45

## 2018-01-01 RX ADMIN — METRONIDAZOLE 500 MG: 500 INJECTION, SOLUTION INTRAVENOUS at 17:35

## 2018-01-01 RX ADMIN — LAMOTRIGINE 100 MG: 100 TABLET ORAL at 09:03

## 2018-01-01 RX ADMIN — FELBAMATE 600 MG: 600 SUSPENSION ORAL at 22:15

## 2018-01-01 RX ADMIN — ALBUTEROL SULFATE 2.5 MG: 2.5 SOLUTION RESPIRATORY (INHALATION) at 21:14

## 2018-01-01 RX ADMIN — METRONIDAZOLE 500 MG: 500 INJECTION, SOLUTION INTRAVENOUS at 00:16

## 2018-01-01 RX ADMIN — GABAPENTIN 200 MG: 250 SOLUTION ORAL at 13:49

## 2018-01-01 RX ADMIN — ALBUTEROL SULFATE 2.5 MG: 2.5 SOLUTION RESPIRATORY (INHALATION) at 07:12

## 2018-01-01 RX ADMIN — BACITRACIN ZINC: 500 OINTMENT TOPICAL at 20:27

## 2018-01-01 RX ADMIN — Medication 1 PACKET: at 08:25

## 2018-01-01 RX ADMIN — FELBAMATE 600 MG: 600 TABLET ORAL at 09:03

## 2018-01-01 RX ADMIN — FELBAMATE 600 MG: 600 SUSPENSION ORAL at 05:36

## 2018-01-01 RX ADMIN — VANCOMYCIN HYDROCHLORIDE 1250 MG: 10 INJECTION, POWDER, LYOPHILIZED, FOR SOLUTION INTRAVENOUS at 06:10

## 2018-01-01 RX ADMIN — ALBUTEROL SULFATE 2.5 MG: 2.5 SOLUTION RESPIRATORY (INHALATION) at 19:35

## 2018-01-01 RX ADMIN — GABAPENTIN 200 MG: 250 SOLUTION ORAL at 16:07

## 2018-01-01 RX ADMIN — IPRATROPIUM BROMIDE AND ALBUTEROL SULFATE 3 ML: .5; 3 SOLUTION RESPIRATORY (INHALATION) at 19:51

## 2018-01-01 RX ADMIN — METRONIDAZOLE 500 MG: 500 INJECTION, SOLUTION INTRAVENOUS at 13:54

## 2018-01-01 RX ADMIN — ENOXAPARIN SODIUM 40 MG: 40 INJECTION SUBCUTANEOUS at 08:42

## 2018-01-01 RX ADMIN — IPRATROPIUM BROMIDE AND ALBUTEROL SULFATE 3 ML: .5; 3 SOLUTION RESPIRATORY (INHALATION) at 11:45

## 2018-01-01 RX ADMIN — DEXTROSE AND SODIUM CHLORIDE: 5; 900 INJECTION, SOLUTION INTRAVENOUS at 11:41

## 2018-01-01 RX ADMIN — DEXAMETHASONE SODIUM PHOSPHATE 4 MG: 4 INJECTION, SOLUTION INTRA-ARTICULAR; INTRALESIONAL; INTRAMUSCULAR; INTRAVENOUS; SOFT TISSUE at 07:32

## 2018-01-01 RX ADMIN — CEFEPIME HYDROCHLORIDE 1 G: 1 INJECTION, POWDER, FOR SOLUTION INTRAMUSCULAR; INTRAVENOUS at 04:16

## 2018-01-01 RX ADMIN — PROPOFOL 30 MCG/KG/MIN: 10 INJECTION, EMULSION INTRAVENOUS at 10:42

## 2018-01-01 RX ADMIN — LORAZEPAM 1 MG: 2 INJECTION INTRAMUSCULAR; INTRAVENOUS at 15:52

## 2018-01-01 RX ADMIN — ONDANSETRON 4 MG: 4 TABLET, ORALLY DISINTEGRATING ORAL at 06:46

## 2018-01-01 RX ADMIN — IPRATROPIUM BROMIDE AND ALBUTEROL SULFATE 3 ML: .5; 3 SOLUTION RESPIRATORY (INHALATION) at 19:43

## 2018-01-01 RX ADMIN — PROPOFOL 35 MCG/KG/MIN: 10 INJECTION, EMULSION INTRAVENOUS at 01:17

## 2018-01-01 RX ADMIN — VANCOMYCIN HYDROCHLORIDE 1250 MG: 10 INJECTION, POWDER, LYOPHILIZED, FOR SOLUTION INTRAVENOUS at 06:02

## 2018-01-01 RX ADMIN — METHYLPREDNISOLONE SODIUM SUCCINATE 40 MG: 40 INJECTION, POWDER, FOR SOLUTION INTRAMUSCULAR; INTRAVENOUS at 08:57

## 2018-01-01 RX ADMIN — METRONIDAZOLE 500 MG: 500 INJECTION, SOLUTION INTRAVENOUS at 01:03

## 2018-01-01 RX ADMIN — ALBUTEROL SULFATE 2.5 MG: 2.5 SOLUTION RESPIRATORY (INHALATION) at 23:37

## 2018-01-01 RX ADMIN — FELBAMATE 600 MG: 600 SUSPENSION ORAL at 14:29

## 2018-01-01 RX ADMIN — AMITRIPTYLINE HYDROCHLORIDE 75 MG: 50 TABLET, FILM COATED ORAL at 21:34

## 2018-01-01 RX ADMIN — FENTANYL CITRATE 100 MCG: 50 INJECTION, SOLUTION INTRAMUSCULAR; INTRAVENOUS at 07:32

## 2018-01-01 RX ADMIN — DEXTRAN 70, AND HYPROMELLOSE 2910 2 DROP: 1; 3 SOLUTION/ DROPS OPHTHALMIC at 05:05

## 2018-01-01 RX ADMIN — IPRATROPIUM BROMIDE AND ALBUTEROL SULFATE 3 ML: .5; 3 SOLUTION RESPIRATORY (INHALATION) at 19:33

## 2018-01-01 RX ADMIN — ACETYLCYSTEINE 2 ML: 200 SOLUTION ORAL; RESPIRATORY (INHALATION) at 23:59

## 2018-01-01 RX ADMIN — PROPOFOL 30 MCG/KG/MIN: 10 INJECTION, EMULSION INTRAVENOUS at 20:04

## 2018-01-01 RX ADMIN — ALBUTEROL SULFATE 2.5 MG: 2.5 SOLUTION RESPIRATORY (INHALATION) at 03:29

## 2018-01-01 RX ADMIN — METHYLPREDNISOLONE SODIUM SUCCINATE 40 MG: 40 INJECTION, POWDER, FOR SOLUTION INTRAMUSCULAR; INTRAVENOUS at 09:27

## 2018-01-01 RX ADMIN — CHLORHEXIDINE GLUCONATE 15 ML: 1.2 RINSE ORAL at 21:18

## 2018-01-01 RX ADMIN — AMITRIPTYLINE HYDROCHLORIDE 75 MG: 25 TABLET, FILM COATED ORAL at 22:07

## 2018-01-01 RX ADMIN — FELBAMATE 600 MG: 600 TABLET ORAL at 22:17

## 2018-01-01 RX ADMIN — GABAPENTIN 200 MG: 250 SOLUTION ORAL at 09:32

## 2018-01-01 RX ADMIN — RANITIDINE HYDROCHLORIDE 150 MG: 15 SOLUTION ORAL at 09:33

## 2018-01-01 RX ADMIN — FELBAMATE 600 MG: 600 TABLET ORAL at 10:30

## 2018-01-01 RX ADMIN — AMITRIPTYLINE HYDROCHLORIDE 75 MG: 25 TABLET, FILM COATED ORAL at 22:12

## 2018-01-01 RX ADMIN — CHLORHEXIDINE GLUCONATE 15 ML: 1.2 RINSE ORAL at 21:42

## 2018-01-01 RX ADMIN — AZITHROMYCIN MONOHYDRATE 250 MG: 500 INJECTION, POWDER, LYOPHILIZED, FOR SOLUTION INTRAVENOUS at 22:28

## 2018-01-01 RX ADMIN — CEFTRIAXONE SODIUM 2 G: 2 INJECTION, POWDER, FOR SOLUTION INTRAMUSCULAR; INTRAVENOUS at 22:12

## 2018-01-01 RX ADMIN — RANITIDINE HYDROCHLORIDE 150 MG: 15 SOLUTION ORAL at 20:27

## 2018-01-01 RX ADMIN — FUROSEMIDE 20 MG: 10 INJECTION, SOLUTION INTRAVENOUS at 22:35

## 2018-01-01 RX ADMIN — DEXTROSE AND SODIUM CHLORIDE: 5; 900 INJECTION, SOLUTION INTRAVENOUS at 15:21

## 2018-01-01 RX ADMIN — DORNASE ALFA 2.5 MG: 1 SOLUTION RESPIRATORY (INHALATION) at 20:35

## 2018-01-01 RX ADMIN — FELBAMATE 600 MG: 600 TABLET ORAL at 15:26

## 2018-01-01 RX ADMIN — PROPOFOL 45 MCG/KG/MIN: 10 INJECTION, EMULSION INTRAVENOUS at 22:10

## 2018-01-01 RX ADMIN — ALBUTEROL SULFATE 2.5 MG: 2.5 SOLUTION RESPIRATORY (INHALATION) at 16:15

## 2018-01-01 RX ADMIN — RANITIDINE HYDROCHLORIDE 150 MG: 15 SOLUTION ORAL at 09:24

## 2018-01-01 RX ADMIN — ALBUTEROL SULFATE 2.5 MG: 2.5 SOLUTION RESPIRATORY (INHALATION) at 23:15

## 2018-01-01 RX ADMIN — DORNASE ALFA 2.5 MG: 1 SOLUTION RESPIRATORY (INHALATION) at 20:18

## 2018-01-01 RX ADMIN — LAMOTRIGINE 100 MG: 100 TABLET ORAL at 21:43

## 2018-01-01 RX ADMIN — ALBUTEROL SULFATE 2.5 MG: 2.5 SOLUTION RESPIRATORY (INHALATION) at 15:38

## 2018-01-01 RX ADMIN — FUROSEMIDE 20 MG: 10 INJECTION, SOLUTION INTRAVENOUS at 10:44

## 2018-01-01 RX ADMIN — PROPOFOL 15 MCG/KG/MIN: 10 INJECTION, EMULSION INTRAVENOUS at 05:12

## 2018-01-01 RX ADMIN — ENOXAPARIN SODIUM 40 MG: 40 INJECTION SUBCUTANEOUS at 10:49

## 2018-01-01 RX ADMIN — ALBUTEROL SULFATE 2.5 MG: 2.5 SOLUTION RESPIRATORY (INHALATION) at 07:17

## 2018-01-01 RX ADMIN — ALBUTEROL SULFATE 2.5 MG: 2.5 SOLUTION RESPIRATORY (INHALATION) at 16:14

## 2018-01-01 RX ADMIN — PROPOFOL 40 MCG/KG/MIN: 10 INJECTION, EMULSION INTRAVENOUS at 04:09

## 2018-01-01 RX ADMIN — ALBUTEROL SULFATE 2.5 MG: 2.5 SOLUTION RESPIRATORY (INHALATION) at 11:21

## 2018-01-01 RX ADMIN — CHLORHEXIDINE GLUCONATE 15 ML: 1.2 RINSE ORAL at 08:42

## 2018-01-01 RX ADMIN — FELBAMATE 600 MG: 600 SUSPENSION ORAL at 06:49

## 2018-01-01 RX ADMIN — PROPOFOL 75 MCG/KG/MIN: 10 INJECTION, EMULSION INTRAVENOUS at 12:16

## 2018-01-01 RX ADMIN — CEFTRIAXONE SODIUM 2 G: 2 INJECTION, POWDER, FOR SOLUTION INTRAMUSCULAR; INTRAVENOUS at 21:38

## 2018-01-01 RX ADMIN — RANITIDINE HYDROCHLORIDE 150 MG: 15 SOLUTION ORAL at 20:46

## 2018-01-01 RX ADMIN — LAMOTRIGINE 100 MG: 100 TABLET ORAL at 22:39

## 2018-01-01 RX ADMIN — FELBAMATE 600 MG: 600 SUSPENSION ORAL at 14:00

## 2018-01-01 RX ADMIN — IPRATROPIUM BROMIDE AND ALBUTEROL SULFATE 3 ML: .5; 3 SOLUTION RESPIRATORY (INHALATION) at 15:21

## 2018-01-01 ASSESSMENT — ACTIVITIES OF DAILY LIVING (ADL)
ADLS_ACUITY_SCORE: 24
ADLS_ACUITY_SCORE: 24
ADLS_ACUITY_SCORE: 20
ADLS_ACUITY_SCORE: 24
ADLS_ACUITY_SCORE: 20
ADLS_ACUITY_SCORE: 24
ADLS_ACUITY_SCORE: 20
ADLS_ACUITY_SCORE: 22
ADLS_ACUITY_SCORE: 24
ADLS_ACUITY_SCORE: 22
ADLS_ACUITY_SCORE: 22
ADLS_ACUITY_SCORE: 24
ADLS_ACUITY_SCORE: 22
ADLS_ACUITY_SCORE: 22
ADLS_ACUITY_SCORE: 24
ADLS_ACUITY_SCORE: 24
ADLS_ACUITY_SCORE: 22
ADLS_ACUITY_SCORE: 22
ADLS_ACUITY_SCORE: 24
ADLS_ACUITY_SCORE: 24

## 2018-01-01 ASSESSMENT — ENCOUNTER SYMPTOMS
COUGH: 1
CONFUSION: 1
DIARRHEA: 1
DYSRHYTHMIAS: 0
ARTHRALGIAS: 1
APPETITE CHANGE: 1
ACTIVITY CHANGE: 1
SEIZURES: 1

## 2018-01-01 ASSESSMENT — COPD QUESTIONNAIRES
COPD: 1
CAT_SEVERITY: MODERATE

## 2018-01-01 ASSESSMENT — PAIN DESCRIPTION - DESCRIPTORS
DESCRIPTORS: ACHING
DESCRIPTORS: ACHING

## 2018-01-01 ASSESSMENT — LIFESTYLE VARIABLES: TOBACCO_USE: 1

## 2018-01-13 DIAGNOSIS — G89.4 CHRONIC PAIN SYNDROME: Primary | ICD-10-CM

## 2018-01-13 RX ORDER — OXYCODONE HCL 80 MG/1
80 TABLET, FILM COATED, EXTENDED RELEASE ORAL EVERY 8 HOURS
Qty: 10 TABLET | Refills: 0 | Status: SHIPPED | OUTPATIENT
Start: 2018-01-13 | End: 2018-01-16

## 2018-01-13 RX ORDER — HYDROMORPHONE HYDROCHLORIDE 8 MG/1
8 TABLET ORAL EVERY 6 HOURS PRN
Qty: 16 TABLET | Refills: 0 | Status: SHIPPED | OUTPATIENT
Start: 2018-01-13 | End: 2018-01-17

## 2018-01-16 NOTE — TELEPHONE ENCOUNTER
This was entered in error.  Pt was undergoing withdrawal from low flow of IT medication, but orders did not print in department.  Hand written prescriptions given  Dr. Farooq

## 2018-02-28 ENCOUNTER — DOCUMENTATION ONLY (OUTPATIENT)
Dept: ANESTHESIOLOGY | Facility: CLINIC | Age: 68
End: 2018-02-28

## 2018-02-28 NOTE — PROGRESS NOTES
This note is to document an encounter that occurred on 1/15/2018.  The pt underwent an intrathecal pump medication refill that began at 1730 and was finished at 1800.  The pt had received an allarm of low reservoir volume.  Medication was ordered from the compounding pharmacy.  The pts abdomen was prepped in a sterile fashion with povidone iodine solution.  Local anesthesia was instilled in the skin above the injection port location.  A 22 g non coring needle was introduced into the reservoir.  Remaining solution was removed and the new solution was injected slowly until a volume of 20 cc was instilled.  The needle reomed and the pump was programmed with the new reservoir volume.  Pt tolerated the procedure well.  Dr. Farooq

## 2018-06-22 NOTE — IP AVS SNAPSHOT
"` `           [place] [address] MEDICAL SURGICAL: [phone]                                              INTERAGENCY TRANSFER FORM - NURSING   2018                    Hospital Admission Date: 2018  CRYSTAL PIERCE   : 1950  Sex: Female        Attending Provider: Kermit Montanez III, MD     Allergies:  Nicotine    Infection:  None   Service:  HOSPITALIST    Ht:  1.6 m (5' 3\")   Wt:  58.3 kg (128 lb 9.6 oz)   Admission Wt:  55.5 kg (122 lb 5.7 oz)    BMI:  22.78 kg/m 2   BSA:  1.61 m 2            Patient PCP Information     Provider PCP Type    BERNARDA WAITE MD General      Current Code Status     Date Active Code Status Order ID Comments User Context       Prior      Code Status History     Date Active Date Inactive Code Status Order ID Comments User Context    2018 10:50 AM  Full Code [id number]  Usman Ceron MD Outpatient    2018 12:08 AM 2018 10:50 AM Full Code [id number]  Kermit Montanez III, MD Inpatient    10/20/2017  2:17 PM 2018 12:08 AM Full Code [id number]  Elijah Liriano MD Outpatient    10/19/2017  8:04 PM 10/20/2017  2:17 PM Full Code [id number]  Elijah Gu MD Inpatient    2017 10:36 AM 10/19/2017  8:04 PM Full Code [id number]  Jaycee Rodney PA-C Outpatient    2017 11:48 PM 2017 10:36 AM Full Code [id number]  Sandi Wong MD Inpatient      Advance Directives        Scanned docmt in ACP Activity?           No scanned doc        Hospital Problems as of 2018              Priority Class Noted POA    Bacterial pneumonia Medium  2018 Yes    Acute and chronic respiratory failure with hypoxia (H) Medium  2018 Unknown      Non-Hospital Problems as of 2018              Priority Class Noted    Foot joint pain Medium  2008    Seizure (H) Medium  2017    Seizures (H) Medium  10/19/2017      Immunizations     Name Date      Influenza (High Dose) 3 valent vaccine 10/20/17     Pneumococcal " 23 valent 10/20/17          END      ASSESSMENT     Discharge Profile Flowsheet     EXPECTED DISCHARGE     SKIN      Expected Discharge Date  -- (TBD> Home, ) 06/25/18 1041   Inspection of bony prominences  Full except (identify areas not inspected) 07/09/18 1319    DISCHARGE NEEDS ASSESSMENT     Full except areas not inspected   Hip, left;Hip, right;Buttock, right;Buttock, left;Sacrum;Coccyx 07/09/18 1319    Concerns Comments   frustrated at not knowing a plan going forward- Mtg w/ Palliative today 07/03/18 1134   Inspection under devices  Full 07/09/18 1319    Patient/family verbalizes understanding of discharge plan recommendations?  Yes 07/04/18 1531   Not Inspected under devices  ETT and stabilizer;NG/NJ tube (bridle) 07/02/18 1659    Equipment Currently Used at Home  oxygen 07/04/18 1531   Skin WDL  ex;characteristics 07/09/18 1319    Transportation Available  family or friend will provide;van, wheelchair accessible 07/04/18 1531   Skin Color/Characteristics  bruised (ecchymotic) 07/09/18 1319    Current Discharge Risk  cognitively impaired 07/03/18 1134   Skin Temperature  warm 07/09/18 1319    GASTROINTESTINAL (ADULT,PEDIATRIC,OB)     Skin Moisture  dry 07/09/18 1319    GI WDL  ex;appearance/characteristics 07/09/18 1311   Skin Elasticity  quick return to original state 07/09/18 1319    Abdominal Appearance  rounded 07/09/18 1311   Skin Integrity  bruise(s) 07/09/18 1319    All Quadrants Bowel Sounds  audible and active in all quadrants 07/09/18 1311   SAFETY      Last Bowel Movement  07/09/18 07/09/18 1311   Safety WDL  WDL 07/09/18 1319    GI Signs/Symptoms  fecal incontinence;diarrhea 07/09/18 1311   Safety Factors  bed in low position;wheels locked;call light in reach;upper side rails raised x 2;ID band on 07/09/18 1319    Incontinence Containment Product  incontinence brief;applied 07/09/18 1311   Aspiration Risk Screen  tube feeding 07/09/18 1319    Passing flatus  yes 07/09/18 1311    "Airway Safety Measures  all equipment/monitors on and audible 07/09/18 1319    COMMUNICATION ASSESSMENT     Safety Equipment  oxygen flowmeter 07/09/18 1319    Patient's communication style  spoken language (English or Bilingual) 06/22/18 2023   All Alarms  alarm(s) activated and audible 07/09/18 1319                 Assessment WDL (Within Defined Limits) Definitions           Safety WDL     Effective: 09/28/15    Row Information: <b>WDL Definition:</b> Bed in low position, wheels locked; call light in reach; upper side rails up x 2; ID band on<br> <font color=\"gray\"><i>Item=AS safety wdl>>List=AS safety wdl>>Version=F14</i></font>      Skin WDL     Effective: 09/28/15    Row Information: <b>WDL Definition:</b> Warm; dry; intact; elastic; without discoloration; pressure points without redness<br> <font color=\"gray\"><i>Item=AS skin wdl>>List=AS skin wdl>>Version=F14</i></font>      Vitals     Vital Signs Flowsheet     COMMENTS     Vocalization (extubated patients)  0 07/05/18 0535    Comments  transfer from icu 07/05/18 1451   Muscle Tension  0 07/05/18 0535    VITAL SIGNS     Total  0 07/05/18 0535    Temp  98.9  F (37.2  C) 07/09/18 1033   ANALGESIA SIDE EFFECTS MONITORING      Temp src  Oral 07/09/18 1033   Side Effects Monitoring: Respiratory Quality  R 07/07/18 0802    Resp  16 07/09/18 1033   Side Effects Monitoring: Respiratory Depth  N 07/07/18 0802    Pulse  81 07/08/18 0013   Side Effects Monitoring: Sedation Level  1 07/07/18 0802    Heart Rate  98 07/09/18 1033   HEIGHT AND WEIGHT      Pulse/Heart Rate Source  Monitor 07/09/18 1033   Height  1.6 m (5' 3\") 06/23/18 0144    BP  126/64 07/09/18 1033   Height Method  Stated 06/23/18 0144    BP Location  Left arm 07/09/18 1033   Weight  58.3 kg (128 lb 9.6 oz) 07/09/18 0601    OXYGEN THERAPY     Weight Method  Bed scale 07/09/18 0601    SpO2  92 % 07/09/18 [facility]   Bed Scale  Standard (fitted sheet, draw sheet/ pad, cover/flat sheet, blanket, two pillows) " 07/09/18 0601    O2 Device  Oxymizer cannula 07/09/18 1257   BSA (Calculated - sq m)  1.57 06/23/18 0320    FiO2 (%)  60 % 07/09/18 0743   BMI (Calculated)  21.72 06/23/18 0320    Oxygen Delivery  12 LPM 07/09/18 1257   POSITIONING      Suction Occurrance  1 06/30/18 1138   Body Position  independently positioning 07/09/18 1319    PAIN/COMFORT     Head of Bed (HOB)  HOB at 30-45 degrees 07/09/18 1319    Patient Currently in Pain  yes 07/09/18 1258   Positioning/Transfer Devices  pillows 07/09/18 1319    Preferred Pain Scale  number (Numeric Rating Pain Scale) 07/09/18 1258   Chair  Upright in chair 07/05/18 2343    Patient's Stated Pain Goal  No pain 07/09/18 1258   DAILY CARE      0-10 Pain Scale  6 07/09/18 1258   Activity Management  activity adjusted per tolerance 07/09/18 1319    Word Pain Scale  4 07/08/18 0749   Activity Assistance Provided  assistance, 2 people 07/09/18 1319    Pain Location  Abdomen 07/09/18 1258   Assistive Device Utilized  mechanical lift 07/09/18 1319    Pain Descriptors  Aching 07/09/18 1258   ECG      Pain Intervention(s)  Repositioned;Declines 07/09/18 1258   ECG Rhythm  Sinus rhythm 07/05/18 0803    Response to Interventions  Decrease in pain 07/09/18 1258   Ectopy  None 07/05/18 0803    CRITICAL-CARE PAIN OBSERVATION TOOL (CPOT)     Lead Monitored  Lead II 07/05/18 0803    Facial Expression  0 07/05/18 0535   POINT OF CARE TESTING      Body Movements  0 07/05/18 0535   Puncture Site  fingertip 07/06/18 0415    Compliance w/ventilator (intubated patients)  Extubated 07/05/18 0535   Bedside Glucose (mg/dl )   96 mg/dl 07/06/18 0415            Patient Lines/Drains/Airways Status    Active LINES/DRAINS/AIRWAYS     Name: Placement date: Placement time: Site: Days: Last dressing change:    NG/OG Tube Nasogastric 10 fr Right nostril 07/01/18   1600   Right nostril   7     Urethral Catheter 16 fr 07/01/18   0600      8     Peripheral IV 07/22/17 Right Upper forearm 07/22/17      Upper  forearm   352     Peripheral IV 07/23/17 Left Upper forearm 07/23/17   0210   Upper forearm   351     Wound 07/22/17 Anterior;Upper Forehead Laceration was cleaned and sutured with 3 staples in ED after assessing 07/22/17   2100   Forehead   351     Incision/Surgical Site 11/29/11 Left;Lower Abdomen 11/29/11       2414     Incision/Surgical Site 10/19/17 Right Toe (Comment  which one) 10/19/17   1404    262             Patient Lines/Drains/Airways Status    Active PICC/CVC     Name: Placement date: Placement time: Site: Days: Additional Info Last dressing change:    PICC Double Lumen 06/23/18 Right Basilic 06/23/18   0800   Basilic   16 Internal Lumen Volume (mL): 0.6 mL   07/06/18 0805 (77.72 hrs)         External Cath Length (cm): 2.5 cm            Size (Fr): 5 Fr            Orientation: Right            Extremity Circumference (cm): 22 cm            Catheter Brand: TV Compass power picc            Dressing Intervention: Chlorhexidine patch;Transparent;Securing device;New dressing            Description: Valved;Power PICC            Total Catheter Length (cm) Trimmed: 37 cm            Site Prep: Chlorhexidine            Local Anesthetic: Injectable            Inserted by: Buddy Cody RN            Insertion attempts with ultrasound: 1            Patient Tolerance: Tolerated well            Placement Verification: Other (Comment)            Difficulty with threading line: No            Tip location: SVC/RA Junction            Full barrier precautions done: Yes            Consent Signed: Yes            Time Out performed: Yes            Lot #: [id number]            Use for : Possible Pressors               Intake/Output Detail Report     Date Intake         Output     Net    Shift P.O. I.V. NG/GT IV Piggyback Enteral Total Urine Emesis/NG output Stool Total       Noc 07/07/18 2300 - 07/08/18 0659 -- -- -- -- -- -- 2350 -- -- 2350 -2350    Day 07/08/18 0700 - 07/08/18 1459 -- 20 -- -- -- 20 650 -- -- 650 -630    Shelley  07/08/18 1500 - 07/08/18 2259 -- -- -- -- -- -- -- 600 -- 600 -600    Noc 07/08/18 2300 - 07/09/18 0659 -- -- -- -- -- -- 700 -- -- 700 -700    Day 07/09/18 0700 - 07/09/18 1459 360 -- 255 -- -- 615 500 -- -- 500 115      Last Void/BM       Most Recent Value    Urine Occurrence 1 at 07/09/2018 0900    Stool Occurrence 1 at 07/09/2018 0900      Case Management/Discharge Planning     Case Management/Discharge Planning Flowsheet     REFERRAL INFORMATION     EXPECTED DISCHARGE      Did the Initial Social Work Assessment result in a Social Work Case?  Yes 07/04/18 1531   Expected Discharge Date  -- (TBD> Home, ) 06/25/18 1041    Admission Type  inpatient 07/04/18 1531   ASSESSMENT/CONCERNS TO BE ADDRESSED      Arrived From  home or self-care 07/04/18 1531   Concerns Comments   frustrated at not knowing a plan going forward- Mtg w/ Palliative today 07/03/18 1134    Referral Source  physician 07/04/18 1531   DISCHARGE PLANNING      Reason For Consult  discharge planning 07/04/18 1531   Patient/family verbalizes understanding of discharge plan recommendations?  Yes 07/04/18 1531    Record Reviewed  clinical discipline documentation;history and physical;medical record;patient profile;plan of care 07/04/18 1531   Transportation Available  family or friend will provide;van, wheelchair accessible 07/04/18 1531    CTS Assigned to Case  -- (Gretel EDEN) 07/04/18 1531   Current Discharge Risk  cognitively impaired 07/03/18 1134    LIVING ENVIRONMENT     FINAL RESOURCES      Lives With  spouse 07/04/18 1531   Equipment Currently Used at Home  oxygen 07/04/18 1531    Living Arrangements  house 07/04/18 1531   ABUSE RISK SCREEN      Quality Of Family Relationships  supportive;helpful;involved 07/04/18 1531   QUESTION TO PATIENT:  Has a member of your family or a partner(now or in the past) intimidated, hurt, manipulated, or controlled you in any way?  patient declined to answer or is unable to answer 06/23/18  0044    ASSESSMENT OF FAMILY/SOCIAL SUPPORT     QUESTION TO PATIENT: Do you feel safe going back to the place where you are living?  patient declined to answer or is unable to answer 06/23/18 0044    Marital Status   07/04/18 1531   OTHER      COPING/STRESS     Are you depressed or being treated for depression?  No 06/23/18 1558    Major Change/Loss/Stressor  none 06/23/18 0051

## 2018-06-22 NOTE — IP AVS SNAPSHOT
` `     [place] [address] MEDICAL SURGICAL: [phone]                 INTERAGENCY TRANSFER FORM - NOTES (H&P, Discharge Summary, Consults, Procedures, Therapies)   2018                    Hospital Admission Date: 2018  SHANA HORNE   : 1950  Sex: Female        Patient PCP Information     Provider PCP Type    BERNARDA WAITE MD General         History & Physicals      H&P by Kermit Montanez III, MD at 2018 10:50 PM     Author:  Kermit Montanez III, MD Service:  Hospitalist Author Type:  Physician    Filed:  2018 11:00 PM Date of Service:  2018 10:50 PM Creation Time:  2018 10:49 PM    Status:  Signed :  Kermit Montanez III, MD (Physician)         [facility]    History and Physical  Hospitalist       Date of Admission:  2018[GH1.1]    Assessment & Plan[GH1.2]   Shana Horne is a 67 year old female with a past medical history of dementia, COPD, chronic hypoxic respiratory failure on 4 L, significant seizure disorder, chronic diarrhea, balance impairment and RSD and she presents to the emergency department today with family for evaluation of worsening mental status.    She was found to have hypoxia and a very significant left-sided infiltrate.  She was started on ceftriaxone and azithromycin as well as BiPAP, underwent trauma evaluation and was admitted to the ICU.    Left middle and lower lobe bacterial pneumonia, community-acquired  Acute on chronic hypoxic respiratory failure (4L by NC at baseline)  Sepsis  -Infiltrate is significant but at the moment she is hemodynamically stable  -Admit to ICU  -Continue BiPAP for now, wean as tolerated  -Continue IV fluids  -Urinalysis pending, check urine strep antigen as well    COPD exacerbation  -Given Solu-Medrol in the emergency department  -Continue Solu-Medrol 40 mg every 12, transition to prednisone when able  Frequent neb treatments  -Still smokes 3/4 of a pack a day  according to family, did not get into how she aquires this    Recurrent falls  -Vision is chronic imbalance and falls frequently at home  -CT imaging of her head, as well as x-ray of her femur and pelvis are pending    Seizure disorder  -Significant epilepsy, patient has many recurrent seizures  -Takes Felbamate, Lamictal, Gabapentin at home, do not have IV equivalents  -Resume her oral medications as soon as possible, if extended BiPAP and is not able to take orals it may be reasonable to load her with Keppra prophylactically    RSD/chronic pain syndrome  -She has an implantable morphine pump, no evidence of infection or malfunction    Diarrhea  -Diarrhea is chronic, no blood and no apparent variance from her baseline  -Not see an indication here for C. difficile testing unless this worsens or becomes characteristic        # Pain Assessment:[GH1.1]  Current Pain Score 10/20/2017   Patient currently in pain? -   Pain score (0-10) 7   Pain location -   Pain descriptors -[GH1.2]   Shana izaguirre pain level was assessed and she currently denies pain.       DVT Prophylaxis: Enoxaparin (Lovenox) SQ  Code Status: Full Code, confirmed with family    Disposition: Expected discharge pending clinical improvement. Based on CXR may have longer stay[GH1.1]    Kermit Montanez III[GH1.2], MD[GH1.1]    Primary Care Physician   BERNARDA WAITE    Chief Complaint[GH1.2]   Worsening mental status    History is obtained from the patient's daughter and patient's significant other, ED physician and review of records.[GH1.1]    History of Present Illness[GH1.2]   Shana Horne is a 67 year old female with a past medical history of dementia, COPD, chronic hypoxic respiratory failure on 4 L, significant seizure disorder, chronic diarrhea, balance impairment and RSD and she presents to the emergency department today with family for evaluation of worsening mental status.    According to family, the patient's mental status is never very good.  " She has a lot of balance difficulty and a severe seizure disorder.  She has RSD for which she has an implanted morphine pump.  However they feel she has been a little off for the last 2 weeks and then for the last 2-3 days dramatically worse.  She has been falling more, though they deny head injuries or loss of consciousness.  Her ambulation has declined and her oral intake has been.  Earlier she had diarrhea \"all over the bathroom\".  The patient has said that she needs to go to the hospital but has not been able to define any specific symptoms to her family.  They do think she has had some productive cough for the last day or so.    In the emergency department she was hypoxic to the mid 80s and tachycardic to the 110s.  Initial blood work is significant for WBC count of 18.9, mildly elevated proBNP.  Chest x-ray shows significant left-sided infiltrate occupying approximately two thirds of her left lung.    Patient is not able to provide me any meaningful history at this time.[GH1.1]  Past Medical History[GH1.2]    I have reviewed this patient's medical history and updated it with pertinent information if needed.[GH1.1]   Past Medical History:   Diagnosis Date     Arthritis      Blood transfusion     38 yrs ago     Chronic pain     painful feet     COPD (chronic obstructive pulmonary disease) (H)      Dementia      Gastroesophageal reflux disease      History of blood transfusion      Numbness and tingling     fingertips occas     Other chronic pain     generalized     Oxygen dependent     4l nc cont     Parkinsons disease (H)      Seizures (H)     LAST SEIZURE 2 MOS AGO     Stimulus-induced repetitive discharges on nerve conduction studies[GH1.3]        Past Surgical History[GH1.2]   I have reviewed this patient's surgical history and updated it with pertinent information if needed.[GH1.1]  Past Surgical History:   Procedure Laterality Date     AMPUTATE TOE(S) Right 10/19/2017    Procedure: AMPUTATE TOE(S);  Right " second toe amputation at metatarsophalangeal joint.;  Surgeon: Montrell Arce MD;  Location:  OR     COLONOSCOPY  11/20/2014    Dr. Schaeffer [facility]     COLONOSCOPY N/A 11/20/2014    Procedure: COLONOSCOPY;  Surgeon: Steven Schaeffer MD;  Location:  GI     GYN SURGERY      tear repair after delivery child     INSERT PUMP MORPHINE      pump replacement x2     MOUTH SURGERY      numerous times     ORTHOPEDIC SURGERY      foot surg left     REVISE PUMP MORPHINE  11/29/2011    Procedure:REVISE PUMP MORPHINE; Pump Replacement, abdominal area; Surgeon:ALIRIO RIVERA; Location: OR[GH1.3]       Prior to Admission Medications   Prior to Admission Medications   Prescriptions Last Dose Informant Patient Reported? Taking?   FERROUS GLUCONATE PO   Yes No   Sig: Take 324 mg by mouth 2 times daily (with meals)   Felbamate (FELBATOL PO)   Yes No   Sig: Take 600 mg by mouth 3 times daily    GABAPENTIN PO   Yes No   Sig: Take 200 mg by mouth 2 times daily Am and Noon   LamoTRIgine (LAMICTAL) 100 MG tablet   Yes No   Sig: Take 100 mg by mouth 3 times daily.   Multiple Vitamin (MULTI-VITAMIN) per tablet   Yes No   Sig: Take 1 tablet by mouth daily.   acetaminophen (TYLENOL) 325 MG tablet   No No   Sig: Take 2 tablets (650 mg) by mouth every 4 hours as needed for other (mild pain)   amitriptyline (ELAVIL) 75 MG tablet   Yes No   Sig: Take 75 mg by mouth At Bedtime.   cloNIDine (CATAPRES-TTS3) 0.3 MG/24HR WK patch   No No   Sig: Place 1 patch onto the skin once a week for 7 days   simvastatin (ZOCOR) 40 MG tablet   Yes No   Sig: Take 40 mg by mouth At Bedtime       Facility-Administered Medications: None     Allergies   No Known Allergies    Social History[GH1.2]   I have reviewed this patient's social history and updated it with pertinent information if needed. Shana Horne[GH1.1]  reports that she has been smoking.  She has been smoking about 0.50 packs per day. She has never used smokeless tobacco. She reports  that she does not drink alcohol or use illicit drugs.[GH1.3]    Family History[GH1.2]   I have reviewed this patient's family history and updated it with pertinent information if needed.[GH1.1]   No family history on file.[GH1.3]    Review of Systems[GH1.2]   The 10 point Review of Systems is negative other than noted in the HPI or here.[GH1.1]     Physical Exam   Temp: 98.4  F (36.9  C) Temp src: Oral BP: 119/76   Heart Rate: 108 Resp: 18 SpO2: 93 % O2 Device: BiPAP/CPAP[GH1.2]    Vital Signs with Ranges[GH1.1]  Temp:  [98.4  F (36.9  C)] 98.4  F (36.9  C)  Heart Rate:  [] 108  Resp:  [14-35] 18  BP: ()/(54-87) 119/76  FiO2 (%):  [100 %] 100 %  SpO2:  [81 %-95 %] 93 %  0 lbs 0 oz[GH1.2]    Constitutional: NAD. Frail. Wearing BIPAP.  Eyes: Anicteric, no conjunctival injection  ENT: Atraumatic, membranes moist. In soft cervical collar  Neck: Supple, trachea midline  Respiratory: On BIPAP, having difficulty keeping mask in place. No wheeze noted  Cardiovascular: S1S2, no edema  GI: Abdomen soft, non-tender  Skin: Warm, pink, dry  Neurologic: Awake and alert. CN II - XII grossly intact.[GH1.1]    Data[GH1.2]   Data reviewed today:  I personally reviewed no images or EKG's today.[GH1.1]    Recent Labs  Lab 06/22/18 2055 06/22/18 2053   WBC 18.9*  --    HGB 14.1  --    MCV 98  --      --    INR 0.97  --      --    POTASSIUM 4.8  --    CHLORIDE 98  --    CO2 33*  --    BUN 23  --    CR 1.06*  --    ANIONGAP 5  --    BIBIANA 9.7  --    *  --    ALBUMIN 3.7  --    PROTTOTAL 9.1*  --    BILITOTAL 0.4  --    ALKPHOS 119  --    ALT 28  --    AST 27  --    TROPONIN  --  0.00[GH1.2]       Imaging:[GH1.1]  Recent Results (from the past 24 hour(s))   XR Chest Port 1 View    Narrative    CHEST ONE VIEW PORTABLE    6/22/2018 9:20 PM     HISTORY: Shortness of breath. Confusion.     COMPARISON: 10/20/2017.      Impression    IMPRESSION: Large consolidation in the left mid and lower lung  concerning for  pneumonia with left pleural effusion. Right lung is  relatively clear. No pneumothorax visualized. Cardiac silhouette  remains enlarged.    EB CALDERON MD[GH1.2]        Revision History        User Key Date/Time User Provider Type Action    > GH1.3 6/22/2018 11:00 PM Kermit Montanez III, MD Physician Sign     GH1.2 6/22/2018 10:50 PM Kermit Montanez III, MD Physician      GH1.1 6/22/2018 10:49 PM eKrmit Montanez III, MD Physician                      Discharge Summaries      Discharge Summaries by Usman Ceron MD at 7/9/2018  1:15 PM     Author:  Usman Ceron MD Service:  Hospitalist Author Type:  Physician    Filed:  7/9/2018  1:15 PM Date of Service:  7/9/2018  1:15 PM Creation Time:  7/9/2018  1:09 PM    Status:  Signed :  Usman Ceron MD (Physician)         [facility]  Discharge Summary  Name: Shana Horne    MRN: [id number]  YOB: 1950    Age: 67 year old  Date of Discharge:[AG1.1]  7/9/2018[AG1.2]  Date of Admission: 6/22/2018  Primary Care Provider: Otoniel Capellan  Discharge Physician:  Usman Ceron MD  Discharging Service:  Hospitalist      Discharge Diagnosis:  Acute on chronic hypoxic respiratory failure secondary to Left lung bacterial pneumonia and COPD in exacerbation  Severe sepsis secondary to community-acquired pneumonia  Severe malnutrition secondary to acute illness  Chronic pain syndrome with prior intrathecal pump  Seizure disorder  physical deconditioning  Stable mild anemia     Other Diagnosis:[AG1.1]  Past Medical History:   Diagnosis Date     Arthritis      Blood transfusion     38 yrs ago     Chronic pain     painful feet     COPD (chronic obstructive pulmonary disease) (H)      Dementia      Gastroesophageal reflux disease      History of blood transfusion      Numbness and tingling     fingertips occas     Other chronic pain     generalized     Oxygen dependent     4l nc cont     Parkinsons  disease (H)      Seizures (H)     LAST SEIZURE 2 MOS AGO     Stimulus-induced repetitive discharges on nerve conduction studies[AG1.3]           Discharge Disposition:  Transferred to [facility] [place]     Allergies:[AG1.1]  Allergies   Allergen Reactions     Nicotine Other (See Comments)     Nicotine patch caused worsening seizures per 's report[AG1.2]        Discharge Medications:   Current Discharge Medication List      START taking these medications    Details   albuterol (2.5 MG/3ML) 0.083% neb solution Take 1 vial (2.5 mg) by nebulization every 2 hours as needed for other (dyspnea)  Qty: 120 mL, Refills: 0    Associated Diagnoses: Pneumonia of right lung due to infectious organism, unspecified part of lung      ipratropium - albuterol 0.5 mg/2.5 mg/3 mL (DUONEB) 0.5-2.5 (3) MG/3ML neb solution Take 1 vial (3 mLs) by nebulization 4 times daily  Qty: 120 mL, Refills: 0    Associated Diagnoses: Pneumonia of right lung due to infectious organism, unspecified part of lung      predniSONE (DELTASONE) 20 MG tablet Take  2 tabs (40 mg) daily x 3 days, 1 tab (20 mg) daily x 3 days, then 1/2 tab (10 mg) x 3 days.  Qty: 12 tablet, Refills: 0    Associated Diagnoses: Pneumonia of right lung due to infectious organism, unspecified part of lung         CONTINUE these medications which have NOT CHANGED    Details   amitriptyline (ELAVIL) 25 MG tablet Take 75 mg by mouth At Bedtime       dispensed in syringe 1 Device in sodium chloride (PF) 20 mL Intrathecal Pump Infusion by Intrathecal route continuous 8840 OpenHatch IIB pump per Dr Farooq  Model 8637-20 20 ml, serial number [id number]  Continuous infusion setting delivers:  Hydromorphone 6.008 mg/day  Bupivacaine 3.004 mg/day  Clonidine 40.05 mcg/day      Felbamate (FELBATOL PO) Take 600 mg by mouth 3 times daily       FERROUS GLUCONATE PO Take 324 mg by mouth 2 times daily (with meals) Breakfast & supper      gabapentin (NEURONTIN) 100 MG capsule Take 200 mg by mouth  "2 times daily (with meals) Breakfast & Lunch       LamoTRIgine (LAMICTAL) 100 MG tablet Take 100 mg by mouth 3 times daily.      simvastatin (ZOCOR) 40 MG tablet Take 40 mg by mouth At Bedtime               Condition on Discharge:  Discharge condition: Stable   Discharge vitals:[AG1.1] Blood pressure 126/64, pulse 81, temperature 98.9  F (37.2  C), temperature source Oral, resp. rate 16, height 1.6 m (5' 3\"), weight 58.3 kg (128 lb 9.6 oz), SpO2 92 %.[AG1.2]   Code status on discharge: Full Code     History of Present Illness:  See detailed admission note for full details.        Significant Physical Exam Findings Day of Discharge:  HEENT; Atraumatic, normocephalic, pinkish conjuctiva, pupils bilateral reactive   Skin: warm and moist, no rashes    Lungs: equal chest expansion, clear to auscultation, no wheezes, no stridor, no crackles,   Heart: normal rate, normal rhythm, no rubs or gallops.   Abdomen: normal bowel sounds, no tenderness, no peritoneal signs, no guarding  Extremities: no deformities, no edema   Neuro; follow commands, alert and oriented x3, spontaneous speech, coherent, moves all extremities spontaneously, poor memory short-term recall  Psych; no hallucination, euthymic mood, not agitated  Feeding tube in place, Gill catheter seen      Procedures other than Imaging:  Mechanical ventilation with intubation, PICC line insertion     Imaging:[AG1.1]  Results for orders placed or performed during the hospital encounter of 06/22/18   XR Chest Port 1 View    Narrative    CHEST ONE VIEW PORTABLE    6/22/2018 9:20 PM     HISTORY: Shortness of breath. Confusion.     COMPARISON: 10/20/2017.      Impression    IMPRESSION: Large consolidation in the left mid and lower lung  concerning for pneumonia with left pleural effusion. Right lung is  relatively clear. No pneumothorax visualized. Cardiac silhouette  remains enlarged.    EB CALDERON MD   CT Head w/o Contrast    Narrative    CT HEAD W/O CONTRAST  6/22/2018 " 11:02 PM     HISTORY: Confusion. Emphysema and dementia.    TECHNIQUE: Axial images of the head and coronal reformations without  IV contrast material. Radiation dose for this scan was reduced using  automated exposure control, adjustment of the mA and/or kV according  to patient size, or iterative reconstruction technique.    COMPARISON: 7/22/2017.    FINDINGS: No intracranial hemorrhage, mass or mass effect. No acute  infarct identified. No shift of midline structures. No significant  change.      Impression    IMPRESSION: No acute intracranial findings.    HOWARD MENJIVAR MD   US Lower Extremity Venous Duplex Left    Narrative    US LOWER EXTREMITY VENOUS DUPLEX LEFT  6/22/2018 11:24 PM     HISTORY: Evaluate for DVT, pain.    TECHNIQUE: Venous Doppler ultrasound of the lower extremity. Color  flow and spectral Doppler with waveform analysis performed.    COMPARISON: None.    FINDINGS: Ultrasound of the left leg demonstrates no deep vein  thrombus from the common femoral through popliteal veins or in the  visualized segments of posterior tibial or peroneal veins in the calf.  3.5 x 3.7 x 1.2 cm popliteal fossa cyst, likely a Baker's cyst.      Impression    IMPRESSION:  1. No DVT identified left leg.  2. Baker's cyst.    HOWARD MENJIVAR MD   XR Pelvis 1/2 Views    Narrative    XR PELVIS 1/2 VW  6/22/2018 11:36 PM     INDICATION: Pain.    COMPARISON: None.      Impression    IMPRESSION: Negative.    HOWARD MENJIVAR MD   XR Femur Left 2 Views    Narrative    XR FEMUR LT 2 VW  6/22/2018 11:37 PM     INDICATION: Pain.    COMPARISON: None.      Impression    IMPRESSION: No acute fractures. Moderate degenerative and hypertrophic  changes left knee.    HOWARD MENJIVAR MD   XR Chest Port 1 View    Narrative    CHEST ONE VIEW PORTABLE   6/25/2018 10:02 AM     HISTORY:  Endotracheal tube positioning.    COMPARISON: 6/22/2018.      Impression    IMPRESSION: Tip of the endotracheal tube is approximately 5.6 cm  above  the juanito. Nasogastric tube extends below the left hemithorax.  Right-sided PICC line present with the tip in the superior vena cava.  There is near-complete opacification of the left hemithorax which may  be related to atelectasis or pleural fluid. Interstitial opacities in  the right lung are unchanged. No pneumothorax either side.    LM LEON MD   CT Chest w/o Contrast    Narrative    CT CHEST WITHOUT CONTRAST   6/25/2018 3:40 PM    HISTORY: Hypoxia. Dyspnea. Confusion.    TECHNIQUE: Volumetric helical acquisition was performed from the  thoracic inlet through the upper abdomen without IV contrast. Coronal  images were also reconstructed from the axial data. Radiation dose for  this scan was reduced using automated exposure control, adjustment of  the mA and/or kV according to patient size, or iterative  reconstruction technique.    COMPARISON: None.    FINDINGS: Endotracheal tube is in place, with tip 5 cm above the  juanito. Small left pleural effusion. Trace right pleural effusion. No  pericardial effusion. Atherosclerotic calcification of the thoracic  aorta and coronary arteries. There are calcified mediastinal and left  hilar lymph nodes. Calcified granuloma is noted in the left lower lobe  laterally. Emphysematous changes are noted throughout both lungs.  There is extensive consolidation in the left mid and lower lung, with  patchy interstitial and airspace infiltrate within the aerated portion  of the left upper lung. There are several indeterminate nodular  opacities in the right upper lobe with the largest measuring 1.5 x 0.6  cm (series 3 image 27). Enteric tube, tip in the proximal stomach.  Ectasia of the infrarenal abdominal aorta measures up to 2.9 cm AP.  Limited noncontrast views of the upper abdomen are otherwise  unremarkable. Right PICC line is in place, with tip in the upper SVC.       Impression    IMPRESSION:   1. Extensive consolidation in the left mid and lower lung may  be  related to pneumonia and/or atelectasis, although underlying lung mass  cannot be excluded.  2. There are several indeterminate nodular opacities in the right  upper lobe, with the largest measuring 1.5 x 0.6 cm.  3. There are bilateral pleural effusions, small on the left and trace  on the right.  4. Emphysema.    CALI CHAVEZ MD   XR Chest Port 1 View    Narrative    CHEST ONE VIEW PORTABLE   6/26/2018 8:06 AM     HISTORY: Follow up respiratory failure.     COMPARISON: 6/25/2018      Impression    IMPRESSION: Tip of the endotracheal tube is 7.1 cm above the juanito.  Nasogastric tube extends below the left hemidiaphragm. There is a  right-sided PICC line that extends at least to the SVC, although the  tip cannot be seen due to overlying leads and wires. There is  retrocardiac airspace opacity. No pneumothorax on either side.    LM LEON MD   XR Chest Port 1 View    Narrative    CHEST PORTABLE ONE VIEW   6/27/2018 9:49 AM     HISTORY: Follow up pneumonia.     COMPARISON: 6/26/2018.      Impression    IMPRESSION: No significant change since prior. Persistent retrocardiac  consolidation concerning for pneumonia or atelectasis likely with  pleural effusion. Background of diffuse hazy opacities throughout the  lungs and interstitial thickening could represent pulmonary edema  versus infection. Support devices remain in place.    EB CALDERON MD   XR Abdomen Port 1 View    Narrative    XR ABDOMEN PORT 1 VW  6/28/2018 3:27 AM     INDICATION: OG placement.    COMPARISON: None.      Impression    IMPRESSION: Enteric tube tip in the stomach. Small left pleural  effusion, left lung base infiltrate or atelectasis, and small left  pleural effusion.    HOWARD MENJIVAR MD   XR Chest Port 1 View    Narrative    XR CHEST PORT 1 VW 6/29/2018 7:29 AM    COMPARISON: 6/27/2018    HISTORY: Left lower lobe atelectasis.      Impression    IMPRESSION: Bibasilar opacities, left worse than right are again seen  and not  significantly changed. No pneumothorax seen on either side.  Support devices unchanged.    YOLI SHORT MD   XR Chest Port 1 View    Narrative    PORTABLE CHEST ONE VIEW  7/1/2018 5:36 AM     HISTORY: Respiratory failure.     COMPARISON: 6/29/2018.      Impression    IMPRESSION:   1. ET tube and nasogastric tube appears stable. Right PICC line  appears stable.  2. Left lung base atelectasis or airspace disease is unchanged. No  pneumothorax. Right lung shows no new airspace disease. Stable cardiac  silhouette.    SHIRLENE MCDONALD MD   XR Abdomen Port 1 View    Narrative    XR ABDOMEN PORT 1 VW  7/1/2018 5:18 PM     HISTORY:  s/p ft placement. please verify post pyloric;     COMPARISON: None.    FINDINGS:  A feeding tube has been placed. The tip is in the third  portion of the duodenum.    VICKIE WILLINGHAM MD   XR Chest Port 1 View    Narrative    CHEST PORTABLE ONE VIEW 7/6/2018 9:09 AM     HISTORY: Hypoxia.     COMPARISON: 7/1/2018      Impression    IMPRESSION: The endotracheal tube has been removed removed. Right arm  PICC line remains in place. Enteric tube directed into the stomach,  the tip is not seen. There is persistent left basilar opacity which  looks unchanged. No new pulmonary opacities are seen. Multiple old  left-sided rib fractures. The cardiac silhouette is stable.    VANCE HO MD[AG1.4]        Consultations:  Consultation during this admission received from ICU care.     Recent Lab Results:[AG1.1]    Recent Labs  Lab 07/09/18  0700 07/08/18  0657 07/07/18  0612   WBC 10.0 10.6 9.6   HGB 10.0* 10.3* 10.0*   HCT 33.2* 32.8* 32.9*    98 100    369 378     No results for input(s): CULT in the last 168 hours.    Recent Labs  Lab 07/09/18  0700 07/08/18  0657 07/07/18  0612 07/06/18  0530  07/04/18  0550    138 139 140  < > 141   POTASSIUM 4.3 4.0 4.2 3.9  < > 3.2*   CHLORIDE 100 99 98 98  < > 95   CO2 33* 37* 39* 39*  < > 44*   ANIONGAP 5 2* 2* 3  < > 2*   GLC 94 107* 106* 99  < > 100*    BUN 27 29 31* 34*  < > 35*   CR 0.68 0.66 0.67 0.65  < > 0.63   GFRESTIMATED 86 89 89 >90  < > >90   GFRESTBLACK >90 >90 >90 >90  < > >90   BIBIANA 9.0 9.1 8.7 9.0  < > 8.8   MAG 2.2  --   --   --   --  2.2   PHOS 4.0  --   --  3.5  --   --    < > = values in this interval not displayed.    Recent Labs  Lab 07/09/18  0834 07/09/18  0700 07/09/18  0419 07/08/18  2345 07/08/18  2054 07/08/18  1625  07/08/18  0657  07/07/18  0612  07/06/18  0530  07/05/18  0600   GLC  --  94  --   --   --   --   --  107*  --  106*  --  99  --  100*   *  --  110* 118* 123* 152*  < >  --   < >  --   < >  --   < >  --    < > = values in this interval not displayed.  No results for input(s): LACT in the last 168 hours.  No results for input(s): TROPONIN, TROPI, TROPR in the last 168 hours.    Invalid input(s): TROP, TROPONINIES  No results for input(s): COLOR, APPEARANCE, URINEGLC, URINEBILI, URINEKETONE, SG, UBLD, URINEPH, PROTEIN, UROBILINOGEN, NITRITE, LEUKEST, RBCU, WBCU in the last 168 hours.[AG1.2]       Pending Results:[AG1.1]    Unresulted Labs Ordered in the Past 30 Days of this Admission     Date and Time Order Name Status Description    6/26/2018 1253 Fungus Culture, non-blood Preliminary     6/26/2018 1253 AFB Culture Non Blood In process[AG1.2]            Discharge Instructions and Follow-Up:   Discharge diet:[AG1.1]   Active Diet Order      Diet      Combination Diet Full Liquid; Nectar Thickened Liquids (pre-thickened or use instant food thickener) (When alert )[AG1.2]   Discharge activity: Activity as tolerated   Discharge follow-up:  As determined upon discharge from LTAC   Outpatient therapy: None    Other instructions: None      Hospital Course:  Continuing CARE today.  Ms. Horne is clinically improving she is much more awake, conversant, cooperative.  She still has poor memory recall.  Her oxygen supplementation is be tapered down now receiving Via Oxymizer.  She still has stable oxygen's saturation this morning.   Remain afebrile.  She is able to participate with speech and swallow earlier and diet advanced to full liquid with nectar thickened liquids.  She is currently now on oral prednisone.  Off antibiotics as earlier discussed.  Remain afebrile.  Stable hemodynamics.  Serum creatinine remains stable than the rest of her labs has been stable.  Serum bicarb has been decreasing almost back to normal levels.  She was evaluated by long-term acute care representative earlier.  Her case was discussed with their physician and was graciously accepted.    Please see excerpts of my prior progress note for details of her prolonged hospitalization.    Summary of Stay: Shana Horne is a 67 year old female with a history of dementia with limited communication, COPD on chronic home O2 at 4L/nc, ongoing tobacco use, epilepsy, RSD of LLE with intrathecal pain pump,chronic diarrhea,  who lives with her dtr and typically ambulates with a walker  admitted on 6/22/2018 with a one week hx of some vague complaints, increasing sob and found to have hypoxic respiratory failure and a large LML/LLL dense infiltrate with effusion.  She was placed on BiPAP, initiated on ceftriaxone and azithromycin for CAP, and steroids for suspected COPD exacerbation, and admitted to the ICU. Her urine antigens returned + for pneumococcus, and sputum culture + for haemophilus influenzae   On am of 6/25 she had significant worsening of her respiratory status necessitating intubation and was bronched 6/26/26 with findings consistent with mucus plugging.  She was extubated on 7/3/18           Problem List:   1. Acute on chronic hypoxic respiratory failure known with COPD and chronic home oxygen at 4 L by nasal cannula secondary to left lung pneumonia felt to be community-acquired and COPD and exacerbation  -Initially required BiPAP therapy.  Did have worsening and required intubation on 6/25/18.  Able to be extubated on 7/3/18.  Has been slowly improving to the  point of being able to be off of BiPAP.  Unfortunately, did worsen early morning of 7/6/18.  Currently back on BiPAP.  Repeat chest x-ray  shows no significant change from previous.  Wean off of BiPAP as able.   Likely require LTACH at discharge.  She is now on high flow nasal cannula and appears to be tolerating it well.  Highly appreciate continuous input from ID service with escalation and weaning of her oxygen.  Hopeful that she can get back to her baseline home regimen of 4 L via nasal cannula.  - Completed 5 days of azithromycin and vancomycin.  Completed 12 days of IV ceftriaxone and 10 days of metronidazole.  Antibiotics completed on 7/3/18.  Needs repeat CT scan in 4 weeks to ensure resolution and evaluate for possible underlying malignancy  -Remain on DuoNeb's, IV Medrol, Pulmozyme.  Oxygen supplementation.  -She has been on pulmo-zyme for almost 2 weeks.  I am suspecting this was initiated when she has a significant mucus plugging leading to her worsening respiratory failure.  Now she is much more awake and likely can further expectorate and will not need further Pulmozyme administration.  -Changed her IV Medrol to oral prednisone with slow taper.     2.  Severe malnutrition secondary to acute illness-on tube feedings, SLP following with us if able  -I am anticipating that likely she will tolerate more of SLP evaluation as her mental state has significantly improved.  I will be awaiting any further reevaluation or recommendations from their service.  3.  Chronic pain syndrome, pain team service following, as an intrathecal pump  4.  Seizure disorder remain on lamotrigine and felbamate  5.  Severe physical deconditioning  6.  History of chronic diarrhea  7.  Hypokalemia  8.  Stable mild anemia          Total time spent in face to face contact with the patient and coordinating discharge was:  > 30 Minutes.[AG1.1]       Revision History        User Key Date/Time User Provider Type Action    > AG1.4 7/9/2018   1:15 PM Usman Ceron MD Physician Sign     AG1.3 7/9/2018  1:11 PM Usman Ceron MD Physician      AG1.2 7/9/2018  1:10 PM Usman Ceron MD Physician      AG1.1 7/9/2018  1:09 PM Usman Ceron MD Physician                      Consult Notes      Consults by Gretel Villegas at 7/4/2018  3:49 PM     Author:  Gretel Villegas Service:  (none) Author Type:      Filed:  7/4/2018  3:49 PM Date of Service:  7/4/2018  3:49 PM Creation Time:  7/4/2018  3:31 PM    Status:  Signed :  Gretel Villegas ()     Consult Orders:    1. Social Work IP Consult [[id number]] ordered by Mackenzie Baptiste MD at 07/02/18 7851                Care Transition Initial Assessment - SW  Reason For Consult: discharge planning  Met with: PATIENT and her sister Samira[JD1.1]    Active Problems:    Bacterial pneumonia    Acute and chronic respiratory failure with hypoxia (H)[JD1.2]       DATA  Lives With: spouse and dtr Lisa  Living Arrangements: house   Quality Of Family Relationships: supportive, helpful, involved  Transportation Available: family or friend will provide, van, wheelchair accessible    ASSESSMENT  Cognitive Status:  Forgetful  Met with pt and her sister Samira.  Pt lives with her  and one of their dtr's (Lisa) in a house.  They have 4 other children who are supportive and involved.  Pt's  works full time and pt's sister Samira comes in and is with pt all day until her  comes home.  She spends most of her days in bed and does not use any assistive devices when she does move around.  She does use home O2 however doesn't remember who she gets this through.  Her sister reports she has had at least 1 fall in the last yr.  Her sister did not know if she had ever been to a TCU.       PLAN  D/C plans still uncertain.  PT still needs to assess pt, may have difficulty finding TCU who will accept pt.[JD1.1]            Revision History        User Key Date/Time User  Provider Type Action    > JD1.2 7/4/2018  3:49 PM Gretel Villegas  Sign     JD1.1 7/4/2018  3:31 PM Gretel Villegas              Consults by Sophie Bolanos APRN CNS at 7/3/2018  2:11 PM     Author:  Sophie Bolanos APRN CNS Service:  Palliative Author Type:  Clinical Nurse Specialist    Filed:  7/3/2018  2:25 PM Date of Service:  7/3/2018  2:11 PM Creation Time:  7/3/2018  7:47 AM    Status:  Addendum :  Sophie Bolanos APRN CNS (Clinical Nurse Specialist)         [facility]    Palliative Care Consultation   Text Page    Date of Admission:  6/22/2018    Assessment & Plan   Shana Horne is a 67 year old female who was admitted on 6/22/2018. I was asked by Hospitalist Mackenzie Baptiste MD to see the patient for care conference.    Recommendations:  1.Decisional Capacity -[AM1.1]  Unreliable. Patient does not demonstrate medical capacity.[AM1.2] Patient does not have an advance directive. Per  informed consent policy next of kin should be involved in all consent and decision making.[AM1.3] Next-of-kin is her  Daniel Horne who request to be contacted at cell phone [phone] if there are decisions regarding Shana's care.[AM1.2]      2. Pain -[AM1.1] Complex regional pain syndrome with intrathecal pain pump which is refilled by Gurpreet Farooq MD.  Pump alarm date is 7/12/2018.[AM1.2]    3.[AM1.1]  Dyspnea with continued tobacco abuse -   is frustrated with request to have Shana stop smoking. He acknowledged that she has worsening seizures with a Nicotine Patch. He request that she is not counseled for smoking cessation.[AM1.2]     4. Spiritual Care -[AM1.1] Oriented to Spiritual Health as part of Palliative Care team.    5.[AM1.2]  Care Planning -[AM1.1]  acknowledged that TCU placement has not been possible in the past due to intrathecal pain pump and medications for epilepsy. Social work has been consulted to assist with  dismissal planning.[AM1.2]     Goal of Care:[AM1.1] FULL CODE - Restorative care. Next-of-kin/ will contact family members to discuss options of care as he is considering less aggressive options for care.[AM1.2]     Disease Process/es & Symptoms:  Shana Horne is a 67 year old patient admitted 6/22/2018 with worsening mental status. Her medical history is significant for dementia, COPD with continued tobacco use, chronic hypoxic respiratory failure on 4 L, significant seizure disorder, chronic diarrhea, balance impairment and RSD of left lower extremity with intrathecal pain pump. She was admitted with significant left-sided infiltrate and hypoxia. She was started on ceftriaxone and azithromycin for community acquired pneumonia and placed on BiPAP. She was placed on steroids for suspected COPD exacerbation due to worsening respiratory status[AM1.1]. O[AM1.2]n 6/25/2018 she was intubated and 6/26/2018 bronchoscopy a thick mucus plug was suctioned from left mainstem bronchus. Thick secretions emanating from apical posterior MANOJ and LLL.  This is the patient's  fifth hospitalization in the past year.      Findings & plan of care discussed with:[AM1.1] Hospitalist Mackenzie Baptiste MD; bedside nurse Lou Harris RN; and  SAMANTHA Camarillo.[AM1.2]  Follow-up plan from palliative team:[AM1.1] Will follow up on Thursday after the July 4 holiday.[AM1.2]   Thank you for involving us in the patient's care.[AM1.1]     Sophie Bolanos MS, RN, CNS, APRN, ACHPN, FAACVPR  Pain and Palliative Care  Pager [phone]  Office [phone][AM1.2]     Time Spent on this Encounter   I spent[AM1.1] 45[AM1.2] minutes ([AM1.1]Noon - 12:45 PM[AM1.2]) in assessment of the patient, counseling and discussion with the family as documented in sections below. Another[AM1.1] 35[AM1.2] minutes in review of chart, documentation, coordination of care and discussion with the health care team.    Primary Care Physician   BERNARDA  "MARIANN    Chief Complaint[AM1.1]   Shortness of Breath[AM1.4]    History is obtained from the electronic health record and patient's spouse[AM1.2]    Decision-Making & Goals of Care:  Discussed on July 3, 2018 with Sophie Martínez[AM1.1] GABRIELLE FERNANDEZ[AM1.2]:[AM1.1]     Met Shana shortly after extubation. She is alert and able to make needs known with a raspy voice. Her  Daniel Horne at bedside. I asked to discuss Shana's care in private and Oliverio joined me in the ICU conference room. Oilverio acknowledged his frustration as he was unaware Shana was getting extubate today and he has not spoken with a doctor since Shana's arrival at the hospital. Oliverio acknowledged \"every year she gets worse and worse. I thought we were going to loose her this time.\" As he was intent on speaking to a physician I asked Hospitalist Mackenzie Baptiste MD to join us. She reviewed the events of Shana's hospitalization and confirm with Oliverio that Shana has advanced dementia. Dr. Baptiste also discussed a trajectory for Shana's hospitalization. Oliverio acknowledged that he plans to leave on a family vacation on Saturday. \"[place] I've already paid $5,000 and I won't get it back if we don't go.\" Oliverio also acknowledged that with previous hospitalizations Shana has not been able to go to TCU due to her intrathecal pump and epilepsy medications. Dr. Baptiste explained that Social Work has been consulted to assist in dismissal planning. After Dr. Baptiste left, I continued a discussion with Oliverio regarding goals of care. He acknowledged \"I don't think she should continue like this, but I need to talk with my family and her mother.\"[AM1.5]      Patient has decision-making capacity[AM1.1] Unreliable[AM1.2]  Patient has[AM1.1] no known legal document designating a decision maker. Per policy next of kin is the designated decision maker. See System Informed Consent policy for guidance.  Physician orders for life-sustaining " treatment (POLST) form is not completed[AM1.2]  Code Status:[AM1.1] Full code[AM1.2]     Past Medical History[AM1.1]   I have reviewed this patient's medical history and updated it with pertinent information if needed.[AM1.2]   Past Medical History:   Diagnosis Date     Arthritis      Blood transfusion     38 yrs ago     Chronic pain     painful feet     COPD (chronic obstructive pulmonary disease) (H)      Dementia      Gastroesophageal reflux disease      History of blood transfusion      Numbness and tingling     fingertips occas     Other chronic pain     generalized     Oxygen dependent     4l nc cont     Parkinsons disease (H)      Seizures (H)     LAST SEIZURE 2 MOS AGO     Stimulus-induced repetitive discharges on nerve conduction studies[AM1.6]        Past Surgical History[AM1.1]   I have reviewed this patient's surgical history and updated it with pertinent information if needed.[AM1.2]  Past Surgical History:   Procedure Laterality Date     AMPUTATE TOE(S) Right 10/19/2017    Procedure: AMPUTATE TOE(S);  Right second toe amputation at metatarsophalangeal joint.;  Surgeon: Montrell Arce MD;  Location:  OR     BRONCHOSCOPY (RIGID OR FLEXIBLE), DIAGNOSTIC N/A 6/26/2018    Procedure: COMBINED BRONCHOSCOPY (RIGID OR FLEXIBLE), LAVAGE;  BRONCHOSCOPY (RIGID OR FLEXIBLE), LAVAGE  in ;  Surgeon: Shala Gonzales MD;  Location:  GI     COLONOSCOPY  11/20/2014    Dr. Schaeffer [facility]     COLONOSCOPY N/A 11/20/2014    Procedure: COLONOSCOPY;  Surgeon: Steven Schaeffer MD;  Location:  GI     GYN SURGERY      tear repair after delivery child     INSERT PUMP MORPHINE      pump replacement x2     MOUTH SURGERY      numerous times     ORTHOPEDIC SURGERY      foot surg left     REVISE PUMP MORPHINE  11/29/2011    Procedure:REVISE PUMP MORPHINE; Pump Replacement, abdominal area; Surgeon:ALIRIO RIVERA; Location: OR[AM1.6]       Prior to Admission Medications   Prior to Admission Medications    Prescriptions Last Dose Informant Patient Reported? Taking?   FERROUS GLUCONATE PO 6/22/2018 at x 2  Yes Yes   Sig: Take 324 mg by mouth 2 times daily (with meals) Breakfast & supper   Felbamate (FELBATOL PO) 6/22/2018 at x 3  Yes Yes   Sig: Take 600 mg by mouth 3 times daily    LamoTRIgine (LAMICTAL) 100 MG tablet 6/22/2018 at x 3  Yes Yes   Sig: Take 100 mg by mouth 3 times daily.   amitriptyline (ELAVIL) 25 MG tablet 6/22/2018 at 2200  Yes Yes   Sig: Take 75 mg by mouth At Bedtime    dispensed in syringe 1 Device in sodium chloride (PF) 20 mL Intrathecal Pump Infusion   Yes Yes   Sig: by Intrathecal route continuous 8840 Solvonics IIB pump per Dr Farooq  Model 8637-20 20 ml, serial number [id number]  Continuous infusion setting delivers:  Hydromorphone 6.008 mg/day  Bupivacaine 3.004 mg/day  Clonidine 40.05 mcg/day  Last fill: 4/11/2018  Pump Alarm date: 7/12/2018   gabapentin (NEURONTIN) 100 MG capsule 6/22/2018 at x 2  Yes Yes   Sig: Take 200 mg by mouth 2 times daily (with meals) Breakfast & Lunch    simvastatin (ZOCOR) 40 MG tablet 6/22/2018 at 2200  Yes Yes   Sig: Take 40 mg by mouth At Bedtime       Facility-Administered Medications: None     Allergies   No Known Allergies    Social History    Living situation: Lives with her [AM1.1] Oliverio[AM1.2] and daughter[AM1.1], Joselin Horne[AM1.2] and during the day her sister cares for her  Family system:[AM1.1]  and 5 adult children[AM1.2]  Functional status[AM1.1]: N[AM1.2]eeds help with ADLs   History of substance use/abuse:[AM1.1]  reports that she has been smoking.  She has been smoking about 0.50 packs per day. She has never used smokeless tobacco.  reports that she does not drink alcohol.[AM1.7]  Denominational affiliation:[AM1.1] Undesignated[AM1.7]    Family History[AM1.1]   I have reviewed this patient's family history and updated it with pertinent information if needed.[AM1.2]   No family history on file.[AM1.7]    Review of Systems[AM1.1]    The 10 point Review of Systems is negative other than noted in the HPI or here.[AM1.2]     Palliative Symptom Review (0=no symptom/no concern, 1=mild, 2=moderate, 3=severe):      Pain:[AM1.1] 2-moderate[AM1.2]      Fatigue:[AM1.1] 2-moderate[AM1.2]      Nausea:[AM1.1] 0-none[AM1.2]      Constipation:[AM1.1] 0-none[AM1.2]      Diarrhea:[AM1.1] 2-moderate[AM1.2]      Depressive Symptoms:[AM1.1] 0-none[AM1.2]      Anxiety:[AM1.1] 1-mild[AM1.2]      Drowsiness:[AM1.1] 1-mild[AM1.2]      Poor Appetite:[AM1.1] 0-none[AM1.2]      Shortness of Breath:[AM1.1] 0-none[AM1.2]      Insomnia:[AM1.1] 0-none[AM1.2]         Physical Exam   Temp:  [97.6  F (36.4  C)-99.3  F (37.4  C)] 98.3  F (36.8  C)  Heart Rate:  [53-84] 64  Resp:  [8-22] 19  BP: ()/(40-97) 99/59  FiO2 (%):  [50 %-65 %] 50 %  SpO2:  [80 %-100 %] 94 %  129 lbs 6.56 oz  GEN:[AM1.1] Frail  female, a[AM1.2]lert,[AM1.1] raspy voice,[AM1.2] oriented[AM1.1] to self[AM1.2], appears comfortable.  HEENT:  Normocephalic/atraumatic, no scleral icterus, no nasal discharge, mouth moist.  CV:  RRR, S1, S2; no murmurs or other irregularities noted.  +3 DP/PT pulses[AM1.1] bilaterally[AM1.2]; no edema BLE.  RESP:[AM1.1]  On oxygen via nasal cannula following recent extubation.[AM1.2] Clear to auscultation bilaterally without rales/rhonchi/wheezing/retractions.  Symmetric chest rise on inhalation noted.  Normal respiratory effort.  ABD:  Rounded, soft, non-tender/non-distended.  +BS  EXT:  CMS intact x 4.     M/S:[AM1.1]   States having chronic pain of LLL[AM1.2].    SKIN:[AM1.1]  Warm and d[AM1.2]ry to touch, no exanthems noted in the visualized areas.    NEURO: Symmetric strength +5/5.  Sensation to touch intact all extremities.   There is no area of allodynia or hyperesthesia.  Psych:[AM1.1]  F[AM1.2]l[AM1.1]at[AM1.2] affect[AM1.1], c[AM1.2]sergio, cooperative, conversant[AM1.1] but easily distracted[AM1.2].     Data[AM1.1]   Results for orders placed or performed  during the hospital encounter of 06/22/18   BRONCHOSCOPY   Result Value Ref Range    Bronchoscopy       [facility]  Pulmonary  _______________________________________________________________________________  Patient Name: Shana Horne ,        Procedure Date: 6/26/2018 11:09 AM  MRN: [id number]                       Account #: [id number]  YOB: 1950              Admit Type: Inpatient  Age: 67                               Gender: Female  Attending MD: Shala Gonzales MD     Total Sedation Time: 15 minutes   (procedure) Total Procedure time 14 minutes  T  Instrument Name: 142                    _______________________________________________________________________________     Procedure:            Bronchoscopy  Indications:          L lung collapse/mucus plug clearance  Providers:            Shala Gonzales MD  Referring MD:         KRISTIE Zambrano MD  Medicines:            4 cc 1% lidocaine in airways  Requesting Physician: ROSANNE Zambrano MD  Complications:        No immediate complications  _______________________________________________________________________ ________  Procedure:            Pre-Anesthesia Assessment:                        - A History and Physical has been performed. Patient                         meds and allergies have been reviewed. The risks and                         benefits of the procedure and the sedation options and                         risks were discussed with the patient. All questions                         were answered and informed consent was obtained.                         Patient identification and proposed procedure were                         verified prior to the procedure by the physician, the                         nurse and the technician. Mental Status Examination:                         normal. After reviewing the risks and benefits, the                         patient was deemed in satisfactory condition to undergo                          the procedure. The anesthesia plan was to use moderate                         sedation / analgesia (conscious sedation). Immediately                          prior to administration of medications, the patient was                         re-assessed for adequacy to receive sedatives. The                         heart rate, respiratory rate, oxygen saturations, blood                         pressure, adequacy of pulmonary ventilation, and                         response to care were monitored throughout the                         procedure. The physical status of the patient was                         re-assessed after the procedure.                        After obtaining informed consent time out performed the                         Olympus Bronchoscope Model BF-P190, Endora #[id number], SN#                         [id number] was introduced through the oral ETT and                         advanced to the distal trachea. Total of 4 cc 1%                         lidocaine given as topical anesthesia in lower airways.                         Wash from L lung with 60 cc NS -with 30 cc recovered                         into trap.The patient t olerated the procedure well.                                                                                   Findings:       juanito sharp.       R lower airway with mininimal thin secretions, no endobronchial lesions        or mucosal abnormalities.       L Lower airway: Thick mucus plug in distal L mainstem-suctioned clear.        Thick secretions emanating friom apical posterior segment MANOJ and LLL        orifices. No distinct endobronchial lesions noted but abnormal        (inflammed and heaped up mucosal noted -orifiece to lingula and lower        lobes. Significant dynamic airway collapse-with intermittent complete        occlusion of the L lower lobes orifices (see pictures)       The endotracheal tube is in good position. The visualized portion of the        trachea is of  normal caliber. The juanito is sharp. The tracheobronchial        tree was examined to at least the first subsegmental level. Bronchial        mucosa and anatomy are normal; there are no endobronchial lesions,  and        no secretions.                                                                                   Impression:           Mucuso plugs L and airway collaspe                        - No specimens collected.  Moderate Sedation:       Propofol gtt 30 ug/kg/min , and 20 ug bump (2 cc) Versed gtt 3 mg/hr        additional 150 Ug IV fentanyl, 3 mg IV versed additional  Recommendation:       Pulmonary toilet                        - Labs:                                                                                   Attending Participation:      __________________  Shala Gonzales MD  6/26/2018 11:56:01 AM  I was physically present for the entire viewing portion of the exam.  __________________________Shala Gonzales MD  Number of Addenda: 0    Note Initiated On: 6/26/2018 11:09 AM  MRN:                  [id number]  Procedure Date:       6/26/2018 11:09:01 AM  Estimated Blood Loss:       None  Scope In: 11:18:33 AM  Scope Out: 11:32:37 AM     XR Chest Port 1 View    Narrative    CHEST ONE VIEW PORTABLE    6/22/2018 9:20 PM     HISTORY: Shortness of breath. Confusion.     COMPARISON: 10/20/2017.      Impression    IMPRESSION: Large consolidation in the left mid and lower lung  concerning for pneumonia with left pleural effusion. Right lung is  relatively clear. No pneumothorax visualized. Cardiac silhouette  remains enlarged.    EB CALDERON MD   CT Head w/o Contrast    Narrative    CT HEAD W/O CONTRAST  6/22/2018 11:02 PM     HISTORY: Confusion. Emphysema and dementia.    TECHNIQUE: Axial images of the head and coronal reformations without  IV contrast material. Radiation dose for this scan was reduced using  automated exposure control, adjustment of the mA and/or kV according  to patient size, or  iterative reconstruction technique.    COMPARISON: 7/22/2017.    FINDINGS: No intracranial hemorrhage, mass or mass effect. No acute  infarct identified. No shift of midline structures. No significant  change.      Impression    IMPRESSION: No acute intracranial findings.    HOWARD MENJIVAR MD   US Lower Extremity Venous Duplex Left    Narrative    US LOWER EXTREMITY VENOUS DUPLEX LEFT  6/22/2018 11:24 PM     HISTORY: Evaluate for DVT, pain.    TECHNIQUE: Venous Doppler ultrasound of the lower extremity. Color  flow and spectral Doppler with waveform analysis performed.    COMPARISON: None.    FINDINGS: Ultrasound of the left leg demonstrates no deep vein  thrombus from the common femoral through popliteal veins or in the  visualized segments of posterior tibial or peroneal veins in the calf.  3.5 x 3.7 x 1.2 cm popliteal fossa cyst, likely a Baker's cyst.      Impression    IMPRESSION:  1. No DVT identified left leg.  2. Baker's cyst.    HOWARD MENJIVAR MD   XR Pelvis 1/2 Views    Narrative    XR PELVIS 1/2 VW  6/22/2018 11:36 PM     INDICATION: Pain.    COMPARISON: None.      Impression    IMPRESSION: Negative.    HOWARD MENJIVAR MD   XR Femur Left 2 Views    Narrative    XR FEMUR LT 2 VW  6/22/2018 11:37 PM     INDICATION: Pain.    COMPARISON: None.      Impression    IMPRESSION: No acute fractures. Moderate degenerative and hypertrophic  changes left knee.    HOWARD MENJIVAR MD   XR Chest Port 1 View    Narrative    CHEST ONE VIEW PORTABLE   6/25/2018 10:02 AM     HISTORY:  Endotracheal tube positioning.    COMPARISON: 6/22/2018.      Impression    IMPRESSION: Tip of the endotracheal tube is approximately 5.6 cm above  the juanito. Nasogastric tube extends below the left hemithorax.  Right-sided PICC line present with the tip in the superior vena cava.  There is near-complete opacification of the left hemithorax which may  be related to atelectasis or pleural fluid. Interstitial opacities in  the right  lung are unchanged. No pneumothorax either side.    LM LEON MD   CT Chest w/o Contrast    Narrative    CT CHEST WITHOUT CONTRAST   6/25/2018 3:40 PM    HISTORY: Hypoxia. Dyspnea. Confusion.    TECHNIQUE: Volumetric helical acquisition was performed from the  thoracic inlet through the upper abdomen without IV contrast. Coronal  images were also reconstructed from the axial data. Radiation dose for  this scan was reduced using automated exposure control, adjustment of  the mA and/or kV according to patient size, or iterative  reconstruction technique.    COMPARISON: None.    FINDINGS: Endotracheal tube is in place, with tip 5 cm above the  juanito. Small left pleural effusion. Trace right pleural effusion. No  pericardial effusion. Atherosclerotic calcification of the thoracic  aorta and coronary arteries. There are calcified mediastinal and left  hilar lymph nodes. Calcified granuloma is noted in the left lower lobe  laterally. Emphysematous changes are noted throughout both lungs.  There is extensive consolidation in the left mid and lower lung, with  patchy interstitial and airspace infiltrate within the aerated portion  of the left upper lung. There are several indeterminate nodular  opacities in the right upper lobe with the largest measuring 1.5 x 0.6  cm (series 3 image 27). Enteric tube, tip in the proximal stomach.  Ectasia of the infrarenal abdominal aorta measures up to 2.9 cm AP.  Limited noncontrast views of the upper abdomen are otherwise  unremarkable. Right PICC line is in place, with tip in the upper SVC.       Impression    IMPRESSION:   1. Extensive consolidation in the left mid and lower lung may be  related to pneumonia and/or atelectasis, although underlying lung mass  cannot be excluded.  2. There are several indeterminate nodular opacities in the right  upper lobe, with the largest measuring 1.5 x 0.6 cm.  3. There are bilateral pleural effusions, small on the left and trace  on the  right.  4. Emphysema.    CALI CHAVEZ MD   XR Chest Port 1 View    Narrative    CHEST ONE VIEW PORTABLE   6/26/2018 8:06 AM     HISTORY: Follow up respiratory failure.     COMPARISON: 6/25/2018      Impression    IMPRESSION: Tip of the endotracheal tube is 7.1 cm above the juanito.  Nasogastric tube extends below the left hemidiaphragm. There is a  right-sided PICC line that extends at least to the SVC, although the  tip cannot be seen due to overlying leads and wires. There is  retrocardiac airspace opacity. No pneumothorax on either side.    LM LEON MD   XR Chest Port 1 View    Narrative    CHEST PORTABLE ONE VIEW   6/27/2018 9:49 AM     HISTORY: Follow up pneumonia.     COMPARISON: 6/26/2018.      Impression    IMPRESSION: No significant change since prior. Persistent retrocardiac  consolidation concerning for pneumonia or atelectasis likely with  pleural effusion. Background of diffuse hazy opacities throughout the  lungs and interstitial thickening could represent pulmonary edema  versus infection. Support devices remain in place.    EB CALDERON MD   XR Abdomen Port 1 View    Narrative    XR ABDOMEN PORT 1 VW  6/28/2018 3:27 AM     INDICATION: OG placement.    COMPARISON: None.      Impression    IMPRESSION: Enteric tube tip in the stomach. Small left pleural  effusion, left lung base infiltrate or atelectasis, and small left  pleural effusion.    HOWARD MENJIVAR MD   XR Chest Port 1 View    Narrative    XR CHEST PORT 1 VW 6/29/2018 7:29 AM    COMPARISON: 6/27/2018    HISTORY: Left lower lobe atelectasis.      Impression    IMPRESSION: Bibasilar opacities, left worse than right are again seen  and not significantly changed. No pneumothorax seen on either side.  Support devices unchanged.    YOLI SHORT MD   XR Chest Port 1 View    Narrative    PORTABLE CHEST ONE VIEW  7/1/2018 5:36 AM     HISTORY: Respiratory failure.     COMPARISON: 6/29/2018.      Impression    IMPRESSION:   1. ET tube and  nasogastric tube appears stable. Right PICC line  appears stable.  2. Left lung base atelectasis or airspace disease is unchanged. No  pneumothorax. Right lung shows no new airspace disease. Stable cardiac  silhouette.    SHIRLENE MCDONALD MD   XR Abdomen Port 1 View    Narrative    XR ABDOMEN PORT 1 VW  7/1/2018 5:18 PM     HISTORY:  s/p ft placement. please verify post pyloric;     COMPARISON: None.    FINDINGS:  A feeding tube has been placed. The tip is in the third  portion of the duodenum.    VICKIE WILLINGHAM MD   CBC with platelets differential   Result Value Ref Range    WBC 18.9 (H) 4.0 - 11.0 10e9/L    RBC Count 4.58 3.8 - 5.2 10e12/L    Hemoglobin 14.1 11.7 - 15.7 g/dL    Hematocrit 44.8 35.0 - 47.0 %    MCV 98 78 - 100 fl    MCH 30.8 26.5 - 33.0 pg    MCHC 31.5 31.5 - 36.5 g/dL    RDW 13.8 10.0 - 15.0 %    Platelet Count 319 150 - 450 10e9/L    Diff Method Automated Method     % Neutrophils 85.3 %    % Lymphocytes 9.9 %    % Monocytes 3.7 %    % Eosinophils 0.1 %    % Basophils 0.3 %    % Immature Granulocytes 0.7 %    Nucleated RBCs 0 0 /100    Absolute Neutrophil 16.1 (H) 1.6 - 8.3 10e9/L    Absolute Lymphocytes 1.9 0.8 - 5.3 10e9/L    Absolute Monocytes 0.7 0.0 - 1.3 10e9/L    Absolute Eosinophils 0.0 0.0 - 0.7 10e9/L    Absolute Basophils 0.1 0.0 - 0.2 10e9/L    Abs Immature Granulocytes 0.1 0 - 0.4 10e9/L    Absolute Nucleated RBC 0.0    INR   Result Value Ref Range    INR 0.97 0.86 - 1.14   Erythrocyte sedimentation rate auto   Result Value Ref Range    Sed Rate 26 0 - 30 mm/h   Comprehensive metabolic panel   Result Value Ref Range    Sodium 136 133 - 144 mmol/L    Potassium 4.8 3.4 - 5.3 mmol/L    Chloride 98 94 - 109 mmol/L    Carbon Dioxide 33 (H) 20 - 32 mmol/L    Anion Gap 5 3 - 14 mmol/L    Glucose 152 (H) 70 - 99 mg/dL    Urea Nitrogen 23 7 - 30 mg/dL    Creatinine 1.06 (H) 0.52 - 1.04 mg/dL    GFR Estimate 52 (L) >60 mL/min/1.7m2    GFR Estimate If Black 63 >60 mL/min/1.7m2    Calcium 9.7 8.5 - 10.1  mg/dL    Bilirubin Total 0.4 0.2 - 1.3 mg/dL    Albumin 3.7 3.4 - 5.0 g/dL    Protein Total 9.1 (H) 6.8 - 8.8 g/dL    Alkaline Phosphatase 119 40 - 150 U/L    ALT 28 0 - 50 U/L    AST 27 0 - 45 U/L   Magnesium   Result Value Ref Range    Magnesium 2.0 1.6 - 2.3 mg/dL   Lactic acid whole blood   Result Value Ref Range    Lactic Acid Canceled, Test credited 0.7 - 2.0 mmol/L   Ammonia   Result Value Ref Range    Ammonia 17 10 - 50 umol/L   TSH   Result Value Ref Range    TSH 0.23 (L) 0.40 - 4.00 mU/L   Blood gas arterial   Result Value Ref Range    pH Arterial Canceled, Test credited 7.35 - 7.45 pH    pCO2 Arterial Canceled, Test credited 35 - 45 mm Hg    pO2 Arterial Canceled, Test credited 80 - 105 mm Hg    Bicarbonate Arterial Canceled, Test credited 21 - 28 mmol/L    Base Excess Art Canceled, Test credited mmol/L    Base Deficit Art Canceled, Test credited mmol/L    FIO2 Canceled, Test credited    Nt probnp inpatient (BNP)   Result Value Ref Range    N-Terminal Pro BNP Inpatient 1290 (H) 0 - 900 pg/mL   UA with Microscopic   Result Value Ref Range    Color Urine Yellow     Appearance Urine Clear     Glucose Urine Negative NEG^Negative mg/dL    Bilirubin Urine Negative NEG^Negative    Ketones Urine Negative NEG^Negative mg/dL    Specific Gravity Urine 1.021 1.003 - 1.035    Blood Urine Large (A) NEG^Negative    pH Urine 5.0 5.0 - 7.0 pH    Protein Albumin Urine 100 (A) NEG^Negative mg/dL    Urobilinogen mg/dL 0.0 0.0 - 2.0 mg/dL    Nitrite Urine Negative NEG^Negative    Leukocyte Esterase Urine Negative NEG^Negative    Source Catheterized Urine     WBC Urine 1 0 - 5 /HPF    RBC Urine 38 (H) 0 - 2 /HPF    Mucous Urine Present (A) NEG^Negative /LPF   Lamotrigine Level   Result Value Ref Range    Lamotrigine Level 6.7 2.5 - 15.0 ug/mL   Glucose by meter   Result Value Ref Range    Glucose 147 (H) 70 - 99 mg/dL   Blood gas arterial with oxyhemoglobin   Result Value Ref Range    pH Arterial 7.42 7.35 - 7.45 pH    pCO2  Arterial 52 (H) 35 - 45 mm Hg    pO2 Arterial 74 (L) 80 - 105 mm Hg    Bicarbonate Arterial 34 (H) 21 - 28 mmol/L    FIO2 BIPAP     Oxyhemoglobin Arterial 94 92 - 100 %    Base Excess Art 7.9 mmol/L   Lactic acid whole blood   Result Value Ref Range    Lactic Acid 1.1 0.7 - 2.0 mmol/L   CBC with platelets differential   Result Value Ref Range    WBC 14.5 (H) 4.0 - 11.0 10e9/L    RBC Count 3.31 (L) 3.8 - 5.2 10e12/L    Hemoglobin 10.2 (L) 11.7 - 15.7 g/dL    Hematocrit 32.9 (L) 35.0 - 47.0 %    MCV 99 78 - 100 fl    MCH 30.8 26.5 - 33.0 pg    MCHC 31.0 (L) 31.5 - 36.5 g/dL    RDW 13.8 10.0 - 15.0 %    Platelet Count 226 150 - 450 10e9/L    Diff Method Automated Method     % Neutrophils 89.0 %    % Lymphocytes 7.4 %    % Monocytes 2.9 %    % Eosinophils 0.0 %    % Basophils 0.1 %    % Immature Granulocytes 0.6 %    Nucleated RBCs 0 0 /100    Absolute Neutrophil 12.9 (H) 1.6 - 8.3 10e9/L    Absolute Lymphocytes 1.1 0.8 - 5.3 10e9/L    Absolute Monocytes 0.4 0.0 - 1.3 10e9/L    Absolute Eosinophils 0.0 0.0 - 0.7 10e9/L    Absolute Basophils 0.0 0.0 - 0.2 10e9/L    Abs Immature Granulocytes 0.1 0 - 0.4 10e9/L    Absolute Nucleated RBC 0.0    Basic metabolic panel   Result Value Ref Range    Sodium 138 133 - 144 mmol/L    Potassium 4.4 3.4 - 5.3 mmol/L    Chloride 105 94 - 109 mmol/L    Carbon Dioxide 30 20 - 32 mmol/L    Anion Gap 3 3 - 14 mmol/L    Glucose 119 (H) 70 - 99 mg/dL    Urea Nitrogen 25 7 - 30 mg/dL    Creatinine 0.86 0.52 - 1.04 mg/dL    GFR Estimate 66 >60 mL/min/1.7m2    GFR Estimate If Black 80 >60 mL/min/1.7m2    Calcium 7.8 (L) 8.5 - 10.1 mg/dL   Blood gas arterial with oxyhemoglobin   Result Value Ref Range    pH Arterial 7.38 7.35 - 7.45 pH    pCO2 Arterial 55 (H) 35 - 45 mm Hg    pO2 Arterial 102 80 - 105 mm Hg    Bicarbonate Arterial 32 (H) 21 - 28 mmol/L    FIO2 100%     Oxyhemoglobin Arterial 97 92 - 100 %    Base Excess Art 5.5 mmol/L   Glucose by meter   Result Value Ref Range    Glucose 227 (H)  70 - 99 mg/dL   Legionella pneumonia antigen urine   Result Value Ref Range    Specimen Description Catheterized Urine     L Pneumo Urine Antigen       Presumptive negative for Legionella pneumophilia serogroup 1 antigen in urine, suggesting   no recent or current infection.  Infection due to Legionella cannot be ruled out, since   other serogroups and species may cause disease, antigen may not be present in urine in   early infection, and the level of antigen present in the urine may be below detectable   limits of the test.     Blood gas arterial with oxyhemoglobin (AM Draw)   Result Value Ref Range    pH Arterial 7.37 7.35 - 7.45 pH    pCO2 Arterial 54 (H) 35 - 45 mm Hg    pO2 Arterial 69 (L) 80 - 105 mm Hg    Bicarbonate Arterial 31 (H) 21 - 28 mmol/L    FIO2 80%     Oxyhemoglobin Arterial 92 92 - 100 %    Base Excess Art 4.4 mmol/L   Glucose by meter   Result Value Ref Range    Glucose 123 (H) 70 - 99 mg/dL   Glucose by meter   Result Value Ref Range    Glucose 154 (H) 70 - 99 mg/dL   Glucose by meter   Result Value Ref Range    Glucose 154 (H) 70 - 99 mg/dL   Glucose by meter   Result Value Ref Range    Glucose 142 (H) 70 - 99 mg/dL   CBC with platelets differential   Result Value Ref Range    WBC 15.1 (H) 4.0 - 11.0 10e9/L    RBC Count 3.32 (L) 3.8 - 5.2 10e12/L    Hemoglobin 10.1 (L) 11.7 - 15.7 g/dL    Hematocrit 33.0 (L) 35.0 - 47.0 %    MCV 99 78 - 100 fl    MCH 30.4 26.5 - 33.0 pg    MCHC 30.6 (L) 31.5 - 36.5 g/dL    RDW 13.8 10.0 - 15.0 %    Platelet Count 226 150 - 450 10e9/L    Diff Method Automated Method     % Neutrophils 91.5 %    % Lymphocytes 5.4 %    % Monocytes 2.6 %    % Eosinophils 0.0 %    % Basophils 0.1 %    % Immature Granulocytes 0.4 %    Nucleated RBCs 0 0 /100    Absolute Neutrophil 13.8 (H) 1.6 - 8.3 10e9/L    Absolute Lymphocytes 0.8 0.8 - 5.3 10e9/L    Absolute Monocytes 0.4 0.0 - 1.3 10e9/L    Absolute Eosinophils 0.0 0.0 - 0.7 10e9/L    Absolute Basophils 0.0 0.0 - 0.2 10e9/L     Abs Immature Granulocytes 0.1 0 - 0.4 10e9/L    Absolute Nucleated RBC 0.0      *Note: Due to a large number of results and/or encounters for the requested time period, some results have not been displayed. A complete set of results can be found in Results Review.[AM1.8]            Revision History        User Key Date/Time User Provider Type Action    > [N/A] 7/3/2018  2:25 PM Sophie Bolanos APRN CNS Clinical Nurse Specialist Addend     AM1.5 7/3/2018  2:10 PM Sophie Bolanos APRN CNS Clinical Nurse Specialist Sign     AM1.7 7/3/2018  1:57 PM Sophie Bolanos APRN CNS Clinical Nurse Specialist      AM1.6 7/3/2018  1:55 PM Sophie Bolanos APRN CNS Clinical Nurse Specialist      AM1.4 7/3/2018  1:54 PM Sophie Bolanos APRN CNS Clinical Nurse Specialist      AM1.2 7/3/2018  1:15 PM Sophie Bolanos APRN CNS Clinical Nurse Specialist      AM1.3 7/3/2018 11:59 AM Sophie Bolanos APRN CNS Clinical Nurse Specialist      AM1.8 7/3/2018  7:48 AM Sophie Bolanos APRN CNS Clinical Nurse Specialist      AM1.1 7/3/2018  7:47 AM Sophie Bolanos APRN CNS Clinical Nurse Specialist             Consults by Connie Singletary RD, LD at 6/26/2018  7:29 AM     Author:  Connie Singletary RD, LD Service:  Nutrition Author Type:  Registered Dietitian    Filed:  6/26/2018  1:19 PM Date of Service:  6/26/2018  7:29 AM Creation Time:  6/26/2018  7:24 AM    Status:  Signed :  Connie Singletary RD, LD (Registered Dietitian)     Consult Orders:    1. Nutrition Services Adult IP Consult [[id number]] ordered by Jordi Zambrano MD at 06/25/18 2053                CLINICAL NUTRITION SERVICES  -  ASSESSMENT NOTE      RECOMMENDATIONS FOR MD/PROVIDER TO ORDER:[RP1.1]     Phosphorus replacement protocol[RP1.2]   Recommendations Ordered by Registered Dietitian (RD):   Peptamen Intense VHP at[RP1.1] 50[RP1.2] mL/hr[RP1.1] - start at 20 mL/hr and increase by 15 mL q8 hours if  "tolerating[RP1.2]  Fluid flush 60 mL q4 hours[RP1.1]  Thera-vit   NPO diet order[RP1.2]   Future/Additional Recommendations:[RP1.1]    Change to non dextrose containing IVF to prevent overfeeding    Goal EN rate pending kcal from propofol    Transition to nasoenteric feeding tube?[RP1.2]   Malnutrition:   % Weight Loss:[RP1.1]Weight loss does not meet criteria for malnutrition[RP1.3]   % Intake:[RP1.1]</= 50% for >/= 5 days and suspect <75% of needs for >1 month (severe malnutrition)[RP1.3]  Muscle Loss:[RP1.1] Mild muscle loss consistent with sarcopenia noted across temporal region, clavicle bone region, Acromion bone region and scapular bone region. Lower extremities somewhat thin but nourished[RP1.2]    Malnutrition Diagnosis:[RP1.1] Non-Severe malnutrition[RP1.2]  In Context of:[RP1.1]  Acute on chronic illness or disease[RP1.2]     REASON FOR ASSESSMENT  Shana Horne is a 67 year old female seen by the dietitian for Provider Order - Registered Dietitian to Assess and Order TF per Medical Nutrition protocol[RP1.1]    Hx of COPD, dementia, refractory epilepsy, RSD and frequent falls.[RP1.3]    NUTRITION HISTORY  - Information obtained from patient[RP1.1]'s daughter[RP1.3]  - Food allergies/intolerances: NKFA   - Patient is on a[RP1.1] mechanical/dental soft (self selects)[RP1.3] diet at home.[RP1.1] Wears dentures that fit ok but tender gums from frequent seizures. Appetite and intake has steadily declined and family has to \"force\" patient to eat. Patient cooks but daughter has been staying with pt for last month and taking on more meal preparation responsibilities.[RP1.3]     CURRENT NUTRITION ORDERS  - Diet: Regular  Current Intake/Tolerance:  - Per flow sheet review, 75% intake for 1 documented meal on 6/24[RP1.1] - otherwise, only ordered a few small meals since admit  - Fac[RP1.3]tors affecting nutrition intake include: intubated    PHYSICAL FINDINGS  Observed  See malnutrition section below[RP1.1] - " "overall well nourished[RP1.2]  Obtained from Chart/Interdisciplinary Team  Nuno nutrition score:[RP1.1] 2[RP1.3]; total score:[RP1.1] 13[RP1.3]  BM:[RP1.1] none since admit[RP1.3]  +10.5 L since admit[RP1.2]    ANTHROPOMETRICS  Height: 5' 3\"  Weight: 64 kg[RP1.1] vs ?dry weight of 55.5 kg[RP1.3]  Body mass index is 25.23 kg/(m^2).  Weight Status:  Overweight BMI 25-29.9  Ideal body weight: 52.3 kg +/- 10%, 124% of IBW   Weight History:  Weight has increased in last 8 months, as noted below  Wt Readings from Last 10 Encounters:   06/25/18 64.6 kg (142 lb 6.7 oz)   10/19/17 47.2 kg (104 lb)   11/20/14 54.4 kg (120 lb)   11/29/11 54.4 kg (120 lb)       ASSESSED NUTRITION NEEDS (PER APPROVED PRACTICE GUIDELINES, Dosing weight:[RP1.1] 55.5[RP1.3] kg):  Estimated Energy Needs: 13[RP1.1]9[RP1.3]0-16[RP1.1]6[RP1.3]5 kcals ([RP1.1]25-30[RP1.3] Kcal/Kg)  Justification: overweight and vented  Estimated Protein Needs:  grams protein (1.2-1.8+ g pro/Kg)  Justification: hypercatabolism with critical illness  Estimated Fluid Needs: >1 mL/Kcal  Justification: maintenance    LABS  Labs reviewed    Recent Labs   Lab Test  06/26/18 0445  06/25/18   0351  06/24/18   0511  06/23/18   0603  06/22/18 2055   POTASSIUM  3.9  4.5  4.4  4.4  4.8     Recent Labs   Lab Test  06/26/18 0445   PHOS  1.8*     Recent Labs   Lab Test  06/26/18   0445  06/22/18   2055  10/20/17   0639  10/19/17   2045   MAG  2.0  2.0  2.2  2.1     Recent Labs   Lab Test  06/26/18 0445  06/25/18   0351  06/24/18   0511  06/23/18   0603  06/22/18 2055   NA  147*  141  141  138  136     Recent Labs   Lab Test  06/26/18   0445  06/25/18   0351  06/24/18   0511  06/23/18   0603  06/22/18 2055   CR  0.72  0.73  0.70  0.86  1.06*       Recent Labs  Lab 06/26/18  0445 06/25/18  0351 06/24/18  0511 06/23/18  0603 06/22/18 2055   * 141* 135* 119* 152*     Lab Results   Component Value Date    A1C 5.8 06/25/2018       MEDICATIONS  Medications " reviewed  D5 IVF at 100 mL per hour provides 408 kcal, 120 gm CHO  Propofol gtt at 15.5 mL/hr = 409 kcal    PROCEDURES WITH NUTRITIONAL IMPLICATIONS  6/26: intubated, OG placed    MALNUTRITION:  % Weight Loss:[RP1.1]Weight loss does not meet criteria for malnutrition[RP1.3]   % Intake:[RP1.1]</= 50% for >/= 5 days and suspect <75% of needs for >1 month (severe malnutrition)[RP1.3]  Subcutaneous Fat Loss:[RP1.1]None observed[RP1.2]  Muscle Loss:[RP1.1] Mild muscle loss consistent with sarcopenia noted across temporal region, clavicle bone region, Acromion bone region and scapular bone region. Lower extremities somewhat thin but nourished[RP1.2]  Fluid Retention:[RP1.1]None noted[RP1.2]    Malnutrition Diagnosis:[RP1.1] Non-Severe malnutrition[RP1.2]  In Context of:[RP1.1]  Acute on chronic illness or disease[RP1.2]    NUTRITION DIAGNOSIS:[RP1.1]  Inadequate protein-energy intake[RP1.2] related to[RP1.1] limited opportunity with intubation and baseline decreased appetite[RP1.2] as evidenced by[RP1.1] meeting <50% of needs for >5 days on average, mild muscle loss and non severe malnutrition criteria met[RP1.2]    INTERVENTIONS  Recommendations / Nutrition Prescription  NPO, and[RP1.1] re[RP1.2]commend TF as follows:     Type of Feeding Tube:[RP1.1] OG[RP1.2] ([RP1.1]6/25[RP1.2])    Enteral Frequency:  Continuous    Enteral Regimen: Peptamen Intense VHP at 50 mL/hr    Total Enteral Provisions: 1200 mL provides 1200 kcal, 112 gm protein (1.8 gm per kg), 1008 ml free H2O, 94 gm CHO and 5 gm Fiber daily. Meets < 100% of DRI's[RP1.1] --> Thera Vit (Certavite with interactions)[RP1.2].    Free Water Flush: standard 60 mL q4 hours    Daily electrolyte check    Start at 20 mL/hr, increase by 15 mL/hr q8 hours until goal rate reached    Change to non dextrose containing IVF once goal rate reached to prevent overfeeding    Implementation  Nutrition education:[RP1.1] Discussed TF regimen with daughter at bedside[RP1.2]  EN  Composition, EN Schedule and Feeding Tube Flush: Entered orders to reflect regimen outlined above  Multivitamin/Minerals: Certavite   Collaboration and Referral of care: Discussed patient during interdisciplinary care rounds this morning    Goals  TF to meet % of estimated nutrition needs within 48 hours    MONITORING AND EVALUATION:  Progress towards goals will be monitored and evaluated per protocol and Practice Guidelines      Connie Singletary RDN, LD, CNSC  Pager - 3rd floor/ICU: [phone]  Pager - All other floors: [phone]  Pager - Weekend/holiday: [phone]  Office: [phone][RP1.1]     Revision History        User Key Date/Time User Provider Type Action    > RP1.2 6/26/2018  1:19 PM Connie Singletary RD, LD Registered Dietitian Sign     RP1.3 6/26/2018 12:03 PM Connie Singletary RD, LD Registered Dietitian      RP1.1 6/26/2018  7:24 AM Connie Singletary RD, LD Registered Dietitian             Consults by Jordi Zambrano MD at 6/25/2018  8:55 PM     Author:  Jordi Zambrano MD Service:  ICU Author Type:  Physician    Filed:  6/25/2018  8:55 PM Date of Service:  6/25/2018  8:55 PM Creation Time:  6/25/2018  8:26 PM    Status:  Signed :  Jordi Zambrano MD (Physician)         Critical Care  Note      06/25/2018    Name: Shana Horne MRN#: [id number]   Age: 67 year old YOB: 1950     [facility] Day# 3  ICU DAY #3    MV DAY #1             Problem List:   Active Problems:    Bacterial pneumonia    Acute and chronic respiratory failure with hypoxia (H)  1. Acute respiratory failure, likely due to pneumococcal pneumonia- she was intubated this morning and placed on vent, currently at 70% FIO2 and +10 PEEP. Based on urine antigen, this is likely pneumococcal; follow up cultures pending. Her left lung has areas of consolidation, and a history of scarring (reasons unclear). We have started focus on pulmonary hygiene and will consider bronch if not  improved by tomorrow   2. Severe COPD, on home oxygen 4 liters; continues on steroids and bronchodilators   3. Seizure disorder- on lamictal   4. History of dementia  5. History of diarrhea  6. Reflex sympathetic dystrophy LLE/chronic pain   7. GERD- on pepcid   8. History of parkinson's disease  9. She continues to smoke   Overall, she remains critical. I spent 50 minutes of critical care time with her, including assisting anesthesia with intubation; it did NOT include the time spent placing arterial line.            Summary/Hospital Course:   She was admitted with acute respiratory failure due to CAP, and COPD exacerbation. She was on BIPAP the first several days, and finally today she decompensated requiring intubation.         Assessment and plan :       ICU Prophylaxis:   1. DVT: Hep Subq/ LMWH/mechanical  2. VAP: HOB 30 degrees, chlorhexidine rinse  3. Stress Ulcer: PPI/H2 blocker  4. Restraints: Nonviolent soft two point restraints required and necessary for patient safety and continued cares and good effect as patient continues to pull at necessary lines, tubes despite education and distraction. Will readdress daily.   5. Wound care  -   6. Feeding - will start enteral nutrition   7. Family Update: discussed with family at bedside   8. Disposition - continue ICU care         Key goals for next 24 hours:   1. Full vent support  2. Pulmonary hygiene   3. Support family                Medical History:     Past Medical History:   Diagnosis Date     Arthritis      Blood transfusion     38 yrs ago     Chronic pain     painful feet     COPD (chronic obstructive pulmonary disease) (H)      Dementia      Gastroesophageal reflux disease      History of blood transfusion      Numbness and tingling     fingertips occas     Other chronic pain     generalized     Oxygen dependent     4l nc cont     Parkinsons disease (H)      Seizures (H)     LAST SEIZURE 2 MOS AGO     Stimulus-induced repetitive discharges on nerve  conduction studies      Past Surgical History:   Procedure Laterality Date     AMPUTATE TOE(S) Right 10/19/2017    Procedure: AMPUTATE TOE(S);  Right second toe amputation at metatarsophalangeal joint.;  Surgeon: Montrell Arce MD;  Location:  OR     COLONOSCOPY  11/20/2014    Dr. Schaeffer [facility]     COLONOSCOPY N/A 11/20/2014    Procedure: COLONOSCOPY;  Surgeon: Steven Schaeffer MD;  Location:  GI     GYN SURGERY      tear repair after delivery child     INSERT PUMP MORPHINE      pump replacement x2     MOUTH SURGERY      numerous times     ORTHOPEDIC SURGERY      foot surg left     REVISE PUMP MORPHINE  11/29/2011    Procedure:REVISE PUMP MORPHINE; Pump Replacement, abdominal area; Surgeon:ALIRIO RIVERA; Location: OR     Social History     Social History     Marital status:      Spouse name: N/A     Number of children: N/A     Years of education: N/A     Occupational History     Not on file.     Social History Main Topics     Smoking status: Current Every Day Smoker     Packs/day: 0.50     Smokeless tobacco: Never Used     Alcohol use No     Drug use: No     Sexual activity: Not on file     Other Topics Concern     Not on file     Social History Narrative      No Known Allergies           Key Medications:       acetylcysteine  2 mL Nebulization Q4H     albuterol  2.5 mg Nebulization Q4H     amitriptyline  75 mg Oral At Bedtime     azithromycin  250 mg Intravenous Q24H     cefTRIAXone  2 g Intravenous Q24H     enoxaparin  40 mg Subcutaneous Q24H     famotidine  20 mg Intravenous Q12H     felbamate (FELBATOL) tablet 600 mg  600 mg Oral TID     gabapentin (NEURONTIN) capsule 200 mg  200 mg Oral BID 09 12     insulin aspart  1-6 Units Subcutaneous Q4H     lamoTRIgine  100 mg Oral TID     methylPREDNISolone  40 mg Intravenous Q12H     metroNIDAZOLE  500 mg Intravenous Q6H     sodium chloride (PF)  10 mL Intracatheter Q7 Days     sodium chloride (PF)  3 mL Intracatheter Q8H     vancomycin  (VANCOCIN) IV  1,250 mg Intravenous Q12H       dextrose 5% and 0.9% NaCl 100 mL/hr at 06/25/18 1141     midazolam 3 mg/hr (06/25/18 1500)     propofol (DIPRIVAN) infusion 45 mcg/kg/min (06/25/18 1600)     sodium chloride Stopped (06/25/18 0602)        Home Meds    No current facility-administered medications on file prior to encounter.   Current Outpatient Prescriptions on File Prior to Encounter:  Felbamate (FELBATOL PO) Take 600 mg by mouth 3 times daily    FERROUS GLUCONATE PO Take 324 mg by mouth 2 times daily (with meals) Breakfast & supper   LamoTRIgine (LAMICTAL) 100 MG tablet Take 100 mg by mouth 3 times daily.   simvastatin (ZOCOR) 40 MG tablet Take 40 mg by mouth At Bedtime               Physical Examination:   Temp:  [92.3  F (33.5  C)-99.7  F (37.6  C)] 96.6  F (35.9  C)  Heart Rate:  [] 62  Resp:  [8-83] 11  BP: ()/() 112/65  MAP:  [69 mmHg-76 mmHg] 76 mmHg  Arterial Line BP: (107-115)/(49-54) 115/54  FiO2 (%):  [80 %-100 %] 95 %  SpO2:  [50 %-100 %] 96 %    Intake/Output Summary (Last 24 hours) at 06/25/18 2026  Last data filed at 06/25/18 1400   Gross per 24 hour   Intake          3535.42 ml   Output             1815 ml   Net          1720.42 ml     Wt Readings from Last 4 Encounters:   06/25/18 64.6 kg (142 lb 6.7 oz)   10/19/17 47.2 kg (104 lb)   11/20/14 54.4 kg (120 lb)   11/29/11 54.4 kg (120 lb)     Arterial Line BP: (107-115)/(49-54) 115/54  MAP:  [69 mmHg-76 mmHg] 76 mmHg  BP - Mean:  [] 83  Ventilation Mode: CMV/AC  (Continuous Mandatory Ventilation/ Assist Control)  FiO2 (%): 95 %  Rate Set (breaths/minute): 14 breaths/min  Tidal Volume Set (mL): 400 mL  PEEP (cm H2O): 10 cmH2O  Oxygen Concentration (%): 95 %  Resp: 11    Recent Labs  Lab 06/25/18  1700 06/25/18  1035 06/24/18  0600 06/23/18  0425   PH 7.35 7.36 7.36 7.37   PCO2 53* 52* 51* 54*   PO2 140* 66* 63* 69*   HCO3 29* 29* 28 31*   O2PER 95% 100% 100% 80%       GEN: no acute distress; comfortable on vent    HEENT: supple   PULM: coarse rhonchi    CV/COR: RRR S1S2 no gallop,  No rub, no murmur  ABD: soft nontender  EXT:  Trace edema   warm  NEURO: grossly intact; moves all extremities to stimulation   SKIN: no obvious rash; no cyanosis or mottling   LINES: clean, dry intact         Data:   All data and imaging reviewed     ROUTINE ICU LABS (Last four results)  CMP  Recent Labs  Lab 06/25/18  0351 06/24/18  0511 06/23/18  0603 06/22/18 2055    141 138 136   POTASSIUM 4.5 4.4 4.4 4.8   CHLORIDE 109 108 105 98   CO2 29 29 30 33*   ANIONGAP 3 4 3 5   * 135* 119* 152*   BUN 11 18 25 23   CR 0.73 0.70 0.86 1.06*   GFRESTIMATED 80 84 66 52*   GFRESTBLACK >90 >90 80 63   BIBIANA 7.8* 7.7* 7.8* 9.7   MAG  --   --   --  2.0   PROTTOTAL  --   --   --  9.1*   ALBUMIN  --   --   --  3.7   BILITOTAL  --   --   --  0.4   ALKPHOS  --   --   --  119   AST  --   --   --  27   ALT  --   --   --  28     CBC  Recent Labs  Lab 06/25/18  0351 06/24/18  0511 06/23/18  0603 06/22/18 2055   WBC 16.0* 15.1* 14.5* 18.9*   RBC 3.25* 3.32* 3.31* 4.58   HGB 10.2* 10.1* 10.2* 14.1   HCT 32.8* 33.0* 32.9* 44.8   * 99 99 98   MCH 31.4 30.4 30.8 30.8   MCHC 31.1* 30.6* 31.0* 31.5   RDW 14.0 13.8 13.8 13.8    226 226 319     INR  Recent Labs  Lab 06/22/18 2055   INR 0.97     Arterial Blood Gas  Recent Labs  Lab 06/25/18  1700 06/25/18  1035 06/24/18  0600 06/23/18  0425   PH 7.35 7.36 7.36 7.37   PCO2 53* 52* 51* 54*   PO2 140* 66* 63* 69*   HCO3 29* 29* 28 31*   O2PER 95% 100% 100% 80%       All cultures:    Recent Labs  Lab 06/25/18  0953 06/23/18  1400 06/23/18  1100 06/22/18  2343 06/22/18 2112 06/22/18 2055   CULT PENDING Canceled, Test creditedDuplicate request Moderate growthNormal amanda to date No growth No growth after 3 days No growth after 3 days     Recent Results (from the past 24 hour(s))   XR Chest Port 1 View    Narrative    CHEST ONE VIEW PORTABLE   6/25/2018 10:02 AM     HISTORY:  Endotracheal tube  positioning.    COMPARISON: 6/22/2018.      Impression    IMPRESSION: Tip of the endotracheal tube is approximately 5.6 cm above  the juanito. Nasogastric tube extends below the left hemithorax.  Right-sided PICC line present with the tip in the superior vena cava.  There is near-complete opacification of the left hemithorax which may  be related to atelectasis or pleural fluid. Interstitial opacities in  the right lung are unchanged. No pneumothorax either side.    LM LEON MD   CT Chest w/o Contrast    Narrative    CT CHEST WITHOUT CONTRAST   6/25/2018 3:40 PM    HISTORY: Hypoxia. Dyspnea. Confusion.    TECHNIQUE: Volumetric helical acquisition was performed from the  thoracic inlet through the upper abdomen without IV contrast. Coronal  images were also reconstructed from the axial data. Radiation dose for  this scan was reduced using automated exposure control, adjustment of  the mA and/or kV according to patient size, or iterative  reconstruction technique.    COMPARISON: None.    FINDINGS: Endotracheal tube is in place, with tip 5 cm above the  juanito. Small left pleural effusion. Trace right pleural effusion. No  pericardial effusion. Atherosclerotic calcification of the thoracic  aorta and coronary arteries. There are calcified mediastinal and left  hilar lymph nodes. Calcified granuloma is noted in the left lower lobe  laterally. Emphysematous changes are noted throughout both lungs.  There is extensive consolidation in the left mid and lower lung, with  patchy interstitial and airspace infiltrate within the aerated portion  of the left upper lung. There are several indeterminate nodular  opacities in the right upper lobe with the largest measuring 1.5 x 0.6  cm (series 3 image 27). Enteric tube, tip in the proximal stomach.  Ectasia of the infrarenal abdominal aorta measures up to 2.9 cm AP.  Limited noncontrast views of the upper abdomen are otherwise  unremarkable. Right PICC line is in place,  with tip in the upper SVC.       Impression    IMPRESSION:   1. Extensive consolidation in the left mid and lower lung may be  related to pneumonia and/or atelectasis, although underlying lung mass  cannot be excluded.  2. There are several indeterminate nodular opacities in the right  upper lobe, with the largest measuring 1.5 x 0.6 cm.  3. There are bilateral pleural effusions, small on the left and trace  on the right.  4. Emphysema.    MD Lester ONTIVEROS MD    Billing: This patient is critically ill: YES. Total critical care time today 50 min.[JD1.1]                              Revision History        User Key Date/Time User Provider Type Action    > JD1.1 6/25/2018  8:55 PM Jordi Zambrano MD Physician Sign            Consults by Smooth Leal MD at 6/23/2018  3:05 AM     Author:  Smooth Leal MD Service:  ICU Author Type:  Physician    Filed:  6/23/2018  3:53 AM Date of Service:  6/23/2018  3:05 AM Creation Time:  6/23/2018  3:03 AM    Status:  Addendum :  Smooth Leal MD (Physician)     Consult Orders:    1. Intensivist IP Consult: Patient to be seen: Routine - within 24 hours; ICU admission, significant pneumonia; Consultant may enter orders: Yes [[id number]] ordered by Kermit Montanez III, MD at 06/22/18 [address] [place] [address] History and Physical    Shana Horne MRN# [id number]   Age: 67 year old YOB: 1950     Date of Admission:  6/22/2018    Primary care provider: Otoniel Capellan          Assessment and Plan:   Assessment: Mrs. Horne is a 67 yr old lady with prior history of Dementia, COPD on supplemental home O2 at 4L/min, seizure disorder, chronic diarrhea that presented for AMS, likely septic encephalopathy bacterial pneumonia:    Problem List  1. Sepsis  2. Bacterial Pneumonia  3. Acute on Chronic Hypoxemic respiratory failure  4. Encephalopathy  5. COPD exacerbation            Plan:   Neuro:  1. Encephalopathy - likely combination of septic/hypoxemia  2. Seizure DIsorder - chronic  3. Baseline Dementia  Plan[JC1.1]  1. Hold oral seizure meds, switch to IV keppra[JC1.2]    Pulm:  1. Pneumonia  2. Acute on Chronic Hypoxemic Respiratory Failure  3. COPD Exacerbation  Plan:  1. Cont with BIPAP, settings changed to 16/8cmH2O with FiO2 80%, low threshold for intubation, will get repeat ABG  2. Cont treat COPD including BDs, Steroids, Chest PT, Azithromycin    CV  1. Low pressure - r/o septic shock  Plan:  1. Will challenge with 1L IVF LR, monitor urine output, if remains low will send for Lactate  2.[JC1.1] If BP remains low after last bolus, start peripheral Phenylephrine at 50mcg while awaiting PICC central line placement. Once PICC line in place switch to Levophed.[JC1.3]    Renal  1. Low urine output, likely pre-renal vs pending TALISHA from shock  Plan  1. Monitor output, place díaz, fluid challenge, pressors if no response.    ID  1. Sepsis from PN  Plan  1. Will change to broad spectrum antibiotics  2. Send for urine legionella/strep antigen  3. Resp Cultures if able to expectorate/ blood cultures  4. Already on steroids    Endo:  No issues    Heme:  No Issues  DVT prophylaxis    Prophylaxis  DVT with Heparin  GI with PPI  Nutrition - NPO               Chief Complaint:   AMS     Indication for critical care admission:  hypotension, severe respiratory distress and hypoxic respiratory failure     History is obtained from the patient and electronic health record          History of Present Illness:   This patient is a 67 year old  female with a significant past medical history of chronic obstructive pulmonary, Dementia, seizure disorder that presented last night with AMS. The family reports that the patient's mental status is poor at baseline, however it has worsened over the last 2 weeks and has acutely deteriated over the last 2-3 days with increased falls. The patient has  productive for a day or so. In the ED, the patient was noted to be hypoxic to 80s, tachycardic to 110s and WBC was 18.9. CXR revealed L-sided infiltrate.              Past Medical History:[JC1.1]     Past Medical History:   Diagnosis Date     Arthritis      Blood transfusion     38 yrs ago     Chronic pain     painful feet     COPD (chronic obstructive pulmonary disease) (H)      Dementia      Gastroesophageal reflux disease      History of blood transfusion      Numbness and tingling     fingertips occas     Other chronic pain     generalized     Oxygen dependent     4l nc cont     Parkinsons disease (H)      Seizures (H)     LAST SEIZURE 2 MOS AGO     Stimulus-induced repetitive discharges on nerve conduction studies[JC1.4]              Past Surgical History:[JC1.1]     Past Surgical History:   Procedure Laterality Date     AMPUTATE TOE(S) Right 10/19/2017    Procedure: AMPUTATE TOE(S);  Right second toe amputation at metatarsophalangeal joint.;  Surgeon: Montrell Arec MD;  Location:  OR     COLONOSCOPY  11/20/2014    Dr. Schaeffer [facility]     COLONOSCOPY N/A 11/20/2014    Procedure: COLONOSCOPY;  Surgeon: Steven Schaeffer MD;  Location:  GI     GYN SURGERY      tear repair after delivery child     INSERT PUMP MORPHINE      pump replacement x2     MOUTH SURGERY      numerous times     ORTHOPEDIC SURGERY      foot surg left     REVISE PUMP MORPHINE  11/29/2011    Procedure:REVISE PUMP MORPHINE; Pump Replacement, abdominal area; Surgeon:ALIRIO RIVERA; Location: OR[JC1.4]             Social History:[JC1.1]     Social History   Substance Use Topics     Smoking status: Current Every Day Smoker     Packs/day: 0.50     Smokeless tobacco: Never Used     Alcohol use No[JC1.4]             Family History:   I have reviewed this patient's family history and commented on sigificant items within the HPI[JC1.1]  No family history on file.[JC1.4]  Family history reviewed and updated in EPIC and old charts  reviewed          Immunizations:   Immunization status is unknown          Allergies:[JC1.1]   No Known Allergies[JC1.5]          Medications:[JC1.1]     Prescriptions Prior to Admission   Medication Sig Dispense Refill Last Dose     amitriptyline (ELAVIL) 25 MG tablet Take 25 mg by mouth At Bedtime   6/22/2018 at 2200     Felbamate (FELBATOL PO) Take 600 mg by mouth 3 times daily    6/22/2018 at x 3     FERROUS GLUCONATE PO Take 324 mg by mouth 2 times daily (with meals) Breakfast & supper   6/22/2018 at x 2     gabapentin (NEURONTIN) 100 MG capsule Take 100 mg by mouth 2 times daily (with meals) Breakfast & Lunch   6/22/2018 at x 2     LamoTRIgine (LAMICTAL) 100 MG tablet Take 100 mg by mouth 3 times daily.   6/22/2018 at x 3     simvastatin (ZOCOR) 40 MG tablet Take 40 mg by mouth At Bedtime    6/22/2018 at 2200[JC1.5]             Review of Systems:   The Review of Systems is negative other than noted in the HPI[JC1.1]     Physical Exam via drop in video call to [facility] ICU from [facility] ICU Tele Hub  Gen - pt comfortable on BIPAP, not in distress, is drowsy but easily arousable and able to follow commands.  Abd - díaz placed by local nursing staff, drained 350cc.[JC1.3]          Data:   All laboratory and imaging data in the past 24 hours reviewed  All cardiac studies reviewed by me.  Chest x-ray:   Infiltrate seen in the left lower lobe        Attestation:  Amount of time performed on this consult: 40 minutes.[JC1.1]    Smooth Leal MD[JC1.6]         Revision History        User Key Date/Time User Provider Type Action    > JC1.3 6/23/2018  3:53 AM Smooth Leal MD Physician Addend     JC1.2 6/23/2018  3:37 AM Smooth Leal MD Physician Addend     JC1.5 6/23/2018  3:27 AM Smooth Leal MD Physician Sign     JC1.4 6/23/2018  3:25 AM Smooth Leal MD Physician      JC1.6 6/23/2018  3:04 AM Smooth Leal MD Physician      JC1.1 6/23/2018  3:03 AM  Smooth Leal MD Physician                      Progress Notes - Physician (Notes from 07/06/18 through 07/09/18)      Progress Notes by Arely Srinivasan at 7/9/2018 11:53 AM     Author:  Arely Srinivasan Service:  (none) Author Type:  (none)    Filed:  7/9/2018 11:57 AM Date of Service:  7/9/2018 11:53 AM Creation Time:  7/9/2018 11:53 AM    Status:  Signed :  Arely Srinivasan         [facility]     This patient has been accepted for admission today and transportatioin is set up for 1430.     The accepting unit is [address] [place] [address] and RN report can be called prior to patient transfer to [phone].    The accepting MD is Dr. Beatty, this provider can be reached for MD sign out report by pager at [phone].    Please have the discharge summary and discharge orders completed, faxed to [phone], and available to accompany patient at least one hour before transfer time.      I have updated Sophie (AKIL) with admission details.       Thank you,    Arely Srinivasan  Referral Specialist  [facility]   [email or web address]  [phone] (direct)[AR1.1]     Revision History        User Key Date/Time User Provider Type Action    > AR1.1 7/9/2018 11:57 AM Arely Srinivasan (none) Sign            Progress Notes by Connie Singletary RD, LD at 7/9/2018 11:43 AM     Author:  Connie Singletary RD, LD Service:  Nutrition Author Type:  Registered Dietitian    Filed:  7/9/2018 11:48 AM Date of Service:  7/9/2018 11:43 AM Creation Time:  7/9/2018 11:43 AM    Status:  Signed :  Connie Singletary RD, LD (Registered Dietitian)         NUTRITION BRIEF NOTE    See RD note 7/3 for full assessment details  Patient due for follow up assessment today, noted d/c to [facility]    New findings in last 24 hours:    Diet order: advanced to full liquids, nectar thick per SLP    Remains on EN at goal rate:  Type of Feeding Tube: ND (7/1, originally with OGT 6/26)  Enteral Frequency:  Continuous  Enteral Regimen: Isosource 1.5 @ 45 ml/hr x 24  hrs  Total Enteral Provisions: 1080 mL provides 1620 kcal (29 kcal/kg), 73 gm protein, 821 ml free H2O, 190 gm CHO and 16 gm Fiber daily.   - 1 pkt Prosource daily for an additional 11 grams protein daily (to total 84 g - 1.5 g/kg)  Free Water Flush: 150 mL q 4 hrs        Weight:  58.3 kg, up ~3 kg since admit    600 mL output noted 7/8 (residuals should not be checked on a post pyloric tube)    BM: 7/8    Meds: Certavite, Prednisone  Recent Labs   Lab Test  07/09/18   0700  07/08/18   0657  07/07/18   0612  07/06/18   0530  07/05/18   0600   POTASSIUM  4.3  4.0  4.2  3.9  3.6     Recent Labs   Lab Test  07/09/18   0700  07/06/18   0530  07/02/18   0520  06/30/18   0445  06/28/18   0450   PHOS  4.0  3.5  3.0  3.1  2.9     Recent Labs   Lab Test  07/09/18   0700  07/04/18   0550  07/02/18   0520  06/30/18   0445  06/27/18   0435   MAG  2.2  2.2  2.0  2.1  2.0     Recent Labs   Lab Test  07/09/18   0700  07/08/18   0657  07/07/18   0612  07/06/18   0530  07/05/18   0600   NA  138  138  139  140  139     Recent Labs   Lab Test  07/09/18   0700  07/08/18   0657  07/07/18   0612  07/06/18   0530  07/05/18   0600   CR  0.68  0.66  0.67  0.65  0.62[RP1.1]       Recent Labs  Lab 07/09/18  0700 07/08/18  0657 07/07/18  0612 07/06/18  0530 07/05/18  0600 07/04/18  0550 07/03/18  0636   GLC 94 107* 106* 99 100* 100* 110*[RP1.2]     Lab Results   Component Value Date    A1C 5.8 06/25/2018           Meds:     Assessed Nutrition Needs (DW: 55.5 kg - lowest weight upon admit):  Estimated Energy Needs: 6115-4100+ kcals (25-30 Kcal/Kg+)  Justification: maintenance with extubation  Estimated Protein Needs: 67-83 grams protein (1.2-1.5 g pro/Kg)  Justification: preservation of LBM  Estimated Fluid Needs: >1 mL/Kcal  Justification: maintenance or per MD       Interventions:      Will hold off on transitioning to cyclic regimen with facility transfer today --> if remains at [facility], can update orders for overnight  feedings.    Collaboration and Referral of care: Discussed patient during interdisciplinary care rounds this morning    Please page/consult as needed.      Connie Singletary RDN, SHAI, [facility]  Pager - 3rd floor/ICU: [phone]  Pager - All other floors: [phone]  Pager - Weekend/holiday: [phone]  Office: [phone][RP1.1]       Revision History        User Key Date/Time User Provider Type Action    > RP1.2 7/9/2018 11:48 AM Connei Singletary RD, SHAI Registered Dietitian Sign     RP1.1 7/9/2018 11:43 AM Connie Singletary RD, SHAI Registered Dietitian             Progress Notes by Sophie Diaz at 7/9/2018  9:04 AM     Author:  Sophie Diaz Service:  Social Work Author Type:      Filed:  7/9/2018 11:31 AM Date of Service:  7/9/2018  9:04 AM Creation Time:  7/9/2018  9:04 AM    Status:  Addendum :  Sophie Diaz ()         SWS  Spoke to Arely Srinivasan RN from [facility] admissions who states she will be here at approximately 930 to reassess patient.  Also spoke to dtr Lisa to let her know as she would like to be here to talk to Arely.  ( is in [place] [facility] for family vacation but has given his approval for transfer if appropriate)  SW will continue to follow[AJ1.1]    Update-  Pt to transfer to [facility] today at 2:30 PM via HE stretcher.  SW consulted with Dr Jie RN, [facility] RN.  Appropriate phone numbers given for RN/RN and MD/MD report.  [facility] has gotten auth from insurance.  Dtr Lisa here and is agreeable to tx.  She has spoke with her father.  SW also spoke to pt who is agreeable  P:  No further SW needs[AJ1.2]     Revision History        User Key Date/Time User Provider Type Action    > AJ1.2 7/9/2018 11:31 AM Sophie Diaz  Addend     AJ1.1 7/9/2018  9:07 AM Sophie Diaz  Sign            Progress Notes by Usman Ceron MD at 7/9/2018  9:40 AM     Author:  Usman Ceron MD Service:  Hospitalist Author Type:  Physician    Filed:  7/9/2018   9:43 AM Date of Service:  7/9/2018  9:40 AM Creation Time:  7/9/2018  9:40 AM    Status:  Signed :  Usman Ceron MD (Physician)         [facility]  Hospitalist Progress Note  Usman Ceron MD, MD 07/09/2018  (Text Page)  Reason for Stay (Diagnosis): Acute on chronic hypoxic respiratory failure secondary to large left lung pneumonia         Assessment and Plan:      Summary of Stay: Shana Horne is a 67 year old female with a history of dementia with limited communication, COPD on chronic home O2 at 4L/nc, ongoing tobacco use, epilepsy, RSD of LLE with intrathecal pain pump,chronic diarrhea,  who lives with her dtr and typically ambulates with a walker  admitted on 6/22/2018 with a one week hx of some vague complaints, increasing sob and found to have hypoxic respiratory failure and a large LML/LLL dense infiltrate with effusion.  She was placed on BiPAP, initiated on ceftriaxone and azithromycin for CAP, and steroids for suspected COPD exacerbation, and admitted to the ICU. Her urine antigens returned + for pneumococcus, and sputum culture + for haemophilus influenzae   On am of 6/25 she had significant worsening of her respiratory status necessitating intubation and was bronched 6/26/26 with findings consistent with mucus plugging.  She was extubated on 7/3/18          Problem List:   1. Acute on chronic hypoxic respiratory failure known with COPD and chronic home oxygen at 4 L by nasal cannula secondary to left lung pneumonia felt to be community-acquired and COPD and exacerbation  -Initially required BiPAP therapy.  Did have worsening and required intubation on 6/25/18.  Able to be extubated on 7/3/18.  Has been slowly improving to the point of being able to be off of BiPAP.  Unfortunately, did worsen early morning of 7/6/18.  Currently back on BiPAP.  Repeat chest x-ray  shows no significant change from previous.  Wean off of BiPAP as able.   Likely require LTACH at  discharge.  She is now on high flow nasal cannula and appears to be tolerating it well.  Highly appreciate continuous input from ID service with escalation and weaning of her oxygen.  Hopeful that she can get back to her baseline home regimen of 4 L via nasal cannula.  - Completed 5 days of azithromycin and vancomycin.  Completed 12 days of IV ceftriaxone and 10 days of metronidazole.  Antibiotics completed on 7/3/18.  Needs repeat CT scan in 4 weeks to ensure resolution and evaluate for possible underlying malignancy  -Remain on DuoNeb's, IV Medrol, Pulmozyme.  Oxygen supplementation.  -She has been on pulmo-zyme for almost 2 weeks.  I am suspecting this was initiated when she has a significant mucus plugging leading to her worsening respiratory failure.  Now she is much more awake and likely can further expectorate and will not need further Pulmozyme administration.  -Changed her IV Medrol to oral prednisone with slow taper.    2.  Severe malnutrition secondary to acute illness-on tube feedings, SLP following with us if able  -I am anticipating that likely she will tolerate more of SLP evaluation as her mental state has significantly improved.  I will be awaiting any further reevaluation or recommendations from their service.  3.  Chronic pain syndrome, pain team service following, as an intrathecal pump  4.  Seizure disorder remain on lamotrigine and felbamate  5.  Severe physical deconditioning  6.  History of chronic diarrhea  7.  Hypokalemia  8.  Stable mild anemia    Continue inpatient care.  Anticipating changing of her IV Medrol to prednisone in the next day or so.  LTACH evaluation underway.    DVT Prophylaxis: Enoxaparin (Lovenox) SQ  Code Status: Full Code  Estimated Disch Date / # of Days until Disch: LTAC evaluation.  I am anticipating that Ms. Saldaan will be ready for hospital discharge in the next 24 hours.  Hopefully she can get to be evaluated by SLP so we can further plan regarding her oral  "intake.      Interval History (Subjective):      Seen and examined.  Chart reviewed.    Ms. Saldana is is more conversant, cooperative, awake.  She is following verbal commands.  No reported significant events occurred overnight.  Remained afebrile.  Oxygen supplementation still being provided via high flow nasal cannula but decreasing FiO2.   Choline stated she feels stronger but somewhat frustrated and depressed regarding her prolonged hospitalization.  She is excited for trial of oral intake once she gets to be seen by SLP.      No reported agitation or combativeness.  She is denying any ongoing nausea, vomiting, chest pain, or palpitations.  Remain afebrile.     # Pain Assessment:  Current Pain Score 7/8/2018   Patient currently in pain?   No   Pain score (0-10) -   Pain location Generalized   Pain descriptors Other (comment)   CPOT pain score -   Shana s pain level was assessed and she currently denies pain.                  Physical Exam:      Last Vital Signs:  BP 98/66 (BP Location: Left arm)  Pulse 81  Temp 98  F (36.7  C) (Oral)  Resp 16  Ht 1.6 m (5' 3\")  Wt 58.3 kg (128 lb 9.6 oz)  SpO2 94%  BMI 22.78 kg/m2    I/O last 3 completed shifts:  In: 20 [I.V.:20]  Out: 1950 [Urine:1350; Emesis/NG output:600]  Wt Readings from Last 1 Encounters:   07/09/18 58.3 kg (128 lb 9.6 oz)     Vitals:    07/03/18 0615 07/05/18 0000 07/06/18 0300 07/08/18 0517   Weight: 58.7 kg (129 lb 6.6 oz) 57.5 kg (126 lb 12.2 oz) 60 kg (132 lb 4.8 oz) 58.5 kg (129 lb)    07/09/18 0600   Weight: 58.3 kg (128 lb 9.6 oz)       Constitutional: Awake, alert, cooperative, no apparent distress   Respiratory:  Minimal wheezing.  Left basilar crackles.  Fair air entry.  O2 sats 90-95% during the time exam while on high flow nasal cannula   Cardiovascular: Regular rate and rhythm, normal S1 and S2, and no murmur noted   Abdomen: Normal bowel sounds, soft, non-distended, non-tender   Skin: No rashes, no cyanosis, dry to touch   Neuro: " Alert and oriented x1, no weakness, spontaneous and coherent speech   Extremities: No edema, normal range of motion   Other(s): Euthymic mood, not agitated    Gill catheter, feeding tube in place.   All other systems: Negative          Medications:      All current medications were reviewed with changes reflected in problem list.         Data:      All new lab and imaging data was reviewed.   Labs:  No results for input(s): CULT in the last 168 hours.    Recent Labs  Lab 07/06/18  0455   PH 7.46*   PO2 52*   PCO2 57*   HCO3 40*   SELENA 14.2       Recent Labs  Lab 07/09/18  0700 07/08/18  0657 07/07/18  0612   WBC 10.0 10.6 9.6   HGB 10.0* 10.3* 10.0*   HCT 33.2* 32.8* 32.9*    98 100    369 378       Recent Labs  Lab 07/09/18  0700 07/08/18  0657 07/07/18  0612 07/06/18  0530  07/04/18  0550    138 139 140  < > 141   POTASSIUM 4.3 4.0 4.2 3.9  < > 3.2*   CHLORIDE 100 99 98 98  < > 95   CO2 33* 37* 39* 39*  < > 44*   ANIONGAP 5 2* 2* 3  < > 2*   GLC 94 107* 106* 99  < > 100*   BUN 27 29 31* 34*  < > 35*   CR 0.68 0.66 0.67 0.65  < > 0.63   GFRESTIMATED 86 89 89 >90  < > >90   GFRESTBLACK >90 >90 >90 >90  < > >90   BIBIANA 9.0 9.1 8.7 9.0  < > 8.8   MAG 2.2  --   --   --   --  2.2   PHOS 4.0  --   --  3.5  --   --    < > = values in this interval not displayed.    Recent Labs  Lab 07/09/18  0700   CRP 6.9       Recent Labs  Lab 07/09/18  0834 07/09/18  0700 07/09/18  0419 07/08/18  2345 07/08/18  2054 07/08/18  1625  07/08/18  0657  07/07/18  0612  07/06/18  0530  07/05/18  0600   GLC  --  94  --   --   --   --   --  107*  --  106*  --  99  --  100*   *  --  110* 118* 123* 152*  < >  --   < >  --   < >  --   < >  --    < > = values in this interval not displayed.  No results for input(s): TROPONIN, TROPI, TROPR in the last 168 hours.    Invalid input(s): TROP, TROPONINIES  No results for input(s): COLOR, APPEARANCE, URINEGLC, URINEBILI, URINEKETONE, SG, UBLD, URINEPH, PROTEIN, UROBILINOGEN,  NITRITE, LEUKEST, RBCU, WBCU in the last 168 hours.   Imaging:   Results for orders placed or performed during the hospital encounter of 06/22/18   XR Chest Port 1 View    Narrative    CHEST ONE VIEW PORTABLE    6/22/2018 9:20 PM     HISTORY: Shortness of breath. Confusion.     COMPARISON: 10/20/2017.      Impression    IMPRESSION: Large consolidation in the left mid and lower lung  concerning for pneumonia with left pleural effusion. Right lung is  relatively clear. No pneumothorax visualized. Cardiac silhouette  remains enlarged.    EB CALDERON MD   CT Head w/o Contrast    Narrative    CT HEAD W/O CONTRAST  6/22/2018 11:02 PM     HISTORY: Confusion. Emphysema and dementia.    TECHNIQUE: Axial images of the head and coronal reformations without  IV contrast material. Radiation dose for this scan was reduced using  automated exposure control, adjustment of the mA and/or kV according  to patient size, or iterative reconstruction technique.    COMPARISON: 7/22/2017.    FINDINGS: No intracranial hemorrhage, mass or mass effect. No acute  infarct identified. No shift of midline structures. No significant  change.      Impression    IMPRESSION: No acute intracranial findings.    HOWARD MENJIVAR MD   US Lower Extremity Venous Duplex Left    Narrative    US LOWER EXTREMITY VENOUS DUPLEX LEFT  6/22/2018 11:24 PM     HISTORY: Evaluate for DVT, pain.    TECHNIQUE: Venous Doppler ultrasound of the lower extremity. Color  flow and spectral Doppler with waveform analysis performed.    COMPARISON: None.    FINDINGS: Ultrasound of the left leg demonstrates no deep vein  thrombus from the common femoral through popliteal veins or in the  visualized segments of posterior tibial or peroneal veins in the calf.  3.5 x 3.7 x 1.2 cm popliteal fossa cyst, likely a Baker's cyst.      Impression    IMPRESSION:  1. No DVT identified left leg.  2. Baker's cyst.    HOWARD MENJIVAR MD   XR Pelvis 1/2 Views    Narrative    XR PELVIS 1/2 VW   6/22/2018 11:36 PM     INDICATION: Pain.    COMPARISON: None.      Impression    IMPRESSION: Negative.    HOWARD MENJIVAR MD   XR Femur Left 2 Views    Narrative    XR FEMUR LT 2 VW  6/22/2018 11:37 PM     INDICATION: Pain.    COMPARISON: None.      Impression    IMPRESSION: No acute fractures. Moderate degenerative and hypertrophic  changes left knee.    HOWARD MENJIVAR MD   XR Chest Port 1 View    Narrative    CHEST ONE VIEW PORTABLE   6/25/2018 10:02 AM     HISTORY:  Endotracheal tube positioning.    COMPARISON: 6/22/2018.      Impression    IMPRESSION: Tip of the endotracheal tube is approximately 5.6 cm above  the juanito. Nasogastric tube extends below the left hemithorax.  Right-sided PICC line present with the tip in the superior vena cava.  There is near-complete opacification of the left hemithorax which may  be related to atelectasis or pleural fluid. Interstitial opacities in  the right lung are unchanged. No pneumothorax either side.    LM LEON MD   CT Chest w/o Contrast    Narrative    CT CHEST WITHOUT CONTRAST   6/25/2018 3:40 PM    HISTORY: Hypoxia. Dyspnea. Confusion.    TECHNIQUE: Volumetric helical acquisition was performed from the  thoracic inlet through the upper abdomen without IV contrast. Coronal  images were also reconstructed from the axial data. Radiation dose for  this scan was reduced using automated exposure control, adjustment of  the mA and/or kV according to patient size, or iterative  reconstruction technique.    COMPARISON: None.    FINDINGS: Endotracheal tube is in place, with tip 5 cm above the  juanito. Small left pleural effusion. Trace right pleural effusion. No  pericardial effusion. Atherosclerotic calcification of the thoracic  aorta and coronary arteries. There are calcified mediastinal and left  hilar lymph nodes. Calcified granuloma is noted in the left lower lobe  laterally. Emphysematous changes are noted throughout both lungs.  There is extensive  consolidation in the left mid and lower lung, with  patchy interstitial and airspace infiltrate within the aerated portion  of the left upper lung. There are several indeterminate nodular  opacities in the right upper lobe with the largest measuring 1.5 x 0.6  cm (series 3 image 27). Enteric tube, tip in the proximal stomach.  Ectasia of the infrarenal abdominal aorta measures up to 2.9 cm AP.  Limited noncontrast views of the upper abdomen are otherwise  unremarkable. Right PICC line is in place, with tip in the upper SVC.       Impression    IMPRESSION:   1. Extensive consolidation in the left mid and lower lung may be  related to pneumonia and/or atelectasis, although underlying lung mass  cannot be excluded.  2. There are several indeterminate nodular opacities in the right  upper lobe, with the largest measuring 1.5 x 0.6 cm.  3. There are bilateral pleural effusions, small on the left and trace  on the right.  4. Emphysema.    CALI CHAVEZ MD   XR Chest Port 1 View    Narrative    CHEST ONE VIEW PORTABLE   6/26/2018 8:06 AM     HISTORY: Follow up respiratory failure.     COMPARISON: 6/25/2018      Impression    IMPRESSION: Tip of the endotracheal tube is 7.1 cm above the juanito.  Nasogastric tube extends below the left hemidiaphragm. There is a  right-sided PICC line that extends at least to the SVC, although the  tip cannot be seen due to overlying leads and wires. There is  retrocardiac airspace opacity. No pneumothorax on either side.    LM LEON MD   XR Chest Port 1 View    Narrative    CHEST PORTABLE ONE VIEW   6/27/2018 9:49 AM     HISTORY: Follow up pneumonia.     COMPARISON: 6/26/2018.      Impression    IMPRESSION: No significant change since prior. Persistent retrocardiac  consolidation concerning for pneumonia or atelectasis likely with  pleural effusion. Background of diffuse hazy opacities throughout the  lungs and interstitial thickening could represent pulmonary edema  versus  infection. Support devices remain in place.    EB CALDERON MD   XR Abdomen Port 1 View    Narrative    XR ABDOMEN PORT 1 VW  6/28/2018 3:27 AM     INDICATION: OG placement.    COMPARISON: None.      Impression    IMPRESSION: Enteric tube tip in the stomach. Small left pleural  effusion, left lung base infiltrate or atelectasis, and small left  pleural effusion.    HOWARD MENJIVAR MD   XR Chest Port 1 View    Narrative    XR CHEST PORT 1 VW 6/29/2018 7:29 AM    COMPARISON: 6/27/2018    HISTORY: Left lower lobe atelectasis.      Impression    IMPRESSION: Bibasilar opacities, left worse than right are again seen  and not significantly changed. No pneumothorax seen on either side.  Support devices unchanged.    YOLI SHORT MD   XR Chest Port 1 View    Narrative    PORTABLE CHEST ONE VIEW  7/1/2018 5:36 AM     HISTORY: Respiratory failure.     COMPARISON: 6/29/2018.      Impression    IMPRESSION:   1. ET tube and nasogastric tube appears stable. Right PICC line  appears stable.  2. Left lung base atelectasis or airspace disease is unchanged. No  pneumothorax. Right lung shows no new airspace disease. Stable cardiac  silhouette.    SHIRLENE MCDONALD MD   XR Abdomen Port 1 View    Narrative    XR ABDOMEN PORT 1 VW  7/1/2018 5:18 PM     HISTORY:  s/p ft placement. please verify post pyloric;     COMPARISON: None.    FINDINGS:  A feeding tube has been placed. The tip is in the third  portion of the duodenum.    VICKIE WILLINGHAM MD   XR Chest Port 1 View    Narrative    CHEST PORTABLE ONE VIEW 7/6/2018 9:09 AM     HISTORY: Hypoxia.     COMPARISON: 7/1/2018      Impression    IMPRESSION: The endotracheal tube has been removed removed. Right arm  PICC line remains in place. Enteric tube directed into the stomach,  the tip is not seen. There is persistent left basilar opacity which  looks unchanged. No new pulmonary opacities are seen. Multiple old  left-sided rib fractures. The cardiac silhouette is stable.    VANCE HO  "MD[AG1.1]          Revision History        User Key Date/Time User Provider Type Action    > AG1.1 7/9/2018  9:43 AM Usman Ceron MD Physician Sign            Progress Notes by Eliane Stone RT at 7/9/2018  6:30 AM     Author:  Eliane Stone RT Service:  (none) Author Type:  Respiratory Therapist    Filed:  7/9/2018  6:32 AM Date of Service:  7/9/2018  6:30 AM Creation Time:  7/9/2018  6:30 AM    Status:  Signed :  Eliane Stone RT (Respiratory Therapist)         Patient remains on HFNC 60%, 30 LPM. Titrated FiO2 down to 60 from 70 and flow 30 from 35, tolerated well. SpO2 mid 90's, BS diminished. Will continue to monitor pt's respiratory status closely.    RT Majo  7/9/2018 6:32 AM[RT1.1]           Revision History        User Key Date/Time User Provider Type Action    > RT1.1 7/9/2018  6:32 AM Eliane Stone RT Respiratory Therapist Sign            Progress Notes by Chapito Epperson RT at 7/8/2018  3:57 PM     Author:  Chapito Epperson RT Service:  (none) Author Type:  Respiratory Therapist    Filed:  7/8/2018  4:48 PM Date of Service:  7/8/2018  3:57 PM Creation Time:  7/8/2018  4:43 PM    Status:  Signed :  Chapito Epperson RT (Respiratory Therapist)         [facility]     Date: 7/8/2018  Admission Dx: Pneumonia  Pulmonary History: COPD  Home Nebulizer/MDI Use: None  Home Oxygen: 4 Lpm NC  Acuity Level (RCAT flow sheet): 3  Aerosol Therapy initiated: Continue Duoneb QID and Albuterol nebs Q2 hours prn.      Pulmonary Hygiene initiated:  Deep breath and cough TID.      Volume Expansion initiated:  IS TID.      Current Oxygen Requirements: HFNC 35 Lpm 70%  Current SpO2: 91    Re-evaluation date: 7/11/2018    Patient Education: Encouraged Pt take deep breaths an to leave her O2 on.        See \"RT Assessments\" flow sheet for patient assessment scoring and Acuity Level Details.     Chapito Epperson  July 8, 2018.3:57 PM[RA1.1]                 Revision History        User Key Date/Time User " Provider Type Action    > RA1.1 7/8/2018  4:48 PM Chapito Epperson, RT Respiratory Therapist Sign            Progress Notes by Chapito Epperson RT at 7/8/2018  4:00 PM     Author:  Chapito Epperson RT Service:  (none) Author Type:  Respiratory Therapist    Filed:  7/8/2018  4:05 PM Date of Service:  7/8/2018  4:00 PM Creation Time:  7/8/2018  4:00 PM    Status:  Signed :  Chapito Epperson RT (Respiratory Therapist)         Respiratory Therapy Note        Pt remains on HFNC 35 Lpm 70%.  Breath sounds are diminished bilaterally.  She is frequently pulling at O2 and will drop her SpO2 during conversation.  If she is relaxed and not moving she can be up to SpO2 of 97%; as it is she is almost constant motion and SpO2 is 87-93%.  Some difficulty keeping even a sticky SpO2 probe in place.    July 8, 2018 4:00 PM  Chapito Epperson[RA1.1]         Revision History        User Key Date/Time User Provider Type Action    > RA1.1 7/8/2018  4:05 PM Chapito Epperson, RT Respiratory Therapist Sign            Progress Notes by Usman Ceron MD at 7/8/2018  9:35 AM     Author:  Usman Ceron MD Service:  Hospitalist Author Type:  Physician    Filed:  7/8/2018  9:38 AM Date of Service:  7/8/2018  9:35 AM Creation Time:  7/8/2018  9:35 AM    Status:  Signed :  Usman Ceron MD (Physician)         [facility]  Hospitalist Progress Note  Usman Ceron MD, MD 07/08/2018  (Text Page)  Reason for Stay (Diagnosis): Acute on chronic hypoxic respiratory failure secondary to large left lung pneumonia         Assessment and Plan:      Summary of Stay: Shana Horne is a 67 year old female with a history of dementia with limited communication, COPD on chronic home O2 at 4L/nc, ongoing tobacco use, epilepsy, RSD of LLE with intrathecal pain pump,chronic diarrhea,  who lives with her dtr and typically ambulates with a walker  admitted on 6/22/2018 with a one week hx of some vague complaints, increasing sob  and found to have hypoxic respiratory failure and a large LML/LLL dense infiltrate with effusion.  She was placed on BiPAP, initiated on ceftriaxone and azithromycin for CAP, and steroids for suspected COPD exacerbation, and admitted to the ICU. Her urine antigens returned + for pneumococcus, and sputum culture + for haemophilus influenzae   On am of 6/25 she had significant worsening of her respiratory status necessitating intubation and was bronched 6/26/26 with findings consistent with mucus plugging.  She was extubated on 7/3/18          Problem List:   1. Acute on chronic hypoxic respiratory failure known with COPD and chronic home oxygen at 4 L by nasal cannula secondary to left lung pneumonia felt to be community-acquired and COPD and exacerbation  -Initially required BiPAP therapy.  Did have worsening and required intubation on 6/25/18.  Able to be extubated on 7/3/18.  Has been slowly improving to the point of being able to be off of BiPAP.  Unfortunately, did worsen early morning of 7/6/18.  Currently back on BiPAP.  Repeat chest x-ray today shows no significant change from previous.  Wean off of BiPAP as able.   Likely require LTACH at discharge.  She is now on high flow nasal cannula and appears to be tolerating it well.  - Completed 5 days of azithromycin and vancomycin.  Completed 12 days of IV ceftriaxone and 10 days of metronidazole.  Antibiotics completed on 7/3/18.  Needs repeat CT scan in 4 weeks to ensure resolution and evaluate for possible underlying malignancy  -Remain on DuoNeb's, IV Medrol, Pulmozyme.  Oxygen supplementation.  -She has been on pulmo-zyme for almost 2 weeks.  I am suspecting this was initiated when she has a significant mucus plugging leading to her worsening respiratory failure.  Now she is much more awake and likely can further expectorate and will not need further Pulmozyme administration.  -Changed her IV Medrol to oral prednisone with slow taper.    2.  Severe  "malnutrition secondary to acute illness-on tube feedings, SLP following with us if able  -I am anticipating that likely she will tolerate more of SLP evaluation as her mental state has significantly improved.  I will be awaiting any further reevaluation or recommendations from their service.  3.  Chronic pain syndrome, pain team service following, as an intrathecal pump  4.  Seizure disorder remain on lamotrigine and felbamate  5.  Severe physical deconditioning  6.  History of chronic diarrhea  7.  Hypokalemia  8.  Stable mild anemia    Continue inpatient care.  Anticipating changing of her IV Medrol to prednisone in the next day or so.  LTACH evaluation underway.    DVT Prophylaxis: Enoxaparin (Lovenox) SQ  Code Status: Full Code  Estimated Disch Date / # of Days until Disch: Likely in the next 1-2 more days        Interval History (Subjective):      Seen and examined.  Chart reviewed.    Ms. Saldana is found laying comfortably in bed and currently appears more awake, pleasant, cooperative.  She is talking in full sentences and appears to have better concentration and axis of her memory recall.    No reported agitation or combativeness.  She is denying any ongoing nausea, vomiting, chest pain, or palpitations.  No reported diarrhea.  Remain afebrile.     # Pain Assessment:  Current Pain Score 7/8/2018   Patient currently in pain?   No   Pain score (0-10) -   Pain location Generalized   Pain descriptors Other (comment)   CPOT pain score -   Shana s pain level was assessed and she currently denies pain.                  Physical Exam:      Last Vital Signs:  /56 (BP Location: Left arm)  Pulse 81  Temp 98.6  F (37  C) (Oral)  Resp 20  Ht 1.6 m (5' 3\")  Wt 58.5 kg (129 lb)  SpO2 94%  BMI 22.85 kg/m2    I/O last 3 completed shifts:  In: -   Out: 2350 [Urine:2350]  Wt Readings from Last 1 Encounters:   07/08/18 58.5 kg (129 lb)     Vitals:    07/02/18 2309 07/03/18 0615 07/05/18 0000 07/06/18 0300 "   Weight: 59 kg (130 lb 1.1 oz) 58.7 kg (129 lb 6.6 oz) 57.5 kg (126 lb 12.2 oz) 60 kg (132 lb 4.8 oz)    07/08/18 0517   Weight: 58.5 kg (129 lb)       Constitutional: Awake, alert, cooperative, no apparent distress   Respiratory:  Minimal wheezing.  Left basilar crackles.  Fair air entry.  O2 sats 90-95% during the time exam while on high flow nasal cannula   Cardiovascular: Regular rate and rhythm, normal S1 and S2, and no murmur noted   Abdomen: Normal bowel sounds, soft, non-distended, non-tender   Skin: No rashes, no cyanosis, dry to touch   Neuro: Alert and oriented x1, no weakness, spontaneous and coherent speech   Extremities: No edema, normal range of motion   Other(s): Euthymic mood, not agitated    Gill catheter, feeding tube in place.   All other systems: Negative          Medications:      All current medications were reviewed with changes reflected in problem list.         Data:      All new lab and imaging data was reviewed.   Labs:  No results for input(s): CULT in the last 168 hours.    Recent Labs  Lab 07/06/18  0455   PH 7.46*   PO2 52*   PCO2 57*   HCO3 40*   SELENA 14.2       Recent Labs  Lab 07/08/18  0657 07/07/18  0612 07/06/18  0530   WBC 10.6 9.6 11.0   HGB 10.3* 10.0* 10.7*   HCT 32.8* 32.9* 34.2*   MCV 98 100 98    378 403       Recent Labs  Lab 07/08/18  0657 07/07/18  0612 07/06/18  0530  07/04/18  0550  07/02/18  0520    139 140  < > 141  < > 143   POTASSIUM 4.0 4.2 3.9  < > 3.2*  < > 3.4   CHLORIDE 99 98 98  < > 95  < > 95   CO2 37* 39* 39*  < > 44*  < > 43*   ANIONGAP 2* 2* 3  < > 2*  < > 5   * 106* 99  < > 100*  < > 126*   BUN 29 31* 34*  < > 35*  < > 34*   CR 0.66 0.67 0.65  < > 0.63  < > 0.70   GFRESTIMATED 89 89 >90  < > >90  < > 84   GFRESTBLACK >90 >90 >90  < > >90  < > >90   BIBIANA 9.1 8.7 9.0  < > 8.8  < > 8.4*   MAG  --   --   --   --  2.2  --  2.0   PHOS  --   --  3.5  --   --   --  3.0   < > = values in this interval not displayed.    Recent Labs  Lab  07/02/18  0520   CRP 6.9       Recent Labs  Lab 07/08/18  0657 07/08/18  0512 07/08/18  0007 07/07/18  2007 07/07/18  1643 07/07/18  1246 07/07/18  0612  07/06/18  0530  07/05/18  0600  07/04/18  0550   *  --   --   --   --   --  106*  --  99  --  100*  --  100*   BGM  --  109* 96 102* 127* 186*  --   < >  --   < >  --   < >  --    < > = values in this interval not displayed.  No results for input(s): TROPONIN, TROPI, TROPR in the last 168 hours.    Invalid input(s): TROP, TROPONINIES  No results for input(s): COLOR, APPEARANCE, URINEGLC, URINEBILI, URINEKETONE, SG, UBLD, URINEPH, PROTEIN, UROBILINOGEN, NITRITE, LEUKEST, RBCU, WBCU in the last 168 hours.   Imaging:   Results for orders placed or performed during the hospital encounter of 06/22/18   XR Chest Port 1 View    Narrative    CHEST ONE VIEW PORTABLE    6/22/2018 9:20 PM     HISTORY: Shortness of breath. Confusion.     COMPARISON: 10/20/2017.      Impression    IMPRESSION: Large consolidation in the left mid and lower lung  concerning for pneumonia with left pleural effusion. Right lung is  relatively clear. No pneumothorax visualized. Cardiac silhouette  remains enlarged.    EB CALDERON MD   CT Head w/o Contrast    Narrative    CT HEAD W/O CONTRAST  6/22/2018 11:02 PM     HISTORY: Confusion. Emphysema and dementia.    TECHNIQUE: Axial images of the head and coronal reformations without  IV contrast material. Radiation dose for this scan was reduced using  automated exposure control, adjustment of the mA and/or kV according  to patient size, or iterative reconstruction technique.    COMPARISON: 7/22/2017.    FINDINGS: No intracranial hemorrhage, mass or mass effect. No acute  infarct identified. No shift of midline structures. No significant  change.      Impression    IMPRESSION: No acute intracranial findings.    HOWARD MENJIVAR MD   US Lower Extremity Venous Duplex Left    Narrative    US LOWER EXTREMITY VENOUS DUPLEX LEFT  6/22/2018 11:24 PM      HISTORY: Evaluate for DVT, pain.    TECHNIQUE: Venous Doppler ultrasound of the lower extremity. Color  flow and spectral Doppler with waveform analysis performed.    COMPARISON: None.    FINDINGS: Ultrasound of the left leg demonstrates no deep vein  thrombus from the common femoral through popliteal veins or in the  visualized segments of posterior tibial or peroneal veins in the calf.  3.5 x 3.7 x 1.2 cm popliteal fossa cyst, likely a Baker's cyst.      Impression    IMPRESSION:  1. No DVT identified left leg.  2. Baker's cyst.    HOWARD MENJIVAR MD   XR Pelvis 1/2 Views    Narrative    XR PELVIS 1/2 VW  6/22/2018 11:36 PM     INDICATION: Pain.    COMPARISON: None.      Impression    IMPRESSION: Negative.    HOWARD MENIJVAR MD   XR Femur Left 2 Views    Narrative    XR FEMUR LT 2 VW  6/22/2018 11:37 PM     INDICATION: Pain.    COMPARISON: None.      Impression    IMPRESSION: No acute fractures. Moderate degenerative and hypertrophic  changes left knee.    HOWARD MENJIVAR MD   XR Chest Port 1 View    Narrative    CHEST ONE VIEW PORTABLE   6/25/2018 10:02 AM     HISTORY:  Endotracheal tube positioning.    COMPARISON: 6/22/2018.      Impression    IMPRESSION: Tip of the endotracheal tube is approximately 5.6 cm above  the juanito. Nasogastric tube extends below the left hemithorax.  Right-sided PICC line present with the tip in the superior vena cava.  There is near-complete opacification of the left hemithorax which may  be related to atelectasis or pleural fluid. Interstitial opacities in  the right lung are unchanged. No pneumothorax either side.    LM LEON MD   CT Chest w/o Contrast    Narrative    CT CHEST WITHOUT CONTRAST   6/25/2018 3:40 PM    HISTORY: Hypoxia. Dyspnea. Confusion.    TECHNIQUE: Volumetric helical acquisition was performed from the  thoracic inlet through the upper abdomen without IV contrast. Coronal  images were also reconstructed from the axial data. Radiation dose  for  this scan was reduced using automated exposure control, adjustment of  the mA and/or kV according to patient size, or iterative  reconstruction technique.    COMPARISON: None.    FINDINGS: Endotracheal tube is in place, with tip 5 cm above the  juanito. Small left pleural effusion. Trace right pleural effusion. No  pericardial effusion. Atherosclerotic calcification of the thoracic  aorta and coronary arteries. There are calcified mediastinal and left  hilar lymph nodes. Calcified granuloma is noted in the left lower lobe  laterally. Emphysematous changes are noted throughout both lungs.  There is extensive consolidation in the left mid and lower lung, with  patchy interstitial and airspace infiltrate within the aerated portion  of the left upper lung. There are several indeterminate nodular  opacities in the right upper lobe with the largest measuring 1.5 x 0.6  cm (series 3 image 27). Enteric tube, tip in the proximal stomach.  Ectasia of the infrarenal abdominal aorta measures up to 2.9 cm AP.  Limited noncontrast views of the upper abdomen are otherwise  unremarkable. Right PICC line is in place, with tip in the upper SVC.       Impression    IMPRESSION:   1. Extensive consolidation in the left mid and lower lung may be  related to pneumonia and/or atelectasis, although underlying lung mass  cannot be excluded.  2. There are several indeterminate nodular opacities in the right  upper lobe, with the largest measuring 1.5 x 0.6 cm.  3. There are bilateral pleural effusions, small on the left and trace  on the right.  4. Emphysema.    CALI CHAVEZ MD   XR Chest Port 1 View    Narrative    CHEST ONE VIEW PORTABLE   6/26/2018 8:06 AM     HISTORY: Follow up respiratory failure.     COMPARISON: 6/25/2018      Impression    IMPRESSION: Tip of the endotracheal tube is 7.1 cm above the juanito.  Nasogastric tube extends below the left hemidiaphragm. There is a  right-sided PICC line that extends at least to the  SVC, although the  tip cannot be seen due to overlying leads and wires. There is  retrocardiac airspace opacity. No pneumothorax on either side.    LM LEON MD   XR Chest Port 1 View    Narrative    CHEST PORTABLE ONE VIEW   6/27/2018 9:49 AM     HISTORY: Follow up pneumonia.     COMPARISON: 6/26/2018.      Impression    IMPRESSION: No significant change since prior. Persistent retrocardiac  consolidation concerning for pneumonia or atelectasis likely with  pleural effusion. Background of diffuse hazy opacities throughout the  lungs and interstitial thickening could represent pulmonary edema  versus infection. Support devices remain in place.    EB CALDERON MD   XR Abdomen Port 1 View    Narrative    XR ABDOMEN PORT 1 VW  6/28/2018 3:27 AM     INDICATION: OG placement.    COMPARISON: None.      Impression    IMPRESSION: Enteric tube tip in the stomach. Small left pleural  effusion, left lung base infiltrate or atelectasis, and small left  pleural effusion.    HOWARD MENJIVAR MD   XR Chest Port 1 View    Narrative    XR CHEST PORT 1 VW 6/29/2018 7:29 AM    COMPARISON: 6/27/2018    HISTORY: Left lower lobe atelectasis.      Impression    IMPRESSION: Bibasilar opacities, left worse than right are again seen  and not significantly changed. No pneumothorax seen on either side.  Support devices unchanged.    YOLI SHORT MD   XR Chest Port 1 View    Narrative    PORTABLE CHEST ONE VIEW  7/1/2018 5:36 AM     HISTORY: Respiratory failure.     COMPARISON: 6/29/2018.      Impression    IMPRESSION:   1. ET tube and nasogastric tube appears stable. Right PICC line  appears stable.  2. Left lung base atelectasis or airspace disease is unchanged. No  pneumothorax. Right lung shows no new airspace disease. Stable cardiac  silhouette.    SHIRLENE MCDONALD MD   XR Abdomen Port 1 View    Narrative    XR ABDOMEN PORT 1 VW  7/1/2018 5:18 PM     HISTORY:  s/p ft placement. please verify post pyloric;     COMPARISON:  None.    FINDINGS:  A feeding tube has been placed. The tip is in the third  portion of the duodenum.    VICKIE WILLINGHAM MD   XR Chest Port 1 View    Narrative    CHEST PORTABLE ONE VIEW 7/6/2018 9:09 AM     HISTORY: Hypoxia.     COMPARISON: 7/1/2018      Impression    IMPRESSION: The endotracheal tube has been removed removed. Right arm  PICC line remains in place. Enteric tube directed into the stomach,  the tip is not seen. There is persistent left basilar opacity which  looks unchanged. No new pulmonary opacities are seen. Multiple old  left-sided rib fractures. The cardiac silhouette is stable.    VANCE HO MD[AG1.1]          Revision History        User Key Date/Time User Provider Type Action    > AG1.1 7/8/2018  9:38 AM Usman eCron MD Physician Sign            Progress Notes by Eliane Stone RT at 7/8/2018  6:33 AM     Author:  Eliane Stone RT Service:  (none) Author Type:  Respiratory Therapist    Filed:  7/8/2018  6:36 AM Date of Service:  7/8/2018  6:33 AM Creation Time:  7/8/2018  6:33 AM    Status:  Signed :  Eliane Stone RT (Respiratory Therapist)         Patient remains on HFNC 70%, 35 LPM tolerated well, SpO2 mid to high 90's, O2 titrated down 70% from 80% maintain SpO2 mid 90's, BS diminished. Will continue to monitor pt's respiratory status closely.    RT Majo  7/8/2018 6:36 AM[RT1.1]           Revision History        User Key Date/Time User Provider Type Action    > RT1.1 7/8/2018  6:36 AM Eliane Stone RT Respiratory Therapist Sign            Progress Notes by Kb Lozada at 7/7/2018  3:51 PM     Author:  Kb Lozada Service:  (none) Author Type:      Filed:  7/7/2018  3:54 PM Date of Service:  7/7/2018  3:51 PM Creation Time:  7/7/2018  3:51 PM    Status:  Signed :  Kb Lozada ()         Patient daughter and spouse stop by to ask what time ARU assessment is happening on Monday. Informed family SW not sure what time.  Spouse reported he told he will be called by Arely on Friday but that didn't happen so he very frustrated by the process. Informed family I will leave a message for Arely at [facility] and will let them know when she gets back to me .   Left a message for Arely regarding what time she will assessing pt since family want to be involved during assessment. Requested a call back.[FM1.1]      Revision History        User Key Date/Time User Provider Type Action    > FM1.1 7/7/2018  3:54 PM Kb Lozada  Sign            Progress Notes by Chapito Epperson, RT at 7/7/2018  3:25 PM     Author:  Chapito Epperson, RT Service:  (none) Author Type:  Respiratory Therapist    Filed:  7/7/2018  3:26 PM Date of Service:  7/7/2018  3:25 PM Creation Time:  7/7/2018  3:25 PM    Status:  Signed :  Chapito Epperson, RT (Respiratory Therapist)         Respiratory Therapy Note        Pt remains on HFNC 35 Lpm 80%.  Her SpO2 ranges from 91-96%, breath sounds are diminished bilaterally.    July 7, 2018 3:25 PM  Chapito Epperson[RA1.1]         Revision History        User Key Date/Time User Provider Type Action    > RA1.1 7/7/2018  3:26 PM Chapito Epperson, RT Respiratory Therapist Sign            Progress Notes by Usman Ceron MD at 7/7/2018 11:02 AM     Author:  Usman Ceron MD Service:  Hospitalist Author Type:  Physician    Filed:  7/7/2018 11:10 AM Date of Service:  7/7/2018 11:02 AM Creation Time:  7/7/2018 11:02 AM    Status:  Signed :  Usman Ceron MD (Physician)         [facility]  Hospitalist Progress Note  Usman Ceron MD, MD 07/07/2018  (Text Page)  Reason for Stay (Diagnosis): Acute on chronic hypoxic respiratory failure secondary to large left lung pneumonia         Assessment and Plan:      Summary of Stay: Shana Horne is a 67 year old female with a history of dementia with limited communication, COPD on chronic home O2 at 4L/nc, ongoing tobacco use, epilepsy, RSD of  LLE with intrathecal pain pump,chronic diarrhea,  who lives with her dtr and typically ambulates with a walker  admitted on 6/22/2018 with a one week hx of some vague complaints, increasing sob and found to have hypoxic respiratory failure and a large LML/LLL dense infiltrate with effusion.  She was placed on BiPAP, initiated on ceftriaxone and azithromycin for CAP, and steroids for suspected COPD exacerbation, and admitted to the ICU. Her urine antigens returned + for pneumococcus, and sputum culture + for haemophilus influenzae   On am of 6/25 she had significant worsening of her respiratory status necessitating intubation and was bronched 6/26/26 with findings consistent with mucus plugging.  She was extubated on 7/3/18          Problem List:   1. Acute on chronic hypoxic respiratory failure known with COPD and chronic home oxygen at 4 L by nasal cannula secondary to left lung pneumonia felt to be community-acquired and COPD and exacerbation  -Initially required BiPAP therapy.  Did have worsening and required intubation on 6/25/18.  Able to be extubated on 7/3/18.  Has been slowly improving to the point of being able to be off of BiPAP.  Unfortunately, did worsen early morning of 7/6/18.  Currently back on BiPAP.  Repeat chest x-ray today shows no significant change from previous.  Wean off of BiPAP as able.   Likely require LTACH at discharge.  She is now on high flow nasal cannula and appears to be tolerating it well.  - Completed 5 days of azithromycin and vancomycin.  Completed 12 days of IV ceftriaxone and 10 days of metronidazole.  Antibiotics completed on 7/3/18.  Needs repeat CT scan in 4 weeks to ensure resolution and evaluate for possible underlying malignancy  -Remain on DuoNeb's, IV Medrol, Pulmozyme.  Oxygen supplementation.    2.  Severe malnutrition secondary to acute illness-on tube feedings, SLP following with us if able  3.  Chronic pain syndrome, pain team service following, as an intrathecal  "pump  4.  Seizure disorder remain on lamotrigine and felbamate  5.  Physical deconditioning  6.  History of chronic diarrhea  7.  Hypokalemia  8.  Stable mild anemia    Continue inpatient care.  Anticipating changing of her IV Medrol to prednisone in the next day or so.  LTAC evaluation underway.    DVT Prophylaxis: Enoxaparin (Lovenox) SQ  Code Status: Full Code  Estimated Disch Date / # of Days until Disch: Likely in the next 1-2 more days        Interval History (Subjective):      Seen and examined.  Chart reviewed.  Assumed care today.  Ms. Saldana is found laying comfortably in bed.  She is off BiPAP mask.  He is able to identify her name, age and birthday.  She is following some simple commands.  However she is disoriented to time and place.  No reported agitation or combativeness.  She is denying any ongoing nausea, vomiting, chest pain, or palpitations.  No reported diarrhea.  Remain afebrile.     # Pain Assessment:  Current Pain Score 7/7/2018   Patient currently in pain? denies   Pain score (0-10) -   Pain location -   Pain descriptors -   CPOT pain score -   Shana s pain level was assessed and she currently denies pain.                  Physical Exam:      Last Vital Signs:  /60 (BP Location: Left arm)  Pulse 74  Temp 97.9  F (36.6  C) (Oral)  Resp 20  Ht 1.6 m (5' 3\")  Wt 60 kg (132 lb 4.8 oz)  SpO2 93%  BMI 23.44 kg/m2[AG1.1]    I/O last 3 completed shifts:  In: 2730 [NG/GT:2730]  Out: 1975 [Urine:1975]  Wt Readings from Last 1 Encounters:   07/06/18 60 kg (132 lb 4.8 oz)     Vitals:    07/02/18 0330 07/02/18 2309 07/03/18 0615 07/05/18 0000   Weight: 60.5 kg (133 lb 6.1 oz) 59 kg (130 lb 1.1 oz) 58.7 kg (129 lb 6.6 oz) 57.5 kg (126 lb 12.2 oz)    07/06/18 0300   Weight: 60 kg (132 lb 4.8 oz)[AG1.2]       Constitutional: Awake, alert, cooperative, no apparent distress   Respiratory:  Minimal wheezing.  Left basilar crackles.  Fair air entry.  O2 sats 90-95% during the time exam while on " high flow nasal cannula   Cardiovascular: Regular rate and rhythm, normal S1 and S2, and no murmur noted   Abdomen: Normal bowel sounds, soft, non-distended, non-tender   Skin: No rashes, no cyanosis, dry to touch   Neuro: Alert and oriented x1, no weakness, spontaneous and coherent speech   Extremities: No edema, normal range of motion   Other(s): Euthymic mood, not agitated    Gill catheter, feeding tube in place.   All other systems: Negative          Medications:      All current medications were reviewed with changes reflected in problem list.         Data:      All new lab and imaging data was reviewed.   Labs:[AG1.1]  No results for input(s): CULT in the last 168 hours.    Recent Labs  Lab 07/06/18  0455   PH 7.46*   PO2 52*   PCO2 57*   HCO3 40*   SELENA 14.2       Recent Labs  Lab 07/07/18  0612 07/06/18  0530 07/05/18  0600   WBC 9.6 11.0 11.0   HGB 10.0* 10.7* 10.3*   HCT 32.9* 34.2* 33.6*    98 98    403 400       Recent Labs  Lab 07/07/18  0612 07/06/18  0530 07/05/18  0600 07/04/18  0550  07/02/18  0520    140 139 141  < > 143   POTASSIUM 4.2 3.9 3.6 3.2*  < > 3.4   CHLORIDE 98 98 97 95  < > 95   CO2 39* 39* 39* 44*  < > 43*   ANIONGAP 2* 3 3 2*  < > 5   * 99 100* 100*  < > 126*   BUN 31* 34* 37* 35*  < > 34*   CR 0.67 0.65 0.62 0.63  < > 0.70   GFRESTIMATED 89 >90 >90 >90  < > 84   GFRESTBLACK >90 >90 >90 >90  < > >90   BIBIANA 8.7 9.0 8.9 8.8  < > 8.4*   MAG  --   --   --  2.2  --  2.0   PHOS  --  3.5  --   --   --  3.0   < > = values in this interval not displayed.    Recent Labs  Lab 07/02/18  0520   CRP 6.9       Recent Labs  Lab 07/07/18  0612 07/07/18  0405 07/07/18  0001 07/06/18  2130 07/06/18  1641 07/06/18  1226  07/06/18  0530  07/05/18  0600  07/04/18  0550  07/03/18  0636   *  --   --   --   --   --   --  99  --  100*  --  100*  --  110*   BGM  --  102* 105* 114* 163* 136*  < >  --   < >  --   < >  --   < >  --    < > = values in this interval not  displayed.  No results for input(s): TROPONIN, TROPI, TROPR in the last 168 hours.    Invalid input(s): TROP, TROPONINIES  No results for input(s): COLOR, APPEARANCE, URINEGLC, URINEBILI, URINEKETONE, SG, UBLD, URINEPH, PROTEIN, UROBILINOGEN, NITRITE, LEUKEST, RBCU, WBCU in the last 168 hours.[AG1.3]   Imaging:[AG1.1]   Results for orders placed or performed during the hospital encounter of 06/22/18   XR Chest Port 1 View    Narrative    CHEST ONE VIEW PORTABLE    6/22/2018 9:20 PM     HISTORY: Shortness of breath. Confusion.     COMPARISON: 10/20/2017.      Impression    IMPRESSION: Large consolidation in the left mid and lower lung  concerning for pneumonia with left pleural effusion. Right lung is  relatively clear. No pneumothorax visualized. Cardiac silhouette  remains enlarged.    EB CALDERON MD   CT Head w/o Contrast    Narrative    CT HEAD W/O CONTRAST  6/22/2018 11:02 PM     HISTORY: Confusion. Emphysema and dementia.    TECHNIQUE: Axial images of the head and coronal reformations without  IV contrast material. Radiation dose for this scan was reduced using  automated exposure control, adjustment of the mA and/or kV according  to patient size, or iterative reconstruction technique.    COMPARISON: 7/22/2017.    FINDINGS: No intracranial hemorrhage, mass or mass effect. No acute  infarct identified. No shift of midline structures. No significant  change.      Impression    IMPRESSION: No acute intracranial findings.    HOWARD MENJIVAR MD   US Lower Extremity Venous Duplex Left    Narrative    US LOWER EXTREMITY VENOUS DUPLEX LEFT  6/22/2018 11:24 PM     HISTORY: Evaluate for DVT, pain.    TECHNIQUE: Venous Doppler ultrasound of the lower extremity. Color  flow and spectral Doppler with waveform analysis performed.    COMPARISON: None.    FINDINGS: Ultrasound of the left leg demonstrates no deep vein  thrombus from the common femoral through popliteal veins or in the  visualized segments of posterior  tibial or peroneal veins in the calf.  3.5 x 3.7 x 1.2 cm popliteal fossa cyst, likely a Baker's cyst.      Impression    IMPRESSION:  1. No DVT identified left leg.  2. Baker's cyst.    HOWARD MENJIVAR MD   XR Pelvis 1/2 Views    Narrative    XR PELVIS 1/2 VW  6/22/2018 11:36 PM     INDICATION: Pain.    COMPARISON: None.      Impression    IMPRESSION: Negative.    HOWARD MENJIVAR MD   XR Femur Left 2 Views    Narrative    XR FEMUR LT 2 VW  6/22/2018 11:37 PM     INDICATION: Pain.    COMPARISON: None.      Impression    IMPRESSION: No acute fractures. Moderate degenerative and hypertrophic  changes left knee.    HOWARD MENJIVAR MD   XR Chest Port 1 View    Narrative    CHEST ONE VIEW PORTABLE   6/25/2018 10:02 AM     HISTORY:  Endotracheal tube positioning.    COMPARISON: 6/22/2018.      Impression    IMPRESSION: Tip of the endotracheal tube is approximately 5.6 cm above  the juanito. Nasogastric tube extends below the left hemithorax.  Right-sided PICC line present with the tip in the superior vena cava.  There is near-complete opacification of the left hemithorax which may  be related to atelectasis or pleural fluid. Interstitial opacities in  the right lung are unchanged. No pneumothorax either side.    LM LEON MD   CT Chest w/o Contrast    Narrative    CT CHEST WITHOUT CONTRAST   6/25/2018 3:40 PM    HISTORY: Hypoxia. Dyspnea. Confusion.    TECHNIQUE: Volumetric helical acquisition was performed from the  thoracic inlet through the upper abdomen without IV contrast. Coronal  images were also reconstructed from the axial data. Radiation dose for  this scan was reduced using automated exposure control, adjustment of  the mA and/or kV according to patient size, or iterative  reconstruction technique.    COMPARISON: None.    FINDINGS: Endotracheal tube is in place, with tip 5 cm above the  juanito. Small left pleural effusion. Trace right pleural effusion. No  pericardial effusion. Atherosclerotic  calcification of the thoracic  aorta and coronary arteries. There are calcified mediastinal and left  hilar lymph nodes. Calcified granuloma is noted in the left lower lobe  laterally. Emphysematous changes are noted throughout both lungs.  There is extensive consolidation in the left mid and lower lung, with  patchy interstitial and airspace infiltrate within the aerated portion  of the left upper lung. There are several indeterminate nodular  opacities in the right upper lobe with the largest measuring 1.5 x 0.6  cm (series 3 image 27). Enteric tube, tip in the proximal stomach.  Ectasia of the infrarenal abdominal aorta measures up to 2.9 cm AP.  Limited noncontrast views of the upper abdomen are otherwise  unremarkable. Right PICC line is in place, with tip in the upper SVC.       Impression    IMPRESSION:   1. Extensive consolidation in the left mid and lower lung may be  related to pneumonia and/or atelectasis, although underlying lung mass  cannot be excluded.  2. There are several indeterminate nodular opacities in the right  upper lobe, with the largest measuring 1.5 x 0.6 cm.  3. There are bilateral pleural effusions, small on the left and trace  on the right.  4. Emphysema.    CALI CHAVEZ MD   XR Chest Port 1 View    Narrative    CHEST ONE VIEW PORTABLE   6/26/2018 8:06 AM     HISTORY: Follow up respiratory failure.     COMPARISON: 6/25/2018      Impression    IMPRESSION: Tip of the endotracheal tube is 7.1 cm above the juanito.  Nasogastric tube extends below the left hemidiaphragm. There is a  right-sided PICC line that extends at least to the SVC, although the  tip cannot be seen due to overlying leads and wires. There is  retrocardiac airspace opacity. No pneumothorax on either side.    LM LEON MD   XR Chest Port 1 View    Narrative    CHEST PORTABLE ONE VIEW   6/27/2018 9:49 AM     HISTORY: Follow up pneumonia.     COMPARISON: 6/26/2018.      Impression    IMPRESSION: No significant  change since prior. Persistent retrocardiac  consolidation concerning for pneumonia or atelectasis likely with  pleural effusion. Background of diffuse hazy opacities throughout the  lungs and interstitial thickening could represent pulmonary edema  versus infection. Support devices remain in place.    EB CALDERON MD   XR Abdomen Port 1 View    Narrative    XR ABDOMEN PORT 1 VW  6/28/2018 3:27 AM     INDICATION: OG placement.    COMPARISON: None.      Impression    IMPRESSION: Enteric tube tip in the stomach. Small left pleural  effusion, left lung base infiltrate or atelectasis, and small left  pleural effusion.    HOWARD MENJIVAR MD   XR Chest Port 1 View    Narrative    XR CHEST PORT 1 VW 6/29/2018 7:29 AM    COMPARISON: 6/27/2018    HISTORY: Left lower lobe atelectasis.      Impression    IMPRESSION: Bibasilar opacities, left worse than right are again seen  and not significantly changed. No pneumothorax seen on either side.  Support devices unchanged.    YOLI SHORT MD   XR Chest Port 1 View    Narrative    PORTABLE CHEST ONE VIEW  7/1/2018 5:36 AM     HISTORY: Respiratory failure.     COMPARISON: 6/29/2018.      Impression    IMPRESSION:   1. ET tube and nasogastric tube appears stable. Right PICC line  appears stable.  2. Left lung base atelectasis or airspace disease is unchanged. No  pneumothorax. Right lung shows no new airspace disease. Stable cardiac  silhouette.    SHIRLENE MCDONALD MD   XR Abdomen Port 1 View    Narrative    XR ABDOMEN PORT 1 VW  7/1/2018 5:18 PM     HISTORY:  s/p ft placement. please verify post pyloric;     COMPARISON: None.    FINDINGS:  A feeding tube has been placed. The tip is in the third  portion of the duodenum.    VICKIE WILLINGHAM MD   XR Chest Port 1 View    Narrative    CHEST PORTABLE ONE VIEW 7/6/2018 9:09 AM     HISTORY: Hypoxia.     COMPARISON: 7/1/2018      Impression    IMPRESSION: The endotracheal tube has been removed removed. Right arm  PICC line remains in place. Enteric  tube directed into the stomach,  the tip is not seen. There is persistent left basilar opacity which  looks unchanged. No new pulmonary opacities are seen. Multiple old  left-sided rib fractures. The cardiac silhouette is stable.    VANCE HO MD[AG1.3]          Revision History        User Key Date/Time User Provider Type Action    > AG1.3 7/7/2018 11:10 AM Usman Ceron MD Physician Sign     AG1.2 7/7/2018 11:03 AM Usman Ceron MD Physician      AG1.1 7/7/2018 11:02 AM Usman Ceron MD Physician             Progress Notes by June Robles RT at 7/7/2018 12:15 AM     Author:  June Robles RT Service:  Respiratory Therapy Author Type:  Respiratory Therapist    Filed:  7/7/2018 12:16 AM Date of Service:  7/7/2018 12:15 AM Creation Time:  7/7/2018 12:15 AM    Status:  Signed :  June Robles RT (Respiratory Therapist)         RT Note      Per charge RN, MD wants to change patient from BIPAP to HFNC. Patient placed on HFNC 35 % FIO2. Saturation on these settings %. MD asked to place in HFNC orders.      Will continue to follow and monitor.      June Robles RRT[MV1.1]       Revision History        User Key Date/Time User Provider Type Action    > MV1.1 7/7/2018 12:16 AM June Robles RT Respiratory Therapist Sign            Progress Notes by Eliane Stone RT at 7/6/2018  6:53 PM     Author:  Eliane Stone RT Service:  (none) Author Type:  Respiratory Therapist    Filed:  7/6/2018  6:54 PM Date of Service:  7/6/2018  6:53 PM Creation Time:  7/6/2018  6:53 PM    Status:  Signed :  Eliane Stone RT (Respiratory Therapist)         Patient remains on BIPAP 12/7, RR 12, 70% FiO2 tolerated well. SpO2 low to mid 90's, BS diminished. Duoneb nebs given as ordered. Will continue to monitor pt's respiratory status closely.    RT Majo  7/6/2018 6:54 PM[RT1.1]           Revision History        User Key Date/Time User Provider Type Action    > RT1.1 7/6/2018  6:54 PM Chase  RT Eliane Respiratory Therapist Sign            Progress Notes by Sophie Diaz at 7/6/2018 12:15 PM     Author:  Sophie Diaz Service:  Social Work Author Type:      Filed:  7/6/2018  3:15 PM Date of Service:  7/6/2018 12:15 PM Creation Time:  7/6/2018 12:15 PM    Status:  Addendum :  Sophie Diaz ()         SWS  D/W Dr Andino.  JUAN reached out to Arely RN at [facility].  She will be rounding here first thing Monday morning and will assess pt further then.  She states pts can often be transferred same day.  P:  LTAC following/SW following[AJ1.1]    Update-[AJ1.2]  Net with .  He is overwhelmed with continued hospitalization.  Discussed possible d/c scenarios.  SW reviewed with him how a referral to [facility] takes place.  SW also asked Arely from [facility] to reach out to  to provide more information.   states he is going to go out of town for weekend and he should be contacted by cell phone.  Alternate contact is dtjosi Gonzalez.  He would like to limit contact to himself or dtr.  SW left contact information in room alone with [facility] brochure.  D.W Dr. Andino and Sophie ARAGON.[AJ1.3]     Revision History        User Key Date/Time User Provider Type Action    > AJ1.3 7/6/2018  3:15 PM Sophie Diaz  Addend     AJ1.2 7/6/2018  1:38 PM Sophie Diaz  Incomplete Revision     AJ1.1 7/6/2018 12:16 PM Sophie Diaz  Sign            Progress Notes by Mitchel Andino DO at 7/6/2018  3:03 PM     Author:  Mitchel Andino DO Service:  Hospitalist Author Type:  Physician    Filed:  7/6/2018  3:07 PM Date of Service:  7/6/2018  3:03 PM Creation Time:  7/6/2018  3:03 PM    Status:  Signed :  Mitchel Andino DO (Physician)         [facility]  Hospitalist Progress Note  Mitchel Andino DO 07/06/2018    Reason for Stay (Diagnosis): Acute hypoxic respiratory failure         Assessment and Plan:      Summary of  Stay: Shana Horne is a 67 year old female with a history of dementia with limited communication, COPD on chronic home O2 at 4L/nc, ongoing tobacco use, epilepsy, RSD of LLE with intrathecal pain pump,chronic diarrhea,  who lives with her dtr and typically ambulates with a walker  admitted on 6/22/2018 with a one week hx of some vague complaints, increasing sob and found to have hypoxic respiratory failure and a large LML/LLL dense infiltrate with effusion.      She was placed on BiPAP, initiated on ceftriaxone and azithromycin for CAP, and steroids for suspected COPD exacerbation, and admitted to the ICU. Her urine antigens returned + for pneumococcus, and sputum culture + for haemophilus influenzae       On am of 6/25 she had significant worsening of her respiratory status necessitating intubation and was bronched 6/26/26 with findings consistent with mucus plugging.  She was extubated on 7/3/18       Problem List:   1. Acute hypoxic respiratory failure.  Secondary to pneumonia and COPD exacerbation.  Initially required BiPAP therapy.  Did have worsening and required intubation on 6/25/18.  Able to be extubated on 7/3/18.  Has been slowly improving to the point of being able to be off of BiPAP.  Unfortunately, did worsen early morning of 7/6/18.  Currently back on BiPAP.  Repeat chest x-ray today shows no significant change from previous.  Wean off of BiPAP as able.  Suspect this will be a prolonged wean.  Likely require LTACH at discharge.  2. Pneumonia.  Completed 5 days of azithromycin and vancomycin.  Completed 12 days of IV ceftriaxone and 10 days of metronidazole.  Antibiotics completed on 7/3/18.  Needs repeat CT scan in 4 weeks to ensure resolution and evaluate for possible underlying malignancy.    3. COPD exacerbation.  Continue IV Solu-Medrol and Pulmozyme.  With worsening this morning, restart scheduled duo nebs.  Albuterol nebs as needed.  Completed antibiotics as noted above.  4. Nutrition.   "Currently on tube feedings.  Also continue dysphagia diet.  5. Dysphasia.  Dysphagia diet.  Speech pathology following.  6. Seizure disorder.  Continue lamotrigine and felbamate.  7. Chronic diarrhea.  8. Chronic pain syndrome.  Pain team following.  Does have an intrathecal pump.  9. Hypokalemia.  Potassium replacement protocol.  10. Anemia.  Mild.  Stable.  No obvious ongoing hemorrhage.  DVT Prophylaxis: Enoxaparin (Lovenox) SQ  Code Status: Full Code  Discharge Dispo: Recommend LTACH  Estimated Disch Date / # of Days until Disch: 2-3        Interval History (Subjective):      She does not feel short of breath.  Denies chest pain, fevers, chills, nausea, vomiting, or diarrhea.                  Physical Exam:      Last Vital Signs:  /75 (BP Location: Left arm)  Pulse 87  Temp 98.8  F (37.1  C) (Axillary)  Resp 20  Ht 1.6 m (5' 3\")  Wt 60 kg (132 lb 4.8 oz)  SpO2 92%  BMI 23.44 kg/m2    Gen:  NAD, A&Ox2 to person and place.  Trouble with time.  Eyes:  PERRL, sclera anicteric.  OP:  MMM, no lesions.  Neck:  Supple.  CV:  Regular, no murmurs.  Lung:  Wheeze b/l, normal effort.  Ab:  +BS, soft.  Skin:  Warm, dry to touch.  No rash.  Ext:  No pitting edema LE b/l.           Medications:      All current medications were reviewed with changes reflected in problem list.         Data:      All new lab and imaging data was reviewed.   Labs:[KR1.1]    Recent Labs  Lab 07/06/18  0530      POTASSIUM 3.9   CHLORIDE 98   CO2 39*   ANIONGAP 3   GLC 99   BUN 34*   CR 0.65   GFRESTIMATED >90   GFRESTBLACK >90   BIBIANA 9.0       Recent Labs  Lab 07/06/18  0530   WBC 11.0   HGB 10.7*   HCT 34.2*   MCV 98   [KR1.2]      Imaging:[KR1.1]   Recent Results (from the past 24 hour(s))   XR Chest Port 1 View    Narrative    CHEST PORTABLE ONE VIEW 7/6/2018 9:09 AM     HISTORY: Hypoxia.     COMPARISON: 7/1/2018      Impression    IMPRESSION: The endotracheal tube has been removed removed. Right arm  PICC line remains " in place. Enteric tube directed into the stomach,  the tip is not seen. There is persistent left basilar opacity which  looks unchanged. No new pulmonary opacities are seen. Multiple old  left-sided rib fractures. The cardiac silhouette is stable.    VANCE HO MD[KR1.2]          Revision History        User Key Date/Time User Provider Type Action    > KR1.2 7/6/2018  3:07 PM Mitchel Andino, DO Physician Sign     KR1.1 7/6/2018  3:03 PM Mitchel Andino, DO Physician             Progress Notes by Sophie Bolanos APRN CNS at 7/6/2018 11:20 AM     Author:  Sophie Bolanos APRN CNS Service:  Palliative Author Type:  Clinical Nurse Specialist    Filed:  7/6/2018  2:01 PM Date of Service:  7/6/2018 11:20 AM Creation Time:  7/6/2018 11:20 AM    Status:  Signed :  Sophie Bolanos APRN CNS (Clinical Nurse Specialist)         [facility]  Palliative Care Progress Note  Text Page[AM1.1]    Met with Shana as she was resting in bed with BiPAP on. She was sleeping, but awoke easily. No complaint. Phoned her  Oliverio at [phone].  He appreciates input of our  SAMANTHA Segura as he is hopeful Shana will be accepted at [facility] on Monday.[AM1.2]  He hs some discussion with family and request to continue FULL CODE with restorative care.[AM1.3]      Assessment & Plan      1.  Decisional Capacity -  Unreliable. Patient does not demonstrate medical capacity. Patient does not have an advance directive. Per  informed consent policy next of kin should be involved in all consent and decision making. Next-of-kin is her  Daniel Horne who request to be contacted at cell phone [phone] if there are decisions regarding Shana's care.        2. Dysphagia - Appreciate input of Speech Therapist Chelo Goodman, SLP. Patient on BiPAP and not appropriate for session.[AM1.1] Continues with nutrition via feeding tube.[AM1.3]     3.  Pain - Complex regional pain  syndrome with intrathecal pain pump which[AM1.1] was[AM1.2] refilled by Gurpreet Farooq MD[AM1.1] yesterday[AM1.2].  Pump[AM1.1] refill[AM1.3] date is[AM1.1] 10/5/[AM1.3]/2018.      4.  Dyspnea with continued tobacco abuse -  Continue restorative intervention[AM1.1] with intubation if needed.[AM1.3]       Goal of Care: FULL CODE - Restorative care.      Sophie Bolanos MS, RN, CNS, APRN, ACHPN, FAACVPR  Pain and Palliative Care  Pager [phone]  Office [phone]       Time Spent on this Encounter   I spent[AM1.1] 30[AM1.3] minutes ([AM1.1]1:10 PM[AM1.3]-[AM1.1]1:40 PM[AM1.3]) in assessment of the patient, counseling and discussion with the patient and family as documented in sections[AM1.1] above[AM1.3]. Another[AM1.1] 20[AM1.3] minutes in review of chart, documentation, coordination of care and discussion with the health care team.    Interval History[AM1.1]   Chart reviewed - respiratory deterioration last night noted[AM1.3]    Palliative Symptom Review (0=no symptom/no concern, 1=mild, 2=moderate, 3=severe):      Pain:[AM1.1] 1-mild[AM1.3]      Fatigue:[AM1.1] 1-mild[AM1.3]      Nausea:[AM1.1] 0-none[AM1.3]      Constipation:[AM1.1] 0-none[AM1.3]      Diarrhea:[AM1.1] 1-mild[AM1.3]      Depressive Symptoms:[AM1.1] 0-none[AM1.3]      Anxiety:[AM1.1] 0-none[AM1.3]      Drowsiness:[AM1.1] 2-moderate[AM1.3]      Shortness of Breath:[AM1.1] 0-none[AM1.3]      Insomnia:[AM1.1] 0-none[AM1.3]        Physical Exam   Temp:  [95.6  F (35.3  C)-99.3  F (37.4  C)] 98.8  F (37.1  C)  Pulse:  [87-89] 87  Heart Rate:  [] 84  Resp:  [9-32] 24  BP: ()/(43-83) 112/62  FiO2 (%):  [70 %-80 %] 70 %  SpO2:  [77 %-93 %] 93 %  132 lbs 4.8 oz  GEN:  Alert, oriented[AM1.1] to self only[AM1.3], appears comfortable.  HEENT:  Normocephalic/atraumatic, no scleral icterus, no nasal discharge, mouth moist.  RESP:[AM1.1]  BiPAP with s[AM1.3]ymmetric chest rise on inhalation noted.    PAIN BEHAVIOR: Cooperative    Medications     IV  fluid REPLACEMENT ONLY       Medication given by intrathecal pump: This is NOT an order to dispense medication. For information only.       - MEDICATION INSTRUCTIONS -       sodium chloride 10 mL/hr at 07/03/18 0731       amitriptyline  75 mg Oral or Feeding Tube At Bedtime     bacitracin   Topical BID     dornase alpha  2.5 mg Inhalation Daily     enoxaparin  40 mg Subcutaneous Q24H     felbamate  600 mg Per Feeding Tube Q8H MARIBEL     gabapentin  200 mg Per Feeding Tube BID     insulin aspart  1-6 Units Subcutaneous Q4H     ipratropium - albuterol 0.5 mg/2.5 mg/3 mL  3 mL Nebulization 4x daily     lamoTRIgine  100 mg Oral or Feeding Tube TID     methylPREDNISolone  40 mg Intravenous Q24H     multivitamins with minerals  15 mL Per Feeding Tube Daily     polyethylene glycol  17 g Oral Daily     protein modular  1 packet Per Feeding Tube Daily     rantidine  150 mg Per Feeding Tube BID     sodium chloride (PF)  10 mL Intracatheter Q7 Days     sodium chloride (PF)  3 mL Intracatheter Q8H     sodium chloride (PF)  3 mL Intracatheter Q8H       Data[AM1.1]   Results for orders placed or performed during the hospital encounter of 06/22/18 (from the past 24 hour(s))   Glucose by meter   Result Value Ref Range    Glucose 153 (H) 70 - 99 mg/dL   Glucose by meter   Result Value Ref Range    Glucose 139 (H) 70 - 99 mg/dL   Glucose by meter   Result Value Ref Range    Glucose 91 70 - 99 mg/dL   Glucose by meter   Result Value Ref Range    Glucose 162 (H) 70 - 99 mg/dL   Glucose by meter   Result Value Ref Range    Glucose 96 70 - 99 mg/dL   Blood gas arterial with oxyhemoglobin   Result Value Ref Range    pH Arterial 7.46 (H) 7.35 - 7.45 pH    pCO2 Arterial 57 (H) 35 - 45 mm Hg    pO2 Arterial 52 (L) 80 - 105 mm Hg    Bicarbonate Arterial 40 (H) 21 - 28 mmol/L    FIO2 15     Oxyhemoglobin Arterial 86 (L) 92 - 100 %    Base Excess Art 14.2 mmol/L   CBC with platelets differential   Result Value Ref Range    WBC 11.0 4.0 - 11.0  10e9/L    RBC Count 3.49 (L) 3.8 - 5.2 10e12/L    Hemoglobin 10.7 (L) 11.7 - 15.7 g/dL    Hematocrit 34.2 (L) 35.0 - 47.0 %    MCV 98 78 - 100 fl    MCH 30.7 26.5 - 33.0 pg    MCHC 31.3 (L) 31.5 - 36.5 g/dL    RDW 13.9 10.0 - 15.0 %    Platelet Count 403 150 - 450 10e9/L    Diff Method Automated Method     % Neutrophils 73.2 %    % Lymphocytes 19.7 %    % Monocytes 5.5 %    % Eosinophils 0.9 %    % Basophils 0.3 %    % Immature Granulocytes 0.4 %    Nucleated RBCs 0 0 /100    Absolute Neutrophil 8.1 1.6 - 8.3 10e9/L    Absolute Lymphocytes 2.2 0.8 - 5.3 10e9/L    Absolute Monocytes 0.6 0.0 - 1.3 10e9/L    Absolute Eosinophils 0.1 0.0 - 0.7 10e9/L    Absolute Basophils 0.0 0.0 - 0.2 10e9/L    Abs Immature Granulocytes 0.0 0 - 0.4 10e9/L    Absolute Nucleated RBC 0.0    Basic metabolic panel   Result Value Ref Range    Sodium 140 133 - 144 mmol/L    Potassium 3.9 3.4 - 5.3 mmol/L    Chloride 98 94 - 109 mmol/L    Carbon Dioxide 39 (H) 20 - 32 mmol/L    Anion Gap 3 3 - 14 mmol/L    Glucose 99 70 - 99 mg/dL    Urea Nitrogen 34 (H) 7 - 30 mg/dL    Creatinine 0.65 0.52 - 1.04 mg/dL    GFR Estimate >90 >60 mL/min/1.7m2    GFR Estimate If Black >90 >60 mL/min/1.7m2    Calcium 9.0 8.5 - 10.1 mg/dL   Phosphorus   Result Value Ref Range    Phosphorus 3.5 2.5 - 4.5 mg/dL   XR Chest Port 1 View    Narrative    CHEST PORTABLE ONE VIEW 7/6/2018 9:09 AM     HISTORY: Hypoxia.     COMPARISON: 7/1/2018      Impression    IMPRESSION: The endotracheal tube has been removed removed. Right arm  PICC line remains in place. Enteric tube directed into the stomach,  the tip is not seen. There is persistent left basilar opacity which  looks unchanged. No new pulmonary opacities are seen. Multiple old  left-sided rib fractures. The cardiac silhouette is stable.[AM1.4]          Revision History        User Key Date/Time User Provider Type Action    > AM1.3 7/6/2018  2:01 PM Sophie Bolanos APRN CNS Clinical Nurse Specialist Sign     AM1.2  "7/6/2018  1:17 PM Sophie Bolanos APRN CNS Clinical Nurse Specialist      AM1.4 7/6/2018 11:21 AM Sophie Bolanos APRN CNS Clinical Nurse Specialist      AM1.1 7/6/2018 11:20 AM Sohpie Bolanos APRN CNS Clinical Nurse Specialist             Progress Notes by Sonya Euceda RN at 7/6/2018 11:31 AM     Author:  Sonya Euceda RN Service:  WOC Nurse Author Type:  Registered Nurse    Filed:  7/6/2018 11:36 AM Date of Service:  7/6/2018 11:31 AM Creation Time:  7/6/2018 11:31 AM    Status:  Signed :  Sonya Euceda RN (Registered Nurse)         WO consulted for old dry unroofed blister to right index finger with new skin growing over wound bed. No signs of infection. Small amount of old skin to periwound is dry and crusted and could be trimmed off.   Recommend Sween cream BID  WOC will sign off.[MA1.1]     Revision History        User Key Date/Time User Provider Type Action    > MA1.1 7/6/2018 11:36 AM Sonya Euceda RN Registered Nurse Sign            Progress Notes by Mackenzie Baptiste MD at 7/6/2018  6:08 AM     Author:  Mackenzie Baptiste MD Service:  Hospitalist Author Type:  Physician    Filed:  7/6/2018  6:14 AM Date of Service:  7/6/2018  6:08 AM Creation Time:  7/6/2018  6:08 AM    Status:  Signed :  Mackenzie Baptiste MD (Physician)         X-cover    Called for RRT due to persistent hypoxia    I know this patient from earlier in her hospitalization    Dropped sats into the 70 % range around 4 am, RT notified and made some O2 adjustments and gave her a neb.  She continued to have sats in the 70-80 % range    I suspect her baseline sats 85-88 range[KD1.1]    /56  Pulse 87  Temp 96.3  F (35.7  C) (Axillary)  Resp 16  Ht 1.6 m (5' 3\")  Wt 60 kg (132 lb 4.8 oz)  SpO2 93%  BMI 23.44 kg/m2[KD1.2]  Lungs wheezy in bilateral LL I and E, sitting in bed , is awake but no oriented (close to baseline)    Stat ABG    Placed on bipap and transferred to C    ABG returned 7.46/46/52 " 40    Was on diuretics for several days-dc'd 7/3 with eo metabolic alkalosis, I wonder if we are messing with her acid / base status    Suspect short term need for BiPAP  No need for repeat ABGs  O2 sats should aim for 85 % or higher[KD1.1]     Revision History        User Key Date/Time User Provider Type Action    > KD1.2 7/6/2018  6:14 AM Mackenzie Baptiste MD Physician Sign     KD1.1 7/6/2018  6:08 AM Mackenzie Baptiste MD Physician             Progress Notes by Yanira Schwarz RN at 7/6/2018  5:20 AM     Author:  Yanira Schwarz RN Service:  (none) Author Type:  Registered Nurse    Filed:  7/6/2018  5:28 AM Date of Service:  7/6/2018  5:20 AM Creation Time:  7/6/2018  5:20 AM    Status:  Signed :  Yanira Schwarz RN (Registered Nurse)         Noc RN [id number] - Informed by NST Pt's O2 sats 77% & pt is on 9l oximizer. Attempts to reposition pt and adjust oxygen did not improve saturation, RT notified for assessment, nebulizer oximask at 15l applied and sats remain in 70s. Rapid Response called at 0450 & pt was placed on bi-pap with sats lower 90's. Waiting for transfer to intermediate bed on 3rd floor. Pt's  was notified at 0505 of pt's pending transfer. Flying Sultana RN with pt.[LB1.1]     Revision History        User Key Date/Time User Provider Type Action    > LB1.1 7/6/2018  5:28 AM Yanira Schwarz RN Registered Nurse Sign            Progress Notes by Tonia Calero RT at 7/6/2018  5:01 AM     Author:  Tonia Calero RT Service:  (none) Author Type:  Respiratory Therapist    Filed:  7/6/2018  5:02 AM Date of Service:  7/6/2018  5:01 AM Creation Time:  7/6/2018  5:01 AM    Status:  Signed :  Tonia Calero RT (Respiratory Therapist)         Paged to assess patient, desatting to low 70s. Breath sounds wheezy. Nebulizer given, ABG obtained from left radial artery without complications, patient placed on BiPAP. RT will continue to monitor.    Tonia Calero  July 6, 2018.5:02 AM[GW1.1]            Revision History        User Key Date/Time User Provider Type Action    > GW1.1 7/6/2018  5:02 AM Tonia Calero RT Respiratory Therapist Sign            Progress Notes by Mackenzie Baptiste MD at 7/4/2018  2:02 PM     Author:  Mackenzie Baptiste MD Service:  Hospitalist Author Type:  Physician    Filed:  7/6/2018  4:13 AM Date of Service:  7/4/2018  2:02 PM Creation Time:  7/4/2018  2:02 PM    Status:  Addendum :  Mackenzie Baptiste MD (Physician)         [facility]  Hospitalist Progress Note  Mackenzie Baptiste MD 07/04/2018    Reason for Stay (Diagnosis): encephalopathy          Assessment and Plan:      Summary of Stay: Shana Horne is a 67 year old female with a history of dementia with limited communication, COPD on chronic home O2 at 4L/nc, ongoing tobacco use, epilepsy, RSD of LLE with intrathecal pain pump,chronic diarrhea,  who lives with her dtr and typically ambulates with a walker  admitted on 6/22/2018 with a one week hx of some vague complaints, increasing sob and found to have hypoxic respiratory failure and a large LML/LLL dense infiltrate with effusion.    She was placed on BiPAP, initiated on ceftriaxone and azithromycin for CAP, and steroids for suspected COPD exacerbation, and admitted to the ICU. Her urine antigens returned + for pneumococcus, and sputum culture + for haemophilus influenzae     On am of 6/25 she had significant worsening of her respiratory status necessitating intubation and was bronched 6/26/26 with findings consistent with mucus plugging.  She was extubated on 7/3/18      Problem List:   1. Acute hypoxic respiratory failure: 2/2 pna/COPD exacerbation-extubated 7/3/18  2. Dense LML and LLL pna-pneumococcal urinary agn positive:  She rec;d 5 days of azith and vanco.  She has received 12 days of ceftriaxone, and 10 days of metronidazole.  Her procal was minimally elevated and she is afebrile.  Given her dementia she is at higher risk of delirium with abx.  Abx discontinued  "7/3/18. She will need repeat CT scan in 4-6 weeks to ensure resolution, as it at risk for underlying malignancy[KD1.1]   3. Sepsis:  Given leukocytosis and acute on chronic hypoxic respiratory failure in setting of pna-resolved[KD1.2]  4. Acute on chronic COPD exacerbation: received 11 days of IV steroids - at 40 mg iv bid.  Decreased to 40 mg qd on 7/3/18.  Given duration of time on vent, ongoing hypoxic respiratory failure will likely need slow taper  5. Chronic copd-home inhalers  6. Seizure d/o:  Cont home meds:  lamictal and felbamate  7. Diarrhea;  Chronic - has rectal tube in place-likely worsened by TFs-C diff not checked but no fever/abdominal pain/leukocytosis. Although patient was on metronidazole as well.  TFs to hopefully to stop soon now that extubated, monitor - hopefully to improve.    8. Dementia:  Seems rather severe-but she does recognize family members and lives in a home with her  and daughter. They look after her in the evening and apparently a sister looks after her during the day. I confirmed this with the  7/3/18.    9. RSD with chronic pain-cont intrathecal pump. (had to \"don't order\" in EPIC to get an alert to stop,) but put misc order and sticky note and discussed with RN, that this need to continue while in hopsitalization-this is strictly an EPIC issue  10. FEN:  SLP to assess swallowing-still NPO and on TFs  11. Volume overload:  Was on furosemide 20 mg iv bid, but pushing bicarb-so d/c'd diuretics-and UO is really picking up so suspect she is starting to self diurese  12. On dilaudid pump:  In part due to sedation-wean off over today now that extubated  13. Hypokalemia:  Replace and recheck, mag 7/2/18 at 2.0  DVT Prophylaxis: Enoxaparin (Lovenox) SQ  Code Status: Full Code    Disposition Plan   Expected discharge in 4-5 days provided continues to improve. Still with high O2 requirements. Will likely need rehab, will ask for PT eval tomorrow.  Lots of concern from " " re: placement as has been refused to multiple TCU in past due to intrathecal pump.  SW involved     Entered: Mackenzie CHOUDHARYMarlin Baptiste 07/04/2018, 2:02 PM               Interval History (Subjective):      Doing good this am. Denies any cp or sob.  No pain (while on dilaudid pca wean)  Did have some n without v this am after potassium replacement given down FT.                    Physical Exam:      Last Vital Signs:  /60  Temp 98.6  F (37  C) (Oral)  Resp 9  Ht 1.6 m (5' 3\")  Wt 58.7 kg (129 lb 6.6 oz)  SpO2 93%  BMI 22.92 kg/m2    I/O:  Up 3.5 L  UO 4.5 L  Weight:  Up 3.2 kg from yesterday-no weight today   Tele:  NSR    Pleasant sitting in bed, fully alert, nad looks stated age head ncat sclera mild bilateral injection lungs coarse, moderate air movement, rrr no mrg trace b LE edema skin w/d no c/c abd s/nt/nd alert christopher            Medications:      All current medications were reviewed with changes reflected in problem list.         Data:      All new lab and imaging data was reviewed.   Labs:    Recent Labs  Lab 07/04/18  0550      POTASSIUM 3.2*   CHLORIDE 95   CO2 44*   ANIONGAP 2*   *   BUN 35*   CR 0.63   GFRESTIMATED >90   GFRESTBLACK >90   BIBIANA 8.8       Recent Labs  Lab 07/04/18  0550   WBC 10.2   HGB 10.9*   HCT 35.3   MCV 98         Imaging:[KD1.1]        Revision History        User Key Date/Time User Provider Type Action    > KD1.2 7/6/2018  4:13 AM Mackenzie Baptiste MD Physician Addend     KD1.1 7/4/2018  2:13 PM Mackenzie Baptiste MD Physician Sign                     Procedure Notes      Procedures by Shala Gonzales MD at 6/26/2018  1:56 PM     Author:  Shala Gonzales MD Service:  ICU Author Type:  Physician    Filed:  6/26/2018  2:13 PM Date of Service:  6/26/2018  1:56 PM Creation Time:  6/26/2018  1:56 PM    Status:  Signed :  Shala Gonzales MD (Physician)         ICU PROCEDURE NOTE: (see note in Provation as well).    Procedure: Flexible [facility] " bronchoscopy  Indication: L mucus plug/lung collapse    Consent: Obtained  Time Out: Performed    Method:  Patient previously intubated with oral ETT and on continuous sedation in the ICU with a versed and propofol gtts. Patient further administered a total of 150 Ug of IV Fentanyl.  Topical anesthetic with total of 4 cc 1% Lidocaine administered down the ETT.    Findings:  Distal trachea without lesions or abnormalities. Missy sharp.     Right  Bronchial Tree: Visual inspection revealed minimal thin secretions, no evidence of endobronchial lesions or mucosal abnormalities.    Left Bronchial Tree: Visual inspection revealed a thick mucopurulent mucus plug in the left mainstem bronchus and further plugs emanating from the A/P subsegments of the MANOJ and lower lobe orifices. There was erythema and abnormal /chronically inflamed appearing mucosa around all of the left sided subsegmental orifices.  Further-there was significant dynamic airway collapse in the left lower lobe. No discreet endobronchial lesions were identified.    No apparent immediate complications. The patient remained well oxygenated and hemodynamically stable throughout the procedure.      Shala Gonzales MD  #[id number][SB1.1]         Revision History        User Key Date/Time User Provider Type Action    > SB1.1 6/26/2018  2:13 PM Shala Gonzales MD Physician Sign            Procedures by Jordi Zambrano MD at 6/25/2018  4:51 PM     Author:  Jordi Zambrano MD Service:  ICU Author Type:  Physician    Filed:  6/25/2018  4:52 PM Date of Service:  6/25/2018  4:51 PM Creation Time:  6/25/2018  4:50 PM    Status:  Signed :  Jordi Zambrano MD (Physician)     Procedure Orders:    1. Insert arterial line [[id number]] ordered by Jordi Zambrano MD at 06/25/18 1650            Post-procedure Diagnoses:    1. Community acquired pneumonia of left lung, unspecified part of lung [J18.9]    2. Acute respiratory failure  with hypoxia and hypercapnia (H) [J96.01, J96.02]               Procedure/Surgery Information   [facility]    Bedside Procedure Note  Date of Service (when I performed the procedure): 06/25/2018    Shana Horne is a 67 year old female patient.  1. Generalized muscle weakness    2. Confusion    3. Pneumonia of right lung due to infectious organism, unspecified part of lung    4. Chronic bronchitis, unspecified chronic bronchitis type (H)      Past Medical History:   Diagnosis Date     Arthritis      Blood transfusion     38 yrs ago     Chronic pain     painful feet     COPD (chronic obstructive pulmonary disease) (H)      Dementia      Gastroesophageal reflux disease      History of blood transfusion      Numbness and tingling     fingertips occas     Other chronic pain     generalized     Oxygen dependent     4l nc cont     Parkinsons disease (H)      Seizures (H)     LAST SEIZURE 2 MOS AGO     Stimulus-induced repetitive discharges on nerve conduction studies      Temp: 97.7  F (36.5  C)   BP: 112/65   Heart Rate: 64 Resp: 14 SpO2: 97 % O2 Device: Mechanical Ventilator Oxygen Delivery: Other (Comments) (45lpm)    Insert arterial line  Date/Time: 6/25/2018 4:50 PM  Performed by: NICO ZELAYA  Authorized by: NICO ZELAYA   Consent: Written consent obtained.  Risks and benefits: risks, benefits and alternatives were discussed  Consent given by: spouse  Patient understanding: patient states understanding of the procedure being performed  Patient consent: the patient's understanding of the procedure matches consent given  Procedure consent: procedure consent matches procedure scheduled  Relevant documents: relevant documents present and verified  Test results: test results available and properly labeled  Site marked: the operative site was marked  Imaging studies: imaging studies available  Required items: required blood products, implants, devices, and special equipment  "available  Patient identity confirmed: arm band  Time out: Immediately prior to procedure a \"time out\" was called to verify the correct patient, procedure, equipment, support staff and site/side marked as required.  Preparation: Patient was prepped and draped in the usual sterile fashion.  Indications: multiple ABGs and respiratory failure  Location: left radial  Anesthesia: local infiltration    Anesthesia:  Local Anesthetic: lidocaine 1% without epinephrine    Sedation:  Patient sedated: no  Needle gauge: 20  Seldinger technique: Seldinger technique used  Number of attempts: 2  Post-procedure: dressing applied  Post-procedure CMS: normal  Patient tolerance: Patient tolerated the procedure well with no immediate complications           Jordi Zambrano[JD1.1]     Revision History        User Key Date/Time User Provider Type Action    > JD1.1 6/25/2018  4:52 PM Jordi Zambrano MD Physician Sign                     Progress Notes - Therapies (Notes from 07/06/18 through 07/09/18)      Progress Notes by Arely Srinivasan at 7/9/2018 11:53 AM     Author:  Arely Srinivasan Service:  (none) Author Type:  (none)    Filed:  7/9/2018 11:57 AM Date of Service:  7/9/2018 11:53 AM Creation Time:  7/9/2018 11:53 AM    Status:  Signed :  Arely Srinivasan         [facility]     This patient has been accepted for admission today and transportatioin is set up for 1430.     The accepting unit is [address] [place] [address] and RN report can be called prior to patient transfer to [phone].    The accepting MD is Dr. Beatty, this provider can be reached for MD sign out report by pager at [phone].    Please have the discharge summary and discharge orders completed, faxed to [phone], and available to accompany patient at least one hour before transfer time.      I have updated Sophie (AKIL) with admission details.       Thank you,    Arely Srinivasan  Referral Specialist  [facility]   [email or web address]  [phone] " "(direct)[AR1.1]     Revision History        User Key Date/Time User Provider Type Action    > AR1.1 7/9/2018 11:57 AM Arely Srinivasan (none) Sign            Progress Notes by Eliane Stone RT at 7/9/2018  6:30 AM     Author:  Eliane Stone RT Service:  (none) Author Type:  Respiratory Therapist    Filed:  7/9/2018  6:32 AM Date of Service:  7/9/2018  6:30 AM Creation Time:  7/9/2018  6:30 AM    Status:  Signed :  Eliane Stone RT (Respiratory Therapist)         Patient remains on HFNC 60%, 30 LPM. Titrated FiO2 down to 60 from 70 and flow 30 from 35, tolerated well. SpO2 mid 90's, BS diminished. Will continue to monitor pt's respiratory status closely.    RT Majo  7/9/2018 6:32 AM[RT1.1]           Revision History        User Key Date/Time User Provider Type Action    > RT1.1 7/9/2018  6:32 AM Eliane Stone RT Respiratory Therapist Sign            Progress Notes by Chapito Epperson RT at 7/8/2018  3:57 PM     Author:  Chapito Epperson RT Service:  (none) Author Type:  Respiratory Therapist    Filed:  7/8/2018  4:48 PM Date of Service:  7/8/2018  3:57 PM Creation Time:  7/8/2018  4:43 PM    Status:  Signed :  Chapito Epperson RT (Respiratory Therapist)         [facility]     Date: 7/8/2018  Admission Dx: Pneumonia  Pulmonary History: COPD  Home Nebulizer/MDI Use: None  Home Oxygen: 4 Lpm NC  Acuity Level (RCAT flow sheet): 3  Aerosol Therapy initiated: Continue Duoneb QID and Albuterol nebs Q2 hours prn.      Pulmonary Hygiene initiated:  Deep breath and cough TID.      Volume Expansion initiated:  IS TID.      Current Oxygen Requirements: HFNC 35 Lpm 70%  Current SpO2: 91    Re-evaluation date: 7/11/2018    Patient Education: Encouraged Pt take deep breaths an to leave her O2 on.        See \"RT Assessments\" flow sheet for patient assessment scoring and Acuity Level Details.     Chapito Epperson  July 8, 2018.3:57 PM[RA1.1]                 Revision History        User Key Date/Time User Provider Type Action "    > RA1.1 7/8/2018  4:48 PM Chapito Epperson RT Respiratory Therapist Sign            Progress Notes by Chapito Epperson RT at 7/8/2018  4:00 PM     Author:  Chapito Epperson RT Service:  (none) Author Type:  Respiratory Therapist    Filed:  7/8/2018  4:05 PM Date of Service:  7/8/2018  4:00 PM Creation Time:  7/8/2018  4:00 PM    Status:  Signed :  Chapito Epperson RT (Respiratory Therapist)         Respiratory Therapy Note        Pt remains on HFNC 35 Lpm 70%.  Breath sounds are diminished bilaterally.  She is frequently pulling at O2 and will drop her SpO2 during conversation.  If she is relaxed and not moving she can be up to SpO2 of 97%; as it is she is almost constant motion and SpO2 is 87-93%.  Some difficulty keeping even a sticky SpO2 probe in place.    July 8, 2018 4:00 PM  Chapito Epperson[RA1.1]         Revision History        User Key Date/Time User Provider Type Action    > RA1.1 7/8/2018  4:05 PM Chapito Epperson RT Respiratory Therapist Sign            Progress Notes by Eliane Stone RT at 7/8/2018  6:33 AM     Author:  Eliane Stone RT Service:  (none) Author Type:  Respiratory Therapist    Filed:  7/8/2018  6:36 AM Date of Service:  7/8/2018  6:33 AM Creation Time:  7/8/2018  6:33 AM    Status:  Signed :  Eliane Stone RT (Respiratory Therapist)         Patient remains on HFNC 70%, 35 LPM tolerated well, SpO2 mid to high 90's, O2 titrated down 70% from 80% maintain SpO2 mid 90's, BS diminished. Will continue to monitor pt's respiratory status closely.    RT Majo  7/8/2018 6:36 AM[RT1.1]           Revision History        User Key Date/Time User Provider Type Action    > RT1.1 7/8/2018  6:36 AM Eliane Stone RT Respiratory Therapist Sign            Progress Notes by Chapito Epperson RT at 7/7/2018  3:25 PM     Author:  Chapito Epperson RT Service:  (none) Author Type:  Respiratory Therapist    Filed:  7/7/2018  3:26 PM Date of Service:  7/7/2018  3:25 PM Creation Time:  7/7/2018  3:25 PM     Status:  Signed :  Chapito Epperson, RT (Respiratory Therapist)         Respiratory Therapy Note        Pt remains on HFNC 35 Lpm 80%.  Her SpO2 ranges from 91-96%, breath sounds are diminished bilaterally.    July 7, 2018 3:25 PM  Chapito Epperson[RA1.1]         Revision History        User Key Date/Time User Provider Type Action    > RA1.1 7/7/2018  3:26 PM Chapito Epperson, RT Respiratory Therapist Sign            Progress Notes by June Robles RT at 7/7/2018 12:15 AM     Author:  June Robles RT Service:  Respiratory Therapy Author Type:  Respiratory Therapist    Filed:  7/7/2018 12:16 AM Date of Service:  7/7/2018 12:15 AM Creation Time:  7/7/2018 12:15 AM    Status:  Signed :  June Robles RT (Respiratory Therapist)         RT Note      Per charge RN, MD wants to change patient from BIPAP to HFNC. Patient placed on HFNC 35 % FIO2. Saturation on these settings %. MD asked to place in HFNC orders.      Will continue to follow and monitor.      June Rboles RRT[MV1.1]       Revision History        User Key Date/Time User Provider Type Action    > MV1.1 7/7/2018 12:16 AM June Robles RT Respiratory Therapist Sign            Progress Notes by Eliane Stone RT at 7/6/2018  6:53 PM     Author:  Eliane Stone RT Service:  (none) Author Type:  Respiratory Therapist    Filed:  7/6/2018  6:54 PM Date of Service:  7/6/2018  6:53 PM Creation Time:  7/6/2018  6:53 PM    Status:  Signed :  Eliane Stone RT (Respiratory Therapist)         Patient remains on BIPAP 12/7, RR 12, 70% FiO2 tolerated well. SpO2 low to mid 90's, BS diminished. Duoneb nebs given as ordered. Will continue to monitor pt's respiratory status closely.    RT Majo  7/6/2018 6:54 PM[RT1.1]           Revision History        User Key Date/Time User Provider Type Action    > RT1.1 7/6/2018  6:54 PM Eliane Stone RT Respiratory Therapist Sign            Progress Notes by Tonia Calero RT at 7/6/2018  5:01 AM     Author:  Abdias  RT Tonia Service:  (none) Author Type:  Respiratory Therapist    Filed:  7/6/2018  5:02 AM Date of Service:  7/6/2018  5:01 AM Creation Time:  7/6/2018  5:01 AM    Status:  Signed :  Tonia Calero RT (Respiratory Therapist)         Paged to assess patient, desatting to low 70s. Breath sounds wheezy. Nebulizer given, ABG obtained from left radial artery without complications, patient placed on BiPAP. RT will continue to monitor.    Tonia Calero  July 6, 2018.5:02 AM[GW1.1]           Revision History        User Key Date/Time User Provider Type Action    > GW1.1 7/6/2018  5:02 AM Tonia Calero RT Respiratory Therapist Sign

## 2018-06-22 NOTE — IP AVS SNAPSHOT
` ` Patient Information     Patient Name Sex     Shana Horne ([id number]) Female 1950       Room Bed    [facility] 0325-01      Patient Demographics     Address Phone    [address] [address] [phone] (Home) *Preferred*  None (Work)  [phone] (Mobile)      Patient Ethnicity & Race     Ethnic Group Patient Race    American White      Emergency Contact(s)     Name Relation Home Work Mobile    MONIKA HORNE Spouse [phone] none [phone]    Joselin Horne Daughter [phone] none [phone]    Briseida Tipton Daughter [phone] none [phone]    Molly Horne Daughter   [phone]      Documents on File        Status Date Received Description       Documents for the Patient    Insurance Card  08     Privacy Notice - [place]  08     Consent Form  08     Waiver - Payment  08     External Medication Information Consent       Patient ID Received 11     Consent for Services - Hospital/Clinic  ()      Insurance Card Received 11     Privacy Notice - [place] Received 11     Consent for Services - Hospital/Clinic Received () 11     Consent for EHR Access  13 Copied from existing Consent for services - IP/ED collected on 2012    [facility] Specified Other       Consent for Services - Hospital/Clinic Received () 13     Insurance Card Received 14     Consent for Services - Hospital/Clinic Received () 14     Insurance Card Received 10/07/16     Patient ID Received 10/07/16     Consent for Services - Hospital/Clinic Received () 10/13/15     Consent for Services/Privacy Notice - Hospital/Clinic Received () 10/07/16     Patient ID Received 17     Insurance Card Received 18     Consent for Services/Privacy Notice - Hospital/Clinic Received 10/10/17     Care Everywhere Prospective Auth Received 10/19/17     HIM RHIANNON Authorization  18      Consent for Services - Hospital and Clinic Received 06/23/18     HIE Auth Received 06/23/18        Documents for the Encounter    CMS IM for Patient Signature         Admission Information     Attending Provider Admitting Provider Admission Type Admission Date/Time    Kermit Montanez III, MD Heckert, George William III, MD Emergency 06/22/18 2028    Discharge Date Hospital Service Auth/Cert Status Service Area     Hospitalist [facility]    Unit Room/Bed Admission Status        3 MEDICAL SURGICAL 0325/0325-01 Admission (Confirmed)       Admission     Complaint    Bacterial pneumonia, pneumonia- intubated      Hospital Account     Name Acct ID Class Status Primary Coverage    Shana Horne [id number] Inpatient Open [employer] - [employer] OPEN ACCESS            Guarantor Account (for Hospital Account #[id number])     Name Relation to Pt Service Area Active? Acct Type    Shana Horne Self FCS Yes Personal/Family    Address Phone          [address] [place] [address] [phone](H)  None(O)              Coverage Information (for Hospital Account #[id number])     F/O Payor/Plan Precert #    [employer] OPEN ACCESS     Subscriber Subscriber #    Dru Horne [id number]    Address Phone    [address]  [place] [address] [phone]

## 2018-06-22 NOTE — IP AVS SNAPSHOT
` `     [place] [address] MEDICAL SURGICAL: [phone]            Medication Administration Report for Shana Horne as of 07/09/18 1348   Legend:    Given Hold Not Given Due Canceled Entry Other Actions    Time Time (Time) Time  Time-Action       Inactive    Active    Linked        Medications 07/03/18 07/04/18 07/05/18 07/06/18 07/07/18 07/08/18 07/09/18    albuterol neb solution 2.5 mg  Dose: 2.5 mg  Freq: EVERY 2 HOURS PRN Route: NEBULIZATION  PRN Reason: other  PRN Comment: dyspnea  Start: 06/23/18 1604   Admin. Amount: 2.5 mg = 3 mL Conc: 2.5 mg/3 mL  Last Admin: 07/06/18 0442  Dispense Loc: [facility]  Volume: 3 mL        0442 (2.5 mg)-Given              amitriptyline (ELAVIL) tablet 75 mg  Dose: 75 mg  Freq: AT BEDTIME Route: ORAL OR FEED  Start: 06/30/18 2200   Admin Instructions: Okay to crush.    Admin. Amount: 1 tablet (1 × 25 mg tablet) + 1 tablet (1 × 50 mg tablet)  Last Admin: 07/08/18 2109  Dispense Loc: [facility]   Mixture Administration Information:   Medication Type Amount   amitriptyline 25 MG TABS Medications 25 mg   amitriptyline 50 MG TABS Medications 50 mg             2134 (75 mg)-Given        2102 (75 mg)-Given        2204 (75 mg)-Given        2215 (75 mg)-Given        2204 (75 mg)-Given        2109 (75 mg)-Given        [ ] 2200           bacitracin ointment  Freq: 2 TIMES DAILY Route: Top  Start: 07/05/18 2000   Admin Instructions: Apply to R pointer finger    Last Admin: 07/09/18 0931  Dispense Loc: [facility]       2204 ( )-Given        1057 ( )-Given       2216 ( )-Given        0812 ( )-Given       2027 ( )-Given        0905 ( )-Given       2109 ( )-Given        0931 ( )-Given       [ ] 2000           dextrose 10 % 1,000 mL infusion  Freq: CONTINUOUS PRN Route: IV  PRN Comment: Hypoglycemia prevention  Start: 06/26/18 1316   Admin Instructions: For Hypoglycemia Prevention for patients on long-acting subcutaneous basal insulin (Glargine, Detemir, NPH) or continuous insulin  infusion. Whenever nutrition support is held or interrupted:   1) Infuse IV D10W at nutrition support rate  2) Notify provider for further instructions    Dispense Loc: [facility] Floor Stock  Volume: 1,000 mL   Mixture Administration Information:   Medication Type Amount   dextrose 10 % SOLN Base 1,000 mL                       enoxaparin (LOVENOX) injection 40 mg  Dose: 40 mg  Freq: EVERY 24 HOURS Route: SC  Start: 06/23/18 0800   Admin Instructions: HOLD if platelet count falls below 50% of baseline or less than 100,000/ L and notify provider.    Admin. Amount: 40 mg = 0.4 mL Conc: 40 mg/0.4 mL  Last Admin: 07/09/18 0931  Dispense Loc:  [facility]  Volume: 0.4 mL     0842 (40 mg)-Given        0820 (40 mg)-Given        0823 (40 mg)-Given        1049 (40 mg)-Given        0812 (40 mg)-Given        0905 (40 mg)-Given        0931 (40 mg)-Given           felbamate (FELBATOL) suspension 600 mg  Dose: 600 mg  Freq: EVERY 8 HOURS SCHEDULED Route: PER FEEDING   Start: 06/30/18 1400   Admin Instructions: Shake well.    Admin. Amount: 600 mg = 5 mL Conc: 120 mg/mL  Last Admin: 07/09/18 0933  Dispense Loc:  Main Pharmacy  Volume: 5 mL     0627 (600 mg)-Given       1400 (600 mg)-Given       2134 (600 mg)-Given        0550 (600 mg)-Given       1441 (600 mg)-Given       2103 (600 mg)-Given        0557 (600 mg)-Given       1421 (600 mg)-Given       2216 (600 mg)-Given        0536 (600 mg)-Given       1420 (600 mg)-Given       2215 (600 mg)-Given        0603 (600 mg)-Given       1328 (600 mg)-Given       2204 (600 mg)-Given        0647 (600 mg)-Given       1401 (600 mg)-Given       2110 (600 mg)-Given        0933 (600 mg)-Given       [ ] 1400       [ ] 2200           gabapentin (NEURONTIN) solution 200 mg  Dose: 200 mg  Freq: 2 TIMES DAILY Route: PER FEEDING   Start: 06/27/18 0900   Admin. Amount: 200 mg = 4 mL Conc: 50 mg/mL  Last Admin: 07/09/18 0932  Dispense Loc:  Main Pharmacy  Volume: 4 mL     0844 (200 mg)-Given       1400 (200  mg)-Given        0821 (200 mg)-Given       1441 (200 mg)-Given        0823 (200 mg)-Given       1421 (200 mg)-Given        1048 (200 mg)-Given       1420 (200 mg)-Given        0930 (200 mg)-Given       1328 (200 mg)-Given        0905 (200 mg)-Given       1401 (200 mg)-Given        0932 (200 mg)-Given       [ ] 1400           glucose gel 15-30 g  Dose: 15-30 g  Freq: EVERY 15 MIN PRN Route: PO  PRN Reason: low blood sugar  Start: 06/25/18 1048   Admin Instructions: Give 15 g for BG 51 to 69 mg/dL IF patient is conscious and able to swallow. Give 30 g for BG less than or equal to 50 mg/dL IF patient is conscious and able to swallow. Do NOT give glucose gel via enteral tube.  IF patient has enteral tube: give apple juice 120 mL (4 oz or 15 g of CHO) via enteral tube for BG 51 to 69 mg/dL.  Give apple juice 240 mL (8 oz or 30 g of CHO) via enteral tube for BG less than or equal to 50 mg/dL.    ~Oral gel is preferable for conscious and able to swallow patient.   ~IF gel unavailable or patient refuses may provide apple juice 120 mL (4 oz or 15 g of CHO). Document juice on I and O flowsheet.    Admin. Amount: 15-30 g  Dispense Loc: [facility]  Volume: 93.75 mL              Or  dextrose 50 % injection 25-50 mL  Dose: 25-50 mL  Freq: EVERY 15 MIN PRN Route: IV  PRN Reason: low blood sugar  Start: 06/25/18 1048   Admin Instructions: Use if have IV access, BG less than 70 mg/dL and meet dose criteria below:  Dose if conscious and alert (or disorientated) and NPO = 25 mL  Dose if unconscious / not alert = 50 mL  Vesicant. For ordered doses up to 25 g, give IV Push undiluted. Give each 5g over 1 minute.    Admin. Amount: 25-50 mL  Dispense Loc: [facility]  Infused Over: 1-5 Minutes  Volume: 50 mL              Or  glucagon injection 1 mg  Dose: 1 mg  Freq: EVERY 15 MIN PRN Route: SC  PRN Reason: low blood sugar  PRN Comment: May repeat x 1 only  Start: 06/25/18 1048   Admin Instructions: May give SQ or IM. ONLY use glucagon IF  patient has NO IV access AND is UNABLE to swallow AND blood glucose is LESS than or EQUAL to 50 mg/dL.  If ordered IV, give IV Push over 1 minute. Reconstitute with 1mL sterile water.    Admin. Amount: 1 mg  Dispense Loc:  [facility]               HYDROmorphone (PF) (DILAUDID) injection 0.3-0.5 mg  Dose: 0.3-0.5 mg  Freq: EVERY 3 HOURS PRN Route: IV  PRN Reason: moderate to severe pain  Start: 07/04/18 1800   Admin Instructions: For ordered doses up to 4 mg give IV Push undiluted. Administer each 2mg over 2-5 minutes.    Admin. Amount: 0.3-0.5 mg  Dispense Loc:  [facility]               hypromellose-dextran (ARTIFICAL TEARS) 0.1-0.3 % ophthalmic solution 2-3 drop  Dose: 2-3 drop  Freq: EVERY 1 HOUR PRN Route: OP  PRN Reason: dry eyes  Start: 06/24/18 0411   Admin Instructions: To affected eye(s)    Admin. Amount: 2-3 drop  Last Admin: 06/29/18 0829  Dispense Loc:  Main Pharmacy  Volume: 15 mL               insulin aspart (NovoLOG) inj (RAPID ACTING)  Dose: 1-6 Units  Freq: EVERY 4 HOURS Route: SC  Start: 06/25/18 1200   Admin Instructions: Correction Scale - MEDIUM INSULIN RESISTANCE DOSING     Do Not give Correction Insulin if BG less than 140.  For  - 189 give 1 unit.  For  - 239 give 2 units.  For  - 289 give 3 units.  For  - 339 give 4 units.  For  - 399 give 5 units.  For BG greater than or equal to 400 give 6 units.  Check blood glucose Q4H and administer based on blood glucose.  Notify provider if glucose greater than or equal to 350 mg/dL after administration of correction dose.  If given at mealtime, must be administered 5 min before meal or immediately after.    Admin. Amount: 1-6 Units  Last Admin: 07/09/18 1207  Dispense Loc: Contact Rx for dose  Volume: 3 mL     (0017)-Not Given [C]       (0456)-Not Given [C]       (0802)-Not Given [C]       1200 (1 Units)-Given [C]       (1600)-Not Given       (2009)-Not Given [C]       (0778)-Not Given [C]        (8666)-Not Given  [C]       (0736)-Not Given [C]       (1216)-Not Given [C]       (1622)-Not Given       (1953)-Not Given [C]       (2342)-Not Given [C]        (0410)-Not Given [C]       (0824)-Not Given       1152 (1 Units)-Given       (1629)-Not Given [C]       (2035)-Not Given [C]        0101 (1 Units)-Given       (0400)-Not Given       (1045)-Not Given [C]       (1228)-Not Given       1659 (1 Units)-Given       (2130)-Not Given [C]        (0001)-Not Given       (0406)-Not Given [C]       (0941)-Not Given       1336 (1 Units)-Given       (1649)-Not Given [C]       (2009)-Not Given [C]        (0030)-Not Given [C]       (0529)-Not Given [C]       (0745)-Not Given [C]       1202 (1 Units)-Given [C]       1634 (1 Units)-Given [C]       (2056)-Not Given [C]       (2347)-Not Given [C]        (0426)-Not Given [C]       (0845)-Not Given [C]       1207 (1 Units)-Given [C]       [ ] 1600       [ ] 2000           ipratropium - albuterol 0.5 mg/2.5 mg/3 mL (DUONEB) neb solution 3 mL  Dose: 3 mL  Freq: 4 TIMES DAILY Route: NEBULIZATION  Start: 07/06/18 0845   Admin. Amount: 3 mL  Last Admin: 07/09/18 1223  Dispense Loc:  [facility]  Volume: 3 mL               1113 (3 mL)-Given       1540 (3 mL)-Given       1943 (3 mL)-Given        0823 (3 mL)-Given       1146 (3 mL)-Given       1521 (3 mL)-Given       1951 (3 mL)-Given        0835 (3 mL)-Given       1140 (3 mL)-Given       1557 (3 mL)-Given       1937 (3 mL)-Given        0741 (3 mL)-Given       1223 (3 mL)-Given       [ ] 1600       [ ] 2000           lamoTRIgine (LaMICtal) tablet 100 mg  Dose: 100 mg  Freq: 3 TIMES DAILY Route: ORAL OR FEED  Start: 06/30/18 1600   Admin Instructions: Okay to crush.    Admin. Amount: 1 tablet (1 × 100 mg tablet)  Last Admin: 07/09/18 0930  Dispense Loc:  [facility]     0843 (100 mg)-Given       1600 (100 mg)-Given       2134 (100 mg)-Given        0821 (100 mg)-Given       1617 (100 mg)-Given       2102 (100 mg)-Given        0823 (100 mg)-Given       1637 (100  "mg)-Given       2204 (100 mg)-Given        1057 (100 mg)-Given       1619 (100 mg)-Given       2215 (100 mg)-Given        0930 (100 mg)-Given       1652 (100 mg)-Given       2205 (100 mg)-Given        0905 (100 mg)-Given       1634 (100 mg)-Given       2109 (100 mg)-Given        0930 (100 mg)-Given       [ ] 1600       [ ] 2200           lidocaine (LMX4) kit  Freq: EVERY 1 HOUR PRN Route: Top  PRN Reason: pain  PRN Comment: with VAD insertion or accessing implanted port.  Start: 07/06/18 0613   Admin Instructions: Do NOT give if patient has a history of allergy to any local anesthetic or any \"alistair\" product.   Apply 30 minutes prior to VAD insertion or port access.  MAX Dose:  2.5 g (  of 5 g tube)    Dispense Loc:  [facility]               lidocaine 1 % 1 mL  Dose: 1 mL  Freq: EVERY 1 HOUR PRN Route: OTHER  PRN Comment: mild pain with VAD insertion or accessing implanted port  Start: 07/06/18 0613   Admin Instructions: Do NOT give if patient has a history of allergy to any local anesthetic or any \"alistair\" product. MAX dose 1 mL subcutaneous OR intradermal in divided doses.    Admin. Amount: 1 mL  Dispense Loc: [facility] Floor Stock  Volume: 2 mL               Medication given by intrathecal pump: This is NOT an order to dispense medication. For information only.  Freq: CONTINUOUS PRN Route: XX  Start: 06/30/18 0943   Admin Instructions: Medications in Pump: hydromorphone, clonidine, bupivicaine  Clinic Responsible for pump medications: Dr Farooq  Conc:  Hydromorphone 30 mg/ml + bupivacaine 15 mg/ml + clonidine 200 mcg/ml  Rate: hydromorphone 6.008 mg/day + bupivacaine 3.004 mg/day + clonidine 40.05 mcg/day  Pump Last Fill Date:  4/11/2018  Pump Refill Date: 7/5/2018 (alarm date 7/12/2018)    Dispense Loc: Non Rx dispense               miconazole (MICATIN; MICRO GUARD) 2 % powder  Freq: EVERY 1 HOUR PRN Route: Top  PRN Reason: other  PRN Comment: topical candidiasis  Start: 07/01/18 1801   Admin Instructions: Apply to " affected area.      Dispense Loc:  Main Pharmacy               multivitamins with minerals (CERTAVITE/CEROVITE) liquid 15 mL  Dose: 15 mL  Freq: DAILY Route: PER FEEDING   Start: 06/28/18 0930   Admin. Amount: 15 mL  Last Admin: 07/09/18 0931  Dispense Loc: [facility]  Volume: 15 mL     0842 (15 mL)-Given        0828 (15 mL)-Given        0823 (15 mL)-Given        1048 (15 mL)-Given        0930 (15 mL)-Given        0905 (15 mL)-Given        0931 (15 mL)-Given           naloxone (NARCAN) injection 0.1-0.4 mg  Dose: 0.1-0.4 mg  Freq: EVERY 2 MIN PRN Route: IV  PRN Reason: opioid reversal  Start: 06/23/18 0008   Admin Instructions: For respiratory rate LESS than or EQUAL to 8.  Partial reversal dose:  0.1 mg titrated q 2 minutes for Analgesia Side Effects Monitoring Sedation Level of 3 (frequently drowsy, arousable, drifts to sleep during conversation).Full reversal dose:  0.4 mg bolus for Analgesia Side Effects Monitoring Sedation Level of 4 (somnolent, minimal or no response to stimulation).  For ordered doses up to 2mg give IVP. Give each 0.4mg over 15 seconds in emergency situations. For non-emergent situations further dilute in 9mL of NS to facilitate titration of response.    Admin. Amount: 0.1-0.4 mg = 0.25-1 mL Conc: 0.4 mg/mL  Dispense Loc: [facility]  Volume: 1 mL               nitroGLYcerin (NITROSTAT) sublingual tablet 0.4 mg  Dose: 0.4 mg  Freq: EVERY 5 MIN PRN Route: SL  PRN Reason: chest pain  Start: 07/06/18 0613   Admin Instructions: Maximum 3 doses in 15 minutes.  Notify provider if no relief after 3 doses.    Do NOT give nitroglycerin SL IF the patient has taken avanafil (STENDRA), sildenafil (VIAGRA) or (REVATIO) within the last 8 hours, vardenafil (LEVITRA) or (STAXYN) within the last 18 hours, tadalafil (CIALIS) or (ADCIRCA) within the last 36 hours. Inform provider if patient has taken one of these medications.  If patient is still having acute angina requiring treatment, an alternative  treatment option may be used such as: IV beta-blocker [2.5 mg - 5 mg metoprolol (LOPRESSOR)] if ordered by a provider.    Admin. Amount: 1 tablet (1 × 0.4 mg tablet)  Dispense Loc: [facility]               No lozenges or gum should be given while patient on BIPAP/AVAPS/AVAPS AE  Freq: CONTINUOUS PRN Route: XX  Start: 07/06/18 0613   Dispense Loc: Non Rx dispense               ondansetron (ZOFRAN-ODT) ODT tab 4 mg  Dose: 4 mg  Freq: EVERY 6 HOURS PRN Route: PO  PRN Reasons: nausea,vomiting  Start: 06/23/18 0008   Admin Instructions: This is Step 1 of nausea and vomiting management.  If nausea not resolved in 15 minutes, go to Step 2 prochlorperazine (COMPAZINE). Do not push through foil backing. Peel back foil and gently remove. Place on tongue immediately. Administration with liquid unnecessary  With dry hands, peel back foil backing and gently remove tablet; do not push oral disintegrating tablet through foil backing; administer immediately on tongue and oral disintegrating tablet dissolves in seconds; then swallow with saliva; liquid not required.    Admin. Amount: 1 tablet (1 × 4 mg tablet)  Last Admin: 07/05/18 1342  Dispense Loc: [facility]      0646 (4 mg)-Given        1342 (4 mg)-Given [C]              Or  ondansetron (ZOFRAN) injection 4 mg  Dose: 4 mg  Freq: EVERY 6 HOURS PRN Route: IV  PRN Reasons: nausea,vomiting  Start: 06/23/18 0008   Admin Instructions: This is Step 1 of nausea and vomiting management.  If nausea not resolved in 15 minutes, go to Step 2 prochlorperazine (COMPAZINE).  Irritant. For ordered doses up to 4 mg, give IV Push undiluted over 2-5 minutes.    Admin. Amount: 4 mg = 2 mL Conc: 4 mg/2 mL  Last Admin: 06/24/18 0754  Dispense Loc: [facility]  Infused Over: 2-5 Minutes  Volume: 2 mL                             polyethylene glycol (MIRALAX/GLYCOLAX) Packet 17 g  Dose: 17 g  Freq: DAILY Route: PO  Start: 06/27/18 1045   Admin Instructions: 1 Packet = 17 grams. Mixed prescribed dose  in 8 ounces of water. Follow with 8 oz. of water.    Admin. Amount: 17 g  Last Admin: 07/01/18 0828  Dispense Loc:  ADS MS3[facility]     (0844)-Not Given        (0820)-Not Given        (0824)-Not Given        (1031)-Not Given        (0931)-Not Given        (0906)-Not Given        (0930)-Not Given [C]           potassium chloride (KLOR-CON) Packet 20-40 mEq  Dose: 20-40 mEq  Freq: EVERY 2 HOURS PRN Route: ORAL OR FEED  PRN Reason: potassium supplementation  Start: 06/23/18 0008   Admin Instructions: Use if unable to tolerate tablets.  If Serum K+ 3.0-3.3, dose = 60 mEq po total dose (40 mEq x1 followed in 2 hours by 20 mEq x1). Recheck K+ level 4 hours after dose and the next AM.  If Serum K+ 2.5-2.9, dose = 80 mEq po total dose (40 mEq Q2H x2). Recheck K+ level 4 hours after dose and the next AM.  If Serum K+ less than 2.5, See IV order.  Dissolve packet contents in 4-8 ounces of cold water or juice.    Admin. Amount: 20-40 mEq  Last Admin: 07/04/18 0637  Dispense Loc:  ADS MS3[facility]     0843 (40 mEq)-Given       1044 (40 mEq)-Given        0637 (40 mEq)-Given                potassium chloride 10 mEq in 100 mL intermittent infusion with 10 mg lidocaine  Dose: 10 mEq  Freq: EVERY 1 HOUR PRN Route: IV  PRN Reason: potassium supplementation  Start: 06/23/18 0008   Admin Instructions: Infuse via PERIPHERAL LINE. Use potassium with lidocaine for pain with peripheral administration.  If Serum K+ 3.0-3.3, dose = 10 mEq/hr x4 doses (40 mEq IV total dose). Recheck K+ level 2 hours after dose and the next AM.  If Serum K+ less than 3.0, dose = 10 mEq/hr x6 doses (60 mEq IV total dose). Recheck K+ level 2 hours after dose and the next AM.    Admin. Amount: 10 mEq = 100 mL Conc: 10 mEq/100 mL  Dispense Loc:  Main Pharmacy  Infused Over: 1 Hours  Volume: 100 mL               potassium chloride 10 mEq in 100 mL sterile water intermittent infusion (premix)  Dose: 10 mEq  Freq: EVERY 1 HOUR PRN Route: IV  PRN Reason: potassium  supplementation  Start: 06/23/18 0008   Admin Instructions: Infuse via PERIPHERAL LINE or CENTRAL LINE. Use for central line replacement if patient weight less than 65 kg, if patient is on TPN with high potassium content or if unit does not stock 20 mEq bags.   If Serum K+ 3.0-3.3, dose = 10 mEq/hr x4 doses (40 mEq IV total dose). Recheck K+ level 2 hours after dose and the next AM.   If Serum K+ less than 3.0, dose = 10 mEq/hr x6 doses (60 mEq IV total dose). Recheck K+ level 2 hours after dose and the next AM.    Admin. Amount: 10 mEq = 100 mL Conc: 10 mEq/100 mL  Dispense Loc:  Main Pharmacy  Infused Over: 60 Minutes  Volume: 100 mL               potassium chloride SA (K-DUR/KLOR-CON M) CR tablet 20-40 mEq  Dose: 20-40 mEq  Freq: EVERY 2 HOURS PRN Route: PO  PRN Reason: potassium supplementation  Start: 06/23/18 0008   Admin Instructions: Use if able to take PO.   If Serum K+ 3.0-3.3, dose = 60 mEq po total dose (40 mEq x1 followed in 2 hours by 20 mEq x1). Recheck K+ level 4 hours after dose and the next AM.  If Serum K+ 2.5-2.9, dose = 80 mEq po total dose (40 mEq Q2H x2). Recheck K+ level 4 hours after dose and the next AM.  If Serum K+ less than 2.5, See IV order.  DO NOT CRUSH    Admin. Amount: 1-2 tablet (1-2 × 20 mEq tablet)  Dispense Loc:  ADS MS3S               potassium phosphate 15 mmol in D5W 250 mL intermittent infusion  Dose: 15 mmol  Freq: DAILY PRN Route: IV  PRN Reason: phosphorous supplementation  Start: 06/26/18 1531   Admin Instructions: For serum phosphorus level 2-2.4  Do not infuse Phosphorus in the same line as TPN.   Give 15 mmol and recheck phosphorus level next AM.  Each mmol of phosphate provides 1.47 mEq of Potassium. Multiply the patient's phosphate dose by 1.47 to determine the amount of potassium in this dose.    Admin. Amount: 15 mmol  Dispense Loc:  Main Pharmacy  Infused Over: 4 Hours  Volume: 250 mL   Mixture Administration Information:   Medication Type Amount    potassium phosphate 3 MMOLE/ML SOLN Medications 15 mmol   D5W 5 % SOLN Base 250 mL                       potassium phosphate 20 mmol in D5W 250 mL intermittent infusion  Dose: 20 mmol  Freq: EVERY 6 HOURS PRN Route: IV  PRN Reason: phosphorous supplementation  Start: 06/26/18 1531   Admin Instructions: For serum phosphorus level 1.1-1.9  For CENTRAL Line ONLY  Do not infuse Phosphorus in the same line as TPN.   Give 20 mmol and recheck phosphorus level 2 hours after last dose and next AM.  Each mmol of phosphate provides 1.47 mEq of Potassium. Multiply the patient's phosphate dose by 1.47 to determine the amount of potassium in this dose.    Admin. Amount: 20 mmol  Last Admin: 06/26/18 1641  Dispense Loc:  Main Pharmacy  Infused Over: 4 Hours  Volume: 250 mL   Mixture Administration Information:   Medication Type Amount   potassium phosphate 3 MMOLE/ML SOLN Medications 20 mmol   D5W 5 % SOLN Base 250 mL                       potassium phosphate 20 mmol in D5W 500 mL intermittent infusion  Dose: 20 mmol  Freq: EVERY 6 HOURS PRN Route: IV  PRN Reason: phosphorous supplementation  Start: 06/26/18 1531   Admin Instructions: For serum phosphorus level 1.1-1.9  For Peripheral Line  Do not infuse Phosphorus in the same line as TPN.   Give 20 mmol and recheck phosphorus level 2 hours after last dose and next AM.  Each mmol of phosphate provides 1.47 mEq of Potassium. Multiply the patient's phosphate dose by 1.47 to determine the amount of potassium in this dose.    Admin. Amount: 20 mmol  Dispense Loc:  Main Pharmacy  Infused Over: 4 Hours  Volume: 500 mL   Mixture Administration Information:   Medication Type Amount   potassium phosphate 3 MMOLE/ML SOLN Medications 20 mmol   D5W 5 % SOLN Base 500 mL                       potassium phosphate 25 mmol in D5W 500 mL intermittent infusion  Dose: 25 mmol  Freq: EVERY 8 HOURS PRN Route: IV  PRN Reason: phosphorous supplementation  Start: 06/26/18 1531   Admin Instructions:  For serum phosphorus level less than 1.1  Do not infuse Phosphorus in the same line as TPN.   Give 25 mmol and recheck phosphorus level 2 hours after last dose and next AM.  Each mmol of phosphate provides 1.47 mEq of Potassium. Multiply the patient's phosphate dose by 1.47 to determine the amount of potassium in this dose.    Admin. Amount: 25 mmol  Dispense Loc:  Main Pharmacy  Infused Over: 6 Hours  Volume: 500 mL   Mixture Administration Information:   Medication Type Amount   potassium phosphate 3 MMOLE/ML SOLN Medications 25 mmol   D5W 5 % SOLN Base 500 mL                       predniSONE (DELTASONE) tablet 40 mg  Dose: 40 mg  Freq: DAILY Route: ORAL OR FEED  Start: 07/09/18 0900   Admin. Amount: 2 tablet (2 × 20 mg tablet)  Last Admin: 07/09/18 0930  Dispense Loc:  ADS MS3[facility]           0930 (40 mg)-Given           protein modular (PROSource TF) 1 packet  Dose: 1 packet  Freq: DAILY Route: PER FEEDING   Start: 07/05/18 0900   Admin Instructions: Infuse via syringe down feeding tube. Flush feeding tube with 15-30 mL of water after administration. Do not mix with other medications.  No mixing or dilution is required. SUPPLIED BY NUTRITION DEPARTMENT.    Admin. Amount: 1 packet  Last Admin: 07/09/18 1209  Dispense Loc: Contact Rx for dose       0825 (1 packet)-Given        1049 (1 packet)-Given        0931 (1 packet)-Given        0905 (1 packet)-Given        1209 (1 packet)-Given           ranitidine (ZANTAC) 15 MG/ML syrup 150 mg  Dose: 150 mg  Freq: 2 TIMES DAILY Route: PER FEEDING   Start: 06/28/18 0900   Admin. Amount: 150 mg = 10 mL Conc: 15 mg/mL  Last Admin: 07/09/18 0933  Dispense Loc:  Main Pharmacy  Volume: 10 mL     0844 (150 mg)-Given       2134 (150 mg)-Given        0821 (150 mg)-Given       2103 (150 mg)-Given        0823 (150 mg)-Given       2216 (150 mg)-Given        1048 (150 mg)-Given       2126 (150 mg)-Given        0930 (150 mg)-Given       2027 (150 mg)-Given        0905 (150  mg)-Given       2110 (150 mg)-Given        0933 (150 mg)-Given       [ ] 2100           sodium chloride (PF) 0.9% PF flush 10 mL  Dose: 10 mL  Freq: EVERY 7 DAYS Route: IK  Start: 06/23/18 0830   Admin Instructions: And Q1H PRN, to lock each CVC - Valved (Tunneled and Non-Tunneled) dormant lumen.    Admin. Amount: 10 mL  Last Admin: 07/07/18 0813  Dispense Loc: [facility] Floor Stock  Volume: 10 mL         0813 (10 mL)-Given             sodium chloride (PF) 0.9% PF flush 10-20 mL  Dose: 10-20 mL  Freq: EVERY 1 HOUR PRN Route: IK  PRN Reasons: line flush,post meds or blood draw  Start: 07/09/18 0845   Admin Instructions: to flush CVC - Valved (Tunneled and Non-Tunneled).   10 mL post IV meds; 20 mL post blood draw.    Admin. Amount: 10-20 mL  Last Admin: 07/09/18 0958  Dispense Loc: [facility] Floor Stock  Volume: 20 mL   Current Line: Peripheral IV 07/22/17 Right Upper forearm          0957 (10 mL)-Given       0958 (10 mL)-Given           sodium chloride (PF) 0.9% PF flush 3 mL  Dose: 3 mL  Freq: EVERY 8 HOURS Route: IK  Start: 07/06/18 0615   Admin Instructions: And Q1H PRN, to lock peripheral IV dormant line.    Admin. Amount: 3 mL  Last Admin: 07/08/18 0701  Dispense Loc: [facility] Floor Stock  Volume: 4 mL   Current Line: Peripheral IV 07/23/17 Left Upper forearm              1421 (3 mL)-Given       (2224)-Not Given        (0549)-Not Given       1329 (3 mL)-Given       (2352)-Not Given        0701 (3 mL)-Given               (0024)-Not Given       (0925)-Not Given       [ ] 1600           sodium chloride (PF) 0.9% PF flush 3 mL  Dose: 3 mL  Freq: EVERY 1 HOUR PRN Route: IK  PRN Reason: line flush  PRN Comment: for peripheral IV flush post IV meds  Start: 07/06/18 0613   Admin. Amount: 3 mL  Dispense Loc: [facility] Floor Stock  Volume: 4 mL               sodium chloride (PF) 0.9% PF flush 3 mL  Dose: 3 mL  Freq: EVERY 8 HOURS Route: IK  Start: 06/22/18 2047   Admin Instructions: And Q1H PRN, to lock peripheral IV dormant line.    Admin.  Amount: 3 mL  Last Admin: 07/09/18 0652  Dispense Loc: [facility] Floor Stock  Volume: 4 mL   Current Line: Peripheral IV 07/22/17 Right Upper forearm    0901 (3 mL)-Given       1600 (3 mL)-Given        (0241)-Not Given       0826 (3 mL)-Given       1617 (3 mL)-Given        (0124)-Not Given       0823 (3 mL)-Given       1642 (3 mL)-Given        (0000)-Not Given       1132 (3 mL)-Given       1624 (3 mL)-Given        (0115)-Not Given                      (0030)-Not Given       0702 (3 mL)-Given               0652 (3 mL)-Given       (0925)-Not Given       [ ] 1600           sodium chloride 0.9% infusion  Rate: 10 mL/hr   Freq: CONTINUOUS Route: IV  Start: 06/23/18 0945   Last Admin: 07/03/18 0731  Dispense Loc: [facility] Floor Stock  Volume: 1,000 mL   Current Line: PICC Double Lumen 06/23/18 Right Basilic (Purple)    0103-Stopped       0203 ( )-Restarted       0631-Stopped       0731 ( )-Restarted                Discontinued Medications  Medications 07/03/18 07/04/18 07/05/18 07/06/18 07/07/18 07/08/18 07/09/18         Dose: 2.5 mg  Freq: DAILY Route: IN  Start: 06/26/18 1345   End: 07/08/18 0903   Admin Instructions: Request 1 hour before due time from main pharmacy    Admin. Amount: 2.5 mg  Last Admin: 07/07/18 2031  Dispense Loc:  Main Pharmacy  Volume: 2.5 mL     2126 (2.5 mg)-Given        2306 (2.5 mg)-Given        2000 (2.5 mg)-Given        1945 (2.5 mg)-Given        2031 (2.5 mg)-Given        0903-Med Discontinued          Dose: 0.3-0.5 mg  Freq: EVERY 1 HOUR PRN Route: IV  PRN Reasons: moderate to severe pain,other  PRN Comment: discomfort  Start: 06/25/18 0921   End: 07/06/18 1401   Admin Instructions: For ordered doses up to 4 mg give IV Push undiluted. Administer each 2mg over 2-5 minutes.    Admin. Amount: 0.3-0.5 mg  Last Admin: 07/02/18 0046  Dispense Loc:  [facility]        1401-Med Discontinued            Freq: EVERY 1 HOUR PRN Route: Top  PRN Reason: pain  PRN Comment: with VAD insertion or accessing implanted  "port.  Start: 06/22/18 2045   End: 07/06/18 1403   Admin Instructions: Do NOT give if patient has a history of allergy to any local anesthetic or any \"alistair\" product.   Apply 30 minutes prior to VAD insertion or port access.  MAX Dose:  2.5 g (  of 5 g tube)    Dispense Loc: [facility]        1403-Med Discontinued            Dose: 1 mL  Freq: EVERY 1 HOUR PRN Route: OTHER  PRN Comment: mild pain with VAD insertion or accessing implanted port  Start: 06/22/18 2045   End: 07/06/18 1403   Admin Instructions: Do NOT give if patient has a history of allergy to any local anesthetic or any \"alistair\" product. MAX dose 1 mL subcutaneous OR intradermal in divided doses.    Admin. Amount: 1 mL  Dispense Loc: [facility] Stock  Volume: 2 mL        1403-Med Discontinued            Dose: 40 mg  Freq: EVERY 24 HOURS Route: IV  Start: 07/03/18 0823   End: 07/08/18 0650   Admin Instructions: Give Doses 125mg and less IV Push over 2-3 minutes - reconstitute with 1 mL of bacteriostatic water if no diluent is provided. Doses greater than 125 mg need to be in at least 50 mL IVPB.    Admin. Amount: 40 mg = 1 mL Conc: 40 mg/mL  Last Admin: 07/07/18 0812  Dispense Loc: [facility]  Volume: 1 mL   Current Line: PICC Double Lumen 06/23/18 Right Basilic (Red)    0843 (40 mg)-Given        0820 (40 mg)-Given        0823 (40 mg)-Given        1132 (40 mg)-Given        0812 (40 mg)-Given        0650-Med Discontinued          Dose: 40 mg  Freq: DAILY Route: PO  Start: 07/08/18 0900   End: 07/08/18 1207   Admin. Amount: 2 tablet (2 × 20 mg tablet)  Last Admin: 07/08/18 0905  Dispense Loc: [facility]          0905 (40 mg)-Given       1207-Med Discontinued          Dose: 10 mL  Freq: EVERY 7 DAYS Route: IK  Start: 07/09/18 0900   End: 07/09/18 0853   Admin Instructions: And Q1H PRN, to lock each CVC - Valved (Tunneled and Non-Tunneled) dormant lumen.    Admin. Amount: 10 mL  Volume: 10 mL           0853-Med Discontinued         Dose: 10-20 mL  Freq: " EVERY 1 HOUR PRN Route: IK  PRN Reasons: line flush,post meds or blood draw  Start: 06/23/18 0823   End: 07/06/18 1404   Admin Instructions: to flush CVC - Valved (Tunneled and Non-Tunneled).   10 mL post IV meds; 20 mL post blood draw.    Admin. Amount: 10-20 mL  Dispense Loc: [facility] Floor Stock  Volume: 20 mL        1404-Med Discontinued            Dose: 3 mL  Freq: EVERY 1 HOUR PRN Route: IK  PRN Reason: line flush  PRN Comment: for peripheral IV flush post IV meds  Start: 06/22/18 2045   End: 07/06/18 1404   Admin. Amount: 3 mL  Dispense Loc: [facility] Floor Stock  Volume: 4 mL        1404-Med Discontinued       Medications 07/03/18 07/04/18 07/05/18 07/06/18 07/07/18 07/08/18 07/09/18

## 2018-06-22 NOTE — IP AVS SNAPSHOT
"    [place] [address] MEDICAL SURGICAL: [phone]                                              INTERAGENCY TRANSFER FORM - PHYSICIAN ORDERS   2018                    Hospital Admission Date: 2018  CRYSTAL PIERCE   : 1950  Sex: Female        Attending Provider: Kermit Montanez III, MD     Allergies:  Nicotine    Infection:  None   Service:  HOSPITALIST    Ht:  1.6 m (5' 3\")   Wt:  58.3 kg (128 lb 9.6 oz)   Admission Wt:  55.5 kg (122 lb 5.7 oz)    BMI:  22.78 kg/m 2   BSA:  1.61 m 2            Patient PCP Information     Provider PCP Type    BERNARDA WAITE MD General      ED Clinical Impression     Diagnosis Description Comment Added By Time Added    Generalized muscle weakness [M62.81] Generalized muscle weakness [M62.81]  Dane Franco MD 2018  9:51 PM    Confusion [R41.0] Confusion [R41.0]  Dane Franco MD 2018  9:51 PM    Pneumonia of right lung due to infectious organism, unspecified part of lung [J18.9] Pneumonia of right lung due to infectious organism, unspecified part of lung [J18.9]  Dane Franco MD 2018  9:51 PM    Chronic bronchitis, unspecified chronic bronchitis type (H) [J42] Chronic bronchitis, unspecified chronic bronchitis type (H) [J42]  Dane Franco MD 2018  9:52 PM      Hospital Problems as of 2018              Priority Class Noted POA    Bacterial pneumonia Medium  2018 Yes    Acute and chronic respiratory failure with hypoxia (H) Medium  2018 Unknown      Non-Hospital Problems as of 2018              Priority Class Noted    Foot joint pain Medium  2008    Seizure (H) Medium  2017    Seizures (H) Medium  10/19/2017      Code Status History     Date Active Date Inactive Code Status Order ID Comments User Context    2018 10:50 AM  Full Code [id number]  Usman Ceron MD Outpatient    2018 12:08 AM 2018 10:50 AM Full Code [id number]  Kermit Montanez III, " MD Inpatient    10/20/2017  2:17 PM 6/23/2018 12:08 AM Full Code [id number]  Elijah Liriano MD Outpatient    10/19/2017  8:04 PM 10/20/2017  2:17 PM Full Code [id number]  Elijah Gu MD Inpatient    7/23/2017 10:36 AM 10/19/2017  8:04 PM Full Code [id number]  Jaycee Rodney PA-C Outpatient    7/22/2017 11:48 PM 7/23/2017 10:36 AM Full Code [id number]  Sandi Wong MD Inpatient         Medication Review      START taking        Dose / Directions Comments    albuterol (2.5 MG/3ML) 0.083% neb solution   Used for:  Pneumonia of right lung due to infectious organism, unspecified part of lung        Dose:  2.5 mg   Take 1 vial (2.5 mg) by nebulization every 2 hours as needed for other (dyspnea)   Quantity:  120 mL   Refills:  0        ipratropium - albuterol 0.5 mg/2.5 mg/3 mL 0.5-2.5 (3) MG/3ML neb solution   Commonly known as:  DUONEB   Used for:  Pneumonia of right lung due to infectious organism, unspecified part of lung        Dose:  3 mL   Take 1 vial (3 mLs) by nebulization 4 times daily   Quantity:  120 mL   Refills:  0        predniSONE 20 MG tablet   Commonly known as:  DELTASONE   Used for:  Pneumonia of right lung due to infectious organism, unspecified part of lung        Take  2 tabs (40 mg) daily x 3 days, 1 tab (20 mg) daily x 3 days, then 1/2 tab (10 mg) x 3 days.   Quantity:  12 tablet   Refills:  0          CONTINUE these medications which have NOT CHANGED        Dose / Directions Comments    amitriptyline 25 MG tablet   Commonly known as:  ELAVIL        Dose:  75 mg   Take 75 mg by mouth At Bedtime   Refills:  0        dispensed in syringe 1 Device in sodium chloride (PF) 20 mL Intrathecal Pump Infusion        by Intrathecal route continuous 0940 MonesbatMed IIB pump per Dr Farooq Model 8637-20 20 ml, serial number [id number] Continuous infusion setting delivers: Hydromorphone 6.008 mg/day Bupivacaine 3.004 mg/day Clonidine 40.05 mcg/day   Refills:  0        FELBATOL PO        Dose:   600 mg   Take 600 mg by mouth 3 times daily   Refills:  0        FERROUS GLUCONATE PO        Dose:  324 mg   Take 324 mg by mouth 2 times daily (with meals) Breakfast & supper   Refills:  0        gabapentin 100 MG capsule   Commonly known as:  NEURONTIN        Dose:  200 mg   Take 200 mg by mouth 2 times daily (with meals) Breakfast & Lunch   Refills:  0        lamoTRIgine 100 MG tablet   Commonly known as:  LaMICtal        Dose:  100 mg   Take 100 mg by mouth 3 times daily.   Refills:  0        simvastatin 40 MG tablet   Commonly known as:  ZOCOR        Dose:  40 mg   Take 40 mg by mouth At Bedtime   Refills:  0                Summary of Visit     Reason for your hospital stay       Admitted for respiratory failure found with significant severe left lung pneumonia complicated with COPD and exacerbation             After Care     Activity       Your activity upon discharge: As determined by physical therapy/Occupational Therapy evaluation       Diet       Follow this diet upon discharge: Orders Placed This Encounter      Adult Formula Drip Feeding: Continuous Isosource 1.5; Nasoduodenal tube; Goal Rate: 45; mL/hr; Medication - Tube Feeding Flush Frequency: At least 15-30 mL water before and after medication administration and with tube clogging; Amount to Send (Nu...      Snacks/Supplements Adult: Other; Magic Shake; Between Meals      NPO for Medical/Clinical Reasons Except for: Meds             Follow-Up Appointment Instructions     Future Labs/Procedures    Follow-up and recommended labs and tests      Comments:    To be determined upon discharge from long-term acute care.  She needs SLP evaluation she is to remain n.p.o. as she had significant respiratory failure currently on tube feedings.      Follow-Up Appointment Instructions     Follow-up and recommended labs and tests        To be determined upon discharge from long-term acute care.  She needs SLP evaluation she is to remain n.p.o. as she had significant  respiratory failure currently on tube feedings.             Statement of Approval     Ordered          07/09/18 1112  I have reviewed and agree with all the recommendations and orders detailed in this document.  EFFECTIVE NOW     Approved and electronically signed by:  Usman Ceron MD

## 2018-06-22 NOTE — IP AVS SNAPSHOT
"          [place] [address] MEDICAL SURGICAL: [phone]                                              INTERAGENCY TRANSFER FORM - LAB / IMAGING / EKG / EMG RESULTS   2018                    Hospital Admission Date: 2018  CRYSTAL PIERCE   : 1950  Sex: Female        Attending Provider: Kermit Montanez III, MD     Allergies:  Nicotine    Infection:  None   Service:  HOSPITALIST    Ht:  1.6 m (5' 3\")   Wt:  58.3 kg (128 lb 9.6 oz)   Admission Wt:  55.5 kg (122 lb 5.7 oz)    BMI:  22.78 kg/m 2   BSA:  1.61 m 2            Patient PCP Information     Provider PCP Type    BERNARDA WAITE MD General         Lab Results - 3 Days      Glucose by meter [[id number]] (Abnormal)  Resulted: 18 0846, Result status: Final result    Ordering provider: Kermit Montanez III, MD  18 0834 Resulting lab: POINT OF CARE TEST, GLUCOSE    Specimen Information    Type Source Collected On     18 0834          Components       Value Reference Range Flag Lab   Glucose 109 70 - 99 mg/dL H 170            Basic metabolic panel [[id number]] (Abnormal)  Resulted: 18 0729, Result status: Final result    Ordering provider: Kermit Montanez III, MD  18 0000 Resulting lab: [facility]    Specimen Information    Type Source Collected On   Blood  18 0700          Components       Value Reference Range Flag Lab   Sodium 138 133 - 144 mmol/L  FrRdHs   Potassium 4.3 3.4 - 5.3 mmol/L  FrRdHs   Chloride 100 94 - 109 mmol/L  FrRdHs   Carbon Dioxide 33 20 - 32 mmol/L H FrRdHs   Anion Gap 5 3 - 14 mmol/L  FrRdHs   Glucose 94 70 - 99 mg/dL  FrRdHs   Urea Nitrogen 27 7 - 30 mg/dL  FrRdHs   Creatinine 0.68 0.52 - 1.04 mg/dL  FrRdHs   GFR Estimate 86 >60 mL/min/1.7m2  FrRdHs   Comment:  Non  GFR Calc   GFR Estimate If Black >90 >60 mL/min/1.7m2  FrRdHs   Comment:  African American GFR Calc   Calcium 9.0 8.5 - 10.1 mg/dL  FrRdHs            CRP inflammation " [[id number]]  Resulted: 07/09/18 0729, Result status: Final result    Ordering provider: Jordi Zambrano MD  07/09/18 0000 Resulting lab: [facility]    Specimen Information    Type Source Collected On   Blood  07/09/18 0700          Components       Value Reference Range Flag Lab   CRP Inflammation 6.9 0.0 - 8.0 mg/L  FrRdHs            Phosphorus [[id number]]  Resulted: 07/09/18 0729, Result status: Final result    Ordering provider: Jordi Zambrano MD  07/09/18 0000 Resulting lab: [facility]    Specimen Information    Type Source Collected On   Blood  07/09/18 0700          Components       Value Reference Range Flag Lab   Phosphorus 4.0 2.5 - 4.5 mg/dL  FrRdHs            Magnesium [[id number]]  Resulted: 07/09/18 0729, Result status: Final result    Ordering provider: Jordi Zambrano MD  07/09/18 0000 Resulting lab: [facility]    Specimen Information    Type Source Collected On   Blood  07/09/18 0700          Components       Value Reference Range Flag Lab   Magnesium 2.2 1.6 - 2.3 mg/dL  FrRdHs            CBC with platelets differential [[id number]] (Abnormal)  Resulted: 07/09/18 0714, Result status: Final result    Ordering provider: Kermit Montanez III, MD  07/09/18 0000 Resulting lab: [facility]    Specimen Information    Type Source Collected On   Blood  07/09/18 0700          Components       Value Reference Range Flag Lab   WBC 10.0 4.0 - 11.0 10e9/L  FrRdHs   RBC Count 3.32 3.8 - 5.2 10e12/L L FrRdHs   Hemoglobin 10.0 11.7 - 15.7 g/dL L FrRdHs   Hematocrit 33.2 35.0 - 47.0 % L FrRdHs    78 - 100 fl  FrRdHs   MCH 30.1 26.5 - 33.0 pg  FrRdHs   MCHC 30.1 31.5 - 36.5 g/dL L FrRdHs   RDW 14.0 10.0 - 15.0 %  FrRdHs   Platelet Count 359 150 - 450 10e9/L  FrRdHs   Diff Method Automated Method   FrRdHs   % Neutrophils 72.8 %  FrRdHs   % Lymphocytes 18.5 %  FrRdHs   % Monocytes 6.8 %  FrRdHs   % Eosinophils 1.3 %   FrRdHs   % Basophils 0.2 %  FrRdHs   % Immature Granulocytes 0.4 %  FrRdHs   Nucleated RBCs 0 0 /100  FrRdHs   Absolute Neutrophil 7.3 1.6 - 8.3 10e9/L  FrRdHs   Absolute Lymphocytes 1.8 0.8 - 5.3 10e9/L  FrRdHs   Absolute Monocytes 0.7 0.0 - 1.3 10e9/L  FrRdHs   Absolute Eosinophils 0.1 0.0 - 0.7 10e9/L  FrRdHs   Absolute Basophils 0.0 0.0 - 0.2 10e9/L  FrRdHs   Abs Immature Granulocytes 0.0 0 - 0.4 10e9/L  FrRdHs   Absolute Nucleated RBC 0.0   FrRdHs            Glucose by meter [[id number]] (Abnormal)  Resulted: 07/09/18 0432, Result status: Final result    Ordering provider: Kermit Montanez III, MD  07/09/18 0419 Resulting lab: POINT OF CARE TEST, GLUCOSE    Specimen Information    Type Source Collected On     07/09/18 0419          Components       Value Reference Range Flag Lab   Glucose 110 70 - 99 mg/dL H 170            Glucose by meter [[id number]] (Abnormal)  Resulted: 07/08/18 2357, Result status: Final result    Ordering provider: Kermit Montanez III, MD  07/08/18 2345 Resulting lab: POINT OF CARE TEST, GLUCOSE    Specimen Information    Type Source Collected On     07/08/18 2345          Components       Value Reference Range Flag Lab   Glucose 118 70 - 99 mg/dL H 170            Glucose by meter [[id number]] (Abnormal)  Resulted: 07/08/18 2109, Result status: Final result    Ordering provider: Kermit Montanez III, MD  07/08/18 2054 Resulting lab: POINT OF CARE TEST, GLUCOSE    Specimen Information    Type Source Collected On     07/08/18 2054          Components       Value Reference Range Flag Lab   Glucose 123 70 - 99 mg/dL H 170            Glucose by meter [[id number]] (Abnormal)  Resulted: 07/08/18 1637, Result status: Final result    Ordering provider: Kermit Montanez III, MD  07/08/18 1625 Resulting lab: POINT OF CARE TEST, GLUCOSE    Specimen Information    Type Source Collected On     07/08/18 1625          Components       Value Reference Range Flag Lab   Glucose 152  70 - 99 mg/dL H 170            Glucose by meter [[id number]] (Abnormal)  Resulted: 07/08/18 1217, Result status: Final result    Ordering provider: Kermit Montanez III, MD  07/08/18 1201 Resulting lab: POINT OF CARE TEST, GLUCOSE    Specimen Information    Type Source Collected On     07/08/18 1201          Components       Value Reference Range Flag Lab   Glucose 147 70 - 99 mg/dL H 170            Basic metabolic panel [[id number]] (Abnormal)  Resulted: 07/08/18 0726, Result status: Final result    Ordering provider: Kermit Montanez III, MD  07/08/18 0000 Resulting lab: [facility]    Specimen Information    Type Source Collected On   Blood  07/08/18 0657          Components       Value Reference Range Flag Lab   Sodium 138 133 - 144 mmol/L  FrRdHs   Potassium 4.0 3.4 - 5.3 mmol/L  FrRdHs   Chloride 99 94 - 109 mmol/L  FrRdHs   Carbon Dioxide 37 20 - 32 mmol/L H FrRdHs   Anion Gap 2 3 - 14 mmol/L L FrRdHs   Glucose 107 70 - 99 mg/dL H FrRdHs   Urea Nitrogen 29 7 - 30 mg/dL  FrRdHs   Creatinine 0.66 0.52 - 1.04 mg/dL  FrRdHs   GFR Estimate 89 >60 mL/min/1.7m2  FrRdHs   Comment:  Non  GFR Calc   GFR Estimate If Black >90 >60 mL/min/1.7m2  FrRdHs   Comment:  African American GFR Calc   Calcium 9.1 8.5 - 10.1 mg/dL  FrRdHs            CBC with platelets differential [[id number]] (Abnormal)  Resulted: 07/08/18 0710, Result status: Final result    Ordering provider: Kermit Montanez III, MD  07/08/18 0000 Resulting lab: [facility]    Specimen Information    Type Source Collected On   Blood  07/08/18 0657          Components       Value Reference Range Flag Lab   WBC 10.6 4.0 - 11.0 10e9/L  FrRdHs   RBC Count 3.35 3.8 - 5.2 10e12/L L FrRdHs   Hemoglobin 10.3 11.7 - 15.7 g/dL L FrRdHs   Hematocrit 32.8 35.0 - 47.0 % L FrRdHs   MCV 98 78 - 100 fl  FrRdHs   MCH 30.7 26.5 - 33.0 pg  FrRdHs   MCHC 31.4 31.5 - 36.5 g/dL L FrRd   RDW 13.9 10.0 - 15.0 %  FrHs    Platelet Count 369 150 - 450 10e9/L  FrRdHs   Diff Method Automated Method   FrRdHs   % Neutrophils 73.7 %  FrRdHs   % Lymphocytes 18.9 %  FrRdHs   % Monocytes 5.7 %  FrRdHs   % Eosinophils 1.2 %  FrRdHs   % Basophils 0.2 %  FrRdHs   % Immature Granulocytes 0.3 %  FrRdHs   Nucleated RBCs 0 0 /100  FrRdHs   Absolute Neutrophil 7.8 1.6 - 8.3 10e9/L  FrRdHs   Absolute Lymphocytes 2.0 0.8 - 5.3 10e9/L  FrRdHs   Absolute Monocytes 0.6 0.0 - 1.3 10e9/L  FrRdHs   Absolute Eosinophils 0.1 0.0 - 0.7 10e9/L  FrRdHs   Absolute Basophils 0.0 0.0 - 0.2 10e9/L  FrRdHs   Abs Immature Granulocytes 0.0 0 - 0.4 10e9/L  FrRdHs   Absolute Nucleated RBC 0.0   FrRdHs            Glucose by meter [[id number]] (Abnormal)  Resulted: 07/08/18 0531, Result status: Final result    Ordering provider: Kermit Montanez III, MD  07/08/18 0512 Resulting lab: POINT OF CARE TEST, GLUCOSE    Specimen Information    Type Source Collected On     07/08/18 0512          Components       Value Reference Range Flag Lab   Glucose 109 70 - 99 mg/dL H 170            Glucose by meter [[id number]]  Resulted: 07/08/18 0027, Result status: Final result    Ordering provider: Kermit Montanez III, MD  07/08/18 0007 Resulting lab: POINT OF CARE TEST, GLUCOSE    Specimen Information    Type Source Collected On     07/08/18 0007          Components       Value Reference Range Flag Lab   Glucose 96 70 - 99 mg/dL  170            Glucose by meter [[id number]] (Abnormal)  Resulted: 07/07/18 2019, Result status: Final result    Ordering provider: Kermit Montanez III, MD  07/07/18 2007 Resulting lab: POINT OF CARE TEST, GLUCOSE    Specimen Information    Type Source Collected On     07/07/18 2007          Components       Value Reference Range Flag Lab   Glucose 102 70 - 99 mg/dL H 170            Glucose by meter [[id number]] (Abnormal)  Resulted: 07/07/18 1656, Result status: Final result    Ordering provider: Kermit Montanez III, MD  07/07/18  1643 Resulting lab: POINT OF CARE TEST, GLUCOSE    Specimen Information    Type Source Collected On     07/07/18 1643          Components       Value Reference Range Flag Lab   Glucose 127 70 - 99 mg/dL H 170            Glucose by meter [[id number]] (Abnormal)  Resulted: 07/07/18 1259, Result status: Final result    Ordering provider: Kermit Montanez III, MD  07/07/18 1246 Resulting lab: POINT OF CARE TEST, GLUCOSE    Specimen Information    Type Source Collected On     07/07/18 1246          Components       Value Reference Range Flag Lab   Glucose 186 70 - 99 mg/dL H 170   Comment:  Dr/RN Notified            Basic metabolic panel [[id number]] (Abnormal)  Resulted: 07/07/18 0636, Result status: Final result    Ordering provider: Kermit Montanez III, MD  07/07/18 0000 Resulting lab: [facility]    Specimen Information    Type Source Collected On   Blood  07/07/18 0612          Components       Value Reference Range Flag Lab   Sodium 139 133 - 144 mmol/L  FrRdHs   Potassium 4.2 3.4 - 5.3 mmol/L  FrRdHs   Chloride 98 94 - 109 mmol/L  FrRdHs   Carbon Dioxide 39 20 - 32 mmol/L H FrRdHs   Anion Gap 2 3 - 14 mmol/L L FrRdHs   Glucose 106 70 - 99 mg/dL H FrRdHs   Urea Nitrogen 31 7 - 30 mg/dL H FrRdHs   Creatinine 0.67 0.52 - 1.04 mg/dL  FrRdHs   GFR Estimate 89 >60 mL/min/1.7m2  FrRdHs   Comment:  Non  GFR Calc   GFR Estimate If Black >90 >60 mL/min/1.7m2  FrRd   Comment:  African American GFR Calc   Calcium 8.7 8.5 - 10.1 mg/dL  FrRdHs            CBC with platelets differential [[id number]] (Abnormal)  Resulted: 07/07/18 0622, Result status: Final result    Ordering provider: Kermit Montanez III, MD  07/07/18 0000 Resulting lab: [facility]    Specimen Information    Type Source Collected On   Blood  07/07/18 0612          Components       Value Reference Range Flag Lab   WBC 9.6 4.0 - 11.0 10e9/L  FrRdHs   RBC Count 3.29 3.8 - 5.2 10e12/L L FrRdHs    Hemoglobin 10.0 11.7 - 15.7 g/dL L FrRdHs   Hematocrit 32.9 35.0 - 47.0 % L FrRdHs    78 - 100 fl  FrRdHs   MCH 30.4 26.5 - 33.0 pg  FrRdHs   MCHC 30.4 31.5 - 36.5 g/dL L FrRdHs   RDW 13.8 10.0 - 15.0 %  FrRdHs   Platelet Count 378 150 - 450 10e9/L  FrRdHs   Diff Method Automated Method   FrRdHs   % Neutrophils 72.7 %  FrRdHs   % Lymphocytes 20.0 %  FrRdHs   % Monocytes 6.0 %  FrRdHs   % Eosinophils 0.9 %  FrRdHs   % Basophils 0.2 %  FrRdHs   % Immature Granulocytes 0.2 %  FrRdHs   Nucleated RBCs 0 0 /100  FrRdHs   Absolute Neutrophil 7.0 1.6 - 8.3 10e9/L  FrRdHs   Absolute Lymphocytes 1.9 0.8 - 5.3 10e9/L  FrRdHs   Absolute Monocytes 0.6 0.0 - 1.3 10e9/L  FrRdHs   Absolute Eosinophils 0.1 0.0 - 0.7 10e9/L  FrRdHs   Absolute Basophils 0.0 0.0 - 0.2 10e9/L  FrRdHs   Abs Immature Granulocytes 0.0 0 - 0.4 10e9/L  FrRdHs   Absolute Nucleated RBC 0.0   FrRdHs            Glucose by meter [[id number]] (Abnormal)  Resulted: 07/07/18 0449, Result status: Final result    Ordering provider: Kermit Montanez III, MD  07/07/18 0405 Resulting lab: POINT OF CARE TEST, GLUCOSE    Specimen Information    Type Source Collected On     07/07/18 0405          Components       Value Reference Range Flag Lab   Glucose 102 70 - 99 mg/dL H 170            Glucose by meter [[id number]] (Abnormal)  Resulted: 07/07/18 0405, Result status: Final result    Ordering provider: Kermit Montanez III, MD  07/06/18 1039 Resulting lab: POINT OF CARE TEST, GLUCOSE    Specimen Information    Type Source Collected On     07/06/18 1039          Components       Value Reference Range Flag Lab   Glucose 107 70 - 99 mg/dL H 170            Glucose by meter [[id number]] (Abnormal)  Resulted: 07/07/18 0022, Result status: Final result    Ordering provider: Kermit Montanez III, MD  07/07/18 0001 Resulting lab: POINT OF CARE TEST, GLUCOSE    Specimen Information    Type Source Collected On     07/07/18 0001          Components        Value Reference Range Flag Lab   Glucose 105 70 - 99 mg/dL H 170            Glucose by meter [[id number]] (Abnormal)  Resulted: 07/06/18 2143, Result status: Final result    Ordering provider: Kermit Montanez III, MD  07/06/18 2130 Resulting lab: POINT OF CARE TEST, GLUCOSE    Specimen Information    Type Source Collected On     07/06/18 2130          Components       Value Reference Range Flag Lab   Glucose 114 70 - 99 mg/dL H 170            Glucose by meter [[id number]] (Abnormal)  Resulted: 07/06/18 1655, Result status: Final result    Ordering provider: Kermit Montanez III, MD  07/06/18 1641 Resulting lab: POINT OF CARE TEST, GLUCOSE    Specimen Information    Type Source Collected On     07/06/18 1641          Components       Value Reference Range Flag Lab   Glucose 163 70 - 99 mg/dL H 170            Glucose by meter [[id number]] (Abnormal)  Resulted: 07/06/18 1240, Result status: Final result    Ordering provider: Kermit Montanez III, MD  07/06/18 1226 Resulting lab: POINT OF CARE TEST, GLUCOSE    Specimen Information    Type Source Collected On     07/06/18 1226          Components       Value Reference Range Flag Lab   Glucose 136 70 - 99 mg/dL H 170            Basic metabolic panel [[id number]] (Abnormal)  Resulted: 07/06/18 0610, Result status: Final result    Ordering provider: Kermit Montanez III, MD  07/06/18 0000 Resulting lab: [facility]    Specimen Information    Type Source Collected On   Blood  07/06/18 0530          Components       Value Reference Range Flag Lab   Sodium 140 133 - 144 mmol/L  FrRdHs   Potassium 3.9 3.4 - 5.3 mmol/L  FrRdHs   Chloride 98 94 - 109 mmol/L  FrRdHs   Carbon Dioxide 39 20 - 32 mmol/L H FrRdHs   Anion Gap 3 3 - 14 mmol/L  FrRdHs   Glucose 99 70 - 99 mg/dL  FrRdHs   Urea Nitrogen 34 7 - 30 mg/dL H FrRdHs   Creatinine 0.65 0.52 - 1.04 mg/dL  FrRdHs   GFR Estimate >90 >60 mL/min/1.7m2  FrRdHs   Comment:  Non  GFR  Calc   GFR Estimate If Black >90 >60 mL/min/1.7m2  FrRdHs   Comment:  African American GFR Calc   Calcium 9.0 8.5 - 10.1 mg/dL  FrRdHs            Phosphorus [[id number]]  Resulted: 07/06/18 0610, Result status: Final result    Ordering provider: Kermit Montanez III, MD  07/06/18 0530 Resulting lab: [facility]    Specimen Information    Type Source Collected On     07/06/18 0530          Components       Value Reference Range Flag Lab   Phosphorus 3.5 2.5 - 4.5 mg/dL  FrRdHs            CBC with platelets differential [[id number]] (Abnormal)  Resulted: 07/06/18 0557, Result status: Final result    Ordering provider: Kermit Montanez III, MD  07/06/18 0000 Resulting lab: [facility]    Specimen Information    Type Source Collected On   Blood  07/06/18 0530          Components       Value Reference Range Flag Lab   WBC 11.0 4.0 - 11.0 10e9/L  FrRdHs   RBC Count 3.49 3.8 - 5.2 10e12/L L FrRdHs   Hemoglobin 10.7 11.7 - 15.7 g/dL L FrRdHs   Hematocrit 34.2 35.0 - 47.0 % L FrRdHs   MCV 98 78 - 100 fl  FrRdHs   MCH 30.7 26.5 - 33.0 pg  FrRdHs   MCHC 31.3 31.5 - 36.5 g/dL L FrRdHs   RDW 13.9 10.0 - 15.0 %  FrRdHs   Platelet Count 403 150 - 450 10e9/L  FrRdHs   Diff Method Automated Method   FrRdHs   % Neutrophils 73.2 %  FrRdHs   % Lymphocytes 19.7 %  FrRdHs   % Monocytes 5.5 %  FrRdHs   % Eosinophils 0.9 %  FrRdHs   % Basophils 0.3 %  FrRdHs   % Immature Granulocytes 0.4 %  FrRdHs   Nucleated RBCs 0 0 /100  FrRdHs   Absolute Neutrophil 8.1 1.6 - 8.3 10e9/L  FrRdHs   Absolute Lymphocytes 2.2 0.8 - 5.3 10e9/L  FrRdHs   Absolute Monocytes 0.6 0.0 - 1.3 10e9/L  FrRdHs   Absolute Eosinophils 0.1 0.0 - 0.7 10e9/L  FrRdHs   Absolute Basophils 0.0 0.0 - 0.2 10e9/L  FrRdHs   Abs Immature Granulocytes 0.0 0 - 0.4 10e9/L  FrRdHs   Absolute Nucleated RBC 0.0   FrRdHs            Blood gas arterial with oxyhemoglobin [[id number]] (Abnormal)  Resulted: 07/06/18 0523, Result status: Final result     Ordering provider: Mackenzie Baptiste MD  07/06/18 0453 Resulting lab: [facility]    Specimen Information    Type Source Collected On   Blood  07/06/18 0455          Components       Value Reference Range Flag Lab   pH Arterial 7.46 7.35 - 7.45 pH H FrRdHs   pCO2 Arterial 57 35 - 45 mm Hg H FrRdHs   pO2 Arterial 52 80 - 105 mm Hg L FrRdHs   Bicarbonate Arterial 40 21 - 28 mmol/L H FrRdHs   FIO2 15   FrRdHs   Comment:  Liters/minute  OXIMASK     Oxyhemoglobin Arterial 86 92 - 100 % L FrRdHs   Base Excess Art 14.2 mmol/L  FrRd   Comment:  Abnormal Result, Ref range: -9.0 to 1.8            Glucose by meter [[id number]]  Resulted: 07/06/18 0431, Result status: Final result    Ordering provider: Kermit Montanez III, MD  07/06/18 0413 Resulting lab: POINT OF CARE TEST, GLUCOSE    Specimen Information    Type Source Collected On     07/06/18 0413          Components       Value Reference Range Flag Lab   Glucose 96 70 - 99 mg/dL  170            Testing Performed By     Lab - Abbreviation Name Director Address Valid Date Range    12 - FrRdHs [facility] Unknown [address] [facility] [address] 05/08/15 1057 - Present    170 - Unknown POINT OF CARE TEST, GLUCOSE Unknown Unknown 10/31/11 1114 - Present            Unresulted Labs (24h ago through future)    Start       Ordered    07/02/18 0600  Magnesium  EVERY MONDAY,   Routine     Comments:  Every Monday while on enteral tube feeding.    06/26/18 1318    06/25/18 0600  Platelet count  (Pharmacological Prophylaxis - enoxaparin (LOVENOX) *Use only if creatinine clearance is greater than 30 mL/min)  EVERY THREE DAYS,   Routine     Comments:  Repeat every 3 days while on VTE prophylaxis.  Notify provider and hold enoxaparin if platelet count falls by 50% of baseline. If no result is listed, this lab has not been done the past 365 days. LATEST LAB RESULT: Platelet Count (10e9/L)       Date                     Value                  "06/22/2018               319              ----------    06/23/18 0008    Unscheduled  Potassium  (Potassium Replacement - \"Standard\" - For K levels less than 3.4 mmol/L - UU,UR,UA,RH,SH,PH,WY )  CONDITIONAL (SPECIFY),   Routine     Comments:  Obtain Potassium Level for these conditions:  *IF no potassium result within 24 hours before initiation of order set, draw potassium level with next lab collect.    *2 HOURS AFTER last IV potassium replacement dose and 4 hours after an oral replacement dose.  *Next morning after potassium dose.     Repeat Potassium Replacement if necessary.    06/23/18 0008    Unscheduled  Glucose  CONDITIONAL X 3,   Routine     Comments:  Release order if patient experiencing hyperglycemia or hypoglycemia symptoms and Notify Provider.    07/06/18 0613         Imaging Results - 3 Days      XR Chest Port 1 View [[id number]]  Resulted: 07/06/18 1232, Result status: Final result    Ordering provider: Mitchel Andino DO  07/06/18 0849 Resulted by: Luz Maria Dupree MD    Performed: 07/06/18 0854 - 07/06/18 0909 Resulting lab: RADIOLOGY RESULTS    Narrative:       CHEST PORTABLE ONE VIEW 7/6/2018 9:09 AM     HISTORY: Hypoxia.     COMPARISON: 7/1/2018      Impression:       IMPRESSION: The endotracheal tube has been removed removed. Right arm  PICC line remains in place. Enteric tube directed into the stomach,  the tip is not seen. There is persistent left basilar opacity which  looks unchanged. No new pulmonary opacities are seen. Multiple old  left-sided rib fractures. The cardiac silhouette is stable.    LUZ MARIA DUPREE MD      Testing Performed By     Lab - Abbreviation Name Director Address Valid Date Range    104 - Rad Rslts RADIOLOGY RESULTS Unknown Unknown 02/16/05 1553 - Present            Encounter-Level Documents:     There are no encounter-level documents.      Order-Level Documents:     There are no order-level documents.      "

## 2018-06-23 PROBLEM — J15.9 BACTERIAL PNEUMONIA: Status: ACTIVE | Noted: 2018-01-01

## 2018-06-23 PROBLEM — J96.21 ACUTE AND CHRONIC RESPIRATORY FAILURE WITH HYPOXIA (H): Status: ACTIVE | Noted: 2018-01-01

## 2018-06-23 NOTE — PHARMACY-ADMISSION MEDICATION HISTORY
Admission medication history interview status for this patient is complete. See [facility] admission navigator for allergy information, prior to admission medications and immunization status.     Medication history interview source(s):Patient and Family  Medication history resources (including written lists, pill bottles, clinic record):None  Primary pharmacy: [facility] - [place], [place] - [address]    Changes made to PTA medication list:   Added: none  Deleted: acetaminophen, MVI, clonidine  Changed: gabapentin (200 mg --> 100 mg)    Actions taken by pharmacist (provider contacted, etc):None   Additional medication history information:None  Medication reconciliation/reorder completed by provider prior to medication history? no  Do you take OTC medications (eg tylenol, ibuprofen, fish oil, eye/ear drops, etc)? no      Prior to Admission medications    Medication Sig Last Dose Taking? Auth Provider   amitriptyline (ELAVIL) 25 MG tablet Take 25 mg by mouth At Bedtime 6/22/2018 at 2200 Yes Unknown, Entered By History   Felbamate (FELBATOL PO) Take 600 mg by mouth 3 times daily  6/22/2018 at x 3 Yes Unknown, Entered By History   FERROUS GLUCONATE PO Take 324 mg by mouth 2 times daily (with meals) Breakfast & supper 6/22/2018 at x 2 Yes Unknown, Entered By History   gabapentin (NEURONTIN) 100 MG capsule Take 100 mg by mouth 2 times daily (with meals) Breakfast & Lunch 6/22/2018 at x 2 Yes Unknown, Entered By History   LamoTRIgine (LAMICTAL) 100 MG tablet Take 100 mg by mouth 3 times daily. 6/22/2018 at x 3 Yes Reported, Patient   simvastatin (ZOCOR) 40 MG tablet Take 40 mg by mouth At Bedtime  6/22/2018 at 2200 Yes Reported, Patient

## 2018-06-23 NOTE — PROVIDER NOTIFICATION
Prior to the start of the  PICC procedure and with procedural staff participation, I verbally confirmed the patient s identity using two indicators, relevant allergies, that the procedure was appropriate and matched the consent or emergent situation, and that the correct equipment/implants were available. Immediately prior to starting the procedure I conducted the Time Out with the procedural staff and re-confirmed the patient s name, procedure, and site/side. (The Joint Commission universal protocol was followed.)  Yes    Sedation (Moderate or Deep): None

## 2018-06-23 NOTE — PLAN OF CARE
Problem: Patient Care Overview  Goal: Plan of Care/Patient Progress Review  Outcome: No Change  ICU End of Shift Summary.  For vital signs and complete assessments, please see documentation flowsheets.     Pertinent assessments: Pt admitted this shift for shortness of breath and increasing weakness/falls for the past 1 week per /daughter. Somnolent when first assessed only opening eyes for a second and falling back asleep, which family states was unchanged from when they brought her in to the ED. By morning, patient able to open eyes for 10 seconds and answer simple questions/follow commands. SANDRA orientation fully. Remains on Bipap throughout shift. FiO2 weaned down from 100% to 80%. ABGs done x 2 with little improvement - MD aware. LS diminished throughout with L > R. Not coughing this shift so unable to collect sputum sample. Tele NSR. Gill placed with adequate urine output. Bruise on L hip, but otherwise skin intact. Pt has had multiple falls so bed alarm on at all times.   Major Shift Events: 0035: Spoke with Tele-Hub regarding patient's low BPs when first admitted. Informed to talk with admitting MD if able. Paged Dr. Montanez who came to bedside to see patient. Plan for 1L IVF bolus over 1 hour and repeat ABGs. Dr. Montanez back at bedside after results back and explained plan to  and daughter at bedside. Dr. Leal placed orders to change Bipap settings.                       0300: Pt continues with low BPs after improving for a short time so again spoke with Tele-Hub. Order placed for another liter fluid bolus and Gill catheter placement. Dr. Leal videoed in at 0400 with plan to get a PICC placed and antibiotics changed and repeat ABG placed. Pt able to follow commands at that time. PICC stat called and stated they would not be here until 0530. Spoke with Dr. PAGE again about waiting until our PICC team here around 0700. OKed to wait for PICC placement and supervisor made aware to let PICC  team know.                        0450: Tele-Hub notified strep antigen in urine came back positive and repeat ABGs resulted.   Plan (Upcoming Events): Continue antibiotics and await labs/cultures. Continue Bipap support and wean as able with a low threshold for intubation per MD. , Oliverio, and daughter, Joselin updated at bedside.   Discharge/Transfer Needs: Home with  assisting with cares. Date TBD.    Bedside Shift Report Completed   Bedside Safety Check Completed          Problem: Chronic Respiratory Difficulty Comorbidity  Goal: Chronic Respiratory Difficulty  Patient comorbidity will be monitored for signs and symptoms of Respiratory Difficulty (Chronic) condition.  Problems will be absent, minimized or managed by discharge/transition of care.   Outcome: No Change  Nebs ordered along with IV Solumedrol and antibiotics.    Problem: Memory Impairment Comorbidity  Goal: Memory Impairment Comorbidity  Patient comorbidity will be monitored for signs and symptoms of Memory Impairment condition.  Problems will be absent, minimized or managed by discharge/transition of care.   Outcome: No Change  Pt lethargic and confused.

## 2018-06-23 NOTE — H&P
[facility]    History and Physical  Hospitalist       Date of Admission:  6/22/2018    Assessment & Plan   Shana Horne is a 67 year old female with a past medical history of dementia, COPD, chronic hypoxic respiratory failure on 4 L, significant seizure disorder, chronic diarrhea, balance impairment and RSD and she presents to the emergency department today with family for evaluation of worsening mental status.    She was found to have hypoxia and a very significant left-sided infiltrate.  She was started on ceftriaxone and azithromycin as well as BiPAP, underwent trauma evaluation and was admitted to the ICU.    Left middle and lower lobe bacterial pneumonia, community-acquired  Acute on chronic hypoxic respiratory failure (4L by NC at baseline)  Sepsis  -Infiltrate is significant but at the moment she is hemodynamically stable  -Admit to ICU  -Continue BiPAP for now, wean as tolerated  -Continue IV fluids  -Urinalysis pending, check urine strep antigen as well    COPD exacerbation  -Given Solu-Medrol in the emergency department  -Continue Solu-Medrol 40 mg every 12, transition to prednisone when able  Frequent neb treatments  -Still smokes 3/4 of a pack a day according to family, did not get into how she aquires this    Recurrent falls  -Vision is chronic imbalance and falls frequently at home  -CT imaging of her head, as well as x-ray of her femur and pelvis are pending    Seizure disorder  -Significant epilepsy, patient has many recurrent seizures  -Takes Felbamate, Lamictal, Gabapentin at home, do not have IV equivalents  -Resume her oral medications as soon as possible, if extended BiPAP and is not able to take orals it may be reasonable to load her with Keppra prophylactically    RSD/chronic pain syndrome  -She has an implantable morphine pump, no evidence of infection or malfunction    Diarrhea  -Diarrhea is chronic, no blood and no apparent variance from her baseline  -Not see an  "indication here for C. difficile testing unless this worsens or becomes characteristic        # Pain Assessment:  Current Pain Score 10/20/2017   Patient currently in pain? -   Pain score (0-10) 7   Pain location -   Pain descriptors -   Shana izaguirre pain level was assessed and she currently denies pain.       DVT Prophylaxis: Enoxaparin (Lovenox) SQ  Code Status: Full Code, confirmed with family    Disposition: Expected discharge pending clinical improvement. Based on CXR may have longer stay    Kermit Montanez III, MD    Primary Care Physician   BERNARDA WAITE    Chief Complaint   Worsening mental status    History is obtained from the patient's daughter and patient's significant other, ED physician and review of records.    History of Present Illness   Shana Horne is a 67 year old female with a past medical history of dementia, COPD, chronic hypoxic respiratory failure on 4 L, significant seizure disorder, chronic diarrhea, balance impairment and RSD and she presents to the emergency department today with family for evaluation of worsening mental status.    According to family, the patient's mental status is never very good.  She has a lot of balance difficulty and a severe seizure disorder.  She has RSD for which she has an implanted morphine pump.  However they feel she has been a little off for the last 2 weeks and then for the last 2-3 days dramatically worse.  She has been falling more, though they deny head injuries or loss of consciousness.  Her ambulation has declined and her oral intake has been.  Earlier she had diarrhea \"all over the bathroom\".  The patient has said that she needs to go to the hospital but has not been able to define any specific symptoms to her family.  They do think she has had some productive cough for the last day or so.    In the emergency department she was hypoxic to the mid 80s and tachycardic to the 110s.  Initial blood work is significant for WBC count of 18.9, mildly " elevated proBNP.  Chest x-ray shows significant left-sided infiltrate occupying approximately two thirds of her left lung.    Patient is not able to provide me any meaningful history at this time.  Past Medical History    I have reviewed this patient's medical history and updated it with pertinent information if needed.   Past Medical History:   Diagnosis Date     Arthritis      Blood transfusion     38 yrs ago     Chronic pain     painful feet     COPD (chronic obstructive pulmonary disease) (H)      Dementia      Gastroesophageal reflux disease      History of blood transfusion      Numbness and tingling     fingertips occas     Other chronic pain     generalized     Oxygen dependent     4l nc cont     Parkinsons disease (H)      Seizures (H)     LAST SEIZURE 2 MOS AGO     Stimulus-induced repetitive discharges on nerve conduction studies        Past Surgical History   I have reviewed this patient's surgical history and updated it with pertinent information if needed.  Past Surgical History:   Procedure Laterality Date     AMPUTATE TOE(S) Right 10/19/2017    Procedure: AMPUTATE TOE(S);  Right second toe amputation at metatarsophalangeal joint.;  Surgeon: Montrell Arce MD;  Location:  OR     COLONOSCOPY  11/20/2014    Dr. Schaeffer [facility]     COLONOSCOPY N/A 11/20/2014    Procedure: COLONOSCOPY;  Surgeon: Steven Schaeffer MD;  Location:  GI     GYN SURGERY      tear repair after delivery child     INSERT PUMP MORPHINE      pump replacement x2     MOUTH SURGERY      numerous times     ORTHOPEDIC SURGERY      foot surg left     REVISE PUMP MORPHINE  11/29/2011    Procedure:REVISE PUMP MORPHINE; Pump Replacement, abdominal area; Surgeon:ALIRIO RIVERA; Location: OR       Prior to Admission Medications   Prior to Admission Medications   Prescriptions Last Dose Informant Patient Reported? Taking?   FERROUS GLUCONATE PO   Yes No   Sig: Take 324 mg by mouth 2 times daily (with meals)   Felbamate  (FELBATOL PO)   Yes No   Sig: Take 600 mg by mouth 3 times daily    GABAPENTIN PO   Yes No   Sig: Take 200 mg by mouth 2 times daily Am and Noon   LamoTRIgine (LAMICTAL) 100 MG tablet   Yes No   Sig: Take 100 mg by mouth 3 times daily.   Multiple Vitamin (MULTI-VITAMIN) per tablet   Yes No   Sig: Take 1 tablet by mouth daily.   acetaminophen (TYLENOL) 325 MG tablet   No No   Sig: Take 2 tablets (650 mg) by mouth every 4 hours as needed for other (mild pain)   amitriptyline (ELAVIL) 75 MG tablet   Yes No   Sig: Take 75 mg by mouth At Bedtime.   cloNIDine (CATAPRES-TTS3) 0.3 MG/24HR WK patch   No No   Sig: Place 1 patch onto the skin once a week for 7 days   simvastatin (ZOCOR) 40 MG tablet   Yes No   Sig: Take 40 mg by mouth At Bedtime       Facility-Administered Medications: None     Allergies   No Known Allergies    Social History   I have reviewed this patient's social history and updated it with pertinent information if needed. Shana Horne  reports that she has been smoking.  She has been smoking about 0.50 packs per day. She has never used smokeless tobacco. She reports that she does not drink alcohol or use illicit drugs.    Family History   I have reviewed this patient's family history and updated it with pertinent information if needed.   No family history on file.    Review of Systems   The 10 point Review of Systems is negative other than noted in the HPI or here.     Physical Exam   Temp: 98.4  F (36.9  C) Temp src: Oral BP: 119/76   Heart Rate: 108 Resp: 18 SpO2: 93 % O2 Device: BiPAP/CPAP    Vital Signs with Ranges  Temp:  [98.4  F (36.9  C)] 98.4  F (36.9  C)  Heart Rate:  [] 108  Resp:  [14-35] 18  BP: ()/(54-87) 119/76  FiO2 (%):  [100 %] 100 %  SpO2:  [81 %-95 %] 93 %  0 lbs 0 oz    Constitutional: NAD. Frail. Wearing BIPAP.  Eyes: Anicteric, no conjunctival injection  ENT: Atraumatic, membranes moist. In soft cervical collar  Neck: Supple, trachea midline  Respiratory: On BIPAP,  having difficulty keeping mask in place. No wheeze noted  Cardiovascular: S1S2, no edema  GI: Abdomen soft, non-tender  Skin: Warm, pink, dry  Neurologic: Awake and alert. CN II - XII grossly intact.    Data   Data reviewed today:  I personally reviewed no images or EKG's today.    Recent Labs  Lab 06/22/18 2055 06/22/18 2053   WBC 18.9*  --    HGB 14.1  --    MCV 98  --      --    INR 0.97  --      --    POTASSIUM 4.8  --    CHLORIDE 98  --    CO2 33*  --    BUN 23  --    CR 1.06*  --    ANIONGAP 5  --    BIBIANA 9.7  --    *  --    ALBUMIN 3.7  --    PROTTOTAL 9.1*  --    BILITOTAL 0.4  --    ALKPHOS 119  --    ALT 28  --    AST 27  --    TROPONIN  --  0.00       Imaging:  Recent Results (from the past 24 hour(s))   XR Chest Port 1 View    Narrative    CHEST ONE VIEW PORTABLE    6/22/2018 9:20 PM     HISTORY: Shortness of breath. Confusion.     COMPARISON: 10/20/2017.      Impression    IMPRESSION: Large consolidation in the left mid and lower lung  concerning for pneumonia with left pleural effusion. Right lung is  relatively clear. No pneumothorax visualized. Cardiac silhouette  remains enlarged.    EB CALDERON MD

## 2018-06-23 NOTE — PLAN OF CARE
Problem: Patient Care Overview  Goal: Plan of Care/Patient Progress Review  Outcome: Improving  ICU End of Shift Summary.  For vital signs and complete assessments, please see documentation flowsheets.     Pertinent assessments: Pulling off O2 mask and trying to get OOB this AM.  Sitter to bedside.  Mentation improving. Impulsive/ forgetful. No c/o pain. HIGH FALL RISK.  HX of seizures. Seizure pads applied.  VSS. Tubular breath sounds with positive egophony L post lung. No BMs.  Multiple bruises to L Hip.    Major Shift Events: Transitioned to Hi Flow O2 from BIPAP with increased ability to cough and improvement in Sats.  Expectorated several mucus plugs after transition.  Diet increased to regular. IVF @ 100/hr.   Sputum spec. Sent.  Growing mult. Organisms.  WBC 14.5.     Plan (Upcoming Events): Sit upright for meds and feeding. Able to swallow pills without crushing.      Discharge/Transfer Needs: TBD    Bedside Shift Report Completed : yes  Bedside Safety Check Completed: yes

## 2018-06-23 NOTE — CONSULTS
[facility] History and Physical    Shana Horne MRN# [id number]   Age: 67 year old YOB: 1950     Date of Admission:  6/22/2018    Primary care provider: Otoniel Capellan          Assessment and Plan:   Assessment: Mrs. Horne is a 67 yr old lady with prior history of Dementia, COPD on supplemental home O2 at 4L/min, seizure disorder, chronic diarrhea that presented for AMS, likely septic encephalopathy bacterial pneumonia:    Problem List  1. Sepsis  2. Bacterial Pneumonia  3. Acute on Chronic Hypoxemic respiratory failure  4. Encephalopathy  5. COPD exacerbation           Plan:   Neuro:  1. Encephalopathy - likely combination of septic/hypoxemia  2. Seizure DIsorder - chronic  3. Baseline Dementia  Plan  1. Hold oral seizure meds, switch to IV keppra    Pulm:  1. Pneumonia  2. Acute on Chronic Hypoxemic Respiratory Failure  3. COPD Exacerbation  Plan:  1. Cont with BIPAP, settings changed to 16/8cmH2O with FiO2 80%, low threshold for intubation, will get repeat ABG  2. Cont treat COPD including BDs, Steroids, Chest PT, Azithromycin    CV  1. Low pressure - r/o septic shock  Plan:  1. Will challenge with 1L IVF LR, monitor urine output, if remains low will send for Lactate  2. If BP remains low after last bolus, start peripheral Phenylephrine at 50mcg while awaiting PICC central line placement. Once PICC line in place switch to Levophed.    Renal  1. Low urine output, likely pre-renal vs pending TALISHA from shock  Plan  1. Monitor output, place díaz, fluid challenge, pressors if no response.    ID  1. Sepsis from PN  Plan  1. Will change to broad spectrum antibiotics  2. Send for urine legionella/strep antigen  3. Resp Cultures if able to expectorate/ blood cultures  4. Already on steroids    Endo:  No issues    Heme:  No Issues  DVT prophylaxis    Prophylaxis  DVT with Heparin  GI with PPI  Nutrition - NPO               Chief Complaint:   AMS     Indication for critical care  admission:  hypotension, severe respiratory distress and hypoxic respiratory failure     History is obtained from the patient and electronic health record          History of Present Illness:   This patient is a 67 year old  female with a significant past medical history of chronic obstructive pulmonary, Dementia, seizure disorder that presented last night with AMS. The family reports that the patient's mental status is poor at baseline, however it has worsened over the last 2 weeks and has acutely deteriated over the last 2-3 days with increased falls. The patient has productive for a day or so. In the ED, the patient was noted to be hypoxic to 80s, tachycardic to 110s and WBC was 18.9. CXR revealed L-sided infiltrate.              Past Medical History:     Past Medical History:   Diagnosis Date     Arthritis      Blood transfusion     38 yrs ago     Chronic pain     painful feet     COPD (chronic obstructive pulmonary disease) (H)      Dementia      Gastroesophageal reflux disease      History of blood transfusion      Numbness and tingling     fingertips occas     Other chronic pain     generalized     Oxygen dependent     4l nc cont     Parkinsons disease (H)      Seizures (H)     LAST SEIZURE 2 MOS AGO     Stimulus-induced repetitive discharges on nerve conduction studies              Past Surgical History:     Past Surgical History:   Procedure Laterality Date     AMPUTATE TOE(S) Right 10/19/2017    Procedure: AMPUTATE TOE(S);  Right second toe amputation at metatarsophalangeal joint.;  Surgeon: Montrell Arce MD;  Location:  OR     COLONOSCOPY  11/20/2014    Dr. Schaeffer [facility]     COLONOSCOPY N/A 11/20/2014    Procedure: COLONOSCOPY;  Surgeon: Steven Schaeffer MD;  Location:  GI     GYN SURGERY      tear repair after delivery child     INSERT PUMP MORPHINE      pump replacement x2     MOUTH SURGERY      numerous times     ORTHOPEDIC SURGERY      foot surg left     REVISE PUMP MORPHINE   11/29/2011    Procedure:REVISE PUMP MORPHINE; Pump Replacement, abdominal area; Surgeon:ALIRIO RIVERA; Location:[facility] OR             Social History:     Social History   Substance Use Topics     Smoking status: Current Every Day Smoker     Packs/day: 0.50     Smokeless tobacco: Never Used     Alcohol use No             Family History:   I have reviewed this patient's family history and commented on sigificant items within the HPI  No family history on file.  Family history reviewed and updated in EPIC and old charts reviewed          Immunizations:   Immunization status is unknown          Allergies:   No Known Allergies          Medications:     Prescriptions Prior to Admission   Medication Sig Dispense Refill Last Dose     amitriptyline (ELAVIL) 25 MG tablet Take 25 mg by mouth At Bedtime   6/22/2018 at 2200     Felbamate (FELBATOL PO) Take 600 mg by mouth 3 times daily    6/22/2018 at x 3     FERROUS GLUCONATE PO Take 324 mg by mouth 2 times daily (with meals) Breakfast & supper   6/22/2018 at x 2     gabapentin (NEURONTIN) 100 MG capsule Take 100 mg by mouth 2 times daily (with meals) Breakfast & Lunch   6/22/2018 at x 2     LamoTRIgine (LAMICTAL) 100 MG tablet Take 100 mg by mouth 3 times daily.   6/22/2018 at x 3     simvastatin (ZOCOR) 40 MG tablet Take 40 mg by mouth At Bedtime    6/22/2018 at 2200             Review of Systems:   The Review of Systems is negative other than noted in the HPI     Physical Exam via drop in video call to [facility] ICU from [facility] ICU Tele Hub  Gen - pt comfortable on BIPAP, not in distress, is drowsy but easily arousable and able to follow commands.  Abd - díaz placed by local nursing staff, drained 350cc.          Data:   All laboratory and imaging data in the past 24 hours reviewed  All cardiac studies reviewed by me.  Chest x-ray:   Infiltrate seen in the left lower lobe        Attestation:  Amount of time performed on this consult: 40 minutes.    Smooth Leal MD

## 2018-06-23 NOTE — PROGRESS NOTES
Received pt on BIPAP from ED initial settings: 12/6, RR12, 100%. ABG draw on this settings from left radial with result 7.38/55/102/32. BIPAP settings changed post ABG per MD. Pt remains on BIPAP 16/8, RR12, 80%. Another ABG done from left radial with out any complication with result 7.37/54/69/31. SpO2 low 90's, BS diminished. Will continue to monitor pt's respiratory status closely.    Eliane Stone, RT  6/23/2018 5:34 AM

## 2018-06-23 NOTE — ED TRIAGE NOTES
Patient here for altered mental status, not making sense, decreased appetite, and unable to stand or walk x couple days. History of falling and walking into walls. Oxygen saturation 33% out in triage. Patient brought straight to room, MD in room, patient put on monitor and IV started.

## 2018-06-23 NOTE — PROGRESS NOTES
[facility]  Hospitalist Progress Note  Nikos Griffith MD 06/23/2018    Reason for Stay (Diagnosis): acute on chronic respiratory failure         Assessment and Plan:      Summary of Stay: Shana Horne is a 67 year old female with a history of dementia and oxygen dependent COPD who came to attention on 6/22/2018 with increased confusion.  Over the several days prior to admission the patient who is typically independent with a walker has been falling more than usual.  The patient evidently is somewhat limited in terms of her communication capacity, lives with her daughter apparently has had some vague nonspecific complaints in the past week or so.    PMH is significant for ongoing tobacco abuse, COPD for which she is chronically managed on 4 L oxygen by nasal cannula, dementia, refractory epilepsy, RSD involving the left lower extremity, difficulty with ambulating.    In the emergency department, the patient was noted to be afebrile but with tachycardia and tachypnea.  She was talking in 4-5 word phrases due to dyspnea.  Examination showed abnormalities on the left greater than on the right and chest x-ray confirmed the presence of a left lobar pneumonia.  Initial arterial blood gas obtained on BiPAP at 80% FiO2 showed a pH of 7.42 PCO2 52 PaO2 74, white cell count nearly 20,000.  She was started empirically on azithromycin, ceftriaxone and was given both Solu-Medrol and nebulizer medications.     Since admission to the ICU Ms. Horne has been stable.  Urine antigens show pneumococcus.  A very good sputum sample also was obtained by the nurse when  the BiPAP mask was removed.  Due to the evidence copious secretions it was felt that high flow may be a better option for the patient.      Problem List:   1. Acute on chronic respiratory failure with hypoxia and hypercapnia.  The acute component is probably due to acute pneumonia and a more chronic component is due to COPD.  2. Associated COPD  "exacerbation.  3. Falling associated with the acute illness primarily due to generalized weakness.  4. Seizure disorder for which the patient is on multiple medications.  5. Chronic pain with regional sympathetic dystrophy involving the left lower extremity.  6. Dementia.  Although the patient apparently has significant memory problems she recognizes family and lives in a freestanding home with her  as well as her daughter.  They both look after her but during the day the patient has a sister who is able to stay with her.    Plan:  1.  Continue ceftriaxone and azithromycin at this time.  Because patient did have a pneumococcal bacterium identified vancomycin will be continued at least for 1 dose.  2.  Usual antiepileptic medications are resumed baseline doses.  3.  Solu-Medrol 40 mg IV every 12 hours.  4.  Bronchodilators: DuoNeb scheduled 4 times daily with as needed albuterol.  5.  We will continue with amitriptyline and gabapentin for pain control.      DVT Prophylaxis: Enoxaparin (Lovenox) SQ  Code Status: Full Code  Discharge Dispo: home expected  Estimated Disch Date / # of Days until Disch: likely 3-4.    Pt likely could transfer to a medical floor tomorrow.         Interval History (Subjective):      Chart reviewed, pt interviewed.      When I interviewed the patient she was on BiPAP.  She was quite focused on the fact her dentures were not in.    I subsequently spoke with patient's daughter who gave me the information about patient's living situation etc. The patient evidently follows at [facility], and has previously been admitted at [facility].                  Physical Exam:      Last Vital Signs:  /69  Temp 96.8  F (36  C)  Resp 20  Ht 1.6 m (5' 3\")  Wt 55.5 kg (122 lb 5.7 oz)  SpO2 97%  BMI 21.67 kg/m2    I/O last 3 completed shifts:  In: 2600 [I.V.:600; IV Piggyback:2000]  Out: 780 [Urine:780]    Constitutional: Awake, alert, cooperative, no apparent distress "   Respiratory: Clear to auscultation bilaterally, no crackles or wheezing. Left lateral basilar consolidation (egophany noted)   Cardiovascular: Regular rate and rhythm, normal S1 and S2, and no murmur noted   Abdomen: Normal bowel sounds, soft, non-distended, non-tender   Skin: No rashes, no cyanosis, dry to touch   Neuro: Alert and oriented x3, no weakness, numbness, memory loss   Extremities: No edema, normal range of motion   Other(s):        All other systems: Negative          Medications:      All current medications were reviewed with changes reflected in problem list.         Data:      All new lab and imaging data was reviewed.   Labs/Imaging:  Results for orders placed or performed during the hospital encounter of 06/22/18 (from the past 24 hour(s))   Glucose by meter   Result Value Ref Range    Glucose 147 (H) 70 - 99 mg/dL   Troponin POCT   Result Value Ref Range    Troponin I 0.00 0.00 - 0.10 ug/L   CBC with platelets differential   Result Value Ref Range    WBC 18.9 (H) 4.0 - 11.0 10e9/L    RBC Count 4.58 3.8 - 5.2 10e12/L    Hemoglobin 14.1 11.7 - 15.7 g/dL    Hematocrit 44.8 35.0 - 47.0 %    MCV 98 78 - 100 fl    MCH 30.8 26.5 - 33.0 pg    MCHC 31.5 31.5 - 36.5 g/dL    RDW 13.8 10.0 - 15.0 %    Platelet Count 319 150 - 450 10e9/L    Diff Method Automated Method     % Neutrophils 85.3 %    % Lymphocytes 9.9 %    % Monocytes 3.7 %    % Eosinophils 0.1 %    % Basophils 0.3 %    % Immature Granulocytes 0.7 %    Nucleated RBCs 0 0 /100    Absolute Neutrophil 16.1 (H) 1.6 - 8.3 10e9/L    Absolute Lymphocytes 1.9 0.8 - 5.3 10e9/L    Absolute Monocytes 0.7 0.0 - 1.3 10e9/L    Absolute Eosinophils 0.0 0.0 - 0.7 10e9/L    Absolute Basophils 0.1 0.0 - 0.2 10e9/L    Abs Immature Granulocytes 0.1 0 - 0.4 10e9/L    Absolute Nucleated RBC 0.0    INR   Result Value Ref Range    INR 0.97 0.86 - 1.14   Erythrocyte sedimentation rate auto   Result Value Ref Range    Sed Rate 26 0 - 30 mm/h   Comprehensive metabolic  panel   Result Value Ref Range    Sodium 136 133 - 144 mmol/L    Potassium 4.8 3.4 - 5.3 mmol/L    Chloride 98 94 - 109 mmol/L    Carbon Dioxide 33 (H) 20 - 32 mmol/L    Anion Gap 5 3 - 14 mmol/L    Glucose 152 (H) 70 - 99 mg/dL    Urea Nitrogen 23 7 - 30 mg/dL    Creatinine 1.06 (H) 0.52 - 1.04 mg/dL    GFR Estimate 52 (L) >60 mL/min/1.7m2    GFR Estimate If Black 63 >60 mL/min/1.7m2    Calcium 9.7 8.5 - 10.1 mg/dL    Bilirubin Total 0.4 0.2 - 1.3 mg/dL    Albumin 3.7 3.4 - 5.0 g/dL    Protein Total 9.1 (H) 6.8 - 8.8 g/dL    Alkaline Phosphatase 119 40 - 150 U/L    ALT 28 0 - 50 U/L    AST 27 0 - 45 U/L   Magnesium   Result Value Ref Range    Magnesium 2.0 1.6 - 2.3 mg/dL   Lactic acid whole blood   Result Value Ref Range    Lactic Acid Canceled, Test credited 0.7 - 2.0 mmol/L   Ammonia   Result Value Ref Range    Ammonia 17 10 - 50 umol/L   TSH   Result Value Ref Range    TSH 0.23 (L) 0.40 - 4.00 mU/L   Blood gas arterial   Result Value Ref Range    pH Arterial Canceled, Test credited 7.35 - 7.45 pH    pCO2 Arterial Canceled, Test credited 35 - 45 mm Hg    pO2 Arterial Canceled, Test credited 80 - 105 mm Hg    Bicarbonate Arterial Canceled, Test credited 21 - 28 mmol/L    Base Excess Art Canceled, Test credited mmol/L    Base Deficit Art Canceled, Test credited mmol/L    FIO2 Canceled, Test credited    Nt probnp inpatient (BNP)   Result Value Ref Range    N-Terminal Pro BNP Inpatient 1290 (H) 0 - 900 pg/mL   Blood culture   Result Value Ref Range    Specimen Description Blood Right Arm     Special Requests Aerobic and anaerobic bottles received     Culture Micro No growth after 16 hours    Blood culture   Result Value Ref Range    Specimen Description Blood Right Arm     Special Requests Aerobic and anaerobic bottles received     Culture Micro No growth after 16 hours    Blood gas arterial with oxyhemoglobin   Result Value Ref Range    pH Arterial 7.42 7.35 - 7.45 pH    pCO2 Arterial 52 (H) 35 - 45 mm Hg    pO2  Arterial 74 (L) 80 - 105 mm Hg    Bicarbonate Arterial 34 (H) 21 - 28 mmol/L    FIO2 BIPAP     Oxyhemoglobin Arterial 94 92 - 100 %    Base Excess Art 7.9 mmol/L   Lactic acid whole blood   Result Value Ref Range    Lactic Acid 1.1 0.7 - 2.0 mmol/L   XR Chest Port 1 View    Narrative    CHEST ONE VIEW PORTABLE    6/22/2018 9:20 PM     HISTORY: Shortness of breath. Confusion.     COMPARISON: 10/20/2017.      Impression    IMPRESSION: Large consolidation in the left mid and lower lung  concerning for pneumonia with left pleural effusion. Right lung is  relatively clear. No pneumothorax visualized. Cardiac silhouette  remains enlarged.    EB CALDERON MD   ABO/Rh type and screen   Result Value Ref Range    ABO O     RH(D) Neg     Antibody Screen Neg     Test Valid Only At [facility]        Specimen Expires 06/25/2018    CT Head w/o Contrast    Narrative    CT HEAD W/O CONTRAST  6/22/2018 11:02 PM     HISTORY: Confusion. Emphysema and dementia.    TECHNIQUE: Axial images of the head and coronal reformations without  IV contrast material. Radiation dose for this scan was reduced using  automated exposure control, adjustment of the mA and/or kV according  to patient size, or iterative reconstruction technique.    COMPARISON: 7/22/2017.    FINDINGS: No intracranial hemorrhage, mass or mass effect. No acute  infarct identified. No shift of midline structures. No significant  change.      Impression    IMPRESSION: No acute intracranial findings.    HOWARD MENJIVAR MD   US Lower Extremity Venous Duplex Left    Narrative    US LOWER EXTREMITY VENOUS DUPLEX LEFT  6/22/2018 11:24 PM     HISTORY: Evaluate for DVT, pain.    TECHNIQUE: Venous Doppler ultrasound of the lower extremity. Color  flow and spectral Doppler with waveform analysis performed.    COMPARISON: None.    FINDINGS: Ultrasound of the left leg demonstrates no deep vein  thrombus from the common femoral through popliteal veins or in the  visualized  segments of posterior tibial or peroneal veins in the calf.  3.5 x 3.7 x 1.2 cm popliteal fossa cyst, likely a Baker's cyst.      Impression    IMPRESSION:  1. No DVT identified left leg.  2. Baker's cyst.    HOWARD MENJIVAR MD   XR Pelvis 1/2 Views    Narrative    XR PELVIS 1/2 VW  6/22/2018 11:36 PM     INDICATION: Pain.    COMPARISON: None.      Impression    IMPRESSION: Negative.    HOWARD MENJIVAR MD   XR Femur Left 2 Views    Narrative    XR FEMUR LT 2 VW  6/22/2018 11:37 PM     INDICATION: Pain.    COMPARISON: None.      Impression    IMPRESSION: No acute fractures. Moderate degenerative and hypertrophic  changes left knee.    HOWARD MENJIVAR MD   UA with Microscopic   Result Value Ref Range    Color Urine Yellow     Appearance Urine Clear     Glucose Urine Negative NEG^Negative mg/dL    Bilirubin Urine Negative NEG^Negative    Ketones Urine Negative NEG^Negative mg/dL    Specific Gravity Urine 1.021 1.003 - 1.035    Blood Urine Large (A) NEG^Negative    pH Urine 5.0 5.0 - 7.0 pH    Protein Albumin Urine 100 (A) NEG^Negative mg/dL    Urobilinogen mg/dL 0.0 0.0 - 2.0 mg/dL    Nitrite Urine Negative NEG^Negative    Leukocyte Esterase Urine Negative NEG^Negative    Source Catheterized Urine     WBC Urine 1 0 - 5 /HPF    RBC Urine 38 (H) 0 - 2 /HPF    Mucous Urine Present (A) NEG^Negative /LPF   Urine Culture   Result Value Ref Range    Specimen Description Catheterized Urine     Special Requests Specimen received in preservative     Culture Micro PENDING    Strep pneumo Agn Ur less than 13yrs or CSF any age   Result Value Ref Range    Specimen Description Catheterized Urine     BAD S pneumoniae       Canceled, Test credited  Incorrectly ordered by PCU/Clinic  Test reordered as correct code     Strep pneumo Agn Ur greater or equal to 13yrs or CSF any age   Result Value Ref Range    Specimen Description Catheterized Urine     S Pneumoniae Antigen (A)      Positive, Streptococcus pneumoniae antigen detected  by immunochromatographic membrane   assay.  This assay may cause false positive results in urine following vaccination.      S Pneumoniae Antigen       Critical Value/Significant Value called to and read back by  Gianna Mares, RN [id number] 6/23/18. MS     Intensivist IP Consult: Patient to be seen: Routine - within 24 hours; ICU admission, significant pneumonia; Consultant may enter orders: Yes    Smooth Farrell MD     6/23/2018  3:53 AM  [facility] History and Physical    Shana Horne MRN# [id number]   Age: 67 year old YOB: 1950     Date of Admission:  6/22/2018    Primary care provider: Otoniel Capellan          Assessment and Plan:   Assessment: Mrs. Horne is a 67 yr old lady with prior history of   Dementia, COPD on supplemental home O2 at 4L/min, seizure   disorder, chronic diarrhea that presented for AMS, likely septic   encephalopathy bacterial pneumonia:    Problem List  1. Sepsis  2. Bacterial Pneumonia  3. Acute on Chronic Hypoxemic respiratory failure  4. Encephalopathy  5. COPD exacerbation           Plan:   Neuro:  1. Encephalopathy - likely combination of septic/hypoxemia  2. Seizure DIsorder - chronic  3. Baseline Dementia  Plan  1. Hold oral seizure meds, switch to IV keppra    Pulm:  1. Pneumonia  2. Acute on Chronic Hypoxemic Respiratory Failure  3. COPD Exacerbation  Plan:  1. Cont with BIPAP, settings changed to 16/8cmH2O with FiO2 80%,   low threshold for intubation, will get repeat ABG  2. Cont treat COPD including BDs, Steroids, Chest PT,   Azithromycin    CV  1. Low pressure - r/o septic shock  Plan:  1. Will challenge with 1L IVF LR, monitor urine output, if   remains low will send for Lactate  2. If BP remains low after last bolus, start peripheral   Phenylephrine at 50mcg while awaiting PICC central line   placement. Once PICC line in place switch to Levophed.    Renal  1. Low urine output, likely pre-renal vs pending TALISHA from shock  Plan  1.  Monitor output, place díaz, fluid challenge, pressors if no   response.    ID  1. Sepsis from PN  Plan  1. Will change to broad spectrum antibiotics  2. Send for urine legionella/strep antigen  3. Resp Cultures if able to expectorate/ blood cultures  4. Already on steroids    Endo:  No issues    Heme:  No Issues  DVT prophylaxis    Prophylaxis  DVT with Heparin  GI with PPI  Nutrition - NPO               Chief Complaint:   AMS     Indication for critical care admission:  hypotension, severe respiratory distress and hypoxic respiratory   failure     History is obtained from the patient and electronic health record          History of Present Illness:   This patient is a 67 year old  female with a significant   past medical history of chronic obstructive pulmonary, Dementia,   seizure disorder that presented last night with AMS. The family   reports that the patient's mental status is poor at baseline,   however it has worsened over the last 2 weeks and has acutely   deteriated over the last 2-3 days with increased falls. The   patient has productive for a day or so. In the ED, the patient   was noted to be hypoxic to 80s, tachycardic to 110s and WBC was   18.9. CXR revealed L-sided infiltrate.              Past Medical History:     Past Medical History:   Diagnosis Date     Arthritis      Blood transfusion     38 yrs ago     Chronic pain     painful feet     COPD (chronic obstructive pulmonary disease) (H)      Dementia      Gastroesophageal reflux disease      History of blood transfusion      Numbness and tingling     fingertips occas     Other chronic pain     generalized     Oxygen dependent     4l nc cont     Parkinsons disease (H)      Seizures (H)     LAST SEIZURE 2 MOS AGO     Stimulus-induced repetitive discharges on nerve conduction   studies              Past Surgical History:     Past Surgical History:   Procedure Laterality Date     AMPUTATE TOE(S) Right 10/19/2017    Procedure: AMPUTATE  TOE(S);  Right second toe amputation at   metatarsophalangeal joint.;  Surgeon: Montrell Arce MD;  Location:  OR     COLONOSCOPY  11/20/2014    Dr. Schaeffer [facility]     COLONOSCOPY N/A 11/20/2014    Procedure: COLONOSCOPY;  Surgeon: Steven Schaeffer MD;    Location:  GI     GYN SURGERY      tear repair after delivery child     INSERT PUMP MORPHINE      pump replacement x2     MOUTH SURGERY      numerous times     ORTHOPEDIC SURGERY      foot surg left     REVISE PUMP MORPHINE  11/29/2011    Procedure:REVISE PUMP MORPHINE; Pump Replacement, abdominal   area; Surgeon:ALIRIO RIVERA; Location: OR             Social History:     Social History   Substance Use Topics     Smoking status: Current Every Day Smoker     Packs/day: 0.50     Smokeless tobacco: Never Used     Alcohol use No             Family History:   I have reviewed this patient's family history and commented on   sigificant items within the HPI  No family history on file.  Family history reviewed and updated   in EPIC and old charts reviewed          Immunizations:   Immunization status is unknown          Allergies:   No Known Allergies          Medications:     Prescriptions Prior to Admission   Medication Sig Dispense Refill Last Dose     amitriptyline (ELAVIL) 25 MG tablet Take 25 mg by mouth At   Bedtime   6/22/2018 at 2200     Felbamate (FELBATOL PO) Take 600 mg by mouth 3 times daily      6/22/2018 at x 3     FERROUS GLUCONATE PO Take 324 mg by mouth 2 times daily (with   meals) Breakfast & supper   6/22/2018 at x 2     gabapentin (NEURONTIN) 100 MG capsule Take 100 mg by mouth 2   times daily (with meals) Breakfast & Lunch   6/22/2018 at x 2     LamoTRIgine (LAMICTAL) 100 MG tablet Take 100 mg by mouth 3   times daily.   6/22/2018 at x 3     simvastatin (ZOCOR) 40 MG tablet Take 40 mg by mouth At Bedtime      6/22/2018 at 2200             Review of Systems:   The Review of Systems is negative other than noted in the HPI      Physical Exam via drop in video call to [facility] ICU from [facility]  Gen - pt comfortable on BIPAP, not in distress, is drowsy but   easily arousable and able to follow commands.  Abd - díaz placed by local nursing staff, drained 350cc.          Data:   All laboratory and imaging data in the past 24 hours reviewed  All cardiac studies reviewed by me.  Chest x-ray:   Infiltrate seen in the left lower lobe        Attestation:  Amount of time performed on this consult: 40 minutes.    Smooth Leal MD       Methicillin Resist/Sens S. aureus PCR   Result Value Ref Range    Specimen Description Nares     Methicillin Resist/Sens S. aureus PCR Negative NEG^Negative   Glucose by meter   Result Value Ref Range    Glucose 227 (H) 70 - 99 mg/dL   Blood gas arterial with oxyhemoglobin   Result Value Ref Range    pH Arterial 7.38 7.35 - 7.45 pH    pCO2 Arterial 55 (H) 35 - 45 mm Hg    pO2 Arterial 102 80 - 105 mm Hg    Bicarbonate Arterial 32 (H) 21 - 28 mmol/L    FIO2 100%     Oxyhemoglobin Arterial 97 92 - 100 %    Base Excess Art 5.5 mmol/L   Legionella pneumonia antigen urine   Result Value Ref Range    Specimen Description Catheterized Urine     L Pneumo Urine Antigen       Presumptive negative for Legionella pneumophilia serogroup 1 antigen in urine, suggesting   no recent or current infection.  Infection due to Legionella cannot be ruled out, since   other serogroups and species may cause disease, antigen may not be present in urine in   early infection, and the level of antigen present in the urine may be below detectable   limits of the test.     Group B strep antigen   Result Value Ref Range    Specimen Description Catheterized Urine     BAD Group B Strep Canceled, Test credited     BAD Group B Strep       Notified Dr. Nacho Rodriguez M.D. new order placed for BASTP on 06.23.18 JT.   Strep pneumo Agn Ur greater or equal to 13yrs or CSF any age   Result Value Ref Range    Specimen Description  Catheterized Urine     S Pneumoniae Antigen (A)      Positive, Streptococcus pneumoniae antigen detected by immunochromatographic membrane   assay.  This assay may cause false positive results in urine following vaccination.     Blood gas arterial with oxyhemoglobin (AM Draw)   Result Value Ref Range    pH Arterial 7.37 7.35 - 7.45 pH    pCO2 Arterial 54 (H) 35 - 45 mm Hg    pO2 Arterial 69 (L) 80 - 105 mm Hg    Bicarbonate Arterial 31 (H) 21 - 28 mmol/L    FIO2 80%     Oxyhemoglobin Arterial 92 92 - 100 %    Base Excess Art 4.4 mmol/L   CBC with platelets differential   Result Value Ref Range    WBC 14.5 (H) 4.0 - 11.0 10e9/L    RBC Count 3.31 (L) 3.8 - 5.2 10e12/L    Hemoglobin 10.2 (L) 11.7 - 15.7 g/dL    Hematocrit 32.9 (L) 35.0 - 47.0 %    MCV 99 78 - 100 fl    MCH 30.8 26.5 - 33.0 pg    MCHC 31.0 (L) 31.5 - 36.5 g/dL    RDW 13.8 10.0 - 15.0 %    Platelet Count 226 150 - 450 10e9/L    Diff Method Automated Method     % Neutrophils 89.0 %    % Lymphocytes 7.4 %    % Monocytes 2.9 %    % Eosinophils 0.0 %    % Basophils 0.1 %    % Immature Granulocytes 0.6 %    Nucleated RBCs 0 0 /100    Absolute Neutrophil 12.9 (H) 1.6 - 8.3 10e9/L    Absolute Lymphocytes 1.1 0.8 - 5.3 10e9/L    Absolute Monocytes 0.4 0.0 - 1.3 10e9/L    Absolute Eosinophils 0.0 0.0 - 0.7 10e9/L    Absolute Basophils 0.0 0.0 - 0.2 10e9/L    Abs Immature Granulocytes 0.1 0 - 0.4 10e9/L    Absolute Nucleated RBC 0.0    Basic metabolic panel   Result Value Ref Range    Sodium 138 133 - 144 mmol/L    Potassium 4.4 3.4 - 5.3 mmol/L    Chloride 105 94 - 109 mmol/L    Carbon Dioxide 30 20 - 32 mmol/L    Anion Gap 3 3 - 14 mmol/L    Glucose 119 (H) 70 - 99 mg/dL    Urea Nitrogen 25 7 - 30 mg/dL    Creatinine 0.86 0.52 - 1.04 mg/dL    GFR Estimate 66 >60 mL/min/1.7m2    GFR Estimate If Black 80 >60 mL/min/1.7m2    Calcium 7.8 (L) 8.5 - 10.1 mg/dL   Glucose by meter   Result Value Ref Range    Glucose 123 (H) 70 - 99 mg/dL   Sputum Culture Aerobic  Bacterial   Result Value Ref Range    Specimen Description Sputum     Special Requests SCREEN     Culture Micro PENDING    Gram stain   Result Value Ref Range    Specimen Description Sputum     Special Requests SCREEN     Gram Stain PENDING    Glucose by meter   Result Value Ref Range    Glucose 154 (H) 70 - 99 mg/dL   Sputum Culture Aerobic Bacterial   Result Value Ref Range    Specimen Description Sputum     Culture Micro Canceled, Test credited  Duplicate request      Gram stain   Result Value Ref Range    Specimen Description Sputum     Gram Stain Canceled, Test credited  Duplicate request

## 2018-06-23 NOTE — PROGRESS NOTES
Respiratory Therapy    RN updated that ABG was collected on patient's chart was the VBG and not ABG.     ABG was drawn at 21:20 from patient's left brachial artery with no complications on BiPAP FiO2 100%.    Patient started on BiPAP for hypoxia and increased WOB @ 2050.        Agata Hernandez RRT  6/22/2018

## 2018-06-23 NOTE — PROGRESS NOTES
Pt transported from ED33 to . Pt is on BIPAP 12/6 R12 100% FIO2. Pt is stable and transport was successful with no complications. Respiratory will continue to follow.

## 2018-06-23 NOTE — PHARMACY-VANCOMYCIN DOSING SERVICE
Pharmacy Vancomycin Initial Note  Date of Service 2018  Patient's  1950  67 year old, female    Indication: Community Acquired Pneumonia and Sepsis    Current estimated CrCl = Estimated Creatinine Clearance: 57.1 mL/min (based on Cr of 0.86).    Creatinine for last 3 days  2018:  8:55 PM Creatinine 1.06 mg/dL  2018:  6:03 AM Creatinine 0.86 mg/dL    Recent Vancomycin Level(s) for last 3 days  No results found for requested labs within last 72 hours.      Vancomycin IV Administrations (past 72 hours)                   vancomycin (VANCOCIN) 1,250 mg in sodium chloride 0.9 % 250 mL intermittent infusion (mg) 1,250 mg New Bag 18 0444                Nephrotoxins and other renal medications (Future)    Start     Dose/Rate Route Frequency Ordered Stop    18 0415  vancomycin (VANCOCIN) 1,250 mg in sodium chloride 0.9 % 250 mL intermittent infusion      1,250 mg  over 90 Minutes Intravenous EVERY 24 HOURS 18 0404            Contrast Orders - past 72 hours     None                Plan:  1.  Start vancomycin  1250 mg IV q24h.   2.  Goal Trough Level: 15-20 mg/L   3.  Pharmacy will check trough levels as appropriate in 1-3 Days.    4. Serum creatinine levels will be ordered daily for the first week of therapy and at least twice weekly for subsequent weeks.    5. Paul[place] method utilized to dose vancomycin therapy: Method 1    June Galvan

## 2018-06-23 NOTE — ED PROVIDER NOTES
"  History     Chief Complaint:  Altered Mental Status    History obtained from family secondary to dementia.   HPI   Shana Horne is a 67 year old female with a extensive medical history including COPD (on 4 liters oxygen, current every day smoker), dementia, epilepsy, chronic diarrhea, difficulty with balance, and RSD who presents to the emergency department today for evaluation of altered mental status. Over the last few days, the patient has had increased confusion (\"not making sense\" per daughter), falls with no head injury or LOC, cough, difficulty ambulating (typically ambulates with a walker), and decreased oral intake. Symptoms have gotten progressively worst prompting ED visit. Here, daughter notes patient has had similar symptoms in the past and symptoms at that time were attributed to dehydration. She has intermittently told her family she needs to go to the hospital but is unable to state what her symptoms are. Symptoms typically improve with Gatorade use however she has been anorexic over the last few days. Her speech is slurred however this is not new per family. She has difficulty communicating with family. No recent seizures. patient is on morphine pump and she has been complaint with all her medications. She did have a productive cough last night. Last episode of diarrhea was last night. No evidence of blood in stool per family. Patient only complains of left leg pain but history is otherwise limited secondary to mental status change. Code status is full code.     Allergies:  No Known Drug Allergies    Medications:    amitriptyline (ELAVIL) 75 MG tablet  cloNIDine (CATAPRES-TTS3) 0.3 MG/24HR WK patch  Felbamate (FELBATOL PO)  FERROUS GLUCONATE PO  GABAPENTIN PO  LamoTRIgine (LAMICTAL) 100 MG tablet  simvastatin (ZOCOR) 40 MG tablet    Past Medical History:    Arthritis   Blood transfusion   Chronic pain   COPD (chronic obstructive pulmonary disease)    Dementia   Gastroesophageal reflux " disease   History of blood transfusion   Numbness and tingling   Other chronic pain   Oxygen dependent   Parkinsons disease    Seizures    Stimulus-induced repetitive discharges on nerve conduction studies     Past Surgical History:    AMPUTATE TOE(S)   COLONOSCOPY   GYN SURGERY-tear repair after delivery child  INSERT PUMP MORPHINE-pump replacement x2  MOUTH SURGERY-numerous times  ORTHOPEDIC SURGERY-foot surg left    Family History:    History reviewed. No pertinent family history.     Social History:  The patient was accompanied to the ED by family.  Smoking Status: Current every day smoker  Smokeless Tobacco: Never used  Alcohol Use: No  Marital Status:   [2]     Review of Systems   Unable to perform ROS: Mental status change   Constitutional: Positive for activity change and appetite change.   Respiratory: Positive for cough.    Gastrointestinal: Positive for diarrhea.   Musculoskeletal: Positive for arthralgias.   Psychiatric/Behavioral: Positive for confusion.   All other systems reviewed and are negative.    Physical Exam   First Vitals:  Patient Vitals for the past 24 hrs:   BP Temp Temp src Heart Rate Resp SpO2   06/22/18 2100 109/67 - - 112 16 94 %   06/22/18 2045 - 98.4  F (36.9  C) Oral 117 20 (!) 85 %   06/22/18 2034 135/87 - - 120 (!) 35 (!) 81 %       Physical Exam  General: The patient is alert, in no respiratory distress.    HENT: Mucous membranes moist.    Cardiovascular: Regular rate and rhythm. Good pulses in all four extremities. Normal capillary refill and skin turgor.     Respiratory: In mild respiratory distress. Lungs are clear. No nasal flaring. No retractions. No wheezing, no crackles. 4-5 word dyspnea with decreased breath sounds, left >right.      Gastrointestinal: Abdomen soft. No guarding, no rebound. No palpable hernias. Non tender.     Musculoskeletal: No gross deformity. Mild tenderness to left groin.     Skin: No rashes or petechiae.     Neurologic: The patient is alert and  oriented to person. GCS 15. No testable cranial nerve deficit. Follows commands with clear and appropriate speech. Gives appropriate answers. Good strength in all extremities. No gross neurologic deficit. Gross sensation intact. Pupils are round and reactive. No meningismus.     Lymphatic: No cervical adenopathy. No lower extremity swelling.    Psychiatric: The patient is non-tearful.    Emergency Department Course     ECG:  ECG taken at 2041, ECG read at 2041  Sinus tachycardia  Left axis deviation  Septal infarct, age undetermined  Abnormal ECG  Rate 121 bpm. TX interval 150. QRS duration 90. QT/QTc 306/434. P-R-T axes -22 -31 -27.      Imaging:  Radiology findings were communicated with the patient who voiced understanding of the findings.    US Lower Extremity Venous Duplex:  1. No DVT identified left leg.  2. Baker's cyst.  Reading per radiology    CT Head w/o Contrast:  No acute intracranial findings.  Reading per radiology    XR Chest Port 1 View:  Large consolidation in the left mid and lower lung  concerning for pneumonia with left pleural effusion. Right lung is  relatively clear. No pneumothorax visualized. Cardiac silhouette  remains enlarged.  Reading per radiology    XR Femur:   No acute fractures. Moderate degenerative and hypertrophic  changes left knee.  Reading per radiology    XR Pelvis 1/2 Views:  Negative   Reading per radiology    Laboratory:  Laboratory findings were communicated with the patient who voiced understanding of the findings.    UA: Blood: Large, Protein albumin: 100(A), Mucous: Present, RBC: 38(H)    Urine culture: Pending    ABO/rh type and screen: O neg    BVlood gas arterial with oxyhgb: ph: 7.42, pco2: 52(H), po2: 74(L), Bcarb: 34(H), ocyhgb: 94    Lactic acid: 1.1    Lamotrigine level: Pending    Blood culture X2: pending    CBC: WBC: 18.9(H) o/w WNL. (HGB 14.1, )     INR: 0.97    Sed rate: 26    CMP: Glucose: 152(H), Creatinine: 1.06(H), GFR: 52(L), protein total:  9.1(H)    M.0    Ammonia: 17    TSH: 0.23(L)    Troponin (Collected ): 0.00    Glucose by meter: 147(H)    Interventions:  - Duoneb 3 mL Nebulization   - Solumedrol 125 mg IV   - Rocephin 2 g IV   - Zithromax 500 mg IV     Emergency Department Course:  Nursing notes and vitals reviewed.  I performed an exam of the patient as documented above.     Patient rechecked. She is breathing more easily on bipap and oxygen saturation is 90%. Family notes mental status is unchanged.     IV was inserted and blood was drawn for laboratory testing, results above.    The patient was sent for a CT while in the emergency department, results above.     10:17 PM: I spoke with Dr. Montanez of the hospitalist service regarding patient's presentation, findings, and plan of care.    I discussed the treatment plan with the patient and family. They expressed understanding of this plan and consented to admission.     Impression & Plan      Medical Decision Making:  Shana Horne is a 67 year old female who has a hx of dementia, COPD, smoking, as well as other multiple medical problems. She is normally on oxygen. Family notes she has had increasing confusion and cough. The patient was in mild respiratory distress. I did feel she needed BiPAP which was started urgently which did help her. She had some mild CO2 retention as well as some hypoxia. The patient looked more comfortable on BiPAP. A CT and XR were ordered as well as an US due to pain in her left groin however she has clear signs of pneumonia. She will be started on IV antibiotics. Urine cultures sent. She was not septic and she was admitted to ICU in guarded condition.       Critical Care time was 50 minutes for this patient excluding procedures.     Diagnosis:    ICD-10-CM    1. Generalized muscle weakness M62.81 ABO/Rh type and screen     Blood culture     Blood culture     CANCELED: ABO/Rh type and screen   2. Confusion R41.0    3. Pneumonia of right lung  due to infectious organism, unspecified part of lung J18.9    4. Chronic bronchitis, unspecified chronic bronchitis type (H) J42          Disposition:   Findings and plan explained to the Patient and spouse and daughter who consents to admission. Discussed the patient with Dr. Montanez, who will admit the patient to a ICU bed for further monitoring, evaluation, and treatment.    Scribe Disclosure:  I, Susan Wang, am serving as a scribe at 8:54 PM on 6/22/2018 to document services personally performed by Dane Franco MD, based on my observations and the provider's statements to me.    6/22/2018   [facility] EMERGENCY DEPARTMENT       Dane Franco MD  06/23/18 0057

## 2018-06-24 NOTE — PROGRESS NOTES
Pt remains on HFNC 100% 60LPM throughout the night and into the morning. An arterial blood gas was drawn at 06:00 this morning from the left radial site with no complications and compression held for 5 minutes. Pt SPO2 was 100% with brief spells of it dropping to 90% when mouth breathing. No significant changes in status were observed. Respiratory will continue to follow.

## 2018-06-24 NOTE — PROGRESS NOTES
Respiratory Therapy Note        Pt was placed on HFNC 60 lpm 100% this morning at 11:10.  She had been removed from BiPAP and was having difficulty maintaining SpO2 on an Oxymask.  SpO2 has been from 90-97% on HFNC.  She has been tolerating HFNC better than BIPAP.  Breath sounds remain diminished.    June 23, 2018 9:46 PM  Chapito Epperson

## 2018-06-24 NOTE — PLAN OF CARE
Problem: Patient Care Overview  Goal: Plan of Care/Patient Progress Review  Outcome: No Change  ICU End of Shift Summary.  For vital signs and complete assessments, please see documentation flowsheets.     Pertinent assessments: Patient anxious @ times.  Gets emotional. States not wanting visitors today. Daughter recommends continuing sitter as pt is impulsive, willful and a high FALL Risk.  Patient c/o generalized discomfort responding to massage, repositioning and neurontin. Least comfortable when on L side (pneumonia is on Left so breathes better on R side). Lung sounds unchanged. Loose, occasional productive cough  Currently on 100% FIO2 (desated to 79 with coughing. and 50 L flow with sat of 91%.   VSS. No BMs. Appetite fair to good.    UOP approx 45cc/hr.  No new skin issues.  Bruising same to L hip.    Major Shift Events: Blood sugar elevated.  SSI added.    Plan (Upcoming Events): Aggressive pulmonary toilet.  Declines dangling @ edge of bed but tolerated chair position in bed. Increase activity as able. Shorten R side lying times r/t Left sided PNA.   Discharge/Transfer Needs: Requires ICU r/t high O2/ Liter flow needs.  Desats quickly with coughing episodes.      Bedside Shift Report Completed : yes    Bedside Safety Check Completed: yes

## 2018-06-24 NOTE — PROGRESS NOTES
Respiratory Therapy Note        Pt is down to 50Lpm 80% Mon HFNC.  Breath sounds remain dimished.  We will continue to decrease HFNC as she tolerates.    June 24, 2018 3:50 PM  Chapito Epperson

## 2018-06-24 NOTE — PLAN OF CARE
Problem: Patient Care Overview  Goal: Plan of Care/Patient Progress Review  Outcome: No Change  ICU End of Shift Summary.  For vital signs and complete assessments, please see documentation flowsheets.     Pertinent assessments: Pt calm in bed visiting with family at beginning of the shift. Able to get about 5 hours of sleep total in between cares. Alert and oriented, but forgetful. PSC at bedside for safety due to multiple falls and compulsiveness per family. BP WNL all shift. Pt c/o of shortness of breath at times more so with any movement in bed. Remains on HFNC at 100% and 60L with sats in the low to mid 90s. LS diminished throughout with L more dim than R. Encouraged TCDB with pt expectorating moderate amounts of thick, yellow/white sputum. Tele SR/ST. UO about 40 mL/hr. BSs above 150 a few times. Plan to discuss with rounding MD this AM. Bruise unchanged to L hip.   Major Shift Events: None  Plan (Upcoming Events): Continue antibiotics, nebs, and IV Solumedrol. Continue HFNC and wean as able. Possible DC of PSC today if patient remains calm and cooperative.    Discharge/Transfer Needs:  Home with  and support from daughters/family when stable. ? Transfer to medical floor today per MD note.    Bedside Shift Report Completed   Bedside Safety Check Completed          Problem: Chronic Respiratory Difficulty Comorbidity  Goal: Chronic Respiratory Difficulty  Patient comorbidity will be monitored for signs and symptoms of Respiratory Difficulty (Chronic) condition.  Problems will be absent, minimized or managed by discharge/transition of care.   Outcome: No Change  Pt continues on HFNC to maintain sats. IV Solumedrol and nebs continue.    Problem: Memory Impairment Comorbidity  Goal: Memory Impairment Comorbidity  Patient comorbidity will be monitored for signs and symptoms of Memory Impairment condition.  Problems will be absent, minimized or managed by discharge/transition of care.   Outcome: Improving  Pt  more alert and oriented x 4 by end of shift, but forgetful. Plan to DC sitter for next shift.

## 2018-06-25 NOTE — PHARMACY-VANCOMYCIN DOSING SERVICE
Pharmacy Vancomycin Note  Date of Service 2018  Patient's  1950   67 year old, female    Indication: Sepsis  Goal Trough Level: 15-20 mg/L  Day of Therapy: 3  Current Vancomycin regimen:  1000 mg IV q24h    Current estimated CrCl = Estimated Creatinine Clearance: 69.5 mL/min (based on Cr of 0.73).    Creatinine for last 3 days  2018:  8:55 PM Creatinine 1.06 mg/dL  2018:  6:03 AM Creatinine 0.86 mg/dL  2018:  5:11 AM Creatinine 0.70 mg/dL  2018:  3:51 AM Creatinine 0.73 mg/dL    Recent Vancomycin Levels (past 3 days)  2018:  3:51 AM Vancomycin Level 8.2 mg/L    Vancomycin IV Administrations (past 72 hours)                   vancomycin (VANCOCIN) 1,250 mg in sodium chloride 0.9 % 250 mL intermittent infusion (mg) 1,250 mg New Bag 18 0602     1,250 mg New Bag 18 0420     1,250 mg New Bag 18 0444                Nephrotoxins and other renal medications (Future)    Start     Dose/Rate Route Frequency Ordered Stop    18 0415  vancomycin (VANCOCIN) 1,250 mg in sodium chloride 0.9 % 250 mL intermittent infusion      1,250 mg  over 90 Minutes Intravenous EVERY 24 HOURS 18 0404               Contrast Orders - past 72 hours     None          Interpretation of levels and current regimen:  Trough level is  Subtherapeutic    Has serum creatinine changed > 50% in last 72 hours: No    Urine output:  unable to determine    Renal Function: Improving    Plan:  1.  Increase frequency. 1250 mg q12h  2.  Pharmacy will check trough levels as appropriate in 1-3 Days.    3. Serum creatinine levels will be ordered daily for the first week of therapy and at least twice weekly for subsequent weeks.      Dane Hall        .

## 2018-06-25 NOTE — PROGRESS NOTES
Patient continues on ventilator with current settings:    Ventilation Mode: CMV/AC  (Continuous Mandatory Ventilation/ Assist Control)  FiO2 (%): 95 %  Rate Set (breaths/minute): 14 breaths/min  Tidal Volume Set (mL): 400 mL  PEEP (cm H2O): 10 cmH2O  Oxygen Concentration (%): 95 %  Resp: 14    BS coarse, suctioning moderate to copious amounts of thick creamy, tan secretions. New orders to start CPT and albuterol/mucomyst Q4, will start at 2000. Patient transported to CT, tolerated well. RT to follow.

## 2018-06-25 NOTE — PLAN OF CARE
Problem: Patient Care Overview  Goal: Plan of Care/Patient Progress Review  Outcome: No Change  ICU End of Shift Summary.  For vital signs and complete assessments, please see documentation flowsheets.     Pertinent assessments: Pt rested in bed throughout shift sleeping about 5 hours tonight. Able to answer all orientation questions, but remains forgetful. PSC at bedside due to this and impulsivity per family. Calm and cooperative this shift. Continues on high flow nasal cannula at 50 LPM and FiO2 between %. Desats into the high 70s with coughing spells and takes several minutes to recover.Coughing up a moderate amount of white/cloudy sputum. LS remain diminished throughout with L > R. Encouraged TCDB.  Tele SR with tachycardia with any activity. UO adequate. No BM. L hip bruise remains unchanged. Weight up another 4 kg from yesterday via bed scale.  Major Shift Events: None  Plan (Upcoming Events): Continue antibiotics, nebs, and IV Solumedrol. Continue HFNC with aggressive pulmonary toileting and wean as able. Possible DC of PSC today if patient remains calm and cooperative.   Discharge/Transfer Needs: TBD. Continues to require ICU care.    Bedside Shift Report Completed   Bedside Safety Check Completed          Problem: Chronic Respiratory Difficulty Comorbidity  Goal: Chronic Respiratory Difficulty  Patient comorbidity will be monitored for signs and symptoms of Respiratory Difficulty (Chronic) condition.  Problems will be absent, minimized or managed by discharge/transition of care.   Outcome: No Change  Continues on IV Solumedrol, nebs, and high flow nasal cannula.    Problem: Memory Impairment Comorbidity  Goal: Memory Impairment Comorbidity  Patient comorbidity will be monitored for signs and symptoms of Memory Impairment condition.  Problems will be absent, minimized or managed by discharge/transition of care.   Outcome: No Change  PSC continues throughout shift due to  forgetfulness/impulsiveness.

## 2018-06-25 NOTE — PROCEDURES
Procedure/Surgery Information   [facility]    Bedside Procedure Note  Date of Service (when I performed the procedure): 06/25/2018    Shana Horne is a 67 year old female patient.  1. Generalized muscle weakness    2. Confusion    3. Pneumonia of right lung due to infectious organism, unspecified part of lung    4. Chronic bronchitis, unspecified chronic bronchitis type (H)      Past Medical History:   Diagnosis Date     Arthritis      Blood transfusion     38 yrs ago     Chronic pain     painful feet     COPD (chronic obstructive pulmonary disease) (H)      Dementia      Gastroesophageal reflux disease      History of blood transfusion      Numbness and tingling     fingertips occas     Other chronic pain     generalized     Oxygen dependent     4l nc cont     Parkinsons disease (H)      Seizures (H)     LAST SEIZURE 2 MOS AGO     Stimulus-induced repetitive discharges on nerve conduction studies      Temp: 97.7  F (36.5  C)   BP: 112/65   Heart Rate: 64 Resp: 14 SpO2: 97 % O2 Device: Mechanical Ventilator Oxygen Delivery: Other (Comments) (45lpm)    Insert arterial line  Date/Time: 6/25/2018 4:50 PM  Performed by: NICO ZELAYA  Authorized by: NICO ZELAYA   Consent: Written consent obtained.  Risks and benefits: risks, benefits and alternatives were discussed  Consent given by: spouse  Patient understanding: patient states understanding of the procedure being performed  Patient consent: the patient's understanding of the procedure matches consent given  Procedure consent: procedure consent matches procedure scheduled  Relevant documents: relevant documents present and verified  Test results: test results available and properly labeled  Site marked: the operative site was marked  Imaging studies: imaging studies available  Required items: required blood products, implants, devices, and special equipment available  Patient identity confirmed: arm band  Time out:  "Immediately prior to procedure a \"time out\" was called to verify the correct patient, procedure, equipment, support staff and site/side marked as required.  Preparation: Patient was prepped and draped in the usual sterile fashion.  Indications: multiple ABGs and respiratory failure  Location: left radial  Anesthesia: local infiltration    Anesthesia:  Local Anesthetic: lidocaine 1% without epinephrine    Sedation:  Patient sedated: no  Needle gauge: 20  Seldinger technique: Seldinger technique used  Number of attempts: 2  Post-procedure: dressing applied  Post-procedure CMS: normal  Patient tolerance: Patient tolerated the procedure well with no immediate complications           Jordi Zambrano  "

## 2018-06-25 NOTE — PLAN OF CARE
Problem: Pneumonia (Adult)  Goal: Signs and Symptoms of Listed Potential Problems Will be Absent, Minimized or Managed (Pneumonia)  Signs and symptoms of listed potential problems will be absent, minimized or managed by discharge/transition of care (reference Pneumonia (Adult) CPG).   Outcome: Declining  ICU End of Shift Summary.  For vital signs and complete assessments, please see documentation flowsheets.     Pertinent assessments: Awake and alert this am. Called to the room per pt. Extremely agitated and restless. Coughing with thick secretions. Desating to 50's. Was on high karma 100% Fio2. Trial on Bipap not paul. Trying to pull off mask. Dr Zambrano at the bedside. Decision to intubate. Sedated on full vent support. Copious oral and Ett secretions. Chest CT done.  Major Shift Events:See above.   Plan (Upcoming Events): Cont supportive care.  Discharge/Transfer Needs: TBD    Bedside Shift Report Completed : Y  Bedside Safety Check Completed:Y

## 2018-06-25 NOTE — PROGRESS NOTES
ABG drawn from right radial artery at 10:35 on 100% FiO2 on ventilator with settings: 14, 40, +10.

## 2018-06-25 NOTE — PROGRESS NOTES
[facility]  Hospitalist Progress Note  Nikos Griffith MD 06/24/2018    Reason for Stay (Diagnosis): acute on chronic respiratory failure         Assessment and Plan:      Summary of Stay: Shana Horne is a 67 year old female with a history of dementia and oxygen dependent COPD who came to attention on 6/22/2018 with increased confusion.  Over the several days prior to admission the patient who is typically independent with a walker has been falling more than usual.  The patient evidently is somewhat limited in terms of her communication capacity, lives with her daughter apparently has had some vague nonspecific complaints in the past week or so.    PMH is significant for ongoing tobacco abuse, COPD for which she is chronically managed on 4 L oxygen by nasal cannula, dementia, refractory epilepsy, RSD involving the left lower extremity, difficulty with ambulating.    In the emergency department, the patient was noted to be afebrile but with tachycardia and tachypnea.  She was talking in 4-5 word phrases due to dyspnea.  Examination showed abnormalities on the left greater than on the right and chest x-ray confirmed the presence of a left lobar pneumonia.  Initial arterial blood gas obtained on BiPAP at 80% FiO2 showed a pH of 7.42 PCO2 52 PaO2 74, white cell count nearly 20,000.  She was started empirically on azithromycin, ceftriaxone and was given both Solu-Medrol and nebulizer medications.     Since admission to the ICU Ms. Horne has been stable.  Urine antigens show pneumococcus.  A very good sputum sample also was obtained by the nurse when  the BiPAP mask was removed.  Due to the evidence copious secretions it was felt that high flow may be a better option for the patient.      Problem List:   1. Acute on chronic respiratory failure with hypoxia and hypercapnia.  Pneumococcal antigen positive.  Appears stable and slowly improving on ceftriaxone alone.  2. Associated COPD  "exacerbation.  3. Falling associated with the acute illness primarily due to generalized weakness, impulsivity, etc.  Fortunately no acute fractures identified.  4. Seizure disorder for which the patient is on multiple medications.  5. Chronic pain with regional sympathetic dystrophy involving the left lower extremity.  6. Dementia.  Although the patient apparently has significant memory problems she recognizes family and lives in a freestanding home with her  as well as her daughter.  They both look after her but during the day the patient has a sister who is able to stay with her.    Plan:  1.  Continue ceftriaxone and azithromycin at this time.  Because patient did have a pneumococcal bacterium identified vancomycin will be continued at least for 1 dose.  2.  Usual antiepileptic medications are resumed baseline doses.  3.  Solu-Medrol 40 mg IV every 12 hours.  4.  Bronchodilators: DuoNeb scheduled 4 times daily with as needed albuterol.  5.  We will continue with amitriptyline and gabapentin for pain control.  6.  It will be important to resume physical and occupational therapy as soon as the patient is able.  She resides with family and will likely not do well in transitional care      DVT Prophylaxis: Enoxaparin (Lovenox) SQ  Code Status: Full Code  Discharge Dispo: home expected  Estimated Disch Date / # of Days until Disch: likely 3-4.    Patient would be reasonable to transfer out from the ICU.        Interval History (Subjective):      Ms. Horne now has a sitter.  She is not fully appropriate today seemed more confused.  No obvious cough or shortness of breath.                  Physical Exam:      Last Vital Signs:  BP 95/70  Temp 99.7  F (37.6  C)  Resp 13  Ht 1.6 m (5' 3\")  Wt 60.5 kg (133 lb 6.1 oz)  SpO2 91%  BMI 23.63 kg/m2    I/O last 3 completed shifts:  In: 4565.17 [P.O.:1210; I.V.:3355.17]  Out: 1009 [Urine:1009]    Constitutional: Awake, alert, cooperative, no apparent distress.  " Patient dislikes the high flow oxygen.   Respiratory: Clear to auscultation bilaterally, no crackles or wheezing. Left lateral basilar consolidation (egophany noted)   Cardiovascular: Regular rate and rhythm, normal S1 and S2, and no murmur noted   Abdomen: Normal bowel sounds, soft, non-distended, non-tender   Skin: No rashes, no cyanosis, dry to touch   Neuro: Alert and oriented to self, appears appropriate to situation.    Extremities: No edema, normal range of motion   Other(s):        All other systems: Negative          Medications:      All current medications were reviewed with changes reflected in problem list.         Data:      All new lab and imaging data was reviewed.   Labs/Imaging:  Results for orders placed or performed during the hospital encounter of 06/22/18 (from the past 24 hour(s))   Glucose by meter   Result Value Ref Range    Glucose 153 (H) 70 - 99 mg/dL   Glucose by meter   Result Value Ref Range    Glucose 175 (H) 70 - 99 mg/dL   CBC with platelets differential   Result Value Ref Range    WBC 15.1 (H) 4.0 - 11.0 10e9/L    RBC Count 3.32 (L) 3.8 - 5.2 10e12/L    Hemoglobin 10.1 (L) 11.7 - 15.7 g/dL    Hematocrit 33.0 (L) 35.0 - 47.0 %    MCV 99 78 - 100 fl    MCH 30.4 26.5 - 33.0 pg    MCHC 30.6 (L) 31.5 - 36.5 g/dL    RDW 13.8 10.0 - 15.0 %    Platelet Count 226 150 - 450 10e9/L    Diff Method Automated Method     % Neutrophils 91.5 %    % Lymphocytes 5.4 %    % Monocytes 2.6 %    % Eosinophils 0.0 %    % Basophils 0.1 %    % Immature Granulocytes 0.4 %    Nucleated RBCs 0 0 /100    Absolute Neutrophil 13.8 (H) 1.6 - 8.3 10e9/L    Absolute Lymphocytes 0.8 0.8 - 5.3 10e9/L    Absolute Monocytes 0.4 0.0 - 1.3 10e9/L    Absolute Eosinophils 0.0 0.0 - 0.7 10e9/L    Absolute Basophils 0.0 0.0 - 0.2 10e9/L    Abs Immature Granulocytes 0.1 0 - 0.4 10e9/L    Absolute Nucleated RBC 0.0    Basic metabolic panel   Result Value Ref Range    Sodium 141 133 - 144 mmol/L    Potassium 4.4 3.4 - 5.3 mmol/L     Chloride 108 94 - 109 mmol/L    Carbon Dioxide 29 20 - 32 mmol/L    Anion Gap 4 3 - 14 mmol/L    Glucose 135 (H) 70 - 99 mg/dL    Urea Nitrogen 18 7 - 30 mg/dL    Creatinine 0.70 0.52 - 1.04 mg/dL    GFR Estimate 84 >60 mL/min/1.7m2    GFR Estimate If Black >90 >60 mL/min/1.7m2    Calcium 7.7 (L) 8.5 - 10.1 mg/dL   Lactic acid whole blood   Result Value Ref Range    Lactic Acid 1.0 0.7 - 2.0 mmol/L   Blood gas arterial with oxyhemoglobin   Result Value Ref Range    pH Arterial 7.36 7.35 - 7.45 pH    pCO2 Arterial 51 (H) 35 - 45 mm Hg    pO2 Arterial 63 (L) 80 - 105 mm Hg    Bicarbonate Arterial 28 21 - 28 mmol/L    FIO2 100%     Oxyhemoglobin Arterial 92 92 - 100 %    Base Excess Art 1.7 mmol/L   Glucose by meter   Result Value Ref Range    Glucose 135 (H) 70 - 99 mg/dL   Glucose by meter   Result Value Ref Range    Glucose 230 (H) 70 - 99 mg/dL

## 2018-06-25 NOTE — PLAN OF CARE
Problem: Restraint for Non-Violent/Non-Self-Destructive Behavior  Goal: Prevent/Manage Potential Problems  Maintain safety of patient and others during period of restraint.  Promote psychological and physical wellbeing.  Prevent injury to skin and involved body parts.  Promote nutrition, hydration, and elimination.   Outcome: Declining  Right wrist and Left wrist restraints initiated on patient on 6/25/2018 at 11:30 AM    Clinical Justification: Pulling lines, pulling tubes, and pulling equipment  Less Restrictive Alternative: Repositioning, Alarm, Reorientation  Attending Physician Notified: MD ordered restraint,     Order received: YES     Family Notification: Other   Criteria explained to: Patient  Patient's Response: No evidence of learning  Restraint care Plan initiated: YES    Celeste Sanches

## 2018-06-25 NOTE — PROGRESS NOTES
[facility]  Hospitalist Progress Note  Name: Shana Horne    MRN: [id number]  Provider:  Marissa Garcia MD  06/25/18    Initial presenting complaint/issue to hospital (Diagnosis): acute on chronic rresp failure.         Assessment and Plan:      Summary of Stay: Shana Horne is a 67 year old female admitted on 6/22/2018 with  a history of dementia and oxygen dependent COPD who came to attention on 6/22/2018 with increased confusion.  Over the several days prior to admission the patient who is typically independent with a walker has been falling more than usual.  The patient evidently is somewhat limited in terms of her communication capacity, lives with her daughter apparently has had some vague nonspecific complaints in the past week or so.     PMH is significant for ongoing tobacco abuse, COPD for which she is chronically managed on 4 L oxygen by nasal cannula, dementia, refractory epilepsy, RSD involving the left lower extremity, difficulty with ambulating.     In the emergency department, the patient was noted to be afebrile but with tachycardia and tachypnea.  She was talking in 4-5 word phrases due to dyspnea.  Examination showed abnormalities on the left greater than on the right and chest x-ray confirmed the presence of a left lobar pneumonia.  Initial arterial blood gas obtained on BiPAP at 80% FiO2 showed a pH of 7.42 PCO2 52 PaO2 74, white cell count nearly 20,000.  She was started empirically on azithromycin, ceftriaxone and was given both Solu-Medrol and nebulizer medications.      Since admission to the ICU Ms. Horne has been stable.  Urine antigens show pneumococcus.  A very good sputum sample also was obtained by the nurse when  the BiPAP mask was removed.  Due to the evidence copious secretions it was felt that high flow may be a better option for the patient.        AM of 6/25 noted to have worsening hypoxia and intubated.    Problem List:     1. Acute on chronic respiratory  failure with hypoxia and hypercapnia.  Pneumococcal antigen positive.  Intubated 6/25.  2. Associated COPD exacerbation.  3. Falling associated with the acute illness primarily due to generalized weakness, impulsivity, etc.  Fortunately no acute fractures identified.  4. Seizure disorder for which the patient is on multiple medications.  5. Chronic pain with regional sympathetic dystrophy involving the left lower extremity.  6. Dementia.  Although the patient apparently has significant memory problems she recognizes family and lives in a freestanding home with her  as well as her daughter.  They both look after her but during the day the patient has a sister who is able to stay with her.     Plan:  1.  Continue ceftriaxone and azithromycin at this time.  Because patient did have a pneumococcal bacterium identified vancomycin will be continued. Intubated 6/25, continue MV.  2.  Usual antiepileptic medications are resumed baseline doses.  3.  Solu-Medrol 40 mg IV every 12 hours.  4.  Bronchodilators: DuoNeb scheduled 4 times daily with as needed albuterol.          DVT Prophylaxis: Enoxaparin (Lovenox) SQ  Code Status: Full Code  Discharge Dispo: TBD.  Estimated Disch Date / # of Days until Disch: likely 3-4.    # Pain Assessment:  Current Pain Score 6/25/2018   Patient currently in pain? denies   Pain score (0-10) -   Pain location -   Pain descriptors -   CPOT pain score -   - Shana is unable to participate in a collaborative plan for pain management due to intubated.           Interval History:        Intubated this am for worsening hypoxia on BiPAP.                  Physical Exam:      Last Vital Signs:  Temp: 98.4  F (36.9  C) Temp src: Bladder BP: 133/77   Heart Rate: 91 Resp: 15 SpO2: 97 % O2 Device: Mechanical Ventilator Oxygen Delivery: Other (Comments) (45lpm)    Intake/Output Summary (Last 24 hours) at 06/25/18 0992  Last data filed at 06/25/18 0602   Gross per 24 hour   Intake          3935.99 ml    Output             1040 ml   Net          2895.99 ml     I/O last 3 completed shifts:  In: 4307.33 [P.O.:1180; I.V.:3127.33]  Out: 1240 [Urine:1240]  Vitals:    06/23/18 0030 06/24/18 0430 06/25/18 0600   Weight: 55.5 kg (122 lb 5.7 oz) 60.5 kg (133 lb 6.1 oz) 64.6 kg (142 lb 6.7 oz)       Gen - Intubated.  Lungs - coarse BS.  Heart - RR,S1+S2 nml, no m/g/r.  Abd - soft, NT, ND, + BS.  Ext - no edema.         Medications:      All current medications were reviewed.         Data:      All new lab and imaging data was reviewed.   Labs:    Recent Labs  Lab 06/23/18  1400 06/23/18  1100 06/22/18  2343 06/22/18 2112 06/22/18 2055   CULT Canceled, Test creditedDuplicate request Moderate growthNormal amanda to date No growth No growth after 3 days No growth after 3 days       Recent Labs  Lab 06/25/18  0351 06/24/18  0511 06/23/18  0603   WBC 16.0* 15.1* 14.5*   HGB 10.2* 10.1* 10.2*   HCT 32.8* 33.0* 32.9*   * 99 99    226 226       Recent Labs  Lab 06/25/18  0351 06/24/18  0511 06/23/18  0603 06/22/18 2055    141 138 136   POTASSIUM 4.5 4.4 4.4 4.8   CHLORIDE 109 108 105 98   CO2 29 29 30 33*   ANIONGAP 3 4 3 5   * 135* 119* 152*   BUN 11 18 25 23   CR 0.73 0.70 0.86 1.06*   GFRESTIMATED 80 84 66 52*   GFRESTBLACK >90 >90 80 63   BIBIANA 7.8* 7.7* 7.8* 9.7   MAG  --   --   --  2.0   PROTTOTAL  --   --   --  9.1*   ALBUMIN  --   --   --  3.7   BILITOTAL  --   --   --  0.4   ALKPHOS  --   --   --  119   AST  --   --   --  27   ALT  --   --   --  28      Recent Imaging:   No results found for this or any previous visit (from the past 24 hour(s)).

## 2018-06-25 NOTE — PROGRESS NOTES
Patient intubated with 7.0 ETT secured at 23 at the lip. Good color change on EtCO2 detector and bilateral BS heard post intubation. Put on initial settings of 14, 400, +10, 100%. Suctioning copious amount of tan thick secretions. RT to follow.

## 2018-06-26 NOTE — PROGRESS NOTES
[facility]  Hospitalist Progress Note  Name: Shana Horne    MRN: [id number]  Provider:  Marissa Garcia MD  06/26/18    Initial presenting complaint/issue to hospital (Diagnosis): acute on chronic resp failure.         Assessment and Plan:      Summary of Stay: Shana Horne is a 67 year old female admitted on 6/22/2018 with a history of dementia and oxygen dependent COPD who came to attention on 6/22/2018 with increased confusion.  Over the several days prior to admission the patient who is typically independent with a walker has been falling more than usual.  The patient evidently is somewhat limited in terms of her communication capacity, lives with her daughter apparently has had some vague nonspecific complaints in the past week or so.      PMH is significant for ongoing tobacco abuse, COPD for which she is chronically managed on 4 L oxygen by nasal cannula, dementia, refractory epilepsy, RSD involving the left lower extremity, difficulty with ambulating.      In the emergency department, the patient was noted to be afebrile but with tachycardia and tachypnea.  She was talking in 4-5 word phrases due to dyspnea.  Examination showed abnormalities on the left greater than on the right and chest x-ray confirmed the presence of a left lobar pneumonia.  Initial arterial blood gas obtained on BiPAP at 80% FiO2 showed a pH of 7.42 PCO2 52 PaO2 74, white cell count nearly 20,000.  She was started empirically on azithromycin, ceftriaxone and was given both Solu-Medrol and nebulizer medications.      Since admission to the ICU Ms. Horne has been stable.  Urine antigens show pneumococcus.  A very good sputum sample also was obtained by the nurse when  the BiPAP mask was removed.  Due to the evidence copious secretions it was felt that high flow may be a better option for the patient.         AM of 6/25 noted to have worsening hypoxia and intubated.     Problem List:      1. Acute on chronic  respiratory failure with hypoxia and hypercapnia.  Pneumococcal antigen positive.  Intubated 6/25. CT chest 6/25 showed consolidation, ? PNA versus atelectasis versus mass with nodular opacities on RUL. Bronch 6/26 pending.  2. Associated COPD exacerbation.  3. Falling associated with the acute illness primarily due to generalized weakness, impulsivity, etc.  Fortunately no acute fractures identified.  4. Seizure disorder for which the patient is on multiple medications.  5. Chronic pain with regional sympathetic dystrophy involving the left lower extremity.  6. Dementia.  Although the patient apparently has significant memory problems she recognizes family and lives in a freestanding home with her  as well as her daughter.  They both look after her but during the day the patient has a sister who is able to stay with her.      Plan:  1.  Continue ceftriaxone and azithromycin at this time.  Because patient did have a pneumococcal bacterium identified vancomycin will be continued. Intubated 6/25, continue MV.  2.  Usual antiepileptic medications are resumed baseline doses.  3.  Solu-Medrol 40 mg IV every 12 hours.  4.  Bronchodilators: DuoNeb scheduled 4 times daily with as needed albuterol.  5.  Bronch 6/26 for specimens and aspiration of mucus plug.             DVT Prophylaxis: Enoxaparin (Lovenox) SQ  Code Status: Full Code  Discharge Dispo: TBD.  Estimated Disch Date / # of Days until Disch: likely > 3 days.        # Pain Assessment:  Current Pain Score 6/26/2018   Patient currently in pain? SANDRA   Pain score (0-10) -   Pain location -   Pain descriptors -   CPOT pain score 0   - Shana is unable to participate in a collaborative plan for pain management due to intubation.               Interval History:        Intubated and sedated.                  Physical Exam:      Last Vital Signs:  Temp: 96.4  F (35.8  C) Temp src: Bladder BP: 112/65   Heart Rate: 62 Resp: (!) 7 SpO2: 98 % O2 Device: Mechanical  Ventilator      Intake/Output Summary (Last 24 hours) at 06/26/18 0944  Last data filed at 06/26/18 0810   Gross per 24 hour   Intake          3704.81 ml   Output             1690 ml   Net          2014.81 ml     I/O last 3 completed shifts:  In: 3704.81 [I.V.:3604.81; NG/GT:100]  Out: 1815 [Urine:1315; Emesis/NG output:500]  Vitals:    06/23/18 0030 06/24/18 0430 06/25/18 0600   Weight: 55.5 kg (122 lb 5.7 oz) 60.5 kg (133 lb 6.1 oz) 64.6 kg (142 lb 6.7 oz)       Gen - Intubated.  Lungs - coarse BS.  Heart - RR,S1+S2 nml, no m/g/r.  Abd - soft, NT, ND, + BS.  Ext - no edema.         Medications:      All current medications were reviewed.         Data:      All new lab and imaging data was reviewed.   Labs:    Recent Labs  Lab 06/25/18  0953 06/23/18  1400 06/23/18  1100 06/22/18  2343 06/22/18 2112 06/22/18 2055   CULT PENDING Canceled, Test creditedDuplicate request Moderate growthNormal amanda  Culture in progress No growth No growth after 4 days No growth after 4 days       Recent Labs  Lab 06/26/18  0445 06/25/18  1700 06/25/18  1035   PH 7.33* 7.35 7.36   PO2 76* 140* 66*   PCO2 52* 53* 52*   HCO3 27 29* 29*   SELENA 0.9 2.9 2.8       Recent Labs  Lab 06/26/18  0445 06/25/18  0351 06/24/18  0511   WBC 9.5 16.0* 15.1*   HGB 9.4* 10.2* 10.1*   HCT 30.5* 32.8* 33.0*    101* 99    239 226       Recent Labs  Lab 06/26/18  0445 06/25/18  0351 06/24/18  0511  06/22/18  2055   * 141 141  < > 136   POTASSIUM 3.9 4.5 4.4  < > 4.8   CHLORIDE 111* 109 108  < > 98   CO2 28 29 29  < > 33*   ANIONGAP 8 3 4  < > 5   * 141* 135*  < > 152*   BUN 10 11 18  < > 23   CR 0.72 0.73 0.70  < > 1.06*   GFRESTIMATED 81 80 84  < > 52*   GFRESTBLACK >90 >90 >90  < > 63   BIBIANA 7.5* 7.8* 7.7*  < > 9.7   MAG 2.0  --   --   --  2.0   PHOS 1.8*  --   --   --   --    PROTTOTAL  --   --   --   --  9.1*   ALBUMIN  --   --   --   --  3.7   BILITOTAL  --   --   --   --  0.4   ALKPHOS  --   --   --   --  119   AST  --   --    --   --  27   ALT  --   --   --   --  28   < > = values in this interval not displayed.   Recent Imaging:   Recent Results (from the past 24 hour(s))   XR Chest Port 1 View    Narrative    CHEST ONE VIEW PORTABLE   6/25/2018 10:02 AM     HISTORY:  Endotracheal tube positioning.    COMPARISON: 6/22/2018.      Impression    IMPRESSION: Tip of the endotracheal tube is approximately 5.6 cm above  the juanito. Nasogastric tube extends below the left hemithorax.  Right-sided PICC line present with the tip in the superior vena cava.  There is near-complete opacification of the left hemithorax which may  be related to atelectasis or pleural fluid. Interstitial opacities in  the right lung are unchanged. No pneumothorax either side.    LM LEON MD   CT Chest w/o Contrast    Narrative    CT CHEST WITHOUT CONTRAST   6/25/2018 3:40 PM    HISTORY: Hypoxia. Dyspnea. Confusion.    TECHNIQUE: Volumetric helical acquisition was performed from the  thoracic inlet through the upper abdomen without IV contrast. Coronal  images were also reconstructed from the axial data. Radiation dose for  this scan was reduced using automated exposure control, adjustment of  the mA and/or kV according to patient size, or iterative  reconstruction technique.    COMPARISON: None.    FINDINGS: Endotracheal tube is in place, with tip 5 cm above the  juanito. Small left pleural effusion. Trace right pleural effusion. No  pericardial effusion. Atherosclerotic calcification of the thoracic  aorta and coronary arteries. There are calcified mediastinal and left  hilar lymph nodes. Calcified granuloma is noted in the left lower lobe  laterally. Emphysematous changes are noted throughout both lungs.  There is extensive consolidation in the left mid and lower lung, with  patchy interstitial and airspace infiltrate within the aerated portion  of the left upper lung. There are several indeterminate nodular  opacities in the right upper lobe with the largest  measuring 1.5 x 0.6  cm (series 3 image 27). Enteric tube, tip in the proximal stomach.  Ectasia of the infrarenal abdominal aorta measures up to 2.9 cm AP.  Limited noncontrast views of the upper abdomen are otherwise  unremarkable. Right PICC line is in place, with tip in the upper SVC.       Impression    IMPRESSION:   1. Extensive consolidation in the left mid and lower lung may be  related to pneumonia and/or atelectasis, although underlying lung mass  cannot be excluded.  2. There are several indeterminate nodular opacities in the right  upper lobe, with the largest measuring 1.5 x 0.6 cm.  3. There are bilateral pleural effusions, small on the left and trace  on the right.  4. Emphysema.    CALI CHAVEZ MD

## 2018-06-26 NOTE — CONSULTS
"CLINICAL NUTRITION SERVICES  -  ASSESSMENT NOTE      RECOMMENDATIONS FOR MD/PROVIDER TO ORDER:     Phosphorus replacement protocol   Recommendations Ordered by Registered Dietitian (RD):   Peptamen Intense VHP at 50 mL/hr - start at 20 mL/hr and increase by 15 mL q8 hours if tolerating  Fluid flush 60 mL q4 hours  Thera-vit   NPO diet order   Future/Additional Recommendations:    Change to non dextrose containing IVF to prevent overfeeding    Goal EN rate pending kcal from propofol    Transition to nasoenteric feeding tube?   Malnutrition:   % Weight Loss:Weight loss does not meet criteria for malnutrition   % Intake:</= 50% for >/= 5 days and suspect <75% of needs for >1 month (severe malnutrition)  Muscle Loss: Mild muscle loss consistent with sarcopenia noted across temporal region, clavicle bone region, Acromion bone region and scapular bone region. Lower extremities somewhat thin but nourished    Malnutrition Diagnosis: Non-Severe malnutrition  In Context of:  Acute on chronic illness or disease     REASON FOR ASSESSMENT  Shana Horne is a 67 year old female seen by the dietitian for Provider Order - Registered Dietitian to Assess and Order TF per Medical Nutrition protocol    Hx of COPD, dementia, refractory epilepsy, RSD and frequent falls.    NUTRITION HISTORY  - Information obtained from patient's daughter  - Food allergies/intolerances: NKFA   - Patient is on a mechanical/dental soft (self selects) diet at home. Wears dentures that fit ok but tender gums from frequent seizures. Appetite and intake has steadily declined and family has to \"force\" patient to eat. Patient cooks but daughter has been staying with pt for last month and taking on more meal preparation responsibilities.     CURRENT NUTRITION ORDERS  - Diet: Regular  Current Intake/Tolerance:  - Per flow sheet review, 75% intake for 1 documented meal on 6/24 - otherwise, only ordered a few small meals since admit  - Factors affecting nutrition " "intake include: intubated    PHYSICAL FINDINGS  Observed  See malnutrition section below - overall well nourished  Obtained from Chart/Interdisciplinary Team  Nuno nutrition score: 2; total score: 13  BM: none since admit  +10.5 L since admit    ANTHROPOMETRICS  Height: 5' 3\"  Weight: 64 kg vs ?dry weight of 55.5 kg  Body mass index is 25.23 kg/(m^2).  Weight Status:  Overweight BMI 25-29.9  Ideal body weight: 52.3 kg +/- 10%, 124% of IBW   Weight History:  Weight has increased in last 8 months, as noted below  Wt Readings from Last 10 Encounters:   06/25/18 64.6 kg (142 lb 6.7 oz)   10/19/17 47.2 kg (104 lb)   11/20/14 54.4 kg (120 lb)   11/29/11 54.4 kg (120 lb)       ASSESSED NUTRITION NEEDS (PER APPROVED PRACTICE GUIDELINES, Dosing weight: 55.5 kg):  Estimated Energy Needs: 4976-4091 kcals (25-30 Kcal/Kg)  Justification: overweight and vented  Estimated Protein Needs:  grams protein (1.2-1.8+ g pro/Kg)  Justification: hypercatabolism with critical illness  Estimated Fluid Needs: >1 mL/Kcal  Justification: maintenance    LABS  Labs reviewed    Recent Labs   Lab Test  06/26/18 0445 06/25/18 0351 06/24/18   0511 06/23/18   0603 06/22/18 2055   POTASSIUM  3.9  4.5  4.4  4.4  4.8     Recent Labs   Lab Test  06/26/18 0445   PHOS  1.8*     Recent Labs   Lab Test  06/26/18   0445  06/22/18   2055  10/20/17   0639  10/19/17   2045   MAG  2.0  2.0  2.2  2.1     Recent Labs   Lab Test  06/26/18   0445  06/25/18   0351 06/24/18   0511  06/23/18   0603  06/22/18 2055   NA  147*  141  141  138  136     Recent Labs   Lab Test  06/26/18   0445  06/25/18   0351  06/24/18   0511  06/23/18   0603  06/22/18 2055   CR  0.72  0.73  0.70  0.86  1.06*       Recent Labs  Lab 06/26/18  0445 06/25/18  0351 06/24/18  0511 06/23/18  0603 06/22/18  2055   * 141* 135* 119* 152*     Lab Results   Component Value Date    A1C 5.8 06/25/2018       MEDICATIONS  Medications reviewed  D5 IVF at 100 mL per hour " provides 408 kcal, 120 gm CHO  Propofol gtt at 15.5 mL/hr = 409 kcal    PROCEDURES WITH NUTRITIONAL IMPLICATIONS  6/26: intubated, OG placed    MALNUTRITION:  % Weight Loss:Weight loss does not meet criteria for malnutrition   % Intake:</= 50% for >/= 5 days and suspect <75% of needs for >1 month (severe malnutrition)  Subcutaneous Fat Loss:None observed  Muscle Loss: Mild muscle loss consistent with sarcopenia noted across temporal region, clavicle bone region, Acromion bone region and scapular bone region. Lower extremities somewhat thin but nourished  Fluid Retention:None noted    Malnutrition Diagnosis: Non-Severe malnutrition  In Context of:  Acute on chronic illness or disease    NUTRITION DIAGNOSIS:  Inadequate protein-energy intake related to limited opportunity with intubation and baseline decreased appetite as evidenced by meeting <50% of needs for >5 days on average, mild muscle loss and non severe malnutrition criteria met    INTERVENTIONS  Recommendations / Nutrition Prescription  NPO, and recommend TF as follows:     Type of Feeding Tube: OG (6/25)    Enteral Frequency:  Continuous    Enteral Regimen: Peptamen Intense VHP at 50 mL/hr    Total Enteral Provisions: 1200 mL provides 1200 kcal, 112 gm protein (1.8 gm per kg), 1008 ml free H2O, 94 gm CHO and 5 gm Fiber daily. Meets < 100% of DRI's --> Thera Vit (Certavite with interactions).    Free Water Flush: standard 60 mL q4 hours    Daily electrolyte check    Start at 20 mL/hr, increase by 15 mL/hr q8 hours until goal rate reached    Change to non dextrose containing IVF once goal rate reached to prevent overfeeding    Implementation  Nutrition education: Discussed TF regimen with daughter at bedside  EN Composition, EN Schedule and Feeding Tube Flush: Entered orders to reflect regimen outlined above  Multivitamin/Minerals: Certavite   Collaboration and Referral of care: Discussed patient during interdisciplinary care rounds this morning    Goals  TF  to meet % of estimated nutrition needs within 48 hours    MONITORING AND EVALUATION:  Progress towards goals will be monitored and evaluated per protocol and Practice Guidelines      Connie Singletary RDN, LD, CNSC  Pager - 3rd floor/ICU: [phone]  Pager - All other floors: [phone]  Pager - Weekend/holiday: [phone]  Office: [phone]

## 2018-06-26 NOTE — PROGRESS NOTES
Ventilation Mode: CMV/AC  (Continuous Mandatory Ventilation/ Assist Control)  FiO2 (%): 80 %  Rate Set (breaths/minute): 14 breaths/min  Tidal Volume Set (mL): 400 mL  PEEP (cm H2O): 10 cmH2O  Oxygen Concentration (%): 80 %  Resp: 15    Weaning trial not attempted due to FIO2 80% and PEEP 10. Pt has been stable throughout the night with SPO2 93%. Course breath sounds hear bilaterally with large amounts of thick yellow creamy secretions. Respiratory will continue to follow.

## 2018-06-26 NOTE — CONSULTS
Critical Care  Note      06/25/2018    Name: Shana Horne MRN#: [id number]   Age: 67 year old YOB: 1950     Hsptl Day# 3  ICU DAY #3    MV DAY #1             Problem List:   Active Problems:    Bacterial pneumonia    Acute and chronic respiratory failure with hypoxia (H)  1. Acute respiratory failure, likely due to pneumococcal pneumonia- she was intubated this morning and placed on vent, currently at 70% FIO2 and +10 PEEP. Based on urine antigen, this is likely pneumococcal; follow up cultures pending. Her left lung has areas of consolidation, and a history of scarring (reasons unclear). We have started focus on pulmonary hygiene and will consider bronch if not improved by tomorrow   2. Severe COPD, on home oxygen 4 liters; continues on steroids and bronchodilators   3. Seizure disorder- on lamictal   4. History of dementia  5. History of diarrhea  6. Reflex sympathetic dystrophy LLE/chronic pain   7. GERD- on pepcid   8. History of parkinson's disease  9. She continues to smoke   Overall, she remains critical. I spent 50 minutes of critical care time with her, including assisting anesthesia with intubation; it did NOT include the time spent placing arterial line.            Summary/Hospital Course:   She was admitted with acute respiratory failure due to CAP, and COPD exacerbation. She was on BIPAP the first several days, and finally today she decompensated requiring intubation.         Assessment and plan :       ICU Prophylaxis:   1. DVT: Hep Subq/ LMWH/mechanical  2. VAP: HOB 30 degrees, chlorhexidine rinse  3. Stress Ulcer: PPI/H2 blocker  4. Restraints: Nonviolent soft two point restraints required and necessary for patient safety and continued cares and good effect as patient continues to pull at necessary lines, tubes despite education and distraction. Will readdress daily.   5. Wound care  -   6. Feeding - will start enteral nutrition   7. Family Update: discussed with family at bedside    8. Disposition - continue ICU care         Key goals for next 24 hours:   1. Full vent support  2. Pulmonary hygiene   3. Support family                Medical History:     Past Medical History:   Diagnosis Date     Arthritis      Blood transfusion     38 yrs ago     Chronic pain     painful feet     COPD (chronic obstructive pulmonary disease) (H)      Dementia      Gastroesophageal reflux disease      History of blood transfusion      Numbness and tingling     fingertips occas     Other chronic pain     generalized     Oxygen dependent     4l nc cont     Parkinsons disease (H)      Seizures (H)     LAST SEIZURE 2 MOS AGO     Stimulus-induced repetitive discharges on nerve conduction studies      Past Surgical History:   Procedure Laterality Date     AMPUTATE TOE(S) Right 10/19/2017    Procedure: AMPUTATE TOE(S);  Right second toe amputation at metatarsophalangeal joint.;  Surgeon: Montrell Arce MD;  Location:  OR     COLONOSCOPY  11/20/2014    Dr. Schaeffer [facility]     COLONOSCOPY N/A 11/20/2014    Procedure: COLONOSCOPY;  Surgeon: Steven Schaeffer MD;  Location:  GI     GYN SURGERY      tear repair after delivery child     INSERT PUMP MORPHINE      pump replacement x2     MOUTH SURGERY      numerous times     ORTHOPEDIC SURGERY      foot surg left     REVISE PUMP MORPHINE  11/29/2011    Procedure:REVISE PUMP MORPHINE; Pump Replacement, abdominal area; Surgeon:ALIRIO RIVERA; Location: OR     Social History     Social History     Marital status:      Spouse name: N/A     Number of children: N/A     Years of education: N/A     Occupational History     Not on file.     Social History Main Topics     Smoking status: Current Every Day Smoker     Packs/day: 0.50     Smokeless tobacco: Never Used     Alcohol use No     Drug use: No     Sexual activity: Not on file     Other Topics Concern     Not on file     Social History Narrative      No Known Allergies           Key Medications:        acetylcysteine  2 mL Nebulization Q4H     albuterol  2.5 mg Nebulization Q4H     amitriptyline  75 mg Oral At Bedtime     azithromycin  250 mg Intravenous Q24H     cefTRIAXone  2 g Intravenous Q24H     enoxaparin  40 mg Subcutaneous Q24H     famotidine  20 mg Intravenous Q12H     felbamate (FELBATOL) tablet 600 mg  600 mg Oral TID     gabapentin (NEURONTIN) capsule 200 mg  200 mg Oral BID 09 12     insulin aspart  1-6 Units Subcutaneous Q4H     lamoTRIgine  100 mg Oral TID     methylPREDNISolone  40 mg Intravenous Q12H     metroNIDAZOLE  500 mg Intravenous Q6H     sodium chloride (PF)  10 mL Intracatheter Q7 Days     sodium chloride (PF)  3 mL Intracatheter Q8H     vancomycin (VANCOCIN) IV  1,250 mg Intravenous Q12H       dextrose 5% and 0.9% NaCl 100 mL/hr at 06/25/18 1141     midazolam 3 mg/hr (06/25/18 1500)     propofol (DIPRIVAN) infusion 45 mcg/kg/min (06/25/18 1600)     sodium chloride Stopped (06/25/18 0602)        Home Meds    No current facility-administered medications on file prior to encounter.   Current Outpatient Prescriptions on File Prior to Encounter:  Felbamate (FELBATOL PO) Take 600 mg by mouth 3 times daily    FERROUS GLUCONATE PO Take 324 mg by mouth 2 times daily (with meals) Breakfast & supper   LamoTRIgine (LAMICTAL) 100 MG tablet Take 100 mg by mouth 3 times daily.   simvastatin (ZOCOR) 40 MG tablet Take 40 mg by mouth At Bedtime               Physical Examination:   Temp:  [92.3  F (33.5  C)-99.7  F (37.6  C)] 96.6  F (35.9  C)  Heart Rate:  [] 62  Resp:  [8-83] 11  BP: ()/() 112/65  MAP:  [69 mmHg-76 mmHg] 76 mmHg  Arterial Line BP: (107-115)/(49-54) 115/54  FiO2 (%):  [80 %-100 %] 95 %  SpO2:  [50 %-100 %] 96 %    Intake/Output Summary (Last 24 hours) at 06/25/18 2026  Last data filed at 06/25/18 1400   Gross per 24 hour   Intake          3535.42 ml   Output             1815 ml   Net          1720.42 ml     Wt Readings from Last 4 Encounters:   06/25/18 64.6 kg (142 lb  6.7 oz)   10/19/17 47.2 kg (104 lb)   11/20/14 54.4 kg (120 lb)   11/29/11 54.4 kg (120 lb)     Arterial Line BP: (107-115)/(49-54) 115/54  MAP:  [69 mmHg-76 mmHg] 76 mmHg  BP - Mean:  [] 83  Ventilation Mode: CMV/AC  (Continuous Mandatory Ventilation/ Assist Control)  FiO2 (%): 95 %  Rate Set (breaths/minute): 14 breaths/min  Tidal Volume Set (mL): 400 mL  PEEP (cm H2O): 10 cmH2O  Oxygen Concentration (%): 95 %  Resp: 11    Recent Labs  Lab 06/25/18  1700 06/25/18  1035 06/24/18  0600 06/23/18  0425   PH 7.35 7.36 7.36 7.37   PCO2 53* 52* 51* 54*   PO2 140* 66* 63* 69*   HCO3 29* 29* 28 31*   O2PER 95% 100% 100% 80%       GEN: no acute distress; comfortable on vent   HEENT: supple   PULM: coarse rhonchi    CV/COR: RRR S1S2 no gallop,  No rub, no murmur  ABD: soft nontender  EXT:  Trace edema   warm  NEURO: grossly intact; moves all extremities to stimulation   SKIN: no obvious rash; no cyanosis or mottling   LINES: clean, dry intact         Data:   All data and imaging reviewed     ROUTINE ICU LABS (Last four results)  [facility]  Recent Labs  Lab 06/25/18  0351 06/24/18  0511 06/23/18  0603 06/22/18  2055    141 138 136   POTASSIUM 4.5 4.4 4.4 4.8   CHLORIDE 109 108 105 98   CO2 29 29 30 33*   ANIONGAP 3 4 3 5   * 135* 119* 152*   BUN 11 18 25 23   CR 0.73 0.70 0.86 1.06*   GFRESTIMATED 80 84 66 52*   GFRESTBLACK >90 >90 80 63   BIBIANA 7.8* 7.7* 7.8* 9.7   MAG  --   --   --  2.0   PROTTOTAL  --   --   --  9.1*   ALBUMIN  --   --   --  3.7   BILITOTAL  --   --   --  0.4   ALKPHOS  --   --   --  119   AST  --   --   --  27   ALT  --   --   --  28     CBC  Recent Labs  Lab 06/25/18  0351 06/24/18  0511 06/23/18 0603 06/22/18 2055   WBC 16.0* 15.1* 14.5* 18.9*   RBC 3.25* 3.32* 3.31* 4.58   HGB 10.2* 10.1* 10.2* 14.1   HCT 32.8* 33.0* 32.9* 44.8   * 99 99 98   MCH 31.4 30.4 30.8 30.8   MCHC 31.1* 30.6* 31.0* 31.5   RDW 14.0 13.8 13.8 13.8    226 226 319     INR  Recent Labs  Lab 06/22/18 2055    INR 0.97     Arterial Blood Gas  Recent Labs  Lab 06/25/18  1700 06/25/18  1035 06/24/18  0600 06/23/18  0425   PH 7.35 7.36 7.36 7.37   PCO2 53* 52* 51* 54*   PO2 140* 66* 63* 69*   HCO3 29* 29* 28 31*   O2PER 95% 100% 100% 80%       All cultures:    Recent Labs  Lab 06/25/18  0953 06/23/18  1400 06/23/18  1100 06/22/18  2343 06/22/18  2112 06/22/18  2055   CULT PENDING Canceled, Test creditedDuplicate request Moderate growthNormal amanda to date No growth No growth after 3 days No growth after 3 days     Recent Results (from the past 24 hour(s))   XR Chest Port 1 View    Narrative    CHEST ONE VIEW PORTABLE   6/25/2018 10:02 AM     HISTORY:  Endotracheal tube positioning.    COMPARISON: 6/22/2018.      Impression    IMPRESSION: Tip of the endotracheal tube is approximately 5.6 cm above  the juanito. Nasogastric tube extends below the left hemithorax.  Right-sided PICC line present with the tip in the superior vena cava.  There is near-complete opacification of the left hemithorax which may  be related to atelectasis or pleural fluid. Interstitial opacities in  the right lung are unchanged. No pneumothorax either side.    LM LEON MD   CT Chest w/o Contrast    Narrative    CT CHEST WITHOUT CONTRAST   6/25/2018 3:40 PM    HISTORY: Hypoxia. Dyspnea. Confusion.    TECHNIQUE: Volumetric helical acquisition was performed from the  thoracic inlet through the upper abdomen without IV contrast. Coronal  images were also reconstructed from the axial data. Radiation dose for  this scan was reduced using automated exposure control, adjustment of  the mA and/or kV according to patient size, or iterative  reconstruction technique.    COMPARISON: None.    FINDINGS: Endotracheal tube is in place, with tip 5 cm above the  juanito. Small left pleural effusion. Trace right pleural effusion. No  pericardial effusion. Atherosclerotic calcification of the thoracic  aorta and coronary arteries. There are calcified mediastinal and  left  hilar lymph nodes. Calcified granuloma is noted in the left lower lobe  laterally. Emphysematous changes are noted throughout both lungs.  There is extensive consolidation in the left mid and lower lung, with  patchy interstitial and airspace infiltrate within the aerated portion  of the left upper lung. There are several indeterminate nodular  opacities in the right upper lobe with the largest measuring 1.5 x 0.6  cm (series 3 image 27). Enteric tube, tip in the proximal stomach.  Ectasia of the infrarenal abdominal aorta measures up to 2.9 cm AP.  Limited noncontrast views of the upper abdomen are otherwise  unremarkable. Right PICC line is in place, with tip in the upper SVC.       Impression    IMPRESSION:   1. Extensive consolidation in the left mid and lower lung may be  related to pneumonia and/or atelectasis, although underlying lung mass  cannot be excluded.  2. There are several indeterminate nodular opacities in the right  upper lobe, with the largest measuring 1.5 x 0.6 cm.  3. There are bilateral pleural effusions, small on the left and trace  on the right.  4. Emphysema.    MD Lester ONTIVEROS MD    Billing: This patient is critically ill: YES. Total critical care time today 50 min.

## 2018-06-26 NOTE — PLAN OF CARE
Problem: Patient Care Overview  Goal: Plan of Care/Patient Progress Review  Outcome: No Change  ICU End of Shift Summary.  For vital signs and complete assessments, please see documentation flowsheets.     Pertinent assessments: Patient remains intubated and sedated with versed and propofol. VSS. Afebrile. Adequate UOP per Gill. Tele SR-SBrady.  Major Shift Events: Copious secretions per ETT. FiO2 weaned to 80%. LS coarse. Abx administered per orders.   Plan (Upcoming Events): Wean FiO2 as able.   Discharge/Transfer Needs: TBD    Bedside Shift Report Completed :   Bedside Safety Check Completed:

## 2018-06-26 NOTE — PROGRESS NOTES
"Intensivist note  She was comfortable appearing on vent today; sedated. Review of CXR indicates that aeration of left lung improved, but LLL still largely consolidated. I am grateful to Dr. Gonzales hor her assistance with bronchoscopy. Patient did have mucous plugging and dynamic collapse of airways.     We will continue aggressive pulmonary hygiene. She is on 70% and +12 PEEP, and will titrate down support as able.     I spoke with  by phone this morning. Follow up cultures pending.     Assessment and Plan   1. Acute respiratory failure, likely due to pneumococcal pneumonia, with a functional component of ARDS- she remains on vent at 70% FIO2 and +12 PEEP. She had bronch today and will follow expectantly.    2. Severe COPD, on home oxygen 4 liters; continues on steroids and bronchodilators   3. Seizure disorder- on lamictal   4. History of dementia  5. History of diarrhea  6. Reflex sympathetic dystrophy LLE/chronic pain   7. GERD- on pepcid   8. History of parkinson's disease  9. She continues to smoke   Overall, she remains critical. 40 minutes of critical care time spent with her at bedside    Physical exam  Well developed female NAD on vent  /65  Temp 97.3  F (36.3  C)  Resp 14  Ht 1.6 m (5' 3\")  Wt 64.6 kg (142 lb 6.7 oz)  SpO2 95%  BMI 25.23 kg/m2  Lungs with mod rhonchi  Heart is RRR  abdomen is soft and non-tender  Extremities are warm with mod edema  Skin shows no cyanosis or mottling   CNS sedated    Labs reviewed  CXR reviewed     ryan mckeon  June 26, 2018          "

## 2018-06-26 NOTE — PHARMACY-CONSULT NOTE
Pharmacy Tube Feeding Consult    Medication reviewed for administration by feeding tube and for potential food/drug interactions.    Recommendation: Recommend changing the following medications to a liquid dosage form: gabapentin, famotidine   Pharmacy will continue to follow as new medications are ordered.

## 2018-06-26 NOTE — PROGRESS NOTES
Patient continues on ventilator with current settings:    Ventilation Mode: CMV/AC  (Continuous Mandatory Ventilation/ Assist Control)  FiO2 (%): 70 %  Rate Set (breaths/minute): 14 breaths/min  Tidal Volume Set (mL): 400 mL  PEEP (cm H2O): 12 cmH2O  Oxygen Concentration (%): 70 %  Resp: 14    Assisted with bronch today. Receiving CPT on Left lobe via mechanical percussor, BS diminished, suctioning small creamy thick secretions. Was receiving Mucomyst/albuterol nebs Q4, changed to pulmozyme QD per MD with albuterol Q4. RT to follow.

## 2018-06-26 NOTE — PROCEDURES
ICU PROCEDURE NOTE: (see note in Provation as well).    Procedure: Flexible VA bronchoscopy  Indication: L mucus plug/lung collapse    Consent: Obtained  Time Out: Performed    Method:  Patient previously intubated with oral ETT and on continuous sedation in the ICU with a versed and propofol gtts. Patient further administered a total of 150 Ug of IV Fentanyl.  Topical anesthetic with total of 4 cc 1% Lidocaine administered down the ETT.    Findings:  Distal trachea without lesions or abnormalities. Missy sharp.     Right  Bronchial Tree: Visual inspection revealed minimal thin secretions, no evidence of endobronchial lesions or mucosal abnormalities.    Left Bronchial Tree: Visual inspection revealed a thick mucopurulent mucus plug in the left mainstem bronchus and further plugs emanating from the A/P subsegments of the MANOJ and lower lobe orifices. There was erythema and abnormal /chronically inflamed appearing mucosa around all of the left sided subsegmental orifices.  Further-there was significant dynamic airway collapse in the left lower lobe. No discreet endobronchial lesions were identified.    No apparent immediate complications. The patient remained well oxygenated and hemodynamically stable throughout the procedure.      Shala Gonzales MD  #[id number]

## 2018-06-27 NOTE — PROGRESS NOTES
[facility]  Hospitalist Progress Note  Name: Shana Horne    MRN: [id number]  Provider:  Marissa Garcia MD  06/27/18    Initial presenting complaint/issue to hospital (Diagnosis): acute on chronic resp failure.         Assessment and Plan:      Summary of Stay: Shana Horne is a 67 year old female admitted on 6/22/2018 with a history of dementia and oxygen dependent COPD who came to attention on 6/22/2018 with increased confusion.  Over the several days prior to admission the patient who is typically independent with a walker has been falling more than usual.  The patient evidently is somewhat limited in terms of her communication capacity, lives with her daughter apparently has had some vague nonspecific complaints in the past week or so.      PMH is significant for ongoing tobacco abuse, COPD for which she is chronically managed on 4 L oxygen by nasal cannula, dementia, refractory epilepsy, RSD involving the left lower extremity, difficulty with ambulating.      In the emergency department, the patient was noted to be afebrile but with tachycardia and tachypnea.  She was talking in 4-5 word phrases due to dyspnea.  Examination showed abnormalities on the left greater than on the right and chest x-ray confirmed the presence of a left lobar pneumonia.  Initial arterial blood gas obtained on BiPAP at 80% FiO2 showed a pH of 7.42 PCO2 52 PaO2 74, white cell count nearly 20,000.  She was started empirically on azithromycin, ceftriaxone and was given both Solu-Medrol and nebulizer medications.      Since admission to the ICU Ms. Horne has been stable.  Urine antigens show pneumococcus.  A very good sputum sample also was obtained by the nurse when  the BiPAP mask was removed.  Due to the evidence copious secretions it was felt that high flow may be a better option for the patient.          AM of 6/25 noted to have worsening hypoxia and intubated.      Problem List:       1. Acute on chronic  respiratory failure with hypoxia and hypercapnia.  Pneumococcal antigen positive.  Intubated 6/25. CT chest 6/25 showed consolidation, ? PNA versus atelectasis versus mass with nodular opacities on RUL. Bronch 6/26 Cx pending.  2. Associated COPD exacerbation.  3. Falling associated with the acute illness primarily due to generalized weakness, impulsivity, etc.  Fortunately no acute fractures identified.  4. Seizure disorder for which the patient is on multiple medications.  5. Chronic pain with regional sympathetic dystrophy involving the left lower extremity.  6. Dementia.  Although the patient apparently has significant memory problems she recognizes family and lives in a freestanding home with her  as well as her daughter.  They both look after her but during the day the patient has a sister who is able to stay with her.      Plan:  1.  Continue ceftriaxone, flagyl and azithromycin at this time. Intubated 6/25, continue MV.  2.  Usual antiepileptic medications are resumed baseline doses.  3.  Solu-Medrol 40 mg IV every 12 hours.  4.  Bronchodilators: DuoNeb scheduled 4 times daily with as needed albuterol.  5.  Bronch 6/26 for specimens and aspiration of mucus plug.              DVT Prophylaxis: Enoxaparin (Lovenox) SQ  Code Status: Full Code  Discharge Dispo: TBD.  Estimated Disch Date / # of Days until Disch: likely > 3 days.          # Pain Assessment:  Current Pain Score 6/27/2018   Patient currently in pain? SANDRA   Pain score (0-10) -   Pain location -   Pain descriptors -   CPOT pain score 0   - Shana is unable to participate in a collaborative plan for pain management due to intubation.   - IV dilaudid drip          Interval History:        Intubated and sedated.                  Physical Exam:      Last Vital Signs:  Temp: 97.7  F (36.5  C) Temp src: Bladder     Heart Rate: 65 Resp: 16 SpO2: 93 % O2 Device: Mechanical Ventilator      Intake/Output Summary (Last 24 hours) at 06/27/18 8737  Last  data filed at 06/27/18 0800   Gross per 24 hour   Intake          2032.13 ml   Output             1555 ml   Net           477.13 ml     I/O last 3 completed shifts:  In: 1872.13 [I.V.:1762.13; NG/GT:90]  Out: 1555 [Urine:1555]  Vitals:    06/23/18 0030 06/24/18 0430 06/25/18 0600   Weight: 55.5 kg (122 lb 5.7 oz) 60.5 kg (133 lb 6.1 oz) 64.6 kg (142 lb 6.7 oz)     Gen - Intubated.  Lungs - coarse BS.  Heart - RR,S1+S2 nml, no m/g/r.  Abd - soft, NT, ND, + BS.  Ext - no edema.           Medications:      All current medications were reviewed.         Data:      All new lab and imaging data was reviewed.   Labs:    Recent Labs  Lab 06/26/18  1250 06/25/18  0953 06/23/18  1400 06/23/18  1100 06/22/18  2343 06/22/18  2112 06/22/18 2055   CULT PENDING  PENDING Culture negative monitoring continues Canceled, Test creditedDuplicate request Moderate growthNormal amanda  Light growthHaemophilus influenzaeBeta lactamase negativeBeta-lactamase negative Haemophilus influenzae are usually susceptible to ampicillin, amoxacillin/clavulanic acid, levofloxacin, and 3rd generation cephalosporins, such as ceftriaxone.* No growth No growth after 5 days No growth after 5 days       Recent Labs  Lab 06/27/18  0655 06/26/18  0445 06/25/18  1700   PH 7.35 7.33* 7.35   PO2 93 76* 140*   PCO2 54* 52* 53*   HCO3 30* 27 29*   SELENA 3.8 0.9 2.9       Recent Labs  Lab 06/27/18  0435 06/26/18  0445 06/25/18  0351   WBC 10.6 9.5 16.0*   HGB 9.7* 9.4* 10.2*   HCT 31.7* 30.5* 32.8*   MCV 99 100 101*    231 239       Recent Labs  Lab 06/27/18  0435 06/26/18  0445 06/25/18  0351  06/22/18 2055   * 147* 141  < > 136   POTASSIUM 4.0 3.9 4.5  < > 4.8   CHLORIDE 111* 111* 109  < > 98   CO2 27 28 29  < > 33*   ANIONGAP 8 8 3  < > 5   * 148* 141*  < > 152*   BUN 9 10 11  < > 23   CR 0.68 0.72 0.73  < > 1.06*   GFRESTIMATED 87 81 80  < > 52*   GFRESTBLACK >90 >90 >90  < > 63   BIBIANA 7.6* 7.5* 7.8*  < > 9.7   MAG 2.0 2.0  --   --  2.0    PHOS 2.6 1.8*  --   --   --    PROTTOTAL  --   --   --   --  9.1*   ALBUMIN  --   --   --   --  3.7   BILITOTAL  --   --   --   --  0.4   ALKPHOS  --   --   --   --  119   AST  --   --   --   --  27   ALT  --   --   --   --  28   < > = values in this interval not displayed.   Recent Imaging:   No results found for this or any previous visit (from the past 24 hour(s)).

## 2018-06-27 NOTE — PLAN OF CARE
Problem: Patient Care Overview  Goal: Plan of Care/Patient Progress Review  Outcome: No Change  ICU End of Shift Summary.  For vital signs and complete assessments, please see documentation flowsheets.     Pertinent assessments: Patient remains intubated and sedated. VSS. Tele SR. Afebrile. Good UOP per Gill; juan jose in color.   Major Shift Events: Tube feed started at 20 mL/hr. Large amt secretions per ETT. LS dim. Abx per orders.  Plan (Upcoming Events): Wean FiO2 as tolerated. Continue abx.   Discharge/Transfer Needs: TBD    Bedside Shift Report Completed :   Bedside Safety Check Completed:

## 2018-06-27 NOTE — PROGRESS NOTES
Respiratory Therapy Note         Pt remains on full vent support and is not ready for a PS attempt at this time due to need for sedation and PEEP of 12. Good synchrony with the vent.  Breath sounds are diminished but clear.  Suctioning back moderate amounts of thick tan secretions.    June 27, 2018.5:21 PM  Chapito Epperson

## 2018-06-27 NOTE — PROGRESS NOTES
RN called Dr. Zambrano with 1520 ABG results, respiratory acidosis. No vent changes made. Will check ABG in a.m. See flowsheet for additional assessment and lab results.

## 2018-06-27 NOTE — PROGRESS NOTES
Patient remains on full mechanical ventilator support with the following settings:    Ventilation Mode: CMV/AC  (Continuous Mandatory Ventilation/ Assist Control)  FiO2 (%): 60 %  Rate Set (breaths/minute): 14 breaths/min  Tidal Volume Set (mL): 400 mL  PEEP (cm H2O): 12 cmH2O  Oxygen Concentration (%): 60 %  Resp: 15    SpO2 low mid 90's, BS coarse and diminished. suction for thick creamy secretions. Albuterol/Pulmozyme nebs given as ordered, LLL CPT done. Will continue to monitor pt's respiratory status closely.    Eliane Stone, RT  6/27/2018 2:19 AM

## 2018-06-27 NOTE — PLAN OF CARE
Problem: Pneumonia (Adult)  Goal: Signs and Symptoms of Listed Potential Problems Will be Absent, Minimized or Managed (Pneumonia)  Signs and symptoms of listed potential problems will be absent, minimized or managed by discharge/transition of care (reference Pneumonia (Adult) CPG).   Outcome: Improving  ICU End of Shift Summary.  For vital signs and complete assessments, please see documentation flowsheets.     Pertinent assessments: lungs coarse/dim. Increased Prop today for increase coughing. Improved. Miralax started. BP and HR stable.  Good urine output.   Major Shift Events:  Hector to wean vent to 50-55 Fi02 today. Increased TF to 35. CXR improving.  vanco dc'd.   Plan (Upcoming Events):  Recheck ABG at 1500. Cont abx, nebs and steroids.   Discharge/Transfer Needs: cont ICU cares. Family updated.     Bedside Shift Report Completed : yes  Bedside Safety Check Completed: yes

## 2018-06-27 NOTE — PROGRESS NOTES
"Intensivist note  She was comfortable; sedated on vent today. Her follow up cultures were negative from yesterday.     She is on vent on 50% and +12. Her cxr shows improvement in left lung aeration, but still with consolidation of LLL.     Assessment and Plan   1. Acute respiratory failure, likely due to pneumococcal pneumonia, with a functional component of ARDS- she remains on vent at 70% FIO2 and +12 PEEP. She continues with aggressive pulmonary hygiene, abx, and steroids.    2. Severe COPD, on home oxygen 4 liters; continues on steroids and bronchodilators   3. Seizure disorder- on lamictal   4. History of dementia  5. History of diarrhea  6. Reflex sympathetic dystrophy LLE/chronic pain   7. GERD- on pepcid   8. History of parkinson's disease  9. She continues to smoke   Overall, she continue to be critical. I spent 30 minutes of critical care time at bedside.     Physical exam  Well developed female NAD  /59  Temp 99  F (37.2  C)  Resp 11  Ht 1.6 m (5' 3\")  Wt 64.6 kg (142 lb 6.7 oz)  SpO2 95%  BMI 25.23 kg/m2  Lungs with mild rhonchi bilaterally  Heart is RRR  Abdomen was soft and mainly non-tender  Extremities- are warm with minimal edema  Skin shows no cyanosis or mottling    Labs reviewed  cxr reviewed     ryan mckeon  June 27, 2018          "

## 2018-06-27 NOTE — PROGRESS NOTES
Respiratory Therapy Note        ABG was drawn by myself from left brachial a-line on AC 14 350 P12 FiO2 55% at  15:20.  No complications noted.    June 27, 2018 3:25 PM  Chapito Epperson

## 2018-06-27 NOTE — PLAN OF CARE
Problem: Pneumonia (Adult)  Goal: Signs and Symptoms of Listed Potential Problems Will be Absent, Minimized or Managed (Pneumonia)  Signs and symptoms of listed potential problems will be absent, minimized or managed by discharge/transition of care (reference Pneumonia (Adult) CPG).   Outcome: Improving  ICU End of Shift Summary.  For vital signs and complete assessments, please see documentation flowsheets.     Pertinent assessments: Sedated RASS -3. VSS. Lungs coarse. Gill patent. NO BM today bowel meds started. TF infusing. R PICC, Art line.   Major Shift Events: CXR slightly improved weaning O2 down. Peep still 12.   Plan (Upcoming Events): vent support. Sedation. Repeat ABGs and bronch tomorrow?   Discharge/Transfer Needs: unknown    Bedside Shift Report Completed   Bedside Safety Check Completed

## 2018-06-28 NOTE — PROGRESS NOTES
Pt remains on ventilator support.  Settings: CMV/AC  Vt:350  RR:14  PEEP: 12  O2: 55%  Ti .9.   Pt. Had coarse dim bs, and cloudy thick secretions, that clear with suctioning.  Pt tolerates CPT.  Pt. Had no complications    Miroslava Ng, RT on 6/28/2018 at 6:24 PM

## 2018-06-28 NOTE — PROGRESS NOTES
[facility]    Hospitalist Progress Note    Date of Service (when I saw the patient): 06/28/2018  Provider:  Alfonso Baker MD     Initial presenting complaint/issue to hospital (Diagnosis): Increased confusion    Assessment & Plan   Summary of Stay: Shana Horne is a 67 year old female admitted on 6/22/2018 with a history of dementia and oxygen dependent COPD who came to attention on 6/22/2018 with increased confusion.  Over the several days prior to admission the patient who is typically independent with a walker has been falling more than usual.  The patient evidently is somewhat limited in terms of her communication capacity, lives with her daughter apparently has had some vague nonspecific complaints in the past week or so.      PMH is significant for ongoing tobacco abuse, COPD for which she is chronically managed on 4 L oxygen by nasal cannula, dementia, refractory epilepsy, RSD involving the left lower extremity, difficulty with ambulating.      In the emergency department, the patient was noted to be afebrile but with tachycardia and tachypnea.  She was talking in 4-5 word phrases due to dyspnea.  Examination showed abnormalities on the left greater than on the right and chest x-ray confirmed the presence of a left lobar pneumonia.  Initial arterial blood gas obtained on BiPAP at 80% FiO2 showed a pH of 7.42 PCO2 52 PaO2 74, white cell count nearly 20,000.  She was started empirically on azithromycin, ceftriaxone and was given both Solu-Medrol and nebulizer medications.      Since admission to the ICU Ms. Horne has been stable.  Urine antigens show pneumococcus.  A very good sputum sample also was obtained by the nurse when  the BiPAP mask was removed.  Due to the evidence copious secretions it was felt that high flow may be a better option for the patient.          AM of 6/25 noted to have worsening hypoxia and intubated.      Problem List:    1. Acute on chronic respiratory failure  with hypoxia and hypercapnia.  Pneumococcal antigen positive.  Intubated 6/25. CT chest 6/25 showed consolidation, ? PNA versus atelectasis versus mass with nodular opacities on RUL. Bronch 6/26 Cx pending.    2. Associated COPD exacerbation.    3. Falling associated with the acute illness primarily due to generalized weakness, impulsivity, etc.  Fortunately no acute fractures identified.    4. Seizure disorder for which the patient is on multiple medications.    5. Chronic pain with regional sympathetic dystrophy involving the left lower extremity.    6. Dementia.  Although the patient apparently has significant memory problems she recognizes family and lives in a freestanding home with her  as well as her daughter.  They both look after her but during the day the patient has a sister who is able to stay with her.      Plan:  ICU care.  Mechanical ventilation per pulmonologist.  Ceftriaxone 2 g IV every 24 hours.  Flagyl 500 mg IV every 6 hours.  DC  azithromycin. Intubated 6/25,  Antiepileptic medications(felbamate, Lamictal) are resumed baseline doses.  Solu-Medrol 40 mg IV every 12 hours.  Bronchodilators: DuoNeb scheduled 4 times daily with as needed albuterol.  Pulmozyme nebs daily.  Bronch 6/26 for specimens and aspiration of mucus plug.         DVT Prophylaxis: Enoxaparin (Lovenox) SQ  Code Status: Full Code  Disposition: Expected discharge TBD.     Interval History   Mechanical ventilation, not in distress.    -Data reviewed today: I reviewed all new labs and imaging results over the last 24 hours. I personally reviewed the EKG tracing showing Sinus rhythm in the monitor.    Physical Exam   Temp: 98.4  F (36.9  C) Temp src: Bladder BP: 173/70   Heart Rate: 75 Resp: 13 SpO2: 93 % O2 Device: Mechanical Ventilator    Vitals:    06/24/18 0430 06/25/18 0600 06/28/18 0330   Weight: 60.5 kg (133 lb 6.1 oz) 64.6 kg (142 lb 6.7 oz) 69.8 kg (153 lb 14.1 oz)     Vital Signs with Ranges  Temp:  [98.4  F (36.9   C)-99.1  F (37.3  C)] 98.4  F (36.9  C)  Heart Rate:  [73-96] 75  Resp:  [9-19] 13  BP: (124-174)/(61-76) 173/70  MAP:  [64 mmHg-99 mmHg] 79 mmHg  Arterial Line BP: ()/(45-83) 131/55  FiO2 (%):  [55 %-60 %] 55 %  SpO2:  [88 %-95 %] 93 %  I/O last 3 completed shifts:  In: 2998.8 [I.V.:813.8; NG/GT:1400]  Out: 2435 [Urine:2435]    GEN: On mechanical ventilation, appears comfortable, NAD.  HEENT:  Normocephalic/atraumatic, no scleral icterus, no nasal discharge, mouth moist.  CV:  Regular rate and rhythm, no murmur or JVD.  S1 + S2 noted, no S3 or S4.  LUNGS:  Clear to auscultation bilaterally without rales/rhonchi/wheezing/retractions.  Symmetric chest rise on inhalation noted.  ABD:  Active bowel sounds, soft, non-tender/non-distended.  No rebound/guarding/rigidity.  EXT:  No edema or cyanosis.  No joint synovitis noted.  SKIN:  Dry to touch, no exanthems noted in the visualized areas.       Medications     IV fluid REPLACEMENT ONLY       HYDROmorphone 0.2 mg/hr (06/28/18 0600)     midazolam       propofol (DIPRIVAN) infusion       sodium chloride 10 mL/hr at 06/28/18 0600       albuterol  2.5 mg Nebulization Q4H     amitriptyline  75 mg Oral At Bedtime     cefTRIAXone  2 g Intravenous Q24H     chlorhexidine  15 mL Mouth/Throat BID     dornase alpha  2.5 mg Inhalation Daily     enoxaparin  40 mg Subcutaneous Q24H     felbamate (FELBATOL) tablet 600 mg  600 mg Oral TID     gabapentin  200 mg Per Feeding Tube BID     insulin aspart  1-6 Units Subcutaneous Q4H     lamoTRIgine  100 mg Oral TID     methylPREDNISolone  40 mg Intravenous Q12H     metroNIDAZOLE  500 mg Intravenous Q6H     multivitamins with minerals  15 mL Per Feeding Tube Daily     polyethylene glycol  17 g Oral Daily     rantidine  150 mg Per Feeding Tube BID     sodium chloride (PF)  10 mL Intracatheter Q7 Days     sodium chloride (PF)  3 mL Intracatheter Q8H       Data     Recent Labs  Lab 06/28/18  0450 06/27/18  0435 06/26/18  0446   06/22/18 2055 06/22/18 2053   WBC 10.2 10.6 9.5  < > 18.9*  --    HGB 9.7* 9.7* 9.4*  < > 14.1  --     99 100  < > 98  --     264 231  < > 319  --    INR  --   --   --   --  0.97  --     146* 147*  < > 136  --    POTASSIUM 3.9 4.0 3.9  < > 4.8  --    CHLORIDE 111* 111* 111*  < > 98  --    CO2 30 27 28  < > 33*  --    BUN 13 9 10  < > 23  --    CR 0.73 0.68 0.72  < > 1.06*  --    ANIONGAP 2* 8 8  < > 5  --    BIBIANA 7.9* 7.6* 7.5*  < > 9.7  --    * 121* 148*  < > 152*  --    ALBUMIN  --   --   --   --  3.7  --    PROTTOTAL  --   --   --   --  9.1*  --    BILITOTAL  --   --   --   --  0.4  --    ALKPHOS  --   --   --   --  119  --    ALT  --   --   --   --  28  --    AST  --   --   --   --  27  --    TROPONIN  --   --   --   --   --  0.00   < > = values in this interval not displayed.    All cultures:    Recent Labs  Lab 06/26/18  1250 06/25/18  0953 06/23/18  1400 06/23/18  1100 06/22/18  2343 06/22/18 2112 06/22/18 2055   CULT No growth  Culture negative after 2 days No growth Canceled, Test creditedDuplicate request Moderate growthNormal amanda  Light growthHaemophilus influenzaeBeta lactamase negativeBeta-lactamase negative Haemophilus influenzae are usually susceptible to ampicillin, amoxacillin/clavulanic acid, levofloxacin, and 3rd generation cephalosporins, such as ceftriaxone.* No growth No growth No growth         Recent Results (from the past 24 hour(s))   XR Abdomen Port 1 View    Narrative    XR ABDOMEN PORT 1 VW  6/28/2018 3:27 AM     INDICATION: OG placement.    COMPARISON: None.      Impression    IMPRESSION: Enteric tube tip in the stomach. Small left pleural  effusion, left lung base infiltrate or atelectasis, and small left  pleural effusion.    HOWARD MENJIVAR MD             Disclaimer: This note consists of symbols derived from keyboarding, dictation and/or voice recognition software. As a result, there may be errors in the script that have gone undetected. Please  consider this when interpreting information found in this chart.

## 2018-06-28 NOTE — PROGRESS NOTES
Ventilation Mode: CMV/AC  (Continuous Mandatory Ventilation/ Assist Control)  FiO2 (%): 60 %  Rate Set (breaths/minute): 14 breaths/min  Tidal Volume Set (mL): 350 mL  PEEP (cm H2O): 12 cmH2O  Oxygen Concentration (%): 60 %  Resp: 15    No changes made to vent this shift.

## 2018-06-28 NOTE — PROGRESS NOTES
"Intensivist note  Well developed female; comfortable/sedated on vent on my exam; per nursing, she follows commands and moves extremities when light today    Her bronch cultures are negative and/or pending as of the writing.     She remains on 55% FIO2 and +12 PEEP, and unchanged from yesterday; PIP's in 20's    I spoke with mother and sister at bedside today.     Assessment and Plan   1. Acute respiratory failure, likely due to pneumococcal pneumonia, with a functional component of ARDS- she remains on vent at 55% FIO2 and +12 PEEP. She continues with aggressive pulmonary hygiene, abx, and steroids.  Her cultures are pending.   2. Severe COPD, on home oxygen 4 liters; continues on steroids and bronchodilators   3. Seizure disorder- on lamictal   4. History of dementia  5. History of diarrhea  6. Reflex sympathetic dystrophy LLE/chronic pain   7. GERD- on pepcid   8. History of parkinson's disease  9. She continues to smoke   Overall, she remains critical. I spent 35 minutes of critical are time with her.     Physical Exam  Well developed female NAD  /70  Temp 98.4  F (36.9  C)  Resp 13  Ht 1.6 m (5' 3\")  Wt 69.8 kg (153 lb 14.1 oz)  SpO2 93%  BMI 27.26 kg/m2  Lungs with mild rhonchi  Heart is RRR  Abdomen is soft and non-tender   Extremities are warm with minimal edema  Skin shows no cyanosis or mottling    Labs reviewed    ryan mckeon  June 28, 2018                  "

## 2018-06-28 NOTE — PLAN OF CARE
Problem: Patient Care Overview  Goal: Plan of Care/Patient Progress Review  Outcome: No Change  ICU End of Shift Summary.  For vital signs and complete assessments, please see documentation flowsheets.     Pertinent assessments: Patient remains intubated and sedated. RASS -2 to -3. VSS. Afebrile. Good UOP per Gill; increased since previous night. No BM.   Major Shift Events: Sats down to 88%; FiO2 increased to 60%; sats 91-93%.  Plan (Upcoming Events): Wean FiO2 as able.   Discharge/Transfer Needs: TBD    Bedside Shift Report Completed :   Bedside Safety Check Completed:

## 2018-06-28 NOTE — PLAN OF CARE
Problem: Pneumonia (Adult)  Goal: Signs and Symptoms of Listed Potential Problems Will be Absent, Minimized or Managed (Pneumonia)  Signs and symptoms of listed potential problems will be absent, minimized or managed by discharge/transition of care (reference Pneumonia (Adult) CPG).   Outcome: No Change  ICU End of Shift Summary.  For vital signs and complete assessments, please see documentation flowsheets.     Pertinent assessments: Full vent support. Light sedation with versed/propofol gtts. Hydromorphone for pain control. Opens eyes and grimaces at times. Increase coughing when awake. Less secretions than previous. O2 sats 90-92 on current vent settings 55% O2. O2 sats drop to 80s when L lung dependent. TF at goal. gen edema. Gill with good UOP. Afebrile.  Major Shift Events: none  Plan (Upcoming Events):cont cares  Discharge/Transfer Needs: TBD    Bedside Shift Report Completed : y  Bedside Safety Check Completed:y

## 2018-06-29 NOTE — PROGRESS NOTES
CLINICAL NUTRITION SERVICES - REASSESSMENT NOTE      Malnutrition: (6/29/2018)  % Weight Loss:Weight loss does not meet criteria for malnutrition   % Intake: Decreased intake does not meet criteria --> now enteral reliant  Muscle Loss: Mild muscle loss consistent with sarcopenia noted across temporal region, clavicle bone region, Acromion bone region and scapular bone region. Lower extremities somewhat thin but nourished     Malnutrition Diagnosis: No longer meets malnutrition criteria given enteral reliance, previously meeting on-Severe malnutrition  In Context of:  Acute on chronic illness or disease based on RD note 6/26/2018       EVALUATION OF PROGRESS TOWARD GOALS   Nutrition Support:  Remains NPO with intubation 6/25 (previously with decreased oral intakes x3-4 days since admission).  OGT placed 6/26 and EN initiated.  Reached goal rate yesterday.  Good tolerance thus far with goal regimen outlined below:       Type of Feeding Tube: OG (6/25)    Enteral Frequency:  Continuous    Enteral Regimen: Peptamen Intense VHP at 50 mL/hr    Total Enteral Provisions: 1200 mL provides 1200 kcal, 112 gm protein (1.8 gm per kg), 1008 ml free H2O, 94 gm CHO and 5 gm Fiber daily.     Meets < 100% of DRI's --> daily Certavite    Free Water Flush: standard 300 ml q 4 hrs per MD (Na was 147)    Prop + EN meeting 100% needs now that at goal rate (see below).    Biochemical review:  - BG <180  - Na, K WNL  - Phos yesterday WNL    Stooling:  - Multiple loose BMs over the past 24 hrs --> now with rectal tube    Wt trending:  - Trending up since admit, generalized edema noted, +12.7 L since admit (adding Lasix per discussion with team during rounds):  Vitals:    06/23/18 0030 06/24/18 0430 06/25/18 0600 06/28/18 0330   Weight: 55.5 kg (122 lb 5.7 oz) 60.5 kg (133 lb 6.1 oz) 64.6 kg (142 lb 6.7 oz) 69.8 kg (153 lb 14.1 oz)    06/29/18 0600   Weight: 69.9 kg (154 lb 1.6 oz)         ASSESSED NUTRITION NEEDS (PER APPROVED PRACTICE  GUIDELINES, Dosing weight: 55.5 kg):  Estimated Energy Needs: 7196-5984 kcals (25-30 Kcal/Kg)  Justification: overweight and vented  Estimated Protein Needs:  grams protein (1.2-1.8+ g pro/Kg)  Justification: hypercatabolism with critical illness  Estimated Fluid Needs: >1 mL/Kcal  Justification: maintenance      NEW FINDINGS:   - Medications reviewed:   SSI   Solu-medrol   Certavite   Scheduled Miralax   Prop with highest rate fo 16.8 ml/hr over the past 24 hrs (up to 444 kcal)   Fluids TKO    Previous Goals:   TF to meet % of estimated nutrition needs within 48 hours  Evaluation: Met    Previous Nutrition Diagnosis:   Inadequate protein-energy intake related to limited opportunity with intubation and baseline decreased appetite as evidenced by meeting <50% of needs for >5 days on average, mild muscle loss and non severe malnutrition criteria met  Evaluation: Completed, updated below       CURRENT NUTRITION DIAGNOSIS  Predicted suboptimal nutrient intake (energy/protein) related to enteral reliant to meet 100% needs since 6/28, potential for interruptions to regimen and decrease in/changes to Prop.      INTERVENTIONS  Recommendations / Nutrition Prescription  Continue regimen as outlined above, dependent on changes to Prop.  Water flushes beyond patency per MD (previously with elevated sodium, updated to 150 q4 per MD this AM).  Consider changing to small-bore tube dependent on goals of care (per discussion at rounds possible Palliative consult given minimal improvement).      Implementation  Collaboration and Referral of Nutrition care: Discussed POC with team during rounds.    Goals  EN to meet % prescribed volume while NPO.      MONITORING AND EVALUATION:  Diet Order      Jo Manzanares RD, LD  Clinical Dietitian  3rd floor/ICU: [phone]  All other floors: [phone]  Weekend/holiday: [phone]

## 2018-06-29 NOTE — PLAN OF CARE
Problem: Patient Care Overview  Goal: Plan of Care/Patient Progress Review  ICU End of Shift Summary.  For vital signs and complete assessments, please see documentation flowsheets.     Pertinent assessments: Afebrile. LS coarse at times with moderate amount of secretions both in-line and orally. No pain per CPOT scoring. Rass scoring -2 on sedation. Loose BM's x 4 (miralax given on previous shift). Good urine output throughout night. On full vent support at 55% FiO2 with O2 sats 91-93% throughout night.  Major Shift Events: Turned and repositioned supine and right side, avoided left side positioning. Family daughters Joselin and Briseida updated.  Plan (Upcoming Events): Continue Rocephin and Flagyl. Wean vent as able. Solumedrol and Duonebs.  Discharge/Transfer Needs: To be determined pending progress.    Bedside Shift Report Completed :   Bedside Safety Check Completed:

## 2018-06-29 NOTE — PROGRESS NOTES
Patient remains on full mechanical support with the following settings:    Ventilation Mode: CMV/AC  (Continuous Mandatory Ventilation/ Assist Control)  FiO2 (%): 55 %  Rate Set (breaths/minute): 14 breaths/min  Tidal Volume Set (mL): 350 mL  PEEP (cm H2O): 12 cmH2O  Oxygen Concentration (%): 55 %  Resp: 11    SpO2 low to mid 90's, BS coarse. Suction for large thick clear to white secretions. Albuterol neb given as ordered, CPT done on LLL tolerated well. Will continue to monitor pt's respiratory status closely.    Eliane Stone, RT  6/29/2018 5:19 PM

## 2018-06-29 NOTE — PLAN OF CARE
Problem: Restraint for Non-Violent/Non-Self-Destructive Behavior  Goal: Prevent/Manage Potential Problems  Maintain safety of patient and others during period of restraint.  Promote psychological and physical wellbeing.  Prevent injury to skin and involved body parts.  Promote nutrition, hydration, and elimination.   Outcome: No Change  Right wrist and Left wrist restraints continued 6/29/2018    Clinical Justification: Pulling lines, pulling tubes, and pulling equipment  Less Restrictive Alternative: Repositioning, Pain management, Reorientation  Attending Physician Notified: MD ordered restraint, Attending Physician's Name: Dr. Zambrano   New orders placed Yes  Length of Order: 1 Day      Cyndee Kauffman

## 2018-06-29 NOTE — PROGRESS NOTES
Ventilation Mode: CMV/AC  (Continuous Mandatory Ventilation/ Assist Control)  FiO2 (%): 55 %  Rate Set (breaths/minute): 14 breaths/min  Tidal Volume Set (mL): 350 mL  PEEP (cm H2O): 12 cmH2O  Oxygen Concentration (%): 55 %  Resp: 9    Pt SPO2 has been at 91% on 55% FiO2 since 03:00am. Small amounts of thin yellow secretions were suctioned and BS are diminished bilaterally with crackles in the bases. Respiratory will continue to follow.

## 2018-06-29 NOTE — PLAN OF CARE
Problem: Restraint for Non-Violent/Non-Self-Destructive Behavior  Goal: Prevent/Manage Potential Problems  Maintain safety of patient and others during period of restraint.  Promote psychological and physical wellbeing.  Prevent injury to skin and involved body parts.  Promote nutrition, hydration, and elimination.   Right wrist and Left wrist restraints continued 6/29/2018    Clinical Justification: Pulling lines, pulling tubes, and pulling equipment  Less Restrictive Alternative: Repositioning, Disguise equipment, Pain management, Alarm, Reorientation  Attending Physician Notified: MD ordered restraint,     New orders placed No  Length of Order: 1 Day      Lisa Abraham

## 2018-06-29 NOTE — PLAN OF CARE
Problem: Pneumonia (Adult)  Goal: Signs and Symptoms of Listed Potential Problems Will be Absent, Minimized or Managed (Pneumonia)  Signs and symptoms of listed potential problems will be absent, minimized or managed by discharge/transition of care (reference Pneumonia (Adult) CPG).   Outcome: No Change  ICU End of Shift Summary.  For vital signs and complete assessments, please see documentation flowsheets.     Pertinent assessments: Sedated, follows simple commands at times. Lungs coarse, lots of copious secretions orally and ET. Slight dependent edema. Belly soft, diarrhea. TF at goal. UOP díaz adequate. VSS. Art line and PICC. Vent settings unchanged.  Major Shift Events: IV lasix for effusions, diuresed well.  rectal tube placed for diarrhea.   Plan (Upcoming Events): GLucs Q 4 hours. Vent weaning, pulmonary toilet. May repeat Bronch. Family would like care conference.  Discharge/Transfer Needs: unknown    Bedside Shift Report Completed   Bedside Safety Check Completed

## 2018-06-30 NOTE — PROGRESS NOTES
[facility]    Hospitalist Progress Note    Date of Service (when I saw the patient): 06/29/2018  Provider:  Alfonso Baker MD     Initial presenting complaint/issue to hospital (Diagnosis): Increased confusion    Assessment & Plan   Summary of Stay: Shana Horne is a 67 year old female admitted on 6/22/2018 with a history of dementia and oxygen dependent COPD who came to attention on 6/22/2018 with increased confusion.  Over the several days prior to admission the patient who is typically independent with a walker has been falling more than usual.  The patient evidently is somewhat limited in terms of her communication capacity, lives with her daughter apparently has had some vague nonspecific complaints in the past week or so.      PMH is significant for ongoing tobacco abuse, COPD for which she is chronically managed on 4 L oxygen by nasal cannula, dementia, refractory epilepsy, RSD involving the left lower extremity, difficulty with ambulating.      In the emergency department, the patient was noted to be afebrile but with tachycardia and tachypnea.  She was talking in 4-5 word phrases due to dyspnea.  Examination showed abnormalities on the left greater than on the right and chest x-ray confirmed the presence of a left lobar pneumonia.  Initial arterial blood gas obtained on BiPAP at 80% FiO2 showed a pH of 7.42 PCO2 52 PaO2 74, white cell count nearly 20,000.  She was started empirically on azithromycin, ceftriaxone and was given both Solu-Medrol and nebulizer medications.      Since admission to the ICU Ms. Horne has been stable.  Urine antigens show pneumococcus.  A very good sputum sample also was obtained by the nurse when  the BiPAP mask was removed.  Due to the evidence copious secretions it was felt that high flow may be a better option for the patient.          AM of 6/25 noted to have worsening hypoxia and intubated.      Problem List:    1. Acute on chronic respiratory failure  with hypoxia and hypercapnia. Pneumococcal antigen positive.  Intubated 6/25. CT chest 6/25 showed consolidation, ? PNA versus atelectasis versus mass with nodular opacities on RUL. Bronch 6/26 Cx pending.    2. Associated COPD exacerbation.    3. Falling associated with the acute illness primarily due to generalized weakness, impulsivity, etc.  Fortunately no acute fractures identified.    4. Seizure disorder for which the patient is on multiple medications.    5. Chronic pain with regional sympathetic dystrophy involving the left lower extremity.    6. Dementia.  Although the patient apparently has significant memory problems she recognizes family and lives in a freestanding home with her  as well as her daughter.  They both look after her but during the day the patient has a sister who is able to stay with her.      Plan:  ICU care.  Mechanical ventilation per pulmonologist.  Ceftriaxone 2 g IV every 24 hours.  Flagyl 500 mg IV every 6 hours.  DC  azithromycin. Intubated 6/25,  Antiepileptic medications(felbamate, Lamictal) are resumed baseline doses.  Solu-Medrol 40 mg IV every 12 hours.  Bronchodilators: DuoNeb scheduled 4 times daily with as needed albuterol.  Pulmozyme nebs daily.  Bronch 6/26 for specimens and aspiration of mucus plug.         DVT Prophylaxis: Enoxaparin (Lovenox) SQ  Code Status: Full Code  Disposition: Expected discharge TBD.     Interval History   Mechanical ventilation, not worse and not improving as expected after so many days of treatment. Still needs FiO2 55% to keep O2 above 90%.     -Data reviewed today: I reviewed all new labs and imaging results over the last 24 hours. I personally reviewed the EKG tracing showing Sinus rhythm in the monitor.    Physical Exam   Temp: 98  F (36.7  C) Temp src: Axillary BP: 98/55   Heart Rate: 84 Resp: 12 SpO2: 94 % O2 Device: Mechanical Ventilator    Vitals:    06/25/18 0600 06/28/18 0330 06/29/18 0600   Weight: 64.6 kg (142 lb 6.7 oz) 69.8  kg (153 lb 14.1 oz) 69.9 kg (154 lb 1.6 oz)     Vital Signs with Ranges  Temp:  [98  F (36.7  C)-99.5  F (37.5  C)] 98  F (36.7  C)  Heart Rate:  [] 84  Resp:  [9-17] 12  BP: ()/(51-85) 98/55  MAP:  [56 mmHg-148 mmHg] 68 mmHg  Arterial Line BP: ()/() 105/50  FiO2 (%):  [55 %] 55 %  SpO2:  [91 %-95 %] 94 %  I/O last 3 completed shifts:  In: 4273.87 [I.V.:1383.87; NG/GT:1690]  Out: 4260 [Urine:3910; Emesis/NG output:350]    GEN: On mechanical ventilation, appears comfortable, NAD.  HEENT:  Normocephalic/atraumatic, no scleral icterus, no nasal discharge, mouth moist.  CV:  Regular rate and rhythm, no murmur or JVD.  S1 + S2 noted, no S3 or S4.  LUNGS:  Clear to auscultation bilaterally without rales/rhonchi/wheezing/retractions.  Symmetric chest rise on inhalation noted.  ABD:  Active bowel sounds, soft, non-tender/non-distended.  No rebound/guarding/rigidity.  EXT:  No edema or cyanosis.  No joint synovitis noted.  SKIN:  Dry to touch, no exanthems noted in the visualized areas.       Medications     IV fluid REPLACEMENT ONLY       HYDROmorphone 0.2 mg/hr (06/29/18 2005)     midazolam 4 mg/hr (06/29/18 2005)     propofol (DIPRIVAN) infusion 30 mcg/kg/min (06/29/18 2004)     sodium chloride 10 mL/hr at 06/29/18 2005       albuterol  2.5 mg Nebulization Q4H     amitriptyline  75 mg Oral At Bedtime     cefTRIAXone  2 g Intravenous Q24H     chlorhexidine  15 mL Mouth/Throat BID     dornase alpha  2.5 mg Inhalation Daily     enoxaparin  40 mg Subcutaneous Q24H     felbamate (FELBATOL) tablet 600 mg  600 mg Oral TID     furosemide  20 mg Intravenous Q12H     gabapentin  200 mg Per Feeding Tube BID     insulin aspart  1-6 Units Subcutaneous Q4H     lamoTRIgine  100 mg Oral TID     methylPREDNISolone  40 mg Intravenous Q12H     metroNIDAZOLE  500 mg Intravenous Q6H     multivitamins with minerals  15 mL Per Feeding Tube Daily     polyethylene glycol  17 g Oral Daily     rantidine  150 mg Per Feeding  Tube BID     sodium chloride (PF)  10 mL Intracatheter Q7 Days     sodium chloride (PF)  3 mL Intracatheter Q8H       Data     Recent Labs  Lab 06/29/18  0523 06/28/18  0450 06/27/18  0435  06/22/18 2055 06/22/18 2053   WBC 8.1 10.2 10.6  < > 18.9*  --    HGB 9.8* 9.7* 9.7*  < > 14.1  --     100 99  < > 98  --     287 264  < > 319  --    INR  --   --   --   --  0.97  --     143 146*  < > 136  --    POTASSIUM 4.0 3.9 4.0  < > 4.8  --    CHLORIDE 109 111* 111*  < > 98  --    CO2 32 30 27  < > 33*  --    BUN 21 13 9  < > 23  --    CR 0.70 0.73 0.68  < > 1.06*  --    ANIONGAP 2* 2* 8  < > 5  --    BIBIANA 8.0* 7.9* 7.6*  < > 9.7  --    * 115* 121*  < > 152*  --    ALBUMIN 1.9*  --   --   --  3.7  --    PROTTOTAL 5.5*  --   --   --  9.1*  --    BILITOTAL <0.1*  --   --   --  0.4  --    ALKPHOS 74  --   --   --  119  --    ALT 27  --   --   --  28  --    AST 25  --   --   --  27  --    TROPONIN  --   --   --   --   --  0.00   < > = values in this interval not displayed.    All cultures:    Recent Labs  Lab 06/26/18  1250 06/25/18  0953 06/23/18  1400 06/23/18  1100 06/22/18  2343 06/22/18 2112 06/22/18 2055   CULT Yeast isolated*  No growth No growth Canceled, Test creditedDuplicate request Moderate growthNormal amanda  Light growthHaemophilus influenzaeBeta lactamase negativeBeta-lactamase negative Haemophilus influenzae are usually susceptible to ampicillin, amoxacillin/clavulanic acid, levofloxacin, and 3rd generation cephalosporins, such as ceftriaxone.* No growth No growth No growth         Recent Results (from the past 24 hour(s))   XR Chest Port 1 View    Narrative    XR CHEST PORT 1 VW 6/29/2018 7:29 AM    COMPARISON: 6/27/2018    HISTORY: Left lower lobe atelectasis.      Impression    IMPRESSION: Bibasilar opacities, left worse than right are again seen  and not significantly changed. No pneumothorax seen on either side.  Support devices unchanged.    YOLI SHORT MD        Disclaimer: This note consists of symbols derived from keyboarding, dictation and/or voice recognition software. As a result, there may be errors in the script that have gone undetected. Please consider this when interpreting information found in this chart.

## 2018-06-30 NOTE — PROGRESS NOTES
"Intensivist note  06/30/18    Last 24 hours:  Hemodynamically stable, on the vent, somewhat agitated when sedation is lightened. On intrathecal pump for RDS    Ventilation Mode: CMV/AC  (Continuous Mandatory Ventilation/ Assist Control)  FiO2 (%): 55 %  Rate Set (breaths/minute): 14 breaths/min  Tidal Volume Set (mL): 350 mL  PEEP (cm H2O): 12 cmH2O  Oxygen Concentration (%): 55 %  Resp: 16    Intake/Output Summary (Last 24 hours) at 06/30/18 1002  Last data filed at 06/30/18 0800   Gross per 24 hour   Intake          2938.39 ml   Output             5545 ml   Net         -2606.61 ml     Assessment and Plan   1. Acute respiratory failure,  pneumococcal pneumonia likely, Klebsiella also possible, and with a functional component of ARDS- she remains on vent at 55% FIO2 and +12 PEEP. She continues with aggressive pulmonary hygiene, abx, and steroids. Cultures unrevealing, Gr stain showed GPC. Procal at 0.43. Resume current abx, repeat CXR in am  2. Severe COPD, on home oxygen 4 liters; continues on steroids and bronchodilators, no PFTs in the system but likely that her FEV1 is around 30%  3. Seizure disorder- on lamictal   4. History of dementia  5. History of diarrhea  6. Reflex sympathetic dystrophy LLE/chronic pain on IT pump  7. GERD- on pepcid   8. History of parkinson's disease  9. Current smoker PTA  10. ICU care: Full code, SQ heparin, H2B, HOB elevated, oral care    Overall, she remains critical. I spent 30 minutes of critical care time with her at bedside     Physical exam  Well developed female nad  BP 98/55 (BP Location: Right leg)  Temp 98  F (36.7  C) (Axillary)  Resp 12  Ht 1.6 m (5' 3\")  Wt 69.9 kg (154 lb 1.6 oz)  SpO2 94%  BMI 27.3 kg/m2  Lungs show mild rhonchi bilaterally  Heart is RRR  Abdomen is soft and non-tender  Extremities are warm with minimal edema  Skin shows no cyanosis or mottling  CNS sedated, moves extremities to stimulation.    Data:    Recent Labs  Lab 06/30/18  7645 " 06/29/18  0523 06/28/18  0450   WBC 6.5 8.1 10.2   RBC 3.11* 3.13* 3.12*   HGB 9.6* 9.8* 9.7*   HCT 30.6* 31.4* 31.2*    299 287       Recent Labs  Lab 06/30/18  0445 06/29/18  0523 06/28/18  0450    143 143   POTASSIUM 3.8 4.0 3.9   CHLORIDE 104 109 111*   CO2 34* 32 30   BUN 29 21 13   CR 0.69 0.70 0.73   * 117* 115*   BIBIANA 8.5 8.0* 7.9*       Recent Labs  Lab 06/30/18  0819 06/30/18  0445 06/30/18  0413 06/30/18  0008 06/29/18  2104 06/29/18  1621 06/29/18  1235  06/29/18  0523  06/28/18  0450  06/27/18  0435  06/26/18  0445  06/25/18  0351   GLC  --  116*  --   --   --   --   --   --  117*  --  115*  --  121*  --  148*  --  141*   BGM 87  --  125* 133* 116* 118* 121*  < >  --   < >  --   < >  --   < >  --   < >  --    < > = values in this interval not displayed.      Cesilia Cortes MD

## 2018-06-30 NOTE — PROGRESS NOTES
RT Note: Patient remains on mechanical ventilation with current settings of CMV 14/350/+12. Etad was changed, secure at 24 lip. Suction moderate amount of cloudy. CPT done. Albuterol nebs given as scheduled.  RT will continue to follow and monitor.

## 2018-06-30 NOTE — PLAN OF CARE
Problem: Patient Care Overview  Goal: Plan of Care/Patient Progress Review  Outcome: No Change  ICU End of Shift Summary.  For vital signs and complete assessments, please see documentation flowsheets.     Pertinent assessments: Pt alert at times and follows commands intermittently. Pt has hx of dementia. Tele-shows SR. ART line in place and meds titrated to keep MAPs >60. HR normal to tachy throughout shift. Lungs clear to coarse and diminished. Nebs given by RT. Continues on vent 55/14/350/12. Moderate amounts of secretions orally and in-line that are white and thick. Rectal tube in place, abdomen soft and non-tender. Gill has good UOP. Continues on versed, dilaudid, propofol, and has implanted morphine pump.   Major Shift Events: None   Plan (Upcoming Events): Family requesting immediate care conference   Discharge/Transfer Needs: TBD?    Bedside Shift Report Completed : yes  Bedside Safety Check Completed: yes

## 2018-06-30 NOTE — PROGRESS NOTES
Right wrist and Left wrist restraints continued 6/29/2018    Clinical Justification: Pulling lines, pulling tubes, and pulling equipment  Less Restrictive Alternative: Repositioning, Re-evaluate equipment, Pain management, Alarm, Reorientation, De-escalation  Attending Physician Notified: MD ordered restraint, Attending Physician's Name: Dr. Zambrano   New orders placed Yes  Length of Order: 1 Day      NAVI NEVILLE

## 2018-06-30 NOTE — PROGRESS NOTES
RT end of shift note:      Patient remains on mechanical ventilator support.  Settings: CMV/AC rate of 14, tidal volume 350, peep of 12 and 55% FIO2. Saturations has been in the low 90's on these settings. Tolerating CPT and treatments well. Breath sounds are coarse slightly diminished pre and post treatment. Suctioned moderate amount of cloudy-white thick secretions.     Will continue to follow and monitor.      June Robles, RRT

## 2018-06-30 NOTE — PLAN OF CARE
Problem: Patient Care Overview  Goal: Plan of Care/Patient Progress Review  Outcome: No Change  ICU End of Shift Summary.  For vital signs and complete assessments, please see documentation flowsheets.     Pertinent assessments: T 99.6 bladder, BP high at times when coughing, Rectal tube, díaz, TF continues at goal, vent 55/14/350/12.   Major Shift Events: Family at bedside, pt alert and awake all day, MD wanted to wean versed and now off but diprivan at 60 mcg/kg/min and paul well and dilaudid 0.2mg/hr and also IV push dilaudid for breakthrough pain, restraints continue, [place] removed, PICC remains. Lungs coarse and oral and inline suctioned for thick white secretions.   Plan (Upcoming Events): wean as able, turn and reposition  Discharge/Transfer Needs: tbd    Bedside Shift Report Completed : Y  Bedside Safety Check Completed: Y

## 2018-06-30 NOTE — PHARMACY-ADMISSION MEDICATION HISTORY
Notified by charge RN that pt has IT pump per Dr Farooq.  Reviewed chart, found scanned session data report from 4/11/2018 refill date in EPIC.    Added hydromorphone/bupivicaine/clonoidine IT pump to PTA med list.    Prior to Admission medications    Medication Sig Last Dose Taking? Auth Provider   amitriptyline (ELAVIL) 25 MG tablet Take 75 mg by mouth At Bedtime  6/22/2018 at 2200 Yes Unknown, Entered By History   dispensed in syringe 1 Device in sodium chloride (PF) 20 mL Intrathecal Pump Infusion by Intrathecal route continuous 8840 SynchroMed IIB pump per Dr Farooq  Model 8637-20 20 ml, serial number [id number]  Continuous infusion setting delivers:  Hydromorphone 6.008 mg/day  Bupivacaine 3.004 mg/day  Clonidine 40.05 mcg/day  Last fill: 4/11/2018  Pump Alarm date: 7/12/2018  Yes Unknown, Entered By History   Felbamate (FELBATOL PO) Take 600 mg by mouth 3 times daily  6/22/2018 at x 3 Yes Unknown, Entered By History   FERROUS GLUCONATE PO Take 324 mg by mouth 2 times daily (with meals) Breakfast & supper 6/22/2018 at x 2 Yes Unknown, Entered By History   gabapentin (NEURONTIN) 100 MG capsule Take 200 mg by mouth 2 times daily (with meals) Breakfast & Lunch  6/22/2018 at x 2 Yes Unknown, Entered By History   LamoTRIgine (LAMICTAL) 100 MG tablet Take 100 mg by mouth 3 times daily. 6/22/2018 at x 3 Yes Reported, Patient   simvastatin (ZOCOR) 40 MG tablet Take 40 mg by mouth At Bedtime  6/22/2018 at 2200 Yes Reported, Patient

## 2018-07-01 NOTE — PROGRESS NOTES
[facility]    Hospitalist Progress Note    Date of Service (when I saw the patient): 07/01/2018  Provider:  Alfonso Baker MD     Initial presenting complaint/issue to hospital (Diagnosis): Increased confusion    Assessment & Plan   Summary of Stay: Shana Horne is a 67 year old female admitted on 6/22/2018 with a history of dementia and oxygen dependent COPD who came to attention on 6/22/2018 with increased confusion.  Over the several days prior to admission the patient who is typically independent with a walker has been falling more than usual.  The patient evidently is somewhat limited in terms of her communication capacity, lives with her daughter apparently has had some vague nonspecific complaints in the past week or so.      PMH is significant for ongoing tobacco abuse, COPD for which she is chronically managed on 4 L oxygen by nasal cannula, dementia, refractory epilepsy, RSD involving the left lower extremity, difficulty with ambulating.      In the emergency department, the patient was noted to be afebrile but with tachycardia and tachypnea.  She was talking in 4-5 word phrases due to dyspnea.  Examination showed abnormalities on the left greater than on the right and chest x-ray confirmed the presence of a left lobar pneumonia.  Initial arterial blood gas obtained on BiPAP at 80% FiO2 showed a pH of 7.42 PCO2 52 PaO2 74, white cell count nearly 20,000.  She was started empirically on azithromycin, ceftriaxone and was given both Solu-Medrol and nebulizer medications.      Since admission to the ICU Ms. Horne has been stable.  Urine antigens show pneumococcus.  A very good sputum sample also was obtained by the nurse when  the BiPAP mask was removed.  Due to the evidence copious secretions it was felt that high flow may be a better option for the patient.          AM of 6/25 noted to have worsening hypoxia and intubated.      Problem List:    1. Acute on chronic respiratory failure  with hypoxemia and hypercapnia. Pneumococcal antigen positive.  Intubated 6/25. CT chest 6/25 showed consolidation, ? PNA versus atelectasis versus mass with nodular opacities on RUL. Bronch 6/26 Cx pending.  FiO2 55%, PEEP 12    2. Associated COPD exacerbation.    3. Frequent falls associated with the acute illness primarily due to generalized weakness, impulsivity, etc.  Fortunately no acute fractures identified.    4. Seizure disorder for which the patient is on multiple medications.    5. Chronic pain with regional sympathetic dystrophy involving the left lower extremity. Intrathecal pump with Dilaudid, Bupivacaine and Clonidine.     6. Dementia.  Although the patient apparently has significant memory problems she recognizes family and lives in a freestanding home with her  as well as her daughter.  They both look after her but during the day the patient has a sister who is able to stay with her.      Plan:  ICU care.  Mechanical ventilation per pulmonologist.  Ceftriaxone 2 g IV every 24 hours.  Flagyl 500 mg IV every 6 hours. Intubated 6/25. Plan for 10 to 14 days  Antiepileptic medications(felbamate, Lamictal) are resumed baseline doses.  Solu-Medrol 40 mg IV every 12 hours.  Bronchodilators: DuoNeb scheduled 4 times daily with as needed albuterol.  Pulmozyme nebs daily.  Bronch 6/26 for specimens and aspiration of mucus plug.         DVT Prophylaxis: Enoxaparin (Lovenox) SQ  Code Status: Full Code  Disposition: Expected discharge TBD. Some family member seem to be not grasping her real status and chances. Pulmonologist explained things in details, see Dr Cortes notes.   Interval History    Not sedated with good tolerance to Mechanical ventilation.  Not worse and not improving as expected after so many days of treatment. Still needs FiO2 55% to keep O2 above 90%. PEEP 8 today.   Probably will need trach.      -Data reviewed today: I reviewed all new labs and imaging results over the last 24 hours. I  personally reviewed the EKG tracing showing Sinus rhythm in the monitor.    Physical Exam   Temp: 98.2  F (36.8  C) Temp src: Oral BP: 189/90   Heart Rate: 81 Resp: 8 SpO2: 95 % O2 Device: Mechanical Ventilator Oxygen Delivery: Other (Comments)  Vitals:    06/29/18 0600 06/30/18 0000 07/01/18 0030   Weight: 69.9 kg (154 lb 1.6 oz) 69.2 kg (152 lb 8.9 oz) 67 kg (147 lb 11.3 oz)     Vital Signs with Ranges  Temp:  [98.1  F (36.7  C)-100  F (37.8  C)] 98.2  F (36.8  C)  Heart Rate:  [] 81  Resp:  [6-24] 8  BP: ()/(42-92) 189/90  FiO2 (%):  [55 %] 55 %  SpO2:  [90 %-100 %] 95 %  I/O last 3 completed shifts:  In: 3158.07 [I.V.:938.07; NG/GT:1020]  Out: 5925 [Urine:5725; Stool:200]    GEN: On mechanical ventilation, appears comfortable, NAD.  HEENT:  Normocephalic/atraumatic, no scleral icterus, no nasal discharge, mouth moist.  CV:  Regular rate and rhythm, no murmur or JVD.  S1 + S2 noted, no S3 or S4.  LUNGS:  Clear to auscultation bilaterally without rales/rhonchi/wheezing/retractions.  Symmetric chest rise on inhalation noted.  ABD:  Active bowel sounds, soft, non-tender/non-distended.  No rebound/guarding/rigidity.  EXT:  No edema or cyanosis.  No joint synovitis noted.  SKIN:  Dry to touch, no exanthems noted in the visualized areas.       Medications     dexmedetomidine       IV fluid REPLACEMENT ONLY       HYDROmorphone 0.2 mg/hr (07/01/18 1000)     Medication given by intrathecal pump: This is NOT an order to dispense medication. For information only.       midazolam Stopped (06/30/18 1058)     propofol (DIPRIVAN) infusion 60 mcg/kg/min (07/01/18 1000)     sodium chloride 10 mL/hr at 06/30/18 2000       albuterol  2.5 mg Nebulization Q4H     amitriptyline  75 mg Oral or Feeding Tube At Bedtime     cefTRIAXone  2 g Intravenous Q24H     chlorhexidine  15 mL Mouth/Throat BID     dornase alpha  2.5 mg Inhalation Daily     enoxaparin  40 mg Subcutaneous Q24H     felbamate  600 mg Per Feeding Tube Q8H MARIBEL      furosemide  20 mg Intravenous Q12H     gabapentin  200 mg Per Feeding Tube BID     insulin aspart  1-6 Units Subcutaneous Q4H     lamoTRIgine  100 mg Oral or Feeding Tube TID     methylPREDNISolone  40 mg Intravenous Q12H     metroNIDAZOLE  500 mg Intravenous Q6H     multivitamins with minerals  15 mL Per Feeding Tube Daily     polyethylene glycol  17 g Oral Daily     rantidine  150 mg Per Feeding Tube BID     sodium chloride (PF)  10 mL Intracatheter Q7 Days     sodium chloride (PF)  3 mL Intracatheter Q8H       Data     Recent Labs  Lab 07/01/18  0609 06/30/18  0445 06/29/18  0523   WBC 7.2 6.5 8.1   HGB 9.8* 9.6* 9.8*   MCV 97 98 100    306 299    142 143   POTASSIUM 3.6 3.8 4.0   CHLORIDE 99 104 109   CO2 40* 34* 32   BUN 36* 29 21   CR 0.72 0.69 0.70   ANIONGAP 2* 4 2*   BIBIANA 8.5 8.5 8.0*   * 116* 117*   ALBUMIN  --   --  1.9*   PROTTOTAL  --   --  5.5*   BILITOTAL  --   --  <0.1*   ALKPHOS  --   --  74   ALT  --   --  27   AST  --   --  25       All cultures:    Recent Labs  Lab 06/26/18  1250 06/25/18  0953   CULT Yeast isolated*  No growth No growth         Recent Results (from the past 24 hour(s))   XR Chest Port 1 View    Narrative    PORTABLE CHEST ONE VIEW  7/1/2018 5:36 AM     HISTORY: Respiratory failure.     COMPARISON: 6/29/2018.      Impression    IMPRESSION:   1. ET tube and nasogastric tube appears stable. Right PICC line  appears stable.  2. Left lung base atelectasis or airspace disease is unchanged. No  pneumothorax. Right lung shows no new airspace disease. Stable cardiac  silhouette.       Disclaimer: This note consists of symbols derived from keyboarding, dictation and/or voice recognition software. As a result, there may be errors in the script that have gone undetected. Please consider this when interpreting information found in this chart.

## 2018-07-01 NOTE — PROGRESS NOTES
"Intensivist note  7/1/18    Last 24 hours:  Hemodynamically stable, on the vent, somewhat agitated when sedation is lightened. On intrathecal pump for RSD, PEEP decreased to 8 this am    Ventilation Mode: CMV/AC  (Continuous Mandatory Ventilation/ Assist Control)  FiO2 (%): 55 %  Rate Set (breaths/minute): 14 breaths/min  Tidal Volume Set (mL): 350 mL  PEEP (cm H2O): 12 cmH2O  Oxygen Concentration (%): 55 %  Resp: 24    Intake/Output Summary (Last 24 hours) at 06/30/18 1002  Last data filed at 06/30/18 0800   Gross per 24 hour   Intake          2938.39 ml   Output             5545 ml   Net         -2606.61 ml     Assessment and Plan   1. Acute respiratory failure, due to CAP she remains on vent at 55% FIO2 and +8 PEEP. She continues with aggressive pulmonary hygiene, abx, and steroids. Cultures unrevealing, Gr stain showed GPC. Procal at 0.43. Resume current abx, repeat CXR in am  --Discussed w one of the daughters and brother on 6/30 and indicated that if we can not extubate her next few days we would consider trach for ongoing weaning  2. Severe COPD, on home oxygen 4 liters; continues on steroids and bronchodilators, no PFTs in the system but likely that her FEV1 is around 30%  3. Seizure disorder- on lamictal   4. History of dementia  5. History of diarrhea  6. Reflex sympathetic dystrophy LLE/chronic pain on IT pump  7. GERD- on pepcid   8. History of parkinson's disease  9. Current smoker PTA  10. ICU care: Full code, SQ heparin, H2B, HOB elevated, oral care    Overall, she remains critical. I spent 30 minutes of critical care time with her at bedside     Physical exam  Well developed female nad  /61  Temp 98.2  F (36.8  C) (Oral)  Resp 24  Ht 1.6 m (5' 3\")  Wt 67 kg (147 lb 11.3 oz)  SpO2 95%  BMI 26.17 kg/m2  Lungs show mild rhonchi bilaterally  Heart is RRR  Abdomen is soft and non-tender  Extremities are warm with minimal edema  Skin shows no cyanosis or mottling  CNS sedated, moves extremities " to stimulation.    Data:    Recent Labs  Lab 07/01/18  0609 06/30/18  0445 06/29/18  0523   WBC 7.2 6.5 8.1   RBC 3.28* 3.11* 3.13*   HGB 9.8* 9.6* 9.8*   HCT 31.9* 30.6* 31.4*    306 299       Recent Labs  Lab 07/01/18  0609 06/30/18  0445 06/29/18  0523    142 143   POTASSIUM 3.6 3.8 4.0   CHLORIDE 99 104 109   CO2 40* 34* 32   BUN 36* 29 21   CR 0.72 0.69 0.70   * 116* 117*   BIBIANA 8.5 8.5 8.0*       Recent Labs  Lab 07/01/18  0808 07/01/18  0609 07/01/18  0438 07/01/18  0008 06/30/18 2012 06/30/18  1633 06/30/18  1202  06/30/18  0445  06/29/18  0523  06/28/18  0450  06/27/18  0435  06/26/18  0445   GLC  --  105*  --   --   --   --   --   --  116*  --  117*  --  115*  --  121*  --  148*   *  --  132* 144* 113* 97 134*  < >  --   < >  --   < >  --   < >  --   < >  --    < > = values in this interval not displayed.      Cesilia Cortes MD

## 2018-07-01 NOTE — PLAN OF CARE
Problem: Patient Care Overview  Goal: Plan of Care/Patient Progress Review  Outcome: No Change  Problem: Patient Care Overview  Goal: Plan of Care/Patient Progress Review  Outcome: No Change  ICU End of Shift Summary.  For vital signs and complete assessments, please see documentation flowsheets.      Pertinent assessments: Pt alert at times and follows commands. Opens eyes spontaneously. RASS score at goal between -1 - -2 PRN Dilaudid given x1.  Pt has hx of dementia. Tele-shows SR. Meds titrated to keep MAPs >60. Lungs clear to coarse and diminished. Nebs given by RT. Continues on vent 55/14/350/12. Receives chest physiotherapy. Moderate amounts of secretions orally and in-line that are white and thick. Rectal tube in place, abdomen soft and non-tender. Jairo has good UOP. Has scheduled lasix. TF at goal and tolerating. Continues on dilaudid, propofol, and implanted pain pump. Labs in AM.   Major Shift Events: None   Plan (Upcoming Events): Repeat Bronch? Chest X-ray this AM?   Discharge/Transfer Needs: TBD     Bedside Shift Report Completed : yes  Bedside Safety Check Completed: yes    Problem: Restraint for Non-Violent/Non-Self-Destructive Behavior  Goal: Prevent/Manage Potential Problems  Maintain safety of patient and others during period of restraint.  Promote psychological and physical wellbeing.  Prevent injury to skin and involved body parts.  Promote nutrition, hydration, and elimination.   Outcome: No Change  Right wrist and Left wrist restraints continued 7/1/2018    Clinical Justification: Pulling lines, pulling tubes, and pulling equipment  Less Restrictive Alternative: Repositioning, Re-evaluate equipment, Disguise equipment, Pain management, Alarm, De-escalation, Reorientation  Attending Physician Notified: MD ordered restraint, Attending Physician's Name: Dr Nelson   New orders placed Yes  Length of Order: 1 Day      NAVI NEVILLE

## 2018-07-01 NOTE — PROGRESS NOTES
RT Note: Patient remains on mechanical ventilator with current settings CMV 14/350/+8/65%. Peep was decreased per MD Dincer request to +8 at 1200. Sats=93-95% on 65%. RT will continue to follow and monitor.

## 2018-07-01 NOTE — PROGRESS NOTES
RT end of shift note:      Patient remains on mechanical ventilator support.  Settings: CMV/AC rate of 14, tidal volume 350, peep of 12 and 55% FIO2. Saturations has been in the low to mid 90's on these settings. Tolerating CPT and treatments well. Breath sounds are coarse slightly diminished pre and post treatment. Suctioned small amount of thin cloudy-white secretions.     Will continue to follow and monitor.      June Robles, RRT

## 2018-07-01 NOTE — PLAN OF CARE
Problem: Pneumonia (Adult)  Goal: Signs and Symptoms of Listed Potential Problems Will be Absent, Minimized or Managed (Pneumonia)  Signs and symptoms of listed potential problems will be absent, minimized or managed by discharge/transition of care (reference Pneumonia (Adult) CPG).   Outcome: No Change  ICU End of Shift Summary.  For vital signs and complete assessments, please see documentation flowsheets.     Pertinent assessments: Anxious and agitated with sedation decreased. Follows simple commands. Lungs dim, secretions less. Edema improved. Belly soft, diarrhea continues. TF at goal. Gill good UOP. PICC line.   Major Shift Events: peep decreased to 8. Try to change sedation. Pt agitated and B.P. Increased. Pulled OG out, replaced with keofeed post pyloric per donna .  Plan (Upcoming Events): vent weaning. Abx, nebs. Chest PT. Family would like care conference set up for Tues morning if possible talk about progress and goals of care.  Discharge/Transfer Needs: unknown    Bedside Shift Report Completed   Bedside Safety Check Completed

## 2018-07-02 NOTE — PROGRESS NOTES
ICU staff  DOS 7/2/2018    Alert, indicates pain in lower abdomen/extremities. Denies dyspnea.    Vitals: 9.1  60s  120s-170s/60s-70s  92-97%    14/350/8/60 (65)    I/O: 2817/6515    Exam:  Gen: Alert  HEENT: NC/AT, anicteric  Pulm: Mechanically ventilated  Cor: RRR  Abdomen/GI: Soft, NT/ND  Extremities: Warm, nonedematous; digital clubbing  Skin: Warm, dry  Neuro: Followed commands  Psych: Calm    Data:   Labs reviewed; overall unremarkable.  CXR 7/1 shows mild LLL infiltrate, improved over prior films.    Assessment/plan:  68 y/o woman with COPD admitted with respiratory failure thought to be COPD exacerbation +/- aspiration pneumonia vs CAP. Slowly improving.  CNS: Alert, follows commands.   - On propofol and dex; consider trying to simplify agent to dex alone.  - Chronic pain: May benefit from pain consultation as she has reflex sympathetic dystrophy and is on intrathecal bupivacaine/fentanyl/clonidine chronically. Continue pump, hydromorphone. Consider adding oxycodone to facilitate weaning infusion. On gabapentin.  - Seizure disorder: Continues on felbamate, lamotrigine.  Pulm: Slowly weaning FiO2.  - COPD exacerbation: On chronic home O2 4L NC. Wean FiO2 as tolerated to goal 40; can keep PEEP at 8 for now. Target 90-92% as saturation goals. Continues on steroids, nebs, dornase.  CV: Intermittently high BP; follow for now.,  FEN/GI: Abdomen benign.  - Protein-calorie malnutrition: Continue tube feedings.   : BUN has come up slightly, but is stable at 34.   - Hypervolemia: Weight and I/O still suggest ~5L positive. On furosemide 20 mg q12h; continue for today.  Heme: No transfusion for mild anemia, Hb 9.9.  Msk: No acute issues.  Endo: Glucose 111-148.  ID: Afebrile, WBCs 7.7.   - ?Pneumonia: Cultures NGTD for now. Continue on ceftriaxone, metronidazole for today.  ICU: LMWH, H2.    This patient is critically ill to my assessment and requires ICU monitoring and cares. A total of 30 minutes critical care time  spent on 7/2/2018.     Gurpreet Jo MD, PhD  Surgical critical care  July 2, 2018, 11:24 AM

## 2018-07-02 NOTE — PROGRESS NOTES
Patient remained on vent settings as followed during the shift:  Ventilation Mode: CMV/AC  (Continuous Mandatory Ventilation/ Assist Control)  FiO2 (%): 65 %  Rate Set (breaths/minute): 14 breaths/min  Tidal Volume Set (mL): 350 mL  PEEP (cm H2O): 8 cmH2O  Oxygen Concentration (%): 60 %  Resp: 12    Patient has not weaned today due to consistently needing 60-65% FIO2. Breath sounds are diminished,  Suctioning out cloudy thick secretions. Scheduled nebs given inline with the vent. Will continue to monitor patient.     Sarah Holland , RT  July 2, 2018 5:09 PM

## 2018-07-02 NOTE — PLAN OF CARE
Problem: Patient Care Overview  Goal: Plan of Care/Patient Progress Review  Outcome: No Change  Problem: Patient Care Overview  Goal: Plan of Care/Patient Progress Review  Outcome: No Change  Problem: Patient Care Overview  Goal: Plan of Care/Patient Progress Review  Outcome: No Change  ICU End of Shift Summary.  For vital signs and complete assessments, please see documentation flowsheets.       Pertinent assessments: Pt alert and follows commands. Opens eyes spontaneously. RASS score at goal between 0 - -1. Pt pleasant throughout shift and able to communicate writing on communication board. PRN Dilaudid given x2.  Pt has hx of dementia. Tele-shows SR. Meds titrated to keep MAPs >60. Lungs diminished. Nebs given by RT. Continues on vent 65/14/350/8. Receives chest physiotherapy. Small amounts of secretions orally and in-line that are cloudy and thin. Rectal tube in place, abdomen soft and non-tender. Gill has good UOP. Has scheduled lasix. TF at goal and tolerating. Continues on dilaudid, propofol, precedex and implanted pain pump. Labs in AM. Provided pt with soft press call light. Pt uses call-light approp. For oral cares and to exercise her legs.   Major Shift Events: None   Plan (Upcoming Events): Repeat Bronch? Needs care conference set-up. PT consult?  Discharge/Transfer Needs: TBD      Bedside Shift Report Completed : yes  Bedside Safety Check Completed: yes             NAVI NEVILLE    Problem: Restraint for Non-Violent/Non-Self-Destructive Behavior  Goal: Prevent/Manage Potential Problems  Maintain safety of patient and others during period of restraint.  Promote psychological and physical wellbeing.  Prevent injury to skin and involved body parts.  Promote nutrition, hydration, and elimination.   Outcome: No Change  Right wrist and Left wrist restraints continued at 4:35 AM on 7/2/2018.      Patient's Response: No evidence of learning  Family Notification: Other  Attending Physician Notified: MD  ordered restraint, Attending Physician's Name: NAVI Maguire

## 2018-07-02 NOTE — PROGRESS NOTES
RT end of shift note:      Patient remains on mechanical ventilator support.  Settings: CMV/AC rate of 14, tidal volume 350, peep of 8 and 65% FIO2. Saturations has been in the mid 90's on these settings. Tolerating CPT and treatments well. Breath sounds are coarse slightly diminished pre and post treatment. Suctioned small amount of cloudy-white thick secretions.     Will continue to follow and monitor.      June Robles, RRT

## 2018-07-02 NOTE — PROGRESS NOTES
[facility]  Hospitalist Progress Note  Mackenzie Baptiste MD 07/02/2018    Reason for Stay (Diagnosis): encephalopathy          Assessment and Plan:      Summary of Stay: Shana Horne is a 67 year old female with a history of dementia with limited communication, COPD on chronic home O2 at 4L/nc, ongoing tobacco use, epilepsy, RSD of LLE with intrathecal pain pump,chronic diarrhea,  who lives with her dtr and typically ambulates with a walker  admitted on 6/22/2018 with a one week hx of some vague complaints, increasing sob and found to have hypoxic respiratory failure and a large LML/LLL dense infiltrate with effusion.    She was placed on BiPAP, initiated on ceftriaxone and azithromycin for CAP, and steroids for suspected COPD exacerbation, and admitted to the ICU. Her urine antigens returned + for pneumococcus, and sputum culture + for haemophilus influenzae     On am of 6/25 she had significant worsening of her respiratory status necessitating intubation and was bronched 6/26/26 with findings consistent with mucus plugging      Problem List:   1. Acute hypoxic respiratory failure: 2/2 pna/COPD exacerbation-remains intubated-vent management per intensivist   2. Dense LML and LLL pna-pneumococcal urinary agn positive:  She rec;d 5 days of azith and vanco.  Is on day 11 of ceftriaxone, and day 8 of metronidazole.  Her procal was minimally elevated and she is afebrile.  Given her dementia she is at higher risk of delirium within abx.  D/c abx tomorrow if remains stable.   3. Acute on chronic COPD exacerbation: On day 10 of IV steroids - at 40 mg iv bid.  Will decrease to qd.  Cont duonebs  4. Chronic copd-home inhalers  5. Seizure d/o:  Cont home meds:  lamictal and felbamate  6. Dementia:  Seems rather severe-but she does recognize family members and lives in a home with her  and daughter. They look after her in the evening and apparently a sister looks after her during the day.   7. RSD with  "chronic pain-cont intrathecal pump. (had to \"don't order\" in EPIC to get an alert to stop, but put misc order and sticky note and discussed with RN, that this need to continue while in hopsitalization-this is strictly an EPIC issue  8. FEN:  On TFs  9. Volume overload:  Cont on IV furosemide 20 mg bid  DVT Prophylaxis: Enoxaparin (Lovenox) SQ  Code Status: Full Code    Disposition Plan   Expected discharge in > 3  days to unclear once extubated.     Entered: Mackenzie FUNMIMarlin Baptiste 07/02/2018, 4:04 PM               Interval History (Subjective):      Sitting up in bed-follows commands, not really communicating due to ETT                  Physical Exam:      Last Vital Signs:  /85  Temp 98.9  F (37.2  C) (Axillary)  Resp 22  Ht 1.6 m (5' 3\")  Wt 60.5 kg (133 lb 6.1 oz)  SpO2 94%  BMI 23.63 kg/m2    I/O:  Up 5 L  Weight:  Up 5 kg  Tele:  NSR    Pleasant sitting in bed, fully alert, nad looks stated age head ncat sclera mild bilateral injection lungs coarse, moderate air movement, rrr no mrg 1 + b LE edema skin w/d no c/c abd s/nt/nd alert christopher            Medications:      All current medications were reviewed with changes reflected in problem list.         Data:      All new lab and imaging data was reviewed.   Labs:    Recent Labs  Lab 07/02/18  0520      POTASSIUM 3.4   CHLORIDE 95   CO2 43*   ANIONGAP 5   *   BUN 34*   CR 0.70   GFRESTIMATED 84   GFRESTBLACK >90   BIBIANA 8.4*       Recent Labs  Lab 07/02/18  0520   WBC 7.7   HGB 9.9*   HCT 31.8*   MCV 97         Imaging:       "

## 2018-07-03 NOTE — PLAN OF CARE
"Problem: Patient Care Overview  Goal: Plan of Care/Patient Progress Review  Outcome: No Change  ICU End of Shift Summary.  For vital signs and complete assessments, please see documentation flowsheets.     Pertinent assessments: Alert. SANDRA orientation d/t ventilation, however, pt follows commands. Bilateral soft wrist restraints d/c'ed. On Vent at 65%/350/14/8. Precedex gtt infusing at 0.6 mcg/kg/hr. NS at TKO x2. Tele SB/SR. L/S clear/diminished. Good cough. Moderate amount of cloudy thick/thin secretions. Merrem, rocephin, nebs and solumedrol. Rectal tube in place. Gill in place. On lasix. Keofeed with TF at goal of 50 mL/hr and free water q4h 150 mL. Scattered bruising, otherwise skin intact.   Major Shift Events: Pt up to chair with lift for about 3 hours today and tolerated well. Intermittent coughing \"fits\". Propofol was titrated up to 65 mcg/kg/hr to allow patient to tolerate the vent. The patient was later weaned back down after more comfortable and when BP's and HR started dropping. Propofol currently infusing at 25 mcg/kg/hr. C/o generalized pain, continuous PCA dilaudid titrated up to 0.4 mg/hr, see MAR for rate dose changes.   Plan (Upcoming Events): Wean 02 and sedation as able. Family would like care conference set up. Palliative and SW to consult.  Discharge/Transfer Needs: TBD, continue POC.    Bedside Shift Report Completed : yes  Bedside Safety Check Completed: yes            "

## 2018-07-03 NOTE — PROGRESS NOTES
CLINICAL NUTRITION SERVICES - REASSESSMENT NOTE      Recommendations Ordered by Registered Dietitian (RD):   Increase TF Peptamen VHP to 55 mL/hr to provide 1320 kcal (24 kcal/kg and 95% needs), 121 g protein (2.1 g/kg), 100 g CHO, 5 g Fiber, and 1100 mL free H2O     Malnutrition: (6/29/2018)  % Weight Loss:Weight loss does not meet criteria for malnutrition   % Intake: Decreased intake does not meet criteria --> now enteral reliant  Muscle Loss: Mild muscle loss consistent with sarcopenia noted across temporal region, clavicle bone region, Acromion bone region and scapular bone region. Lower extremities somewhat thin but nourished      Malnutrition Diagnosis: No longer meets malnutrition criteria given enteral reliance, previously meeting on-Severe malnutrition  In Context of:  Acute on chronic illness or disease based on RD note 6/26/2018       EVALUATION OF PROGRESS TOWARD GOALS   Diet: NPO, vented    Nutrition Support: OGT placed 6/26 and EN initiated. Reached goal 6/28. OGT was pulled by pt 7/1, and a post-pyloric FT was placed and confirmed via AXR.  Nutrition Support Enteral:  Type of Feeding Tube: ND (7/1)  Enteral Frequency:  Continuous  Enteral Regimen: Peptamen Intense VHP at 50 mL/hr  Total Enteral Provisions: 1200 mL provides 1200 kcal (22 kcal/kg), 112 gm protein (1.8 gm per kg), 1008 ml free H2O, 94 gm CHO and 5 gm Fiber daily.   Free Water Flush: 150 mL q 4 hrs    IntakeTolerance:   Propofol was d/c'd yesterday (no longer contributing to total calorie intake). TF above has been meeting 85% energy needs x 1 day.     BM: 300 mL out via rectal tube (6/30), 600 mL out (7/1), 350 mL output (7/2)  Labs reviewed: K 2.9 (L), Mg 2 (NL), Phos 3 (NL)   Wt: 58.7 kg -- up 3 kg from admit    ASSESSED NUTRITION NEEDS (PER APPROVED PRACTICE GUIDELINES, Dosing weight: 55.5 kg):  Estimated Energy Needs: 2900-5751 kcals (25-30 Kcal/Kg)  Justification: overweight and vented  Estimated Protein Needs:  grams protein  (1.2-1.8+ g pro/Kg)  Justification: hypercatabolism with critical illness  Estimated Fluid Needs: >1 mL/Kcal  Justification: maintenance    NEW FINDINGS:   7/3: Planned family care conference today  - Did well on PST --> plan to extubated today  7/2: Propofol --> Precedex    Previous Goals:   EN to meet % prescribed volume while NPO.   Evaluation: Not met    Previous Nutrition Diagnosis:   Predicted suboptimal nutrient intake (energy/protein) related to enteral reliant to meet 100% needs since 6/28, potential for interruptions to regimen and decrease in/changes to Prop.    Evaluation: Declining, updated below        CURRENT NUTRITION DIAGNOSIS  Inadequate enteral nutrition infusion related to lower TF rate with Propofol on, now d/c'd x 1 day as evidenced by EN currently meeting 85% estimated energy needs.    INTERVENTIONS  Recommendations / Nutrition Prescription  Increase TF Peptamen VHP to 55 mL/hr to provide 1320 kcal (24 kcal/kg and 95% needs), 121 g protein (2.1 g/kg), 100 g CHO, 5 g Fiber, and 1100 mL free H2O    Implementation  EN Schedule - increase as above  Collaboration and Referral of Nutrition care - Discussed pt POC during ICU rounds    Goals  EN to meet % estimated needs      MONITORING AND EVALUATION:  Progress towards goals will be monitored and evaluated per protocol and Practice Guidelines        Humaira Crowley, Nutrition Services

## 2018-07-03 NOTE — PLAN OF CARE
Problem: Patient Care Overview  Goal: Plan of Care/Patient Progress Review  Outcome: Improving  ICU End of Shift Summary.  For vital signs and complete assessments, please see documentation flowsheets.     Pertinent assessments: Pt awake and alert this morning. Tolerated CPAP and wean and was extubated to 10 liters per oxymask. Oxygen saturations 87-93%. Tube feedings rate increased to 55 cc/hr goal rate. Tolerating feedings well. Rectal tube draining liquid brown stool. Good u/o after lasix dose.   Major Shift Events: Extubated , o2 sat's >90%. precedex off, dilaudid drip weaned to 0.2.  Plan (Upcoming Events): continue to wean oxygen as tolerated. Swallow eval tomorrow.  Discharge/Transfer Needs: tbd    Bedside Shift Report Completed : y  Bedside Safety Check Completed:y

## 2018-07-03 NOTE — PROGRESS NOTES
JUAN MARTINEZ acknowledges consult. Spoke to Palliative Care re noon family care conference. JUAN to be updated by Palliative for any SS needs identified at care conf.    JUAN to continue to follow and assist with discharge planning.

## 2018-07-03 NOTE — PROGRESS NOTES
SW: SW acknowledges consult. Spoke to Palliative Care Dept who shared that patient's  has concerns about future plans for wife. Also mentioned a family vacation that begins on Sat. SW to follow and assist prn.

## 2018-07-03 NOTE — PROGRESS NOTES
ICU staff  DOS 7/3/2018    Doing well on pressure support this AM -- wide awake, strong, following all commands. RSBI ~30, Vc 6625-5655 on 7 of pressure support.    Vitals:   99.3  50s-70s  13-17  110s-130s/50s-60s  91-95%    14/350/8/55 -> 7/5/50    Dex 0.3 mcg/kg/hr  Hydromorphone 0.3 mg/hr  Propofol off    I/O: 3326/3325    Exam:  Gen: Alert, no apparent distress  HEENT: NC/AT  Pulm: Mechanically ventilated  Cor: RRR  Abdomen/GI: Soft, NT/ND  : Gill in place with clear urine  Extremities: Warm, nonedematous  Skin: Warm, dry, perfused  Neuro: Following commands with good strength  Psych: Calm    Data:   Labs reviewed  - K 2.9 this AM  - Hb stable at 9.8    Assessment/plan:  68 y/o woman with advanced COPD admitted to ICU with respiratory failure from COPD exacerbation vs pneumonia. Improving.  CNS: Alert.  - Chronic pain: Intrathecal pump, hydromorphone. Can transition from infusion to prn enteral analgesics after extubation, if able.  - Seizure disorder: Felbamate, lamotrigine.  - Sedation: Dexmedetomidine -> to off.  Pulm: Doing well with SBT this morning. Was on PS 8/8/50, when turned down to 7/5 continued to perform well mechanically. Saturations  with difficult waveform.  - Acute respiratory failure: Will extubate.  - COPD: 4L O2 baseline. Continue nebs, supportive cares.  CV: No acute issues.  FEN/GI: Abdomen benign.  - Protein-calorie malnutrition: Continue tube feedings.   : Mildly hypervolemic by weights and I/O.  - Furosemide 20 mg q12h for today.  - Hypokalemia correcting on protocol.  Heme: Hb 9.8.  - No transfusion indicated for mild anemia.  Msk: No acute issues.  Endo: Glucose 110-122.  ID: Afebrile, WBCs 8.5.   - Pneumonia: Stopping antibiotics today, has completed 10 days.  ICU: LMWH, H2.    This patient is critically ill to my assessment and requires ICU monitoring and cares. A total of 30 minutes critical care time spent on 7/3/2018.     Gurpreet Jo MD, PhD  Surgical critical  care  July 3, 2018, 11:40 AM

## 2018-07-03 NOTE — PROGRESS NOTES
RT- Patient extubated today at 1145 to a 10L oxymask. Patient suctioned before orally and through ETT. Patient also given albuterol nebulizer after extubation as a scheduled nebulizer.

## 2018-07-03 NOTE — CONSULTS
[facility]    Palliative Care Consultation   Text Page    Date of Admission:  6/22/2018    Assessment & Plan   Shana Horne is a 67 year old female who was admitted on 6/22/2018. I was asked by Hospitalist Mackenzie Baptiste MD to see the patient for care conference.    Recommendations:  1.Decisional Capacity -  Unreliable. Patient does not demonstrate medical capacity. Patient does not have an advance directive. Per  informed consent policy next of kin should be involved in all consent and decision making. Next-of-kin is her  Daniel Horne who request to be contacted at cell phone [phone] if there are decisions regarding Shana's care.      2. Pain - Complex regional pain syndrome with intrathecal pain pump which is refilled by Gurpreet Farooq MD.  Pump alarm date is 7/12/2018.    3.  Dyspnea with continued tobacco abuse -   is frustrated with request to have Shana stop smoking. He acknowledged that she has worsening seizures with a Nicotine Patch. He request that she is not counseled for smoking cessation.     4. Spiritual Care - Oriented to Spiritual Health as part of Palliative Care team.    5.  Care Planning -  acknowledged that TCU placement has not been possible in the past due to intrathecal pain pump and medications for epilepsy. Social work has been consulted to assist with dismissal planning.     Goal of Care: FULL CODE - Restorative care. Next-of-kin/ will contact family members to discuss options of care as he is considering less aggressive options for care.     Disease Process/es & Symptoms:  Shana Horne is a 67 year old patient admitted 6/22/2018 with worsening mental status. Her medical history is significant for dementia, COPD with continued tobacco use, chronic hypoxic respiratory failure on 4 L, significant seizure disorder, chronic diarrhea, balance impairment and RSD of left lower extremity with intrathecal pain pump. She was admitted with  "significant left-sided infiltrate and hypoxia. She was started on ceftriaxone and azithromycin for community acquired pneumonia and placed on BiPAP. She was placed on steroids for suspected COPD exacerbation due to worsening respiratory status. On 6/25/2018 she was intubated and 6/26/2018 bronchoscopy a thick mucus plug was suctioned from left mainstem bronchus. Thick secretions emanating from apical posterior MANOJ and LLL.  This is the patient's  fifth hospitalization in the past year.      Findings & plan of care discussed with: Hospitalist Mackenzie Baptiste MD; bedside nurse Lou Harris RN; and  SAMANTHA Camarillo.  Follow-up plan from palliative team: Will follow up on Thursday after the July 4 holiday.   Thank you for involving us in the patient's care.     Sophie Bolanos MS, RN, CNS, APRN, ACHPN, FAACVPR  Pain and Palliative Care  Pager [phone]  Office [phone]     Time Spent on this Encounter   I spent 45 minutes (Noon - 12:45 PM) in assessment of the patient, counseling and discussion with the family as documented in sections below. Another 35 minutes in review of chart, documentation, coordination of care and discussion with the health care team.    Primary Care Physician   BERNARDA WAITE    Chief Complaint   Shortness of Breath    History is obtained from the electronic health record and patient's spouse    Decision-Making & Goals of Care:  Discussed on July 3, 2018 with Sophie FERNANDEZ, CNS:     Met Shana shortly after extubation. She is alert and able to make needs known with a raspy voice. Her  Daniel Horne at bedside. I asked to discuss Shana's care in private and Oliverio joined me in the ICU conference room. Oliverio acknowledged his frustration as he was unaware Shana was getting extubate today and he has not spoken with a doctor since Shana's arrival at the hospital. Oliverio acknowledged \"every year she gets worse and worse. I thought we were going to loose her " "this time.\" As he was intent on speaking to a physician I asked Hospitalist Mackenzie Baptiste MD to join us. She reviewed the events of Shana's hospitalization and confirm with Oliverio that Shana has advanced dementia. Dr. Baptiste also discussed a trajectory for Shana's hospitalization. Oliverio acknowledged that he plans to leave on a family vacation on Saturday. \"[place] I've already paid $5,000 and I won't get it back if we don't go.\" Oliverio also acknowledged that with previous hospitalizations Shana has not been able to go to TCU due to her intrathecal pump and epilepsy medications. Dr. Baptiste explained that Social Work has been consulted to assist in dismissal planning. After Dr. Baptiste left, I continued a discussion with Oliverio regarding goals of care. He acknowledged \"I don't think she should continue like this, but I need to talk with my family and her mother.\"      Patient has decision-making capacity Unreliable  Patient has no known legal document designating a decision maker. Per policy next of kin is the designated decision maker. See System Informed Consent policy for guidance.  Physician orders for life-sustaining treatment (POLST) form is not completed  Code Status: Full code     Past Medical History   I have reviewed this patient's medical history and updated it with pertinent information if needed.   Past Medical History:   Diagnosis Date     Arthritis      Blood transfusion     38 yrs ago     Chronic pain     painful feet     COPD (chronic obstructive pulmonary disease) (H)      Dementia      Gastroesophageal reflux disease      History of blood transfusion      Numbness and tingling     fingertips occas     Other chronic pain     generalized     Oxygen dependent     4l nc cont     Parkinsons disease (H)      Seizures (H)     LAST SEIZURE 2 MOS AGO     Stimulus-induced repetitive discharges on nerve conduction studies        Past Surgical History   I have reviewed this patient's surgical " history and updated it with pertinent information if needed.  Past Surgical History:   Procedure Laterality Date     AMPUTATE TOE(S) Right 10/19/2017    Procedure: AMPUTATE TOE(S);  Right second toe amputation at metatarsophalangeal joint.;  Surgeon: Montrell Arce MD;  Location:  OR     BRONCHOSCOPY (RIGID OR FLEXIBLE), DIAGNOSTIC N/A 6/26/2018    Procedure: COMBINED BRONCHOSCOPY (RIGID OR FLEXIBLE), LAVAGE;  BRONCHOSCOPY (RIGID OR FLEXIBLE), LAVAGE  in ;  Surgeon: Shala Gonzales MD;  Location:  GI     COLONOSCOPY  11/20/2014    Dr. Schaeffer [facility]     COLONOSCOPY N/A 11/20/2014    Procedure: COLONOSCOPY;  Surgeon: Steven Schaeffer MD;  Location:  GI     GYN SURGERY      tear repair after delivery child     INSERT PUMP MORPHINE      pump replacement x2     MOUTH SURGERY      numerous times     ORTHOPEDIC SURGERY      foot surg left     REVISE PUMP MORPHINE  11/29/2011    Procedure:REVISE PUMP MORPHINE; Pump Replacement, abdominal area; Surgeon:ALIRIO RIVERA; Location: OR       Prior to Admission Medications   Prior to Admission Medications   Prescriptions Last Dose Informant Patient Reported? Taking?   FERROUS GLUCONATE PO 6/22/2018 at x 2  Yes Yes   Sig: Take 324 mg by mouth 2 times daily (with meals) Breakfast & supper   Felbamate (FELBATOL PO) 6/22/2018 at x 3  Yes Yes   Sig: Take 600 mg by mouth 3 times daily    LamoTRIgine (LAMICTAL) 100 MG tablet 6/22/2018 at x 3  Yes Yes   Sig: Take 100 mg by mouth 3 times daily.   amitriptyline (ELAVIL) 25 MG tablet 6/22/2018 at 2200  Yes Yes   Sig: Take 75 mg by mouth At Bedtime    dispensed in syringe 1 Device in sodium chloride (PF) 20 mL Intrathecal Pump Infusion   Yes Yes   Sig: by Intrathecal route continuous 8840 SynchroMed IIB pump per Dr Farooq  Model 8637-20 20 ml, serial number [id number]  Continuous infusion setting delivers:  Hydromorphone 6.008 mg/day  Bupivacaine 3.004 mg/day  Clonidine 40.05 mcg/day  Last fill:  4/11/2018  Pump Alarm date: 7/12/2018   gabapentin (NEURONTIN) 100 MG capsule 6/22/2018 at x 2  Yes Yes   Sig: Take 200 mg by mouth 2 times daily (with meals) Breakfast & Lunch    simvastatin (ZOCOR) 40 MG tablet 6/22/2018 at 2200  Yes Yes   Sig: Take 40 mg by mouth At Bedtime       Facility-Administered Medications: None     Allergies   No Known Allergies    Social History    Living situation: Lives with her  Oliverio and daughter, Joselin Horne and during the day her sister cares for her  Family system:  and 5 adult children  Functional status: Needs help with ADLs   History of substance use/abuse:  reports that she has been smoking.  She has been smoking about 0.50 packs per day. She has never used smokeless tobacco.  reports that she does not drink alcohol.  [employer] affiliation: Undesignated    Family History   I have reviewed this patient's family history and updated it with pertinent information if needed.   No family history on file.    Review of Systems   The 10 point Review of Systems is negative other than noted in the HPI or here.     Palliative Symptom Review (0=no symptom/no concern, 1=mild, 2=moderate, 3=severe):      Pain: 2-moderate      Fatigue: 2-moderate      Nausea: 0-none      Constipation: 0-none      Diarrhea: 2-moderate      Depressive Symptoms: 0-none      Anxiety: 1-mild      Drowsiness: 1-mild      Poor Appetite: 0-none      Shortness of Breath: 0-none      Insomnia: 0-none         Physical Exam   Temp:  [97.6  F (36.4  C)-99.3  F (37.4  C)] 98.3  F (36.8  C)  Heart Rate:  [53-84] 64  Resp:  [8-22] 19  BP: ()/(40-97) 99/59  FiO2 (%):  [50 %-65 %] 50 %  SpO2:  [80 %-100 %] 94 %  129 lbs 6.56 oz  GEN: Frail  female, alert, raspy voice, oriented to self, appears comfortable.  HEENT:  Normocephalic/atraumatic, no scleral icterus, no nasal discharge, mouth moist.  CV:  RRR, S1, S2; no murmurs or other irregularities noted.  +3 DP/PT pulses bilaterally; no edema  BLE.  RESP:  On oxygen via nasal cannula following recent extubation. Clear to auscultation bilaterally without rales/rhonchi/wheezing/retractions.  Symmetric chest rise on inhalation noted.  Normal respiratory effort.  ABD:  Rounded, soft, non-tender/non-distended.  +BS  EXT:  CMS intact x 4.     M/S:   States having chronic pain of LLL.    SKIN:  Warm and dry to touch, no exanthems noted in the visualized areas.    NEURO: Symmetric strength +5/5.  Sensation to touch intact all extremities.   There is no area of allodynia or hyperesthesia.  Psych:  Flat affect, calm, cooperative, conversant but easily distracted.     Data   Results for orders placed or performed during the hospital encounter of 06/22/18   BRONCHOSCOPY   Result Value Ref Range    Bronchoscopy       [facility]  Pulmonary  _______________________________________________________________________________  Patient Name: Shana Horne ,        Procedure Date: 6/26/2018 11:09 AM  MRN: [id number]                       Account #: [id number]  YOB: 1950              Admit Type: Inpatient  Age: 67                               Gender: Female  Attending MD: Shala Gonzales MD     Total Sedation Time: 15 minutes   (procedure) Total Procedure time 14 minutes  T  Instrument Name: 142                    _______________________________________________________________________________     Procedure:            Bronchoscopy  Indications:          L lung collapse/mucus plug clearance  Providers:            Shala Gonzales MD  Referring MD:         KRISTIE Zambrano MD  Medicines:            4 cc 1% lidocaine in airways  Requesting Physician: ROSANNE Zambrano MD  Complications:        No immediate complications  _______________________________________________________________________ ________  Procedure:            Pre-Anesthesia Assessment:                        - A History and Physical has been performed. Patient                         meds and  allergies have been reviewed. The risks and                         benefits of the procedure and the sedation options and                         risks were discussed with the patient. All questions                         were answered and informed consent was obtained.                         Patient identification and proposed procedure were                         verified prior to the procedure by the physician, the                         nurse and the technician. Mental Status Examination:                         normal. After reviewing the risks and benefits, the                         patient was deemed in satisfactory condition to undergo                         the procedure. The anesthesia plan was to use moderate                         sedation / analgesia (conscious sedation). Immediately                          prior to administration of medications, the patient was                         re-assessed for adequacy to receive sedatives. The                         heart rate, respiratory rate, oxygen saturations, blood                         pressure, adequacy of pulmonary ventilation, and                         response to care were monitored throughout the                         procedure. The physical status of the patient was                         re-assessed after the procedure.                        After obtaining informed consent time out performed the                         Olympus Bronchoscope Model BF-P190, Endora #[id number], SN#                         [id number] was introduced through the oral ETT and                         advanced to the distal trachea. Total of 4 cc 1%                         lidocaine given as topical anesthesia in lower airways.                         Wash from L lung with 60 cc NS -with 30 cc recovered                         into trap.The patient t olerated the procedure well.                                                                                    Findings:       juanito sharp.       R lower airway with mininimal thin secretions, no endobronchial lesions        or mucosal abnormalities.       L Lower airway: Thick mucus plug in distal L mainstem-suctioned clear.        Thick secretions emanating friom apical posterior segment MANOJ and LLL        orifices. No distinct endobronchial lesions noted but abnormal        (inflammed and heaped up mucosal noted -orifiece to lingula and lower        lobes. Significant dynamic airway collapse-with intermittent complete        occlusion of the L lower lobes orifices (see pictures)       The endotracheal tube is in good position. The visualized portion of the        trachea is of normal caliber. The juanito is sharp. The tracheobronchial        tree was examined to at least the first subsegmental level. Bronchial        mucosa and anatomy are normal; there are no endobronchial lesions,  and        no secretions.                                                                                   Impression:           Mucuso plugs L and airway collaspe                        - No specimens collected.  Moderate Sedation:       Propofol gtt 30 ug/kg/min , and 20 ug bump (2 cc) Versed gtt 3 mg/hr        additional 150 Ug IV fentanyl, 3 mg IV versed additional  Recommendation:       Pulmonary toilet                        - Labs:                                                                                   Attending Participation:      __________________  Shala Gonzales MD  6/26/2018 11:56:01 AM  I was physically present for the entire viewing portion of the exam.  __________________________Shala Gonzales MD  Number of Addenda: 0    Note Initiated On: 6/26/2018 11:09 AM  MRN:                  [id number]  Procedure Date:       6/26/2018 11:09:01 AM  Estimated Blood Loss:       None  Scope In: 11:18:33 AM  Scope Out: 11:32:37 AM     XR Chest Port 1 View    Narrative    CHEST ONE VIEW PORTABLE    6/22/2018 9:20 PM     HISTORY:  Shortness of breath. Confusion.     COMPARISON: 10/20/2017.      Impression    IMPRESSION: Large consolidation in the left mid and lower lung  concerning for pneumonia with left pleural effusion. Right lung is  relatively clear. No pneumothorax visualized. Cardiac silhouette  remains enlarged.    EB CALDERON MD   CT Head w/o Contrast    Narrative    CT HEAD W/O CONTRAST  6/22/2018 11:02 PM     HISTORY: Confusion. Emphysema and dementia.    TECHNIQUE: Axial images of the head and coronal reformations without  IV contrast material. Radiation dose for this scan was reduced using  automated exposure control, adjustment of the mA and/or kV according  to patient size, or iterative reconstruction technique.    COMPARISON: 7/22/2017.    FINDINGS: No intracranial hemorrhage, mass or mass effect. No acute  infarct identified. No shift of midline structures. No significant  change.      Impression    IMPRESSION: No acute intracranial findings.    HOWARD MENJIVAR MD   US Lower Extremity Venous Duplex Left    Narrative    US LOWER EXTREMITY VENOUS DUPLEX LEFT  6/22/2018 11:24 PM     HISTORY: Evaluate for DVT, pain.    TECHNIQUE: Venous Doppler ultrasound of the lower extremity. Color  flow and spectral Doppler with waveform analysis performed.    COMPARISON: None.    FINDINGS: Ultrasound of the left leg demonstrates no deep vein  thrombus from the common femoral through popliteal veins or in the  visualized segments of posterior tibial or peroneal veins in the calf.  3.5 x 3.7 x 1.2 cm popliteal fossa cyst, likely a Baker's cyst.      Impression    IMPRESSION:  1. No DVT identified left leg.  2. Baker's cyst.    HOWARD MENJIVAR MD   XR Pelvis 1/2 Views    Narrative    XR PELVIS 1/2 VW  6/22/2018 11:36 PM     INDICATION: Pain.    COMPARISON: None.      Impression    IMPRESSION: Negative.    HOWARD MENJIVAR MD   XR Femur Left 2 Views    Narrative    XR FEMUR LT 2 VW  6/22/2018 11:37 PM     INDICATION: Pain.    COMPARISON:  None.      Impression    IMPRESSION: No acute fractures. Moderate degenerative and hypertrophic  changes left knee.    HOWARD MENJIVAR MD   XR Chest Port 1 View    Narrative    CHEST ONE VIEW PORTABLE   6/25/2018 10:02 AM     HISTORY:  Endotracheal tube positioning.    COMPARISON: 6/22/2018.      Impression    IMPRESSION: Tip of the endotracheal tube is approximately 5.6 cm above  the juanito. Nasogastric tube extends below the left hemithorax.  Right-sided PICC line present with the tip in the superior vena cava.  There is near-complete opacification of the left hemithorax which may  be related to atelectasis or pleural fluid. Interstitial opacities in  the right lung are unchanged. No pneumothorax either side.    LM LEON MD   CT Chest w/o Contrast    Narrative    CT CHEST WITHOUT CONTRAST   6/25/2018 3:40 PM    HISTORY: Hypoxia. Dyspnea. Confusion.    TECHNIQUE: Volumetric helical acquisition was performed from the  thoracic inlet through the upper abdomen without IV contrast. Coronal  images were also reconstructed from the axial data. Radiation dose for  this scan was reduced using automated exposure control, adjustment of  the mA and/or kV according to patient size, or iterative  reconstruction technique.    COMPARISON: None.    FINDINGS: Endotracheal tube is in place, with tip 5 cm above the  juanito. Small left pleural effusion. Trace right pleural effusion. No  pericardial effusion. Atherosclerotic calcification of the thoracic  aorta and coronary arteries. There are calcified mediastinal and left  hilar lymph nodes. Calcified granuloma is noted in the left lower lobe  laterally. Emphysematous changes are noted throughout both lungs.  There is extensive consolidation in the left mid and lower lung, with  patchy interstitial and airspace infiltrate within the aerated portion  of the left upper lung. There are several indeterminate nodular  opacities in the right upper lobe with the largest measuring  1.5 x 0.6  cm (series 3 image 27). Enteric tube, tip in the proximal stomach.  Ectasia of the infrarenal abdominal aorta measures up to 2.9 cm AP.  Limited noncontrast views of the upper abdomen are otherwise  unremarkable. Right PICC line is in place, with tip in the upper SVC.       Impression    IMPRESSION:   1. Extensive consolidation in the left mid and lower lung may be  related to pneumonia and/or atelectasis, although underlying lung mass  cannot be excluded.  2. There are several indeterminate nodular opacities in the right  upper lobe, with the largest measuring 1.5 x 0.6 cm.  3. There are bilateral pleural effusions, small on the left and trace  on the right.  4. Emphysema.    CALI CHAVEZ MD   XR Chest Port 1 View    Narrative    CHEST ONE VIEW PORTABLE   6/26/2018 8:06 AM     HISTORY: Follow up respiratory failure.     COMPARISON: 6/25/2018      Impression    IMPRESSION: Tip of the endotracheal tube is 7.1 cm above the juanito.  Nasogastric tube extends below the left hemidiaphragm. There is a  right-sided PICC line that extends at least to the SVC, although the  tip cannot be seen due to overlying leads and wires. There is  retrocardiac airspace opacity. No pneumothorax on either side.    LM LEON MD   XR Chest Port 1 View    Narrative    CHEST PORTABLE ONE VIEW   6/27/2018 9:49 AM     HISTORY: Follow up pneumonia.     COMPARISON: 6/26/2018.      Impression    IMPRESSION: No significant change since prior. Persistent retrocardiac  consolidation concerning for pneumonia or atelectasis likely with  pleural effusion. Background of diffuse hazy opacities throughout the  lungs and interstitial thickening could represent pulmonary edema  versus infection. Support devices remain in place.    EB CALDERON MD   XR Abdomen Port 1 View    Narrative    XR ABDOMEN PORT 1 VW  6/28/2018 3:27 AM     INDICATION: OG placement.    COMPARISON: None.      Impression    IMPRESSION: Enteric tube tip in the  stomach. Small left pleural  effusion, left lung base infiltrate or atelectasis, and small left  pleural effusion.    HOWARD MENJIVAR MD   XR Chest Port 1 View    Narrative    XR CHEST PORT 1 VW 6/29/2018 7:29 AM    COMPARISON: 6/27/2018    HISTORY: Left lower lobe atelectasis.      Impression    IMPRESSION: Bibasilar opacities, left worse than right are again seen  and not significantly changed. No pneumothorax seen on either side.  Support devices unchanged.    YOLI SHORT MD   XR Chest Port 1 View    Narrative    PORTABLE CHEST ONE VIEW  7/1/2018 5:36 AM     HISTORY: Respiratory failure.     COMPARISON: 6/29/2018.      Impression    IMPRESSION:   1. ET tube and nasogastric tube appears stable. Right PICC line  appears stable.  2. Left lung base atelectasis or airspace disease is unchanged. No  pneumothorax. Right lung shows no new airspace disease. Stable cardiac  silhouette.    SHIRLENE MCDONALD MD   XR Abdomen Port 1 View    Narrative    XR ABDOMEN PORT 1 VW  7/1/2018 5:18 PM     HISTORY:  s/p ft placement. please verify post pyloric;     COMPARISON: None.    FINDINGS:  A feeding tube has been placed. The tip is in the third  portion of the duodenum.    VICKIE WILLINGHAM MD   CBC with platelets differential   Result Value Ref Range    WBC 18.9 (H) 4.0 - 11.0 10e9/L    RBC Count 4.58 3.8 - 5.2 10e12/L    Hemoglobin 14.1 11.7 - 15.7 g/dL    Hematocrit 44.8 35.0 - 47.0 %    MCV 98 78 - 100 fl    MCH 30.8 26.5 - 33.0 pg    MCHC 31.5 31.5 - 36.5 g/dL    RDW 13.8 10.0 - 15.0 %    Platelet Count 319 150 - 450 10e9/L    Diff Method Automated Method     % Neutrophils 85.3 %    % Lymphocytes 9.9 %    % Monocytes 3.7 %    % Eosinophils 0.1 %    % Basophils 0.3 %    % Immature Granulocytes 0.7 %    Nucleated RBCs 0 0 /100    Absolute Neutrophil 16.1 (H) 1.6 - 8.3 10e9/L    Absolute Lymphocytes 1.9 0.8 - 5.3 10e9/L    Absolute Monocytes 0.7 0.0 - 1.3 10e9/L    Absolute Eosinophils 0.0 0.0 - 0.7 10e9/L    Absolute Basophils 0.1 0.0 -  0.2 10e9/L    Abs Immature Granulocytes 0.1 0 - 0.4 10e9/L    Absolute Nucleated RBC 0.0    INR   Result Value Ref Range    INR 0.97 0.86 - 1.14   Erythrocyte sedimentation rate auto   Result Value Ref Range    Sed Rate 26 0 - 30 mm/h   Comprehensive metabolic panel   Result Value Ref Range    Sodium 136 133 - 144 mmol/L    Potassium 4.8 3.4 - 5.3 mmol/L    Chloride 98 94 - 109 mmol/L    Carbon Dioxide 33 (H) 20 - 32 mmol/L    Anion Gap 5 3 - 14 mmol/L    Glucose 152 (H) 70 - 99 mg/dL    Urea Nitrogen 23 7 - 30 mg/dL    Creatinine 1.06 (H) 0.52 - 1.04 mg/dL    GFR Estimate 52 (L) >60 mL/min/1.7m2    GFR Estimate If Black 63 >60 mL/min/1.7m2    Calcium 9.7 8.5 - 10.1 mg/dL    Bilirubin Total 0.4 0.2 - 1.3 mg/dL    Albumin 3.7 3.4 - 5.0 g/dL    Protein Total 9.1 (H) 6.8 - 8.8 g/dL    Alkaline Phosphatase 119 40 - 150 U/L    ALT 28 0 - 50 U/L    AST 27 0 - 45 U/L   Magnesium   Result Value Ref Range    Magnesium 2.0 1.6 - 2.3 mg/dL   Lactic acid whole blood   Result Value Ref Range    Lactic Acid Canceled, Test credited 0.7 - 2.0 mmol/L   Ammonia   Result Value Ref Range    Ammonia 17 10 - 50 umol/L   TSH   Result Value Ref Range    TSH 0.23 (L) 0.40 - 4.00 mU/L   Blood gas arterial   Result Value Ref Range    pH Arterial Canceled, Test credited 7.35 - 7.45 pH    pCO2 Arterial Canceled, Test credited 35 - 45 mm Hg    pO2 Arterial Canceled, Test credited 80 - 105 mm Hg    Bicarbonate Arterial Canceled, Test credited 21 - 28 mmol/L    Base Excess Art Canceled, Test credited mmol/L    Base Deficit Art Canceled, Test credited mmol/L    FIO2 Canceled, Test credited    Nt probnp inpatient (BNP)   Result Value Ref Range    N-Terminal Pro BNP Inpatient 1290 (H) 0 - 900 pg/mL   UA with Microscopic   Result Value Ref Range    Color Urine Yellow     Appearance Urine Clear     Glucose Urine Negative NEG^Negative mg/dL    Bilirubin Urine Negative NEG^Negative    Ketones Urine Negative NEG^Negative mg/dL    Specific Gravity Urine  1.021 1.003 - 1.035    Blood Urine Large (A) NEG^Negative    pH Urine 5.0 5.0 - 7.0 pH    Protein Albumin Urine 100 (A) NEG^Negative mg/dL    Urobilinogen mg/dL 0.0 0.0 - 2.0 mg/dL    Nitrite Urine Negative NEG^Negative    Leukocyte Esterase Urine Negative NEG^Negative    Source Catheterized Urine     WBC Urine 1 0 - 5 /HPF    RBC Urine 38 (H) 0 - 2 /HPF    Mucous Urine Present (A) NEG^Negative /LPF   Lamotrigine Level   Result Value Ref Range    Lamotrigine Level 6.7 2.5 - 15.0 ug/mL   Glucose by meter   Result Value Ref Range    Glucose 147 (H) 70 - 99 mg/dL   Blood gas arterial with oxyhemoglobin   Result Value Ref Range    pH Arterial 7.42 7.35 - 7.45 pH    pCO2 Arterial 52 (H) 35 - 45 mm Hg    pO2 Arterial 74 (L) 80 - 105 mm Hg    Bicarbonate Arterial 34 (H) 21 - 28 mmol/L    FIO2 BIPAP     Oxyhemoglobin Arterial 94 92 - 100 %    Base Excess Art 7.9 mmol/L   Lactic acid whole blood   Result Value Ref Range    Lactic Acid 1.1 0.7 - 2.0 mmol/L   CBC with platelets differential   Result Value Ref Range    WBC 14.5 (H) 4.0 - 11.0 10e9/L    RBC Count 3.31 (L) 3.8 - 5.2 10e12/L    Hemoglobin 10.2 (L) 11.7 - 15.7 g/dL    Hematocrit 32.9 (L) 35.0 - 47.0 %    MCV 99 78 - 100 fl    MCH 30.8 26.5 - 33.0 pg    MCHC 31.0 (L) 31.5 - 36.5 g/dL    RDW 13.8 10.0 - 15.0 %    Platelet Count 226 150 - 450 10e9/L    Diff Method Automated Method     % Neutrophils 89.0 %    % Lymphocytes 7.4 %    % Monocytes 2.9 %    % Eosinophils 0.0 %    % Basophils 0.1 %    % Immature Granulocytes 0.6 %    Nucleated RBCs 0 0 /100    Absolute Neutrophil 12.9 (H) 1.6 - 8.3 10e9/L    Absolute Lymphocytes 1.1 0.8 - 5.3 10e9/L    Absolute Monocytes 0.4 0.0 - 1.3 10e9/L    Absolute Eosinophils 0.0 0.0 - 0.7 10e9/L    Absolute Basophils 0.0 0.0 - 0.2 10e9/L    Abs Immature Granulocytes 0.1 0 - 0.4 10e9/L    Absolute Nucleated RBC 0.0    Basic metabolic panel   Result Value Ref Range    Sodium 138 133 - 144 mmol/L    Potassium 4.4 3.4 - 5.3 mmol/L     Chloride 105 94 - 109 mmol/L    Carbon Dioxide 30 20 - 32 mmol/L    Anion Gap 3 3 - 14 mmol/L    Glucose 119 (H) 70 - 99 mg/dL    Urea Nitrogen 25 7 - 30 mg/dL    Creatinine 0.86 0.52 - 1.04 mg/dL    GFR Estimate 66 >60 mL/min/1.7m2    GFR Estimate If Black 80 >60 mL/min/1.7m2    Calcium 7.8 (L) 8.5 - 10.1 mg/dL   Blood gas arterial with oxyhemoglobin   Result Value Ref Range    pH Arterial 7.38 7.35 - 7.45 pH    pCO2 Arterial 55 (H) 35 - 45 mm Hg    pO2 Arterial 102 80 - 105 mm Hg    Bicarbonate Arterial 32 (H) 21 - 28 mmol/L    FIO2 100%     Oxyhemoglobin Arterial 97 92 - 100 %    Base Excess Art 5.5 mmol/L   Glucose by meter   Result Value Ref Range    Glucose 227 (H) 70 - 99 mg/dL   Legionella pneumonia antigen urine   Result Value Ref Range    Specimen Description Catheterized Urine     L Pneumo Urine Antigen       Presumptive negative for Legionella pneumophilia serogroup 1 antigen in urine, suggesting   no recent or current infection.  Infection due to Legionella cannot be ruled out, since   other serogroups and species may cause disease, antigen may not be present in urine in   early infection, and the level of antigen present in the urine may be below detectable   limits of the test.     Blood gas arterial with oxyhemoglobin (AM Draw)   Result Value Ref Range    pH Arterial 7.37 7.35 - 7.45 pH    pCO2 Arterial 54 (H) 35 - 45 mm Hg    pO2 Arterial 69 (L) 80 - 105 mm Hg    Bicarbonate Arterial 31 (H) 21 - 28 mmol/L    FIO2 80%     Oxyhemoglobin Arterial 92 92 - 100 %    Base Excess Art 4.4 mmol/L   Glucose by meter   Result Value Ref Range    Glucose 123 (H) 70 - 99 mg/dL   Glucose by meter   Result Value Ref Range    Glucose 154 (H) 70 - 99 mg/dL   Glucose by meter   Result Value Ref Range    Glucose 154 (H) 70 - 99 mg/dL   Glucose by meter   Result Value Ref Range    Glucose 142 (H) 70 - 99 mg/dL   CBC with platelets differential   Result Value Ref Range    WBC 15.1 (H) 4.0 - 11.0 10e9/L    RBC Count 3.32  (L) 3.8 - 5.2 10e12/L    Hemoglobin 10.1 (L) 11.7 - 15.7 g/dL    Hematocrit 33.0 (L) 35.0 - 47.0 %    MCV 99 78 - 100 fl    MCH 30.4 26.5 - 33.0 pg    MCHC 30.6 (L) 31.5 - 36.5 g/dL    RDW 13.8 10.0 - 15.0 %    Platelet Count 226 150 - 450 10e9/L    Diff Method Automated Method     % Neutrophils 91.5 %    % Lymphocytes 5.4 %    % Monocytes 2.6 %    % Eosinophils 0.0 %    % Basophils 0.1 %    % Immature Granulocytes 0.4 %    Nucleated RBCs 0 0 /100    Absolute Neutrophil 13.8 (H) 1.6 - 8.3 10e9/L    Absolute Lymphocytes 0.8 0.8 - 5.3 10e9/L    Absolute Monocytes 0.4 0.0 - 1.3 10e9/L    Absolute Eosinophils 0.0 0.0 - 0.7 10e9/L    Absolute Basophils 0.0 0.0 - 0.2 10e9/L    Abs Immature Granulocytes 0.1 0 - 0.4 10e9/L    Absolute Nucleated RBC 0.0      *Note: Due to a large number of results and/or encounters for the requested time period, some results have not been displayed. A complete set of results can be found in Results Review.

## 2018-07-03 NOTE — PROGRESS NOTES
RT Note:    Patient remains on ventilator with settings of:    Ventilation Mode: CMV/AC  (Continuous Mandatory Ventilation/ Assist Control)  FiO2 (%): 55 %  Rate Set (breaths/minute): 14 breaths/min  Tidal Volume Set (mL): 350 mL  PEEP (cm H2O): 8 cmH2O  Oxygen Concentration (%): 55 %  Resp: 10    Breath sounds diminished. Suctioning small amounts of thin cloudy secretions. Patient will continue to receive Albuterol Q4 and Pulmozyme QE. No pressure support trial done this AM due to high FiO2 and PEEP settings. RT will continue to monitor.    Tonia Calero  July 3, 2018.5:52 AM

## 2018-07-03 NOTE — PLAN OF CARE
Problem: Patient Care Overview  Goal: Plan of Care/Patient Progress Review  Outcome: No Change  ICU End of Shift Summary.  For vital signs and complete assessments, please see documentation flowsheets.     Pertinent assessments: Pt remains fully vented with a STONE score of 0 until around 2230 when she fell asleep and then had a RASS score of -3 until she woke up at 0230. Calm and cooperative throughout shift. Following commands and writing her questions/wishes on paper. LS remains diminished throughout with L being > R (especially in the base where not much air movement is heard at all). Able to wean FiO2 down to 55% from 65% this shift. Suctioning small to moderate amounts of cloudy sputum. Does get into coughing fits at times and hard to get patient to calm down. Tele SR/SB when sleeping. Adequate UO. Rectal tube remains in place due to loose stools still present. Wound care done to redness around keegan area - barrier cream applied.   Major Shift Events: 2315: Spoke with Tele-Hub regarding patient's low BPs despite titrating Propofol drip to off and Precedex down slightly. Pt lying flat with a RASS score of -3. Discussed how patient's weight is down by 10 kg from a few days ago and questioned if she could be try. BPs slowly started to improve after changes to sedatives, so plan per Tele[facility] is to monitor for improvements with changes made at this time and update if BP continues to be low. MD questioned if Lasix needs to be stopped today. Will inform next nurse. * BP improved with changes and was WDL once patient awoke around 0230.  Plan (Upcoming Events): , Oliverio, would like to meet with Palliative today when they consult regarding her plan of care. He is able to be here around noon. Message left for Palliative care with plan for Oliverio to be at the hospital at noon unless they are unable to meet at that time. If not, he would like a phone call to set up a different time in the afternoon as he will be busy in  the morning (the earlier in the afternoon the better per Oliverio). Oliverio's Cell Phone #: [phone] They would also like to have a care conference on Thursday to meet with everybody involved.                           - Continue to wean O2 on ventilator as able along with antibiotics and IV Solumedrol/Nebs. ? Need to stop Lasix per Tele[facility].   Discharge/Transfer Needs: TBD. Continue ICU cares at this time.    Bedside Shift Report Completed   Bedside Safety Check Completed

## 2018-07-03 NOTE — PROGRESS NOTES
[facility]  Hospitalist Progress Note  Mackenzie Baptiste MD 07/03/2018    Reason for Stay (Diagnosis): encephalopathy          Assessment and Plan:      Summary of Stay: Shana Horne is a 67 year old female with a history of dementia with limited communication, COPD on chronic home O2 at 4L/nc, ongoing tobacco use, epilepsy, RSD of LLE with intrathecal pain pump,chronic diarrhea,  who lives with her dtr and typically ambulates with a walker  admitted on 6/22/2018 with a one week hx of some vague complaints, increasing sob and found to have hypoxic respiratory failure and a large LML/LLL dense infiltrate with effusion.    She was placed on BiPAP, initiated on ceftriaxone and azithromycin for CAP, and steroids for suspected COPD exacerbation, and admitted to the ICU. Her urine antigens returned + for pneumococcus, and sputum culture + for haemophilus influenzae     On am of 6/25 she had significant worsening of her respiratory status necessitating intubation and was bronched 6/26/26 with findings consistent with mucus plugging.  She was extubated on 7/3/18      Problem List:   1. Acute hypoxic respiratory failure: 2/2 pna/COPD exacerbation-extubated 7/3/18  2. Dense LML and LLL pna-pneumococcal urinary agn positive:  She rec;d 5 days of azith and vanco.  She has received 12 days of ceftriaxone, and 10 days of metronidazole.  Her procal was minimally elevated and she is afebrile.  Given her dementia she is at higher risk of delirium within abx.  Abx discontinued 7/3/18. She will need repeat CT scan in 4-6 weeks to ensure resolution, as it at risk for underlying malignancy   3. Acute on chronic COPD exacerbation: On day 11 of IV steroids - at 40 mg iv bid.  Decreased to 40 mg qd  4. Chronic copd-home inhalers  5. Seizure d/o:  Cont home meds:  lamictal and felbamate  6. Diarrhea;  Chronic - has rectal tube in place-likely worsened by TFs-C diff not checked but no fever/abdominal pain/leukocytosis. Although  "patient was on metronidazole as well.  TFs to stop now extubated, monitor - hopefully to improve.    7. Dementia:  Seems rather severe-but she does recognize family members and lives in a home with her  and daughter. They look after her in the evening and apparently a sister looks after her during the day. I confirmed this with the   8. RSD with chronic pain-cont intrathecal pump. (had to \"don't order\" in EPIC to get an alert to stop,) but put misc order and sticky note and discussed with RN, that this need to continue while in hopsitalization-this is strictly an EPIC issue  9. FEN:  SLP and assess swallowing  10. Volume overload:  Was on furosemide 20 mg iv bid, but pushing bicarb-will d/c diuretics  11. On dilaudid pump:  In part due to sedation-wean off over today now that extubated  12. Hypokalemia:  Replace and recheck, mag 7/2/18 at 2.0  DVT Prophylaxis: Enoxaparin (Lovenox) SQ  Code Status: Full Code    Disposition Plan   Expected discharge in 4-5 days provided continues to improve.  Keep in ICU tonight, can transfer to floor tomorrow if doing well from a respiratory standpoint. Will likely need rehab, will ask for PT eval tomorrow     Entered: Mackenzie Baptiste 07/03/2018, 12:47 PM               Interval History (Subjective):      Thinks her breathing is ok, states that ET tube is uncomfortable, no pain    Post extubation talking, took a sip of water without difficulty                   Physical Exam:      Last Vital Signs:  BP (!) 137/99  Temp 98.6  F (37  C)  Resp 11  Ht 1.6 m (5' 3\")  Wt 58.7 kg (129 lb 6.6 oz)  SpO2 93%  BMI 22.92 kg/m2    I/O:  Up 5 L  Weight:  Up 3 kg  Tele:  NSR    Pleasant sitting in bed, fully alert, nad looks stated age head ncat sclera mild bilateral injection lungs coarse, moderate air movement, rrr no mrg trace b LE edema skin w/d no c/c abd s/nt/nd alert christopher            Medications:      All current medications were reviewed with changes reflected in problem " list.         Data:      All new lab and imaging data was reviewed.   Labs:    Recent Labs  Lab 07/03/18  0636      POTASSIUM 2.9*   CHLORIDE 95   CO2 44*   ANIONGAP 3   *   BUN 38*   CR 0.64   GFRESTIMATED >90   GFRESTBLACK >90   BIBIANA 8.6       Recent Labs  Lab 07/03/18  0636   WBC 8.5   HGB 9.8*   HCT 31.9*   MCV 99         Imaging:

## 2018-07-04 NOTE — PROGRESS NOTES
Respiratory Therapy    Patient seen resting in bed/chair on 10 LPM oxymask and weaned to 6 LPM oxymizer cannula throughout shift, SpO2 90-93%. Respiratory rate 20 and breath sounds diminished throughout. Patient will continue to receive Albuterol Q4 hrs and Pulmozyme QE. RT to follow.    Xochitl Kelly  July 4, 2018, 4:21 PM

## 2018-07-04 NOTE — PLAN OF CARE
Problem: Patient Care Overview  Goal: Plan of Care/Patient Progress Review  ICU End of Shift Summary.  For vital signs and complete assessments, please see documentation flowsheets.     Pertinent assessments: A/O, VSS. o2 dependent now at 10L oxymask. Skin is intact, rash in groin area, micotin powder applied. Denies pain. Dilaudid PCA infusing. Picc on R arm. Tube feeding at goal of 55/hr. 150cc q4hr H2O flush. Gill catheter in place, good uop. Rectal tube in place.   Major Shift Events: N/A  Plan (Upcoming Events): Palliative care consult?  Discharge/Transfer Needs: TBD    Bedside Shift Report Completed :   Bedside Safety Check Completed:

## 2018-07-04 NOTE — PROGRESS NOTES
[facility]  Hospitalist Progress Note  Mackenzie Baptiste MD 07/04/2018    Reason for Stay (Diagnosis): encephalopathy          Assessment and Plan:      Summary of Stay: Shana Horne is a 67 year old female with a history of dementia with limited communication, COPD on chronic home O2 at 4L/nc, ongoing tobacco use, epilepsy, RSD of LLE with intrathecal pain pump,chronic diarrhea,  who lives with her dtr and typically ambulates with a walker  admitted on 6/22/2018 with a one week hx of some vague complaints, increasing sob and found to have hypoxic respiratory failure and a large LML/LLL dense infiltrate with effusion.    She was placed on BiPAP, initiated on ceftriaxone and azithromycin for CAP, and steroids for suspected COPD exacerbation, and admitted to the ICU. Her urine antigens returned + for pneumococcus, and sputum culture + for haemophilus influenzae     On am of 6/25 she had significant worsening of her respiratory status necessitating intubation and was bronched 6/26/26 with findings consistent with mucus plugging.  She was extubated on 7/3/18      Problem List:   1. Acute hypoxic respiratory failure: 2/2 pna/COPD exacerbation-extubated 7/3/18  2. Dense LML and LLL pna-pneumococcal urinary agn positive:  She rec;d 5 days of azith and vanco.  She has received 12 days of ceftriaxone, and 10 days of metronidazole.  Her procal was minimally elevated and she is afebrile.  Given her dementia she is at higher risk of delirium with abx.  Abx discontinued 7/3/18. She will need repeat CT scan in 4-6 weeks to ensure resolution, as it at risk for underlying malignancy   3. Sepsis:  Given leukocytosis and acute on chronic hypoxic respiratory failure in setting of pna-resolved  4. Acute on chronic COPD exacerbation: received 11 days of IV steroids - at 40 mg iv bid.  Decreased to 40 mg qd on 7/3/18.  Given duration of time on vent, ongoing hypoxic respiratory failure will likely need slow taper  5. Chronic  "copd-home inhalers  6. Seizure d/o:  Cont home meds:  lamictal and felbamate  7. Diarrhea;  Chronic - has rectal tube in place-likely worsened by TFs-C diff not checked but no fever/abdominal pain/leukocytosis. Although patient was on metronidazole as well.  TFs to hopefully to stop soon now that extubated, monitor - hopefully to improve.    8. Dementia:  Seems rather severe-but she does recognize family members and lives in a home with her  and daughter. They look after her in the evening and apparently a sister looks after her during the day. I confirmed this with the  7/3/18.    9. RSD with chronic pain-cont intrathecal pump. (had to \"don't order\" in EPIC to get an alert to stop,) but put misc order and sticky note and discussed with RN, that this need to continue while in hopsitalization-this is strictly an EPIC issue  10. FEN:  SLP to assess swallowing-still NPO and on TFs  11. Volume overload:  Was on furosemide 20 mg iv bid, but pushing bicarb-so d/c'd diuretics-and UO is really picking up so suspect she is starting to self diurese  12. On dilaudid pump:  In part due to sedation-wean off over today now that extubated  13. Hypokalemia:  Replace and recheck, mag 7/2/18 at 2.0  DVT Prophylaxis: Enoxaparin (Lovenox) SQ  Code Status: Full Code    Disposition Plan   Expected discharge in 4-5 days provided continues to improve. Still with high O2 requirements. Will likely need rehab, will ask for PT eval tomorrow.  Lots of concern from  re: placement as has been refused to multiple TCU in past due to intrathecal pump.  SW involved     Entered: Mackenzie Baptiste 07/04/2018, 2:02 PM               Interval History (Subjective):      Doing good this am. Denies any cp or sob.  No pain (while on dilaudid pca wean)  Did have some n without v this am after potassium replacement given down FT.                    Physical Exam:      Last Vital Signs:  /60  Temp 98.6  F (37  C) (Oral)  Resp 9  Ht 1.6 " "m (5' 3\")  Wt 58.7 kg (129 lb 6.6 oz)  SpO2 93%  BMI 22.92 kg/m2    I/O:  Up 3.5 L  UO 4.5 L  Weight:  Up 3.2 kg from yesterday-no weight today   Tele:  NSR    Pleasant sitting in bed, fully alert, nad looks stated age head ncat sclera mild bilateral injection lungs coarse, moderate air movement, rrr no mrg trace b LE edema skin w/d no c/c abd s/nt/nd alert christopher            Medications:      All current medications were reviewed with changes reflected in problem list.         Data:      All new lab and imaging data was reviewed.   Labs:    Recent Labs  Lab 07/04/18  0550      POTASSIUM 3.2*   CHLORIDE 95   CO2 44*   ANIONGAP 2*   *   BUN 35*   CR 0.63   GFRESTIMATED >90   GFRESTBLACK >90   BIBIANA 8.8       Recent Labs  Lab 07/04/18  0550   WBC 10.2   HGB 10.9*   HCT 35.3   MCV 98         Imaging:     "

## 2018-07-04 NOTE — CONSULTS
Care Transition Initial Assessment -   Reason For Consult: discharge planning  Met with: PATIENT and her sister Samira    Active Problems:    Bacterial pneumonia    Acute and chronic respiratory failure with hypoxia (H)       DATA  Lives With: spouse and dtr Lisa  Living Arrangements: house   Quality Of Family Relationships: supportive, helpful, involved  Transportation Available: family or friend will provide, van, wheelchair accessible    ASSESSMENT  Cognitive Status:  Forgetful  Met with pt and her sister Samira.  Pt lives with her  and one of their dtr's (Lisa) in a house.  They have 4 other children who are supportive and involved.  Pt's  works full time and pt's sister Samira comes in and is with pt all day until her  comes home.  She spends most of her days in bed and does not use any assistive devices when she does move around.  She does use home O2 however doesn't remember who she gets this through.  Her sister reports she has had at least 1 fall in the last yr.  Her sister did not know if she had ever been to a TCU.       PLAN  D/C plans still uncertain.  PT still needs to assess pt, may have difficulty finding TCU who will accept pt.

## 2018-07-05 NOTE — ANESTHESIA CARE TRANSFER NOTE
Patient: Shana Horne    IT pump refill  Diagnosis: CRPS 3  Diagnosis Additional Information: No value filed.    Anesthesia Type:   none    Note:    Patient transferred to:Medical/Surgical Unit  Comments: No specific requirements post pump refill.      Vitals: (Last set prior to Anesthesia Care Transfer)              Electronically Signed By: Gurpreet Farooq MD  July 5, 2018  6:49 PM

## 2018-07-05 NOTE — PROGRESS NOTES
CLINICAL NUTRITION SERVICES - BRIEF NOTE    - Refer to reassessment completed 7/03.  - Extubated 7/03 and as such off Prop.  - Remains NPO with SLP consult pending.  - Medications reviewed:   Lasix d/c'ed   MVI/M   Fluids TKO  - Wt trending reviewed.  - Labs reviewed:   BG consistently <180   Na, K WNL   Phos from 7/02 WNL  - Continues with rectal tube.  - Change in formula/regimen with reassessed needs post-extubation:    Nutrition Support Enteral:  Type of Feeding Tube: ND (7/1, originally with OGT 6/26)  Enteral Frequency:  Continuous  Enteral Regimen: Isosource 1.5 @ 45 ml/hr x 24 hrs  Total Enteral Provisions: 1080 mL provides 1620 kcal (29 kcal/kg), 73 gm protein, 821 ml free H2O, 190 gm CHO and 16 gm Fiber daily.   - 1 pkt Prosource daily for an additional 11 grams protein daily (to total 84 g - 1.5 g/kg)  Free Water Flush: 150 mL q 4 hrs    REASSESSED NUTRITION NEEDS (PER APPROVED PRACTICE GUIDELINES, Dosing weight: 55.5 kg - lowest wt upon admit):  Estimated Energy Needs: 1511-6685+ kcals (25-30 Kcal/Kg+)  Justification: maintenance with extubation  Estimated Protein Needs: 67-83 grams protein (1.2-1.5 g pro/Kg)  Justification: preservation of LBM  Estimated Fluid Needs: >1 mL/Kcal  Justification: maintenance or per MD    - Ok to change to new formula/rate when new bag is received.  To discuss changes with team during upcoming rounds.  Will continue following.      Jo Manzanares RD, SHAI  Clinical Dietitian  3rd floor/ICU: [phone]  All other floors: [phone]  Weekend/holiday: [phone]    Addendum:  - SLP bedside swallow evaluation this AM, diet advanced to full liquids/nectar-thick.    - Will add Magic Shake supplements between meals BID.  - Will continue monitoring appropriateness of enteral nutrition/regimen (?need to cycle) when diet further advanced and oral intake trending determined.       Jo Manzanares RD, SHAI  Clinical Dietitian  3rd floor/ICU: [phone]  All other floors:  [phone]  Weekend/holiday: [phone]

## 2018-07-05 NOTE — PLAN OF CARE
Problem: Patient Care Overview  Goal: Plan of Care/Patient Progress Review  Outcome: No Change  DAY RN  Came from ICU around 1300, used lift to get into bed. Patient is A and O, needs things repeated at times, answers and calls appropriate.   Denied pain.   Stated overall feeling tired  Complaint of nausea about 15 minutes after drinking magic shake via spoon, swallowed well. No dry heaves, requested zofran continues visiting with family seemed to give her relief.tolerated nectar thick water with a spoon well after the zofran.   02 stable 90-92% on 8L encouraged deep breathing  Tube feed running at 45/hour, site is [facility]  Patient came with pulse ox on R index finger removed to check sensor and finger is red and like a callous is sluffing off, no bleeding. A rough collase is still present at bend of finger still has some calloused skin, bacitracin ordered.  Seizure pads place on bed.   Patient visited with family.

## 2018-07-05 NOTE — PROGRESS NOTES
07/05/18 1022   General Information   Onset Date 06/22/18   Start of Care Date 07/05/18   Referring Physician Mackenzie Baptiste MD   Patient Profile Review/OT: Additional Occupational Profile Info See Profile for full history and prior level of function   Patient/Family Goals Statement Pt did not state   Swallowing Evaluation Bedside swallow evaluation   Behaviorial Observations Distractible   Mode of current nutrition NPO;NG   Respiratory Status O2 Supply   Type of O2 supply Nasal cannula   Comments Shana oHrne is a 67 year old female with a history of dementia with limited communication, COPD on chronic home O2 at 4L/nc, ongoing tobacco use, epilepsy, RSD of LLE with intrathecal pain pump,chronic diarrhea,  who lives with her dtr and typically ambulates with a walker  admitted on 6/22/2018 with a one week hx of some vague complaints, increasing sob and found to have hypoxic respiratory failure and a large LML/LLL dense infiltrate with effusion. Pt was extubated 7/4 at 1145. Per chart review, pt with VFSS completed at outside facility in 2001 which demonstrated penetration on thin liquids which improved with use of chin tuck. Pt is a poor historian, but denies any modified diet PTA. Clinical swallow eval completed per MD orders to further assess oropharyngeal swallow function.    Clinical Swallow Evaluation   Oral Musculature unable to assess due to poor participation/comprehension   Additional Documentation Yes   Clinical Swallow Eval: Thin Liquid Texture Trial   Mode of Presentation, Thin Liquids spoon;fed by clinician   Volume of Liquid or Food Presented 2 tbsp   Oral Phase of Swallow Premature pharyngeal entry   Pharyngeal Phase of Swallow impaired;wet vocal quality after swallow   Diagnostic Statement Thin liquids via cup resulted in overt s/sx of aspiration marked by wet vocal quality   Clinical Swallow Eval: Nectar Thick Liquid Texture Trial   Mode of Presentation, Nectar spoon;fed by clinician   Volume  of [place] Presented 6 tbsp   Oral Phase, Nectar other (see comments)  (intermittent bolus holding)   Pharyngeal Phase, [place] intact   Diagnostic Statement Nectar thick liquids via spoon resulted in no overt s/sx of aspiration. Pt noted to intermittently hold bolus and required cues to swallow   Clinical Swallow Eval: Puree Solid Texture Trial   Mode of Presentation, Puree spoon;fed by clinician   Volume of Puree Presented 5 tbsp   Oral Phase, Puree Poor AP movement   Pharyngeal Phase, Puree intact   Diagnostic Statement Pt tolerated pureed textures via spoon with no overt s/sx of aspiration. Pt required prolonged time for AP transit   VFSS Evaluation   VFSS Additional Documentation No   FEES Evaluation   Additional Documentation No   Swallow Compensations   Swallow Compensations Alternate viscosity of consistencies;Effortful swallow;Pacing;Reduce amounts;Multiple swallow   Results Oral difficulties only;Suspect aspiration   General Therapy Interventions   Planned Therapy Interventions Dysphagia Treatment   Dysphagia treatment Compensatory strategies for swallowing;Instruction of safe swallow strategies;Modified diet education   Swallow Eval: Clinical Impressions   Skilled Criteria for Therapy Intervention Skilled criteria met.  Treatment indicated.   Functional Assessment Scale (FAS) 4   Treatment Diagnosis mild-moderate oropharyngeal dysphagia   Diet texture recommendations Full liquid;Nectar thick liquids  (via spoon)   Recommended Feeding/Eating Techniques alternate between small bites and sips of food/liquid;hard swallow w/ each bite or sip;maintain upright posture during/after eating for 30 mins;check mouth frequently for oral residue/pocketing;small sips/bites   Demonstrates Need for Referral to Another Service physical therapy;occupational therapy;dietitian;respiratory therapy   Therapy Frequency 5 times/wk   Predicted Duration of Therapy Intervention (days/wks) 1 week   Anticipated Discharge Disposition  extended care facility   Risks and Benefits of Treatment have been explained. Yes   Patient, family and/or staff in agreement with Plan of Care Yes   Clinical Impression Comments SLP: Clinical swallow eval completed per MD orders. Pt presents with mild-moderate oropharyngeal dysphagia s/p extubation 7/4. Thin liquids via spoon resulted in overt s/sx of aspiration marked by wet gurgly vocal quality. Pt tolerated nectar thick liquids via spoon and pureed textures with no overts/sx of aspiration. Pt noted to require prolonged time for AP transit and required intermittent verbal cues to swallow d/t bolus holding. Recommend pt initiate nectar thick full liquids via spoon with 1:1 supervision. Pt should be fully upright and alert for all PO, take small single sips/bites via spoon only, slow pacing, and alternate between consistencies. Hold PO should pt demonstrate overt s/sx of aspiration or if change in pts respiratory status observed. ST to continue to follow for diet tolerance and advanced trials as appropriate. Anticipate pt will require ongoing ST upon d/c.    Total Evaluation Time   Total Evaluation Time (Minutes) 12

## 2018-07-05 NOTE — PROGRESS NOTES
"[facility] RCAT     Date: 7/5/18  Admission Dx: PNA  Pulmonary History: COPD  Home Nebulizer/MDI Use: none  Home Oxygen: 4lpm NC  Acuity Level (RCAT flow sheet): 3  Aerosol Therapy initiated: Duoneb QID, Alb Q2 prn      Pulmonary Hygiene initiated: cough and deep breathe      Volume Expansion initiated: IS      Current Oxygen Requirements: 8lpm  Current SpO2: 90%    Re-evaluation date: 7/8/18    Patient Education: Patient aware of medication benefits and interactions      See \"RT Assessments\" flow sheet for patient assessment scoring and Acuity Level Details.             "

## 2018-07-05 NOTE — PROGRESS NOTES
"[facility]  Palliative Care Progress Note  Text Page    The patient's  Oliverio Horne called our office and left a message (while I was in ICU rounds). I phoned him to up date him about Shana's status. He is frustrated as in the past Shana has not been placed at a TCU due to her intrathecal pain pump, and is anticipating Shana will be soon dismissed to home. \"What am I supposed to do? Bring her home and watch her pee and shit in bed?\" Their family plans to go on vacation Saturday. I did explore with Oliverio the possibility of having additional help at home. He is concerned with finances, but is willing to discuss options with our .      Assessment & Plan     1.  Decisional Capacity -  Unreliable. Patient does not demonstrate medical capacity. Patient does not have an advance directive. Per  informed consent policy next of kin should be involved in all consent and decision making. Next-of-kin is her  Daniel Horne who request to be contacted at cell phone [phone] if there are decisions regarding Shana's care.       2. Dysphagia - Appreciate input of Speech Therapist Chelo Goodman, SLP recommendation for nectar thick full liquids via spoon with 1:1 supervision.     3.  Pain - Complex regional pain syndrome with intrathecal pain pump which is refilled by Gurpreet Farooq MD.  Pump alarm date is 7/12/2018.     4.  Dyspnea with continued tobacco abuse -  Continue restorative interventions.     5.  Care Planning -  acknowledged that TCU placement has not been possible in the past due to intrathecal pain pump and medications for epilepsy. Social work has been consulted to assist with dismissal planning.      Goal of Care: FULL CODE - Restorative care.         Time Spent on this Encounter   I spent 15 minutes (10:45 AM -11 AM) in assessment of the patient and phone discussion with her /next-of-kin as documented in section above. Another 20 minutes in review of " chart, documentation, coordination of care and discussion with the health care team.    Interval History   Chart reviewed    Palliative Symptom Review (0=no symptom/no concern, 1=mild, 2=moderate, 3=severe):      Pain: 2-moderate      Fatigue: 2-moderate      Nausea: 0-none      Constipation: 0-none      Diarrhea: 1-mild      Depressive Symptoms: 0-none      Anxiety: 0-none      Drowsiness: 0-none      Poor Appetite: 3-severe      Shortness of Breath: 0-none      Insomnia: 0-none        Physical Exam   Temp:  [98.2  F (36.8  C)-98.6  F (37  C)] 98.2  F (36.8  C)  Heart Rate:  [70-97] 96  Resp:  [6-19] 17  BP: ()/(51-88) 130/79  SpO2:  [85 %-97 %] 93 %  126 lbs 12.23 oz  GEN:  Alert, oriented to self, appears comfortable, NAD.  HEENT:  Normocephalic/atraumatic, no scleral icterus, no nasal discharge, mouth moist.  RESP:   Oxygen at 8 liters nasal cannula. Symmetric chest rise on inhalation noted.  Normal respiratory effort.      Medications     dexmedetomidine Stopped (07/03/18 1226)     IV fluid REPLACEMENT ONLY       Medication given by intrathecal pump: This is NOT an order to dispense medication. For information only.       propofol (DIPRIVAN) infusion Stopped (07/02/18 3042)     sodium chloride 10 mL/hr at 07/03/18 0731       albuterol  2.5 mg Nebulization Q4H     amitriptyline  75 mg Oral or Feeding Tube At Bedtime     dornase alpha  2.5 mg Inhalation Daily     enoxaparin  40 mg Subcutaneous Q24H     felbamate  600 mg Per Feeding Tube Q8H [facility]     gabapentin  200 mg Per Feeding Tube BID     insulin aspart  1-6 Units Subcutaneous Q4H     lamoTRIgine  100 mg Oral or Feeding Tube TID     methylPREDNISolone  40 mg Intravenous Q24H     multivitamins with minerals  15 mL Per Feeding Tube Daily     polyethylene glycol  17 g Oral Daily     protein modular  1 packet Per Feeding Tube Daily     rantidine  150 mg Per Feeding Tube BID     sodium chloride (PF)  10 mL Intracatheter Q7 Days     sodium chloride (PF)  3 mL  Intracatheter Q8H       Data   Results for orders placed or performed during the hospital encounter of 06/22/18   BRONCHOSCOPY   Result Value Ref Range    Bronchoscopy       [facility]  Pulmonary  _______________________________________________________________________________  Patient Name: Shana Horne ,        Procedure Date: 6/26/2018 11:09 AM  MRN: [id number]                       Account #: [id number]  YOB: 1950              Admit Type: Inpatient  Age: 67                               Gender: Female  Attending MD: Shala Gonzales MD     Total Sedation Time: 15 minutes   (procedure) Total Procedure time 14 minutes  T  Instrument Name: 142                    _______________________________________________________________________________     Procedure:            Bronchoscopy  Indications:          L lung collapse/mucus plug clearance  Providers:            Shala Gonzales MD  Referring MD:         KRISTIE Zambrano MD  Medicines:            4 cc 1% lidocaine in airways  Requesting Physician: ROSANNE Zambrano MD  Complications:        No immediate complications  _______________________________________________________________________ ________  Procedure:            Pre-Anesthesia Assessment:                        - A History and Physical has been performed. Patient                         meds and allergies have been reviewed. The risks and                         benefits of the procedure and the sedation options and                         risks were discussed with the patient. All questions                         were answered and informed consent was obtained.                         Patient identification and proposed procedure were                         verified prior to the procedure by the physician, the                         nurse and the technician. Mental Status Examination:                         normal. After reviewing the risks and benefits, the                          patient was deemed in satisfactory condition to undergo                         the procedure. The anesthesia plan was to use moderate                         sedation / analgesia (conscious sedation). Immediately                          prior to administration of medications, the patient was                         re-assessed for adequacy to receive sedatives. The                         heart rate, respiratory rate, oxygen saturations, blood                         pressure, adequacy of pulmonary ventilation, and                         response to care were monitored throughout the                         procedure. The physical status of the patient was                         re-assessed after the procedure.                        After obtaining informed consent time out performed the                         Olympus Bronchoscope Model BF-P190, [employer] #[id number], SN#                         [id number] was introduced through the oral ETT and                         advanced to the distal trachea. Total of 4 cc 1%                         lidocaine given as topical anesthesia in lower airways.                         Wash from L lung with 60 cc NS -with 30 cc recovered                         into trap.The patient t olerated the procedure well.                                                                                   Findings:       juanito sharp.       R lower airway with mininimal thin secretions, no endobronchial lesions        or mucosal abnormalities.       L Lower airway: Thick mucus plug in distal L mainstem-suctioned clear.        Thick secretions emanating friom apical posterior segment MANOJ and LLL        orifices. No distinct endobronchial lesions noted but abnormal        (inflammed and heaped up mucosal noted -orifiece to lingula and lower        lobes. Significant dynamic airway collapse-with intermittent complete        occlusion of the L lower lobes orifices (see pictures)       The endotracheal  tube is in good position. The visualized portion of the        trachea is of normal caliber. The juanito is sharp. The tracheobronchial        tree was examined to at least the first subsegmental level. Bronchial        mucosa and anatomy are normal; there are no endobronchial lesions,  and        no secretions.                                                                                   Impression:           Mucuso plugs L and airway collaspe                        - No specimens collected.  Moderate Sedation:       Propofol gtt 30 ug/kg/min , and 20 ug bump (2 cc) Versed gtt 3 mg/hr        additional 150 Ug IV fentanyl, 3 mg IV versed additional  Recommendation:       Pulmonary toilet                        - Labs:                                                                                   Attending Participation:      __________________  Shala Gonzales MD  6/26/2018 11:56:01 AM  I was physically present for the entire viewing portion of the exam.  __________________________Shala Gonzales MD  Number of Addenda: 0    Note Initiated On: 6/26/2018 11:09 AM  MRN:                  [id number]  Procedure Date:       6/26/2018 11:09:01 AM  Estimated Blood Loss:       None  Scope In: 11:18:33 AM  Scope Out: 11:32:37 AM     XR Chest Port 1 View    Narrative    CHEST ONE VIEW PORTABLE    6/22/2018 9:20 PM     HISTORY: Shortness of breath. Confusion.     COMPARISON: 10/20/2017.      Impression    IMPRESSION: Large consolidation in the left mid and lower lung  concerning for pneumonia with left pleural effusion. Right lung is  relatively clear. No pneumothorax visualized. Cardiac silhouette  remains enlarged.    EB CALDERON MD   CT Head w/o Contrast    Narrative    CT HEAD W/O CONTRAST  6/22/2018 11:02 PM     HISTORY: Confusion. Emphysema and dementia.    TECHNIQUE: Axial images of the head and coronal reformations without  IV contrast material. Radiation dose for this scan was reduced using  automated exposure  control, adjustment of the mA and/or kV according  to patient size, or iterative reconstruction technique.    COMPARISON: 7/22/2017.    FINDINGS: No intracranial hemorrhage, mass or mass effect. No acute  infarct identified. No shift of midline structures. No significant  change.      Impression    IMPRESSION: No acute intracranial findings.    HOWARD MENJIVAR MD   US Lower Extremity Venous Duplex Left    Narrative    US LOWER EXTREMITY VENOUS DUPLEX LEFT  6/22/2018 11:24 PM     HISTORY: Evaluate for DVT, pain.    TECHNIQUE: Venous Doppler ultrasound of the lower extremity. Color  flow and spectral Doppler with waveform analysis performed.    COMPARISON: None.    FINDINGS: Ultrasound of the left leg demonstrates no deep vein  thrombus from the common femoral through popliteal veins or in the  visualized segments of posterior tibial or peroneal veins in the calf.  3.5 x 3.7 x 1.2 cm popliteal fossa cyst, likely a Baker's cyst.      Impression    IMPRESSION:  1. No DVT identified left leg.  2. Baker's cyst.    HOWARD MENJIVAR MD   XR Pelvis 1/2 Views    Narrative    XR PELVIS 1/2 VW  6/22/2018 11:36 PM     INDICATION: Pain.    COMPARISON: None.      Impression    IMPRESSION: Negative.    HOWARD MENJIVAR MD   XR Femur Left 2 Views    Narrative    XR FEMUR LT 2 VW  6/22/2018 11:37 PM     INDICATION: Pain.    COMPARISON: None.      Impression    IMPRESSION: No acute fractures. Moderate degenerative and hypertrophic  changes left knee.    HOWARD MENJIVAR MD   XR Chest Port 1 View    Narrative    CHEST ONE VIEW PORTABLE   6/25/2018 10:02 AM     HISTORY:  Endotracheal tube positioning.    COMPARISON: 6/22/2018.      Impression    IMPRESSION: Tip of the endotracheal tube is approximately 5.6 cm above  the juanito. Nasogastric tube extends below the left hemithorax.  Right-sided PICC line present with the tip in the superior vena cava.  There is near-complete opacification of the left hemithorax which may  be related  to atelectasis or pleural fluid. Interstitial opacities in  the right lung are unchanged. No pneumothorax either side.    LM LEON MD   CT Chest w/o Contrast    Narrative    CT CHEST WITHOUT CONTRAST   6/25/2018 3:40 PM    HISTORY: Hypoxia. Dyspnea. Confusion.    TECHNIQUE: Volumetric helical acquisition was performed from the  thoracic inlet through the upper abdomen without IV contrast. Coronal  images were also reconstructed from the axial data. Radiation dose for  this scan was reduced using automated exposure control, adjustment of  the mA and/or kV according to patient size, or iterative  reconstruction technique.    COMPARISON: None.    FINDINGS: Endotracheal tube is in place, with tip 5 cm above the  juanito. Small left pleural effusion. Trace right pleural effusion. No  pericardial effusion. Atherosclerotic calcification of the thoracic  aorta and coronary arteries. There are calcified mediastinal and left  hilar lymph nodes. Calcified granuloma is noted in the left lower lobe  laterally. Emphysematous changes are noted throughout both lungs.  There is extensive consolidation in the left mid and lower lung, with  patchy interstitial and airspace infiltrate within the aerated portion  of the left upper lung. There are several indeterminate nodular  opacities in the right upper lobe with the largest measuring 1.5 x 0.6  cm (series 3 image 27). Enteric tube, tip in the proximal stomach.  Ectasia of the infrarenal abdominal aorta measures up to 2.9 cm AP.  Limited noncontrast views of the upper abdomen are otherwise  unremarkable. Right PICC line is in place, with tip in the upper SVC.       Impression    IMPRESSION:   1. Extensive consolidation in the left mid and lower lung may be  related to pneumonia and/or atelectasis, although underlying lung mass  cannot be excluded.  2. There are several indeterminate nodular opacities in the right  upper lobe, with the largest measuring 1.5 x 0.6 cm.  3. There  are bilateral pleural effusions, small on the left and trace  on the right.  4. Emphysema.    CALI CHAVEZ MD   XR Chest Port 1 View    Narrative    CHEST ONE VIEW PORTABLE   6/26/2018 8:06 AM     HISTORY: Follow up respiratory failure.     COMPARISON: 6/25/2018      Impression    IMPRESSION: Tip of the endotracheal tube is 7.1 cm above the juanito.  Nasogastric tube extends below the left hemidiaphragm. There is a  right-sided PICC line that extends at least to the SVC, although the  tip cannot be seen due to overlying leads and wires. There is  retrocardiac airspace opacity. No pneumothorax on either side.    LM LEON MD   XR Chest Port 1 View    Narrative    CHEST PORTABLE ONE VIEW   6/27/2018 9:49 AM     HISTORY: Follow up pneumonia.     COMPARISON: 6/26/2018.      Impression    IMPRESSION: No significant change since prior. Persistent retrocardiac  consolidation concerning for pneumonia or atelectasis likely with  pleural effusion. Background of diffuse hazy opacities throughout the  lungs and interstitial thickening could represent pulmonary edema  versus infection. Support devices remain in place.    EB CALDERON MD   XR Abdomen Port 1 View    Narrative    XR ABDOMEN PORT 1 VW  6/28/2018 3:27 AM     INDICATION: OG placement.    COMPARISON: None.      Impression    IMPRESSION: Enteric tube tip in the stomach. Small left pleural  effusion, left lung base infiltrate or atelectasis, and small left  pleural effusion.    HOWARD MENJIVAR MD   XR Chest Port 1 View    Narrative    XR CHEST PORT 1 VW 6/29/2018 7:29 AM    COMPARISON: 6/27/2018    HISTORY: Left lower lobe atelectasis.      Impression    IMPRESSION: Bibasilar opacities, left worse than right are again seen  and not significantly changed. No pneumothorax seen on either side.  Support devices unchanged.    YOLI SHORT MD   XR Chest Port 1 View    Narrative    PORTABLE CHEST ONE VIEW  7/1/2018 5:36 AM     HISTORY: Respiratory failure.      COMPARISON: 6/29/2018.      Impression    IMPRESSION:   1. ET tube and nasogastric tube appears stable. Right PICC line  appears stable.  2. Left lung base atelectasis or airspace disease is unchanged. No  pneumothorax. Right lung shows no new airspace disease. Stable cardiac  silhouette.    SHIRLENE MCDONALD MD   XR Abdomen Port 1 View    Narrative    XR ABDOMEN PORT 1 VW  7/1/2018 5:18 PM     HISTORY:  s/p ft placement. please verify post pyloric;     COMPARISON: None.    FINDINGS:  A feeding tube has been placed. The tip is in the third  portion of the duodenum.    VICKIE WILLINGHAM MD   CBC with platelets differential   Result Value Ref Range    WBC 18.9 (H) 4.0 - 11.0 10e9/L    RBC Count 4.58 3.8 - 5.2 10e12/L    Hemoglobin 14.1 11.7 - 15.7 g/dL    Hematocrit 44.8 35.0 - 47.0 %    MCV 98 78 - 100 fl    MCH 30.8 26.5 - 33.0 pg    MCHC 31.5 31.5 - 36.5 g/dL    RDW 13.8 10.0 - 15.0 %    Platelet Count 319 150 - 450 10e9/L    Diff Method Automated Method     % Neutrophils 85.3 %    % Lymphocytes 9.9 %    % Monocytes 3.7 %    % Eosinophils 0.1 %    % Basophils 0.3 %    % Immature Granulocytes 0.7 %    Nucleated RBCs 0 0 /100    Absolute Neutrophil 16.1 (H) 1.6 - 8.3 10e9/L    Absolute Lymphocytes 1.9 0.8 - 5.3 10e9/L    Absolute Monocytes 0.7 0.0 - 1.3 10e9/L    Absolute Eosinophils 0.0 0.0 - 0.7 10e9/L    Absolute Basophils 0.1 0.0 - 0.2 10e9/L    Abs Immature Granulocytes 0.1 0 - 0.4 10e9/L    Absolute Nucleated RBC 0.0    INR   Result Value Ref Range    INR 0.97 0.86 - 1.14   Erythrocyte sedimentation rate auto   Result Value Ref Range    Sed Rate 26 0 - 30 mm/h   Comprehensive metabolic panel   Result Value Ref Range    Sodium 136 133 - 144 mmol/L    Potassium 4.8 3.4 - 5.3 mmol/L    Chloride 98 94 - 109 mmol/L    Carbon Dioxide 33 (H) 20 - 32 mmol/L    Anion Gap 5 3 - 14 mmol/L    Glucose 152 (H) 70 - 99 mg/dL    Urea Nitrogen 23 7 - 30 mg/dL    Creatinine 1.06 (H) 0.52 - 1.04 mg/dL    GFR Estimate 52 (L) >60  mL/min/1.7m2    GFR Estimate If Black 63 >60 mL/min/1.7m2    Calcium 9.7 8.5 - 10.1 mg/dL    Bilirubin Total 0.4 0.2 - 1.3 mg/dL    Albumin 3.7 3.4 - 5.0 g/dL    Protein Total 9.1 (H) 6.8 - 8.8 g/dL    Alkaline Phosphatase 119 40 - 150 U/L    ALT 28 0 - 50 U/L    AST 27 0 - 45 U/L   Magnesium   Result Value Ref Range    Magnesium 2.0 1.6 - 2.3 mg/dL   Lactic acid whole blood   Result Value Ref Range    Lactic Acid Canceled, Test credited 0.7 - 2.0 mmol/L   Ammonia   Result Value Ref Range    Ammonia 17 10 - 50 umol/L   TSH   Result Value Ref Range    TSH 0.23 (L) 0.40 - 4.00 mU/L   Blood gas arterial   Result Value Ref Range    pH Arterial Canceled, Test credited 7.35 - 7.45 pH    pCO2 Arterial Canceled, Test credited 35 - 45 mm Hg    pO2 Arterial Canceled, Test credited 80 - 105 mm Hg    Bicarbonate Arterial Canceled, Test credited 21 - 28 mmol/L    Base Excess Art Canceled, Test credited mmol/L    Base Deficit Art Canceled, Test credited mmol/L    FIO2 Canceled, Test credited    Nt probnp inpatient (BNP)   Result Value Ref Range    N-Terminal Pro BNP Inpatient 1290 (H) 0 - 900 pg/mL   UA with Microscopic   Result Value Ref Range    Color Urine Yellow     Appearance Urine Clear     Glucose Urine Negative NEG^Negative mg/dL    Bilirubin Urine Negative NEG^Negative    Ketones Urine Negative NEG^Negative mg/dL    Specific Gravity Urine 1.021 1.003 - 1.035    Blood Urine Large (A) NEG^Negative    pH Urine 5.0 5.0 - 7.0 pH    Protein Albumin Urine 100 (A) NEG^Negative mg/dL    Urobilinogen mg/dL 0.0 0.0 - 2.0 mg/dL    Nitrite Urine Negative NEG^Negative    Leukocyte Esterase Urine Negative NEG^Negative    Source Catheterized Urine     WBC Urine 1 0 - 5 /HPF    RBC Urine 38 (H) 0 - 2 /HPF    Mucous Urine Present (A) NEG^Negative /LPF   Lamotrigine Level   Result Value Ref Range    Lamotrigine Level 6.7 2.5 - 15.0 ug/mL   Glucose by meter   Result Value Ref Range    Glucose 147 (H) 70 - 99 mg/dL   Blood gas arterial with  oxyhemoglobin   Result Value Ref Range    pH Arterial 7.42 7.35 - 7.45 pH    pCO2 Arterial 52 (H) 35 - 45 mm Hg    pO2 Arterial 74 (L) 80 - 105 mm Hg    Bicarbonate Arterial 34 (H) 21 - 28 mmol/L    FIO2 BIPAP     Oxyhemoglobin Arterial 94 92 - 100 %    Base Excess Art 7.9 mmol/L   Lactic acid whole blood   Result Value Ref Range    Lactic Acid 1.1 0.7 - 2.0 mmol/L   CBC with platelets differential   Result Value Ref Range    WBC 14.5 (H) 4.0 - 11.0 10e9/L    RBC Count 3.31 (L) 3.8 - 5.2 10e12/L    Hemoglobin 10.2 (L) 11.7 - 15.7 g/dL    Hematocrit 32.9 (L) 35.0 - 47.0 %    MCV 99 78 - 100 fl    MCH 30.8 26.5 - 33.0 pg    MCHC 31.0 (L) 31.5 - 36.5 g/dL    RDW 13.8 10.0 - 15.0 %    Platelet Count 226 150 - 450 10e9/L    Diff Method Automated Method     % Neutrophils 89.0 %    % Lymphocytes 7.4 %    % Monocytes 2.9 %    % Eosinophils 0.0 %    % Basophils 0.1 %    % Immature Granulocytes 0.6 %    Nucleated RBCs 0 0 /100    Absolute Neutrophil 12.9 (H) 1.6 - 8.3 10e9/L    Absolute Lymphocytes 1.1 0.8 - 5.3 10e9/L    Absolute Monocytes 0.4 0.0 - 1.3 10e9/L    Absolute Eosinophils 0.0 0.0 - 0.7 10e9/L    Absolute Basophils 0.0 0.0 - 0.2 10e9/L    Abs Immature Granulocytes 0.1 0 - 0.4 10e9/L    Absolute Nucleated RBC 0.0    Basic metabolic panel   Result Value Ref Range    Sodium 138 133 - 144 mmol/L    Potassium 4.4 3.4 - 5.3 mmol/L    Chloride 105 94 - 109 mmol/L    Carbon Dioxide 30 20 - 32 mmol/L    Anion Gap 3 3 - 14 mmol/L    Glucose 119 (H) 70 - 99 mg/dL    Urea Nitrogen 25 7 - 30 mg/dL    Creatinine 0.86 0.52 - 1.04 mg/dL    GFR Estimate 66 >60 mL/min/1.7m2    GFR Estimate If Black 80 >60 mL/min/1.7m2    Calcium 7.8 (L) 8.5 - 10.1 mg/dL   Blood gas arterial with oxyhemoglobin   Result Value Ref Range    pH Arterial 7.38 7.35 - 7.45 pH    pCO2 Arterial 55 (H) 35 - 45 mm Hg    pO2 Arterial 102 80 - 105 mm Hg    Bicarbonate Arterial 32 (H) 21 - 28 mmol/L    FIO2 100%     Oxyhemoglobin Arterial 97 92 - 100 %    Base  Excess Art 5.5 mmol/L   Glucose by meter   Result Value Ref Range    Glucose 227 (H) 70 - 99 mg/dL   Legionella pneumonia antigen urine   Result Value Ref Range    Specimen Description Catheterized Urine     L Pneumo Urine Antigen       Presumptive negative for Legionella pneumophilia serogroup 1 antigen in urine, suggesting   no recent or current infection.  Infection due to Legionella cannot be ruled out, since   other serogroups and species may cause disease, antigen may not be present in urine in   early infection, and the level of antigen present in the urine may be below detectable   limits of the test.     Blood gas arterial with oxyhemoglobin (AM Draw)   Result Value Ref Range    pH Arterial 7.37 7.35 - 7.45 pH    pCO2 Arterial 54 (H) 35 - 45 mm Hg    pO2 Arterial 69 (L) 80 - 105 mm Hg    Bicarbonate Arterial 31 (H) 21 - 28 mmol/L    FIO2 80%     Oxyhemoglobin Arterial 92 92 - 100 %    Base Excess Art 4.4 mmol/L   Glucose by meter   Result Value Ref Range    Glucose 123 (H) 70 - 99 mg/dL   Glucose by meter   Result Value Ref Range    Glucose 154 (H) 70 - 99 mg/dL   Glucose by meter   Result Value Ref Range    Glucose 154 (H) 70 - 99 mg/dL   Glucose by meter   Result Value Ref Range    Glucose 142 (H) 70 - 99 mg/dL   CBC with platelets differential   Result Value Ref Range    WBC 15.1 (H) 4.0 - 11.0 10e9/L    RBC Count 3.32 (L) 3.8 - 5.2 10e12/L    Hemoglobin 10.1 (L) 11.7 - 15.7 g/dL    Hematocrit 33.0 (L) 35.0 - 47.0 %    MCV 99 78 - 100 fl    MCH 30.4 26.5 - 33.0 pg    MCHC 30.6 (L) 31.5 - 36.5 g/dL    RDW 13.8 10.0 - 15.0 %    Platelet Count 226 150 - 450 10e9/L    Diff Method Automated Method     % Neutrophils 91.5 %    % Lymphocytes 5.4 %    % Monocytes 2.6 %    % Eosinophils 0.0 %    % Basophils 0.1 %    % Immature Granulocytes 0.4 %    Nucleated RBCs 0 0 /100    Absolute Neutrophil 13.8 (H) 1.6 - 8.3 10e9/L    Absolute Lymphocytes 0.8 0.8 - 5.3 10e9/L    Absolute Monocytes 0.4 0.0 - 1.3 10e9/L     Absolute Eosinophils 0.0 0.0 - 0.7 10e9/L    Absolute Basophils 0.0 0.0 - 0.2 10e9/L    Abs Immature Granulocytes 0.1 0 - 0.4 10e9/L    Absolute Nucleated RBC 0.0      *Note: Due to a large number of results and/or encounters for the requested time period, some results have not been displayed. A complete set of results can be found in Results Review.

## 2018-07-05 NOTE — PROGRESS NOTES
[facility]  Hospitalist Progress Note  Mitchel Andino, DO 07/05/2018    Reason for Stay (Diagnosis): Acute encephalopathy         Assessment and Plan:      Summary of Stay: Shana Horne is a 67 year old female with a history of dementia with limited communication, COPD on chronic home O2 at 4L/nc, ongoing tobacco use, epilepsy, RSD of LLE with intrathecal pain pump,chronic diarrhea,  who lives with her dtr and typically ambulates with a walker  admitted on 6/22/2018 with a one week hx of some vague complaints, increasing sob and found to have hypoxic respiratory failure and a large LML/LLL dense infiltrate with effusion.     She was placed on BiPAP, initiated on ceftriaxone and azithromycin for CAP, and steroids for suspected COPD exacerbation, and admitted to the ICU. Her urine antigens returned + for pneumococcus, and sputum culture + for haemophilus influenzae      On am of 6/25 she had significant worsening of her respiratory status necessitating intubation and was bronched 6/26/26 with findings consistent with mucus plugging.  She was extubated on 7/3/18      Problem List:   1. Acute hypoxic respiratory failure.  Secondary to pneumonia and COPD exacerbation.  Initially required BiPAP therapy.  Did have worsening and required intubation on 6/25/18.  Able to be extubated on 7/3/18.  2. Pneumonia.  Completed 5 days of azithromycin and vancomycin.  Completed 12 days of IV ceftriaxone and 10 days of metronidazole.  Antibiotics completed on 7/3/18.  Needs repeat CT scan in 4 weeks to ensure resolution and evaluate for possible underlying malignancy.    3. COPD exacerbation.  Continue IV Solu-Medrol and Pulmozyme.  Albuterol nebs as needed.  Completed antibiotics as noted above.  4. Nutrition.  Currently on tube feedings.  Also continue dysphagia diet.  5. Dysphasia.  Dysphagia diet.  Speech pathology to follow.  6. Seizure disorder.  Continue lamotrigine and felbamate.  7. Chronic  "diarrhea.  8. Chronic pain syndrome.  Pain team following.  Does have an intrathecal pump.  9. Hypokalemia.  Potassium replacement protocol.  10. Anemia.  Mild.  Stable.  No obvious ongoing hemorrhage.  DVT Prophylaxis: Enoxaparin (Lovenox) SQ  Code Status: Full Code  Discharge Dispo: Recommend LTAC vs. TCU  Estimated Disch Date / # of Days until Disch: 1-2        Interval History (Subjective):      Denies shortness of breath, chest pain, fevers, chills, nausea, vomiting.                  Physical Exam:      Last Vital Signs:  BP 99/61 (BP Location: Left arm)  Temp 99.3  F (37.4  C) (Oral)  Resp 18  Ht 1.6 m (5' 3\")  Wt 57.5 kg (126 lb 12.2 oz)  SpO2 90%  BMI 22.46 kg/m2    Gen:  NAD, A&Ox2 to person and place.  Not oriented to time.  Eyes:  PERRL, sclera anicteric.  OP:  MMM, no lesions.  Neck:  Supple.  CV:  Regular, no murmurs.  Lung:  CTA b/l, normal effort.  Ab:  +BS, soft.  Skin:  Warm, dry to touch.  No rash.  Ext:  No pitting edema LE b/l.           Medications:      All current medications were reviewed with changes reflected in problem list.         Data:      All new lab and imaging data was reviewed.   Labs:    Recent Labs  Lab 07/05/18  0600      POTASSIUM 3.6   CHLORIDE 97   CO2 39*   ANIONGAP 3   *   BUN 37*   CR 0.62   GFRESTIMATED >90   GFRESTBLACK >90   BIBIANA 8.9       Recent Labs  Lab 07/05/18  0600   WBC 11.0   HGB 10.3*   HCT 33.6*   MCV 98         Imaging:   No results found for this or any previous visit (from the past 24 hour(s)).    "

## 2018-07-05 NOTE — PLAN OF CARE
Problem: Patient Care Overview  Goal: Plan of Care/Patient Progress Review  Outcome: Improving  ICU End of Shift Summary.  For vital signs and complete assessments, please see documentation flowsheets.     Pertinent assessments: Lungs clear, oxygen sat's 89-93% on 6 liters oxymizer. Up to chair x2 today with lift. Tolerating tube feedings at 55cc goal rate. Continues with diarrhea stools via rectal tube. Dilaudid drip off, denies pain at this time.  Major Shift Events: improving with ability to tolerate decreased oxygen  Per oxymizer   Plan (Upcoming Events): PT and speech therapy to see.  Discharge/Transfer Needs: tbd    Bedside Shift Report Completed : y  Bedside Safety Check Completed:y

## 2018-07-05 NOTE — PLAN OF CARE
Problem: Patient Care Overview  Goal: Plan of Care/Patient Progress Review  Discharge Planner SLP   Patient plan for discharge: none stated  Current status: SLP: Clinical swallow eval completed per MD orders. Pt presents with mild-moderate oropharyngeal dysphagia s/p extubation 7/4. Thin liquids via spoon resulted in overt s/sx of aspiration marked by wet gurgly vocal quality. Pt tolerated nectar thick liquids via spoon and pureed textures with no overts/sx of aspiration. Pt noted to require prolonged time for AP transit and required intermittent verbal cues to swallow d/t bolus holding. Recommend pt initiate nectar thick full liquids via spoon with 1:1 supervision. Pt should be fully upright and alert for all PO, take small single sips/bites via spoon only, slow pacing, and alternate between consistencies. Hold PO should pt demonstrate overt s/sx of aspiration or if change in pts respiratory status observed. ST to continue to follow for diet tolerance and advanced trials as appropriate. Anticipate pt will require ongoing ST upon d/c.   Barriers to return to prior living situation: dysphagia; cognitive status   Recommendations for discharge: ongoing ST upon d/c   Rationale for recommendations: pt would benefit from continued ST to safely return to baseline diet level        Entered by: Chelo Goodman 07/05/2018 10:30 AM

## 2018-07-05 NOTE — PROGRESS NOTES
Per discussion with SW, CM left message for liason at [facility] (Arely [phone]) to assess pt for appropriateness to LTACH. Provided her with CM's contact information.     CM will continue to follow patient until discharge for any additional needs.     Gail Knox RN, BSN, CTS  [facility]  [phone]

## 2018-07-06 NOTE — PLAN OF CARE
Problem: Patient Care Overview  Goal: Plan of Care/Patient Progress Review  SLP: Pt currently on BiPAP and not appropriate for ST session. Will follow up as appropriate.

## 2018-07-06 NOTE — PROGRESS NOTES
WOC consulted for old dry unroofed blister to right index finger with new skin growing over wound bed. No signs of infection. Small amount of old skin to periwound is dry and crusted and could be trimmed off.   Recommend Sween cream BID  WOC will sign off.

## 2018-07-06 NOTE — PLAN OF CARE
Problem: Patient Care Overview  Goal: Plan of Care/Patient Progress Review  Outcome: No Change  Patient alert to self and place, VSS, LS diminished, BS audible - BM 7/6. Up A1-2, NPO - TF running at 45mL/hr. , 136. RUE PICC is saline locked. Tele - SR. Bipap in place. Pt removed mask briefly and desatted to 76%. Gill in place - 300cc output.

## 2018-07-06 NOTE — PROGRESS NOTES
SWS  D/W Dr Andino.  SW reached out to Arely RN at [facility] admissions.  She will be rounding here first thing Monday morning and will assess pt further then.  She states pts can often be transferred same day.  P:  LTAC following/SW following    Update-  Net with .  He is overwhelmed with continued hospitalization.  Discussed possible d/c scenarios.  SW reviewed with him how a referral to [facility] takes place.  SW also asked Arely from [facility] to reach out to  to provide more information.   states he is going to go out of town for weekend and he should be contacted by cell phone.  Alternate contact is cassandra Gonzalez.  He would like to limit contact to himself or dtr.  SW left contact information in room alone with [facility] brochure.  D.W Dr. Andino and Sophie ARAGON.

## 2018-07-06 NOTE — PLAN OF CARE
Problem: Pneumonia (Adult)  Goal: Signs and Symptoms of Listed Potential Problems Will be Absent, Minimized or Managed (Pneumonia)  Signs and symptoms of listed potential problems will be absent, minimized or managed by discharge/transition of care (reference Pneumonia (Adult) CPG).   Outcome: No Change  Interm. disorientation, easily reoriented.  Generalized pain, repositioned often, has pain pump L abdomen.  Denies any nausea, tolerating full liqs. nectar thickened in addition to tube feeding.  LS diminished bilaterally with coughs, O2, refused IS.  Baseline neuropathy, body bruised.  R index finger cleansed bacitracin applied.  Gill, incont. loose stools x2, coccyx & keegan-area reddened, cleansed barrier cream applied.  Up with A2 + lift, sat up in recliner.

## 2018-07-06 NOTE — PROGRESS NOTES
Paged to assess patient, desatting to low 70s. Breath sounds wheezy. Nebulizer given, ABG obtained from left radial artery without complications, patient placed on BiPAP. RT will continue to monitor.    Tonia Calero  July 6, 2018.5:02 AM

## 2018-07-06 NOTE — PROGRESS NOTES
Patient remains on BIPAP 12/7, RR 12, 70% FiO2 tolerated well. SpO2 low to mid 90's, BS diminished. Duoneb nebs given as ordered. Will continue to monitor pt's respiratory status closely.    Eliane Stone, RT  7/6/2018 6:54 PM

## 2018-07-06 NOTE — PROGRESS NOTES
[facility]  Palliative Care Progress Note  Text Page    Met with Shana as she was resting in bed with BiPAP on. She was sleeping, but awoke easily. No complaint. Phoned her  Oliverio at [phone].  He appreciates input of our  SAMANTHA Segura as he is hopeful Shana will be accepted at [facility] on Monday.  He hs some discussion with family and request to continue FULL CODE with restorative care.      Assessment & Plan      1.  Decisional Capacity -  Unreliable. Patient does not demonstrate medical capacity. Patient does not have an advance directive. Per  informed consent policy next of kin should be involved in all consent and decision making. Next-of-kin is her  Daniel Horne who request to be contacted at cell phone [phone] if there are decisions regarding Shana's care.        2. Dysphagia - Appreciate input of Speech Therapist Chelo Goodman, SLP. Patient on BiPAP and not appropriate for session. Continues with nutrition via feeding tube.     3.  Pain - Complex regional pain syndrome with intrathecal pain pump which was refilled by Gurpreet Farooq MD yesterday.  Pump refill date is 10/5//2018.      4.  Dyspnea with continued tobacco abuse -  Continue restorative intervention with intubation if needed.       Goal of Care: FULL CODE - Restorative care.      Sophie Bolanos MS, RN, CNS, APRN, ACHPN, FAACVPR  Pain and Palliative Care  Pager [phone]  Office [phone]       Time Spent on this Encounter   I spent 30 minutes (1:10 PM-1:40 PM) in assessment of the patient, counseling and discussion with the patient and family as documented in sections above. Another 20 minutes in review of chart, documentation, coordination of care and discussion with the health care team.    Interval History   Chart reviewed - respiratory deterioration last night noted    Palliative Symptom Review (0=no symptom/no concern, 1=mild, 2=moderate, 3=severe):      Pain: 1-mild       Fatigue: 1-mild      Nausea: 0-none      Constipation: 0-none      Diarrhea: 1-mild      Depressive Symptoms: 0-none      Anxiety: 0-none      Drowsiness: 2-moderate      Shortness of Breath: 0-none      Insomnia: 0-none        Physical Exam   Temp:  [95.6  F (35.3  C)-99.3  F (37.4  C)] 98.8  F (37.1  C)  Pulse:  [87-89] 87  Heart Rate:  [] 84  Resp:  [9-32] 24  BP: ()/(43-83) 112/62  FiO2 (%):  [70 %-80 %] 70 %  SpO2:  [77 %-93 %] 93 %  132 lbs 4.8 oz  GEN:  Alert, oriented to self only, appears comfortable.  HEENT:  Normocephalic/atraumatic, no scleral icterus, no nasal discharge, mouth moist.  RESP:  BiPAP with symmetric chest rise on inhalation noted.    PAIN BEHAVIOR: Cooperative    Medications     IV fluid REPLACEMENT ONLY       Medication given by intrathecal pump: This is NOT an order to dispense medication. For information only.       - MEDICATION INSTRUCTIONS -       sodium chloride 10 mL/hr at 07/03/18 0731       amitriptyline  75 mg Oral or Feeding Tube At Bedtime     bacitracin   Topical BID     dornase alpha  2.5 mg Inhalation Daily     enoxaparin  40 mg Subcutaneous Q24H     felbamate  600 mg Per Feeding Tube Q8H MARIBEL     gabapentin  200 mg Per Feeding Tube BID     insulin aspart  1-6 Units Subcutaneous Q4H     ipratropium - albuterol 0.5 mg/2.5 mg/3 mL  3 mL Nebulization 4x daily     lamoTRIgine  100 mg Oral or Feeding Tube TID     methylPREDNISolone  40 mg Intravenous Q24H     multivitamins with minerals  15 mL Per Feeding Tube Daily     polyethylene glycol  17 g Oral Daily     protein modular  1 packet Per Feeding Tube Daily     rantidine  150 mg Per Feeding Tube BID     sodium chloride (PF)  10 mL Intracatheter Q7 Days     sodium chloride (PF)  3 mL Intracatheter Q8H     sodium chloride (PF)  3 mL Intracatheter Q8H       Data   Results for orders placed or performed during the hospital encounter of 06/22/18 (from the past 24 hour(s))   Glucose by meter   Result Value Ref Range     Glucose 153 (H) 70 - 99 mg/dL   Glucose by meter   Result Value Ref Range    Glucose 139 (H) 70 - 99 mg/dL   Glucose by meter   Result Value Ref Range    Glucose 91 70 - 99 mg/dL   Glucose by meter   Result Value Ref Range    Glucose 162 (H) 70 - 99 mg/dL   Glucose by meter   Result Value Ref Range    Glucose 96 70 - 99 mg/dL   Blood gas arterial with oxyhemoglobin   Result Value Ref Range    pH Arterial 7.46 (H) 7.35 - 7.45 pH    pCO2 Arterial 57 (H) 35 - 45 mm Hg    pO2 Arterial 52 (L) 80 - 105 mm Hg    Bicarbonate Arterial 40 (H) 21 - 28 mmol/L    FIO2 15     Oxyhemoglobin Arterial 86 (L) 92 - 100 %    Base Excess Art 14.2 mmol/L   CBC with platelets differential   Result Value Ref Range    WBC 11.0 4.0 - 11.0 10e9/L    RBC Count 3.49 (L) 3.8 - 5.2 10e12/L    Hemoglobin 10.7 (L) 11.7 - 15.7 g/dL    Hematocrit 34.2 (L) 35.0 - 47.0 %    MCV 98 78 - 100 fl    MCH 30.7 26.5 - 33.0 pg    MCHC 31.3 (L) 31.5 - 36.5 g/dL    RDW 13.9 10.0 - 15.0 %    Platelet Count 403 150 - 450 10e9/L    Diff Method Automated Method     % Neutrophils 73.2 %    % Lymphocytes 19.7 %    % Monocytes 5.5 %    % Eosinophils 0.9 %    % Basophils 0.3 %    % Immature Granulocytes 0.4 %    Nucleated RBCs 0 0 /100    Absolute Neutrophil 8.1 1.6 - 8.3 10e9/L    Absolute Lymphocytes 2.2 0.8 - 5.3 10e9/L    Absolute Monocytes 0.6 0.0 - 1.3 10e9/L    Absolute Eosinophils 0.1 0.0 - 0.7 10e9/L    Absolute Basophils 0.0 0.0 - 0.2 10e9/L    Abs Immature Granulocytes 0.0 0 - 0.4 10e9/L    Absolute Nucleated RBC 0.0    Basic metabolic panel   Result Value Ref Range    Sodium 140 133 - 144 mmol/L    Potassium 3.9 3.4 - 5.3 mmol/L    Chloride 98 94 - 109 mmol/L    Carbon Dioxide 39 (H) 20 - 32 mmol/L    Anion Gap 3 3 - 14 mmol/L    Glucose 99 70 - 99 mg/dL    Urea Nitrogen 34 (H) 7 - 30 mg/dL    Creatinine 0.65 0.52 - 1.04 mg/dL    GFR Estimate >90 >60 mL/min/1.7m2    GFR Estimate If Black >90 >60 mL/min/1.7m2    Calcium 9.0 8.5 - 10.1 mg/dL   Phosphorus    Result Value Ref Range    Phosphorus 3.5 2.5 - 4.5 mg/dL   XR Chest Port 1 View    Narrative    CHEST PORTABLE ONE VIEW 7/6/2018 9:09 AM     HISTORY: Hypoxia.     COMPARISON: 7/1/2018      Impression    IMPRESSION: The endotracheal tube has been removed removed. Right arm  PICC line remains in place. Enteric tube directed into the stomach,  the tip is not seen. There is persistent left basilar opacity which  looks unchanged. No new pulmonary opacities are seen. Multiple old  left-sided rib fractures. The cardiac silhouette is stable.

## 2018-07-06 NOTE — PLAN OF CARE
BP's soft 90's/50's - all other VSS.  Satting high 80's to low 90's on BIPAP.  PICC, Gill and NG in place.  Tube feeds running at 45 mL/hr.  Pt transferred to us around 0600 in stable condition.  Will continue with POC.

## 2018-07-06 NOTE — PROGRESS NOTES
[facility]  Hospitalist Progress Note  Mitchel Andino, DO 07/06/2018    Reason for Stay (Diagnosis): Acute hypoxic respiratory failure         Assessment and Plan:      Summary of Stay: Shana Horne is a 67 year old female with a history of dementia with limited communication, COPD on chronic home O2 at 4L/nc, ongoing tobacco use, epilepsy, RSD of LLE with intrathecal pain pump,chronic diarrhea,  who lives with her dtr and typically ambulates with a walker  admitted on 6/22/2018 with a one week hx of some vague complaints, increasing sob and found to have hypoxic respiratory failure and a large LML/LLL dense infiltrate with effusion.      She was placed on BiPAP, initiated on ceftriaxone and azithromycin for CAP, and steroids for suspected COPD exacerbation, and admitted to the ICU. Her urine antigens returned + for pneumococcus, and sputum culture + for haemophilus influenzae       On am of 6/25 she had significant worsening of her respiratory status necessitating intubation and was bronched 6/26/26 with findings consistent with mucus plugging.  She was extubated on 7/3/18       Problem List:   1. Acute hypoxic respiratory failure.  Secondary to pneumonia and COPD exacerbation.  Initially required BiPAP therapy.  Did have worsening and required intubation on 6/25/18.  Able to be extubated on 7/3/18.  Has been slowly improving to the point of being able to be off of BiPAP.  Unfortunately, did worsen early morning of 7/6/18.  Currently back on BiPAP.  Repeat chest x-ray today shows no significant change from previous.  Wean off of BiPAP as able.  Suspect this will be a prolonged wean.  Likely require LTACH at discharge.  2. Pneumonia.  Completed 5 days of azithromycin and vancomycin.  Completed 12 days of IV ceftriaxone and 10 days of metronidazole.  Antibiotics completed on 7/3/18.  Needs repeat CT scan in 4 weeks to ensure resolution and evaluate for possible underlying malignancy.    3. COPD  "exacerbation.  Continue IV Solu-Medrol and Pulmozyme.  With worsening this morning, restart scheduled duo nebs.  Albuterol nebs as needed.  Completed antibiotics as noted above.  4. Nutrition.  Currently on tube feedings.  Also continue dysphagia diet.  5. Dysphasia.  Dysphagia diet.  Speech pathology following.  6. Seizure disorder.  Continue lamotrigine and felbamate.  7. Chronic diarrhea.  8. Chronic pain syndrome.  Pain team following.  Does have an intrathecal pump.  9. Hypokalemia.  Potassium replacement protocol.  10. Anemia.  Mild.  Stable.  No obvious ongoing hemorrhage.  DVT Prophylaxis: Enoxaparin (Lovenox) SQ  Code Status: Full Code  Discharge Dispo: Recommend LTACH  Estimated Disch Date / # of Days until Disch: 2-3        Interval History (Subjective):      She does not feel short of breath.  Denies chest pain, fevers, chills, nausea, vomiting, or diarrhea.                  Physical Exam:      Last Vital Signs:  /75 (BP Location: Left arm)  Pulse 87  Temp 98.8  F (37.1  C) (Axillary)  Resp 20  Ht 1.6 m (5' 3\")  Wt 60 kg (132 lb 4.8 oz)  SpO2 92%  BMI 23.44 kg/m2    Gen:  NAD, A&Ox2 to person and place.  Trouble with time.  Eyes:  PERRL, sclera anicteric.  OP:  MMM, no lesions.  Neck:  Supple.  CV:  Regular, no murmurs.  Lung:  Wheeze b/l, normal effort.  Ab:  +BS, soft.  Skin:  Warm, dry to touch.  No rash.  Ext:  No pitting edema LE b/l.           Medications:      All current medications were reviewed with changes reflected in problem list.         Data:      All new lab and imaging data was reviewed.   Labs:    Recent Labs  Lab 07/06/18  0530      POTASSIUM 3.9   CHLORIDE 98   CO2 39*   ANIONGAP 3   GLC 99   BUN 34*   CR 0.65   GFRESTIMATED >90   GFRESTBLACK >90   BIBIANA 9.0       Recent Labs  Lab 07/06/18  0530   WBC 11.0   HGB 10.7*   HCT 34.2*   MCV 98         Imaging:   Recent Results (from the past 24 hour(s))   XR Chest Port 1 View    Narrative    CHEST PORTABLE ONE VIEW " 7/6/2018 9:09 AM     HISTORY: Hypoxia.     COMPARISON: 7/1/2018      Impression    IMPRESSION: The endotracheal tube has been removed removed. Right arm  PICC line remains in place. Enteric tube directed into the stomach,  the tip is not seen. There is persistent left basilar opacity which  looks unchanged. No new pulmonary opacities are seen. Multiple old  left-sided rib fractures. The cardiac silhouette is stable.    VANCE HO MD

## 2018-07-06 NOTE — PROGRESS NOTES
Stefania RN [phone] - Informed by NST Pt's O2 sats 77% & pt is on 9l oximizer. Attempts to reposition pt and adjust oxygen did not improve saturation, RT notified for assessment, nebulizer oximask at 15l applied and sats remain in 70s. Rapid Response called at 0450 & pt was placed on bi-pap with sats lower 90's. Waiting for transfer to intermediate bed on 3rd floor. Pt's  was notified at 0505 of pt's pending transfer. Flying Sultana RN with pt.

## 2018-07-06 NOTE — PROGRESS NOTES
"X-cover    Called for RRT due to persistent hypoxia    I know this patient from earlier in her hospitalization    Dropped sats into the 70 % range around 4 am, RT notified and made some O2 adjustments and gave her a neb.  She continued to have sats in the 70-80 % range    I suspect her baseline sats 85-88 range    /56  Pulse 87  Temp 96.3  F (35.7  C) (Axillary)  Resp 16  Ht 1.6 m (5' 3\")  Wt 60 kg (132 lb 4.8 oz)  SpO2 93%  BMI 23.44 kg/m2  Lungs wheezy in bilateral LL I and E, sitting in bed , is awake but no oriented (close to baseline)    Stat ABG    Placed on bipap and transferred to C    ABG returned 7.46/46/52 40    Was on diuretics for several days-dc'd 7/3 with eo metabolic alkalosis, I wonder if we are messing with her acid / base status    Suspect short term need for BiPAP  No need for repeat ABGs  O2 sats should aim for 85 % or higher  "

## 2018-07-07 NOTE — PLAN OF CARE
Problem: Patient Care Overview  Goal: Plan of Care/Patient Progress Review  SLP-  Pt on HFNC at this time.  Nursing reports she is not appropriate for swallow evaluation at this time.  Will attempt tomorrow.

## 2018-07-07 NOTE — PROGRESS NOTES
Patient daughter and spouse stop by to ask what time ARU assessment is happening on Monday. Informed family SW not sure what time. Spouse reported he told he will be called by Arely on Friday but that didn't happen so he very frustrated by the process. Informed family I will leave a message for Arely at [facility] and will let them know when she gets back to me .   Left a message for Arely regarding what time she will assessing pt since family want to be involved during assessment. Requested a call back.

## 2018-07-07 NOTE — PROGRESS NOTES
[facility]  Hospitalist Progress Note  Usman Ceron MD, MD 07/07/2018  (Text Page)  Reason for Stay (Diagnosis): Acute on chronic hypoxic respiratory failure secondary to large left lung pneumonia         Assessment and Plan:      Summary of Stay: Shana Horne is a 67 year old female with a history of dementia with limited communication, COPD on chronic home O2 at 4L/nc, ongoing tobacco use, epilepsy, RSD of LLE with intrathecal pain pump,chronic diarrhea,  who lives with her dtr and typically ambulates with a walker  admitted on 6/22/2018 with a one week hx of some vague complaints, increasing sob and found to have hypoxic respiratory failure and a large LML/LLL dense infiltrate with effusion.  She was placed on BiPAP, initiated on ceftriaxone and azithromycin for CAP, and steroids for suspected COPD exacerbation, and admitted to the ICU. Her urine antigens returned + for pneumococcus, and sputum culture + for haemophilus influenzae   On am of 6/25 she had significant worsening of her respiratory status necessitating intubation and was bronched 6/26/26 with findings consistent with mucus plugging.  She was extubated on 7/3/18          Problem List:   1. Acute on chronic hypoxic respiratory failure known with COPD and chronic home oxygen at 4 L by nasal cannula secondary to left lung pneumonia felt to be community-acquired and COPD and exacerbation  -Initially required BiPAP therapy.  Did have worsening and required intubation on 6/25/18.  Able to be extubated on 7/3/18.  Has been slowly improving to the point of being able to be off of BiPAP.  Unfortunately, did worsen early morning of 7/6/18.  Currently back on BiPAP.  Repeat chest x-ray today shows no significant change from previous.  Wean off of BiPAP as able.   Likely require LTACH at discharge.  She is now on high flow nasal cannula and appears to be tolerating it well.  - Completed 5 days of azithromycin and vancomycin.  Completed  "12 days of IV ceftriaxone and 10 days of metronidazole.  Antibiotics completed on 7/3/18.  Needs repeat CT scan in 4 weeks to ensure resolution and evaluate for possible underlying malignancy  -Remain on DuoNeb's, IV Medrol, Pulmozyme.  Oxygen supplementation.    2.  Severe malnutrition secondary to acute illness-on tube feedings, SLP following with us if able  3.  Chronic pain syndrome, pain team service following, as an intrathecal pump  4.  Seizure disorder remain on lamotrigine and felbamate  5.  Physical deconditioning  6.  History of chronic diarrhea  7.  Hypokalemia  8.  Stable mild anemia    Continue inpatient care.  Anticipating changing of her IV Medrol to prednisone in the next day or so.  LTAC evaluation underway.    DVT Prophylaxis: Enoxaparin (Lovenox) SQ  Code Status: Full Code  Estimated Disch Date / # of Days until Disch: Likely in the next 1-2 more days        Interval History (Subjective):      Seen and examined.  Chart reviewed.  Assumed care today.  Ms. Saldana is found laying comfortably in bed.  She is off BiPAP mask.  He is able to identify her name, age and birthday.  She is following some simple commands.  However she is disoriented to time and place.  No reported agitation or combativeness.  She is denying any ongoing nausea, vomiting, chest pain, or palpitations.  No reported diarrhea.  Remain afebrile.     # Pain Assessment:  Current Pain Score 7/7/2018   Patient currently in pain? denies   Pain score (0-10) -   Pain location -   Pain descriptors -   CPOT pain score -   Shana izaguirre pain level was assessed and she currently denies pain.                  Physical Exam:      Last Vital Signs:  /60 (BP Location: Left arm)  Pulse 74  Temp 97.9  F (36.6  C) (Oral)  Resp 20  Ht 1.6 m (5' 3\")  Wt 60 kg (132 lb 4.8 oz)  SpO2 93%  BMI 23.44 kg/m2    I/O last 3 completed shifts:  In: 2730 [NG/GT:2730]  Out: 1975 [Urine:1975]  Wt Readings from Last 1 Encounters:   07/06/18 60 kg (132 lb " 4.8 oz)     Vitals:    07/02/18 0330 07/02/18 2309 07/03/18 0615 07/05/18 0000   Weight: 60.5 kg (133 lb 6.1 oz) 59 kg (130 lb 1.1 oz) 58.7 kg (129 lb 6.6 oz) 57.5 kg (126 lb 12.2 oz)    07/06/18 0300   Weight: 60 kg (132 lb 4.8 oz)       Constitutional: Awake, alert, cooperative, no apparent distress   Respiratory:  Minimal wheezing.  Left basilar crackles.  Fair air entry.  O2 sats 90-95% during the time exam while on high flow nasal cannula   Cardiovascular: Regular rate and rhythm, normal S1 and S2, and no murmur noted   Abdomen: Normal bowel sounds, soft, non-distended, non-tender   Skin: No rashes, no cyanosis, dry to touch   Neuro: Alert and oriented x1, no weakness, spontaneous and coherent speech   Extremities: No edema, normal range of motion   Other(s): Euthymic mood, not agitated    Gill catheter, feeding tube in place.   All other systems: Negative          Medications:      All current medications were reviewed with changes reflected in problem list.         Data:      All new lab and imaging data was reviewed.   Labs:  No results for input(s): CULT in the last 168 hours.    Recent Labs  Lab 07/06/18  0455   PH 7.46*   PO2 52*   PCO2 57*   HCO3 40*   SELENA 14.2       Recent Labs  Lab 07/07/18  0612 07/06/18  0530 07/05/18  0600   WBC 9.6 11.0 11.0   HGB 10.0* 10.7* 10.3*   HCT 32.9* 34.2* 33.6*    98 98    403 400       Recent Labs  Lab 07/07/18  0612 07/06/18  0530 07/05/18  0600 07/04/18  0550  07/02/18  0520    140 139 141  < > 143   POTASSIUM 4.2 3.9 3.6 3.2*  < > 3.4   CHLORIDE 98 98 97 95  < > 95   CO2 39* 39* 39* 44*  < > 43*   ANIONGAP 2* 3 3 2*  < > 5   * 99 100* 100*  < > 126*   BUN 31* 34* 37* 35*  < > 34*   CR 0.67 0.65 0.62 0.63  < > 0.70   GFRESTIMATED 89 >90 >90 >90  < > 84   GFRESTBLACK >90 >90 >90 >90  < > >90   BIBIANA 8.7 9.0 8.9 8.8  < > 8.4*   MAG  --   --   --  2.2  --  2.0   PHOS  --  3.5  --   --   --  3.0   < > = values in this interval not  displayed.    Recent Labs  Lab 07/02/18  0520   CRP 6.9       Recent Labs  Lab 07/07/18  0612 07/07/18  0405 07/07/18  0001 07/06/18  2130 07/06/18  1641 07/06/18  1226  07/06/18  0530  07/05/18  0600  07/04/18  0550  07/03/18  0636   *  --   --   --   --   --   --  99  --  100*  --  100*  --  110*   BGM  --  102* 105* 114* 163* 136*  < >  --   < >  --   < >  --   < >  --    < > = values in this interval not displayed.  No results for input(s): TROPONIN, TROPI, TROPR in the last 168 hours.    Invalid input(s): TROP, TROPONINIES  No results for input(s): COLOR, APPEARANCE, URINEGLC, URINEBILI, URINEKETONE, SG, UBLD, URINEPH, PROTEIN, UROBILINOGEN, NITRITE, LEUKEST, RBCU, WBCU in the last 168 hours.   Imaging:   Results for orders placed or performed during the hospital encounter of 06/22/18   XR Chest Port 1 View    Narrative    CHEST ONE VIEW PORTABLE    6/22/2018 9:20 PM     HISTORY: Shortness of breath. Confusion.     COMPARISON: 10/20/2017.      Impression    IMPRESSION: Large consolidation in the left mid and lower lung  concerning for pneumonia with left pleural effusion. Right lung is  relatively clear. No pneumothorax visualized. Cardiac silhouette  remains enlarged.    EB CALDERON MD   CT Head w/o Contrast    Narrative    CT HEAD W/O CONTRAST  6/22/2018 11:02 PM     HISTORY: Confusion. Emphysema and dementia.    TECHNIQUE: Axial images of the head and coronal reformations without  IV contrast material. Radiation dose for this scan was reduced using  automated exposure control, adjustment of the mA and/or kV according  to patient size, or iterative reconstruction technique.    COMPARISON: 7/22/2017.    FINDINGS: No intracranial hemorrhage, mass or mass effect. No acute  infarct identified. No shift of midline structures. No significant  change.      Impression    IMPRESSION: No acute intracranial findings.    HOWARD MENJIVAR MD   US Lower Extremity Venous Duplex Left    Narrative    US LOWER  EXTREMITY VENOUS DUPLEX LEFT  6/22/2018 11:24 PM     HISTORY: Evaluate for DVT, pain.    TECHNIQUE: Venous Doppler ultrasound of the lower extremity. Color  flow and spectral Doppler with waveform analysis performed.    COMPARISON: None.    FINDINGS: Ultrasound of the left leg demonstrates no deep vein  thrombus from the common femoral through popliteal veins or in the  visualized segments of posterior tibial or peroneal veins in the calf.  3.5 x 3.7 x 1.2 cm popliteal fossa cyst, likely a Baker's cyst.      Impression    IMPRESSION:  1. No DVT identified left leg.  2. Baker's cyst.    HOWARD MENJIVAR MD   XR Pelvis 1/2 Views    Narrative    XR PELVIS 1/2 VW  6/22/2018 11:36 PM     INDICATION: Pain.    COMPARISON: None.      Impression    IMPRESSION: Negative.    HOWARD MENJIVAR MD   XR Femur Left 2 Views    Narrative    XR FEMUR LT 2 VW  6/22/2018 11:37 PM     INDICATION: Pain.    COMPARISON: None.      Impression    IMPRESSION: No acute fractures. Moderate degenerative and hypertrophic  changes left knee.    HOWARD MENJIVAR MD   XR Chest Port 1 View    Narrative    CHEST ONE VIEW PORTABLE   6/25/2018 10:02 AM     HISTORY:  Endotracheal tube positioning.    COMPARISON: 6/22/2018.      Impression    IMPRESSION: Tip of the endotracheal tube is approximately 5.6 cm above  the juanito. Nasogastric tube extends below the left hemithorax.  Right-sided PICC line present with the tip in the superior vena cava.  There is near-complete opacification of the left hemithorax which may  be related to atelectasis or pleural fluid. Interstitial opacities in  the right lung are unchanged. No pneumothorax either side.    LM LEON MD   CT Chest w/o Contrast    Narrative    CT CHEST WITHOUT CONTRAST   6/25/2018 3:40 PM    HISTORY: Hypoxia. Dyspnea. Confusion.    TECHNIQUE: Volumetric helical acquisition was performed from the  thoracic inlet through the upper abdomen without IV contrast. Coronal  images were also  reconstructed from the axial data. Radiation dose for  this scan was reduced using automated exposure control, adjustment of  the mA and/or kV according to patient size, or iterative  reconstruction technique.    COMPARISON: None.    FINDINGS: Endotracheal tube is in place, with tip 5 cm above the  juanito. Small left pleural effusion. Trace right pleural effusion. No  pericardial effusion. Atherosclerotic calcification of the thoracic  aorta and coronary arteries. There are calcified mediastinal and left  hilar lymph nodes. Calcified granuloma is noted in the left lower lobe  laterally. Emphysematous changes are noted throughout both lungs.  There is extensive consolidation in the left mid and lower lung, with  patchy interstitial and airspace infiltrate within the aerated portion  of the left upper lung. There are several indeterminate nodular  opacities in the right upper lobe with the largest measuring 1.5 x 0.6  cm (series 3 image 27). Enteric tube, tip in the proximal stomach.  Ectasia of the infrarenal abdominal aorta measures up to 2.9 cm AP.  Limited noncontrast views of the upper abdomen are otherwise  unremarkable. Right PICC line is in place, with tip in the upper SVC.       Impression    IMPRESSION:   1. Extensive consolidation in the left mid and lower lung may be  related to pneumonia and/or atelectasis, although underlying lung mass  cannot be excluded.  2. There are several indeterminate nodular opacities in the right  upper lobe, with the largest measuring 1.5 x 0.6 cm.  3. There are bilateral pleural effusions, small on the left and trace  on the right.  4. Emphysema.    CALI CHAVEZ MD   XR Chest Port 1 View    Narrative    CHEST ONE VIEW PORTABLE   6/26/2018 8:06 AM     HISTORY: Follow up respiratory failure.     COMPARISON: 6/25/2018      Impression    IMPRESSION: Tip of the endotracheal tube is 7.1 cm above the juanito.  Nasogastric tube extends below the left hemidiaphragm. There is  a  right-sided PICC line that extends at least to the SVC, although the  tip cannot be seen due to overlying leads and wires. There is  retrocardiac airspace opacity. No pneumothorax on either side.    LM LEON MD   XR Chest Port 1 View    Narrative    CHEST PORTABLE ONE VIEW   6/27/2018 9:49 AM     HISTORY: Follow up pneumonia.     COMPARISON: 6/26/2018.      Impression    IMPRESSION: No significant change since prior. Persistent retrocardiac  consolidation concerning for pneumonia or atelectasis likely with  pleural effusion. Background of diffuse hazy opacities throughout the  lungs and interstitial thickening could represent pulmonary edema  versus infection. Support devices remain in place.    EB CALDERON MD   XR Abdomen Port 1 View    Narrative    XR ABDOMEN PORT 1 VW  6/28/2018 3:27 AM     INDICATION: OG placement.    COMPARISON: None.      Impression    IMPRESSION: Enteric tube tip in the stomach. Small left pleural  effusion, left lung base infiltrate or atelectasis, and small left  pleural effusion.    HOWARD MENJIVAR MD   XR Chest Port 1 View    Narrative    XR CHEST PORT 1 VW 6/29/2018 7:29 AM    COMPARISON: 6/27/2018    HISTORY: Left lower lobe atelectasis.      Impression    IMPRESSION: Bibasilar opacities, left worse than right are again seen  and not significantly changed. No pneumothorax seen on either side.  Support devices unchanged.    YOLI SHORT MD   XR Chest Port 1 View    Narrative    PORTABLE CHEST ONE VIEW  7/1/2018 5:36 AM     HISTORY: Respiratory failure.     COMPARISON: 6/29/2018.      Impression    IMPRESSION:   1. ET tube and nasogastric tube appears stable. Right PICC line  appears stable.  2. Left lung base atelectasis or airspace disease is unchanged. No  pneumothorax. Right lung shows no new airspace disease. Stable cardiac  silhouette.    SHIRLENE MCDONALD MD   XR Abdomen Port 1 View    Narrative    XR ABDOMEN PORT 1 VW  7/1/2018 5:18 PM     HISTORY:  s/p ft placement.  please verify post pyloric;     COMPARISON: None.    FINDINGS:  A feeding tube has been placed. The tip is in the third  portion of the duodenum.    VICKIE WILLINGHAM MD   XR Chest Port 1 View    Narrative    CHEST PORTABLE ONE VIEW 7/6/2018 9:09 AM     HISTORY: Hypoxia.     COMPARISON: 7/1/2018      Impression    IMPRESSION: The endotracheal tube has been removed removed. Right arm  PICC line remains in place. Enteric tube directed into the stomach,  the tip is not seen. There is persistent left basilar opacity which  looks unchanged. No new pulmonary opacities are seen. Multiple old  left-sided rib fractures. The cardiac silhouette is stable.    VANCE HO MD

## 2018-07-07 NOTE — PLAN OF CARE
Problem: Memory Impairment Comorbidity  Goal: Memory Impairment Comorbidity  Patient comorbidity will be monitored for signs and symptoms of Memory Impairment condition.  Problems will be absent, minimized or managed by discharge/transition of care.   Outcome: No Change  Pt forgetful, pulls at oxygen and oximeter probe.  Reoriented frequently about NPO status.

## 2018-07-07 NOTE — PROVIDER NOTIFICATION
Pt w/NJ feeding tube & on BiPap. In [facility] classes instructions were not to have any type of naso feedings while on Bipap. Collaboratively discussed pt situation w/Dr. Andino, will try pt on HFNC, leave tube feeding running. In the event pt unable to tolerate HFNC, plan will be to hold tube feeding, & place back on BiPap. RT paged by this RN.  Lisa Back, JAMMIEN, RN  Medical/Telemetry - 3

## 2018-07-07 NOTE — PLAN OF CARE
Problem: Patient Care Overview  Goal: Plan of Care/Patient Progress Review  Outcome: Improving  Able to tolerates oximyzer for 4 hours. VS stable, denies pain. PICC -R arm. Gill in place. Continue to monitor.

## 2018-07-07 NOTE — PROGRESS NOTES
RT Note      Per charge RN, MD wants to change patient from BIPAP to HFNC. Patient placed on HFNC 35 % FIO2. Saturation on these settings %. MD asked to place in HFNC orders.      Will continue to follow and monitor.      June Robles, RRT

## 2018-07-07 NOTE — PROGRESS NOTES
Respiratory Therapy Note        Pt remains on HFNC 35 Lpm 80%.  Her SpO2 ranges from 91-96%, breath sounds are diminished bilaterally.    July 7, 2018 3:25 PM  Chapito Epperson

## 2018-07-07 NOTE — PLAN OF CARE
Problem: Pneumonia (Adult)  Goal: Signs and Symptoms of Listed Potential Problems Will be Absent, Minimized or Managed (Pneumonia)  Signs and symptoms of listed potential problems will be absent, minimized or managed by discharge/transition of care (reference Pneumonia (Adult) CPG).   Outcome: Improving  Pt forgetful, bed alarm on.  Lung sounds diminished bilaterally.  Oxygen saturation 90-96% on 35LPM high flow oxygen.  Denies pain.  Telemetry SR.  IV fluids per MD orders.  Receiving Isosource 1.5 at 45 ml per hour via Kangaroo feeding pump.  Gill catheter draining juan jose colored urine.  Potassium level 4.2, phosphorous level 3.5.  Pt to discharge to home with daughter Lisa when conditions stable.    Problem: Chronic Respiratory Difficulty Comorbidity  Goal: Chronic Respiratory Difficulty  Patient comorbidity will be monitored for signs and symptoms of Respiratory Difficulty (Chronic) condition.  Problems will be absent, minimized or managed by discharge/transition of care.   Outcome: No Change  Lung sounds diminished bilaterally.  Receiving oxygen at 35 LPM high flow per nasal cannula, oxygen saturation 90-96%.

## 2018-07-07 NOTE — PLAN OF CARE
Problem: Patient Care Overview  Goal: Plan of Care/Patient Progress Review  Outcome: No Change  Alert, disoriented to time.  O2 at 93-96% on HFNC. Lungs clear. NG/Tube feed running 45 ml/hr. Gill patent and draining. . Tele:

## 2018-07-08 NOTE — PROGRESS NOTES
Respiratory Therapy Note        Pt remains on HFNC 35 Lpm 70%.  Breath sounds are diminished bilaterally.  She is frequently pulling at O2 and will drop her SpO2 during conversation.  If she is relaxed and not moving she can be up to SpO2 of 97%; as it is she is almost constant motion and SpO2 is 87-93%.  Some difficulty keeping even a sticky SpO2 probe in place.    July 8, 2018 4:00 PM  Chapito Epperson

## 2018-07-08 NOTE — PROGRESS NOTES
[facility]  Hospitalist Progress Note  Usman Ceron MD, MD 07/08/2018  (Text Page)  Reason for Stay (Diagnosis): Acute on chronic hypoxic respiratory failure secondary to large left lung pneumonia         Assessment and Plan:      Summary of Stay: Shana Horne is a 67 year old female with a history of dementia with limited communication, COPD on chronic home O2 at 4L/nc, ongoing tobacco use, epilepsy, RSD of LLE with intrathecal pain pump,chronic diarrhea,  who lives with her dtr and typically ambulates with a walker  admitted on 6/22/2018 with a one week hx of some vague complaints, increasing sob and found to have hypoxic respiratory failure and a large LML/LLL dense infiltrate with effusion.  She was placed on BiPAP, initiated on ceftriaxone and azithromycin for CAP, and steroids for suspected COPD exacerbation, and admitted to the ICU. Her urine antigens returned + for pneumococcus, and sputum culture + for haemophilus influenzae   On am of 6/25 she had significant worsening of her respiratory status necessitating intubation and was bronched 6/26/26 with findings consistent with mucus plugging.  She was extubated on 7/3/18          Problem List:   1. Acute on chronic hypoxic respiratory failure known with COPD and chronic home oxygen at 4 L by nasal cannula secondary to left lung pneumonia felt to be community-acquired and COPD and exacerbation  -Initially required BiPAP therapy.  Did have worsening and required intubation on 6/25/18.  Able to be extubated on 7/3/18.  Has been slowly improving to the point of being able to be off of BiPAP.  Unfortunately, did worsen early morning of 7/6/18.  Currently back on BiPAP.  Repeat chest x-ray today shows no significant change from previous.  Wean off of BiPAP as able.   Likely require LTACH at discharge.  She is now on high flow nasal cannula and appears to be tolerating it well.  - Completed 5 days of azithromycin and vancomycin.  Completed  12 days of IV ceftriaxone and 10 days of metronidazole.  Antibiotics completed on 7/3/18.  Needs repeat CT scan in 4 weeks to ensure resolution and evaluate for possible underlying malignancy  -Remain on DuoNeb's, IV Medrol, Pulmozyme.  Oxygen supplementation.  -She has been on pulmo-zyme for almost 2 weeks.  I am suspecting this was initiated when she has a significant mucus plugging leading to her worsening respiratory failure.  Now she is much more awake and likely can further expectorate and will not need further Pulmozyme administration.  -Changed her IV Medrol to oral prednisone with slow taper.    2.  Severe malnutrition secondary to acute illness-on tube feedings, SLP following with us if able  -I am anticipating that likely she will tolerate more of SLP evaluation as her mental state has significantly improved.  I will be awaiting any further reevaluation or recommendations from their service.  3.  Chronic pain syndrome, pain team service following, as an intrathecal pump  4.  Seizure disorder remain on lamotrigine and felbamate  5.  Severe physical deconditioning  6.  History of chronic diarrhea  7.  Hypokalemia  8.  Stable mild anemia    Continue inpatient care.  Anticipating changing of her IV Medrol to prednisone in the next day or so.  LTACH evaluation underway.    DVT Prophylaxis: Enoxaparin (Lovenox) SQ  Code Status: Full Code  Estimated Disch Date / # of Days until Disch: Likely in the next 1-2 more days        Interval History (Subjective):      Seen and examined.  Chart reviewed.    Ms. Saldana is found laying comfortably in bed and currently appears more awake, pleasant, cooperative.  She is talking in full sentences and appears to have better concentration and axis of her memory recall.    No reported agitation or combativeness.  She is denying any ongoing nausea, vomiting, chest pain, or palpitations.  No reported diarrhea.  Remain afebrile.     # Pain Assessment:  Current Pain Score 7/8/2018  "  Patient currently in pain?   No   Pain score (0-10) -   Pain location Generalized   Pain descriptors Other (comment)   CPOT pain score -   Shana s pain level was assessed and she currently denies pain.                  Physical Exam:      Last Vital Signs:  /56 (BP Location: Left arm)  Pulse 81  Temp 98.6  F (37  C) (Oral)  Resp 20  Ht 1.6 m (5' 3\")  Wt 58.5 kg (129 lb)  SpO2 94%  BMI 22.85 kg/m2    I/O last 3 completed shifts:  In: -   Out: 2350 [Urine:2350]  Wt Readings from Last 1 Encounters:   07/08/18 58.5 kg (129 lb)     Vitals:    07/02/18 2309 07/03/18 0615 07/05/18 0000 07/06/18 0300   Weight: 59 kg (130 lb 1.1 oz) 58.7 kg (129 lb 6.6 oz) 57.5 kg (126 lb 12.2 oz) 60 kg (132 lb 4.8 oz)    07/08/18 0517   Weight: 58.5 kg (129 lb)       Constitutional: Awake, alert, cooperative, no apparent distress   Respiratory:  Minimal wheezing.  Left basilar crackles.  Fair air entry.  O2 sats 90-95% during the time exam while on high flow nasal cannula   Cardiovascular: Regular rate and rhythm, normal S1 and S2, and no murmur noted   Abdomen: Normal bowel sounds, soft, non-distended, non-tender   Skin: No rashes, no cyanosis, dry to touch   Neuro: Alert and oriented x1, no weakness, spontaneous and coherent speech   Extremities: No edema, normal range of motion   Other(s): Euthymic mood, not agitated    Gill catheter, feeding tube in place.   All other systems: Negative          Medications:      All current medications were reviewed with changes reflected in problem list.         Data:      All new lab and imaging data was reviewed.   Labs:  No results for input(s): CULT in the last 168 hours.    Recent Labs  Lab 07/06/18  0455   PH 7.46*   PO2 52*   PCO2 57*   HCO3 40*   SELENA 14.2       Recent Labs  Lab 07/08/18  0657 07/07/18  0612 07/06/18  0530   WBC 10.6 9.6 11.0   HGB 10.3* 10.0* 10.7*   HCT 32.8* 32.9* 34.2*   MCV 98 100 98    378 403       Recent Labs  Lab 07/08/18  0657 07/07/18  0612 " 07/06/18  0530  07/04/18  0550  07/02/18  0520    139 140  < > 141  < > 143   POTASSIUM 4.0 4.2 3.9  < > 3.2*  < > 3.4   CHLORIDE 99 98 98  < > 95  < > 95   CO2 37* 39* 39*  < > 44*  < > 43*   ANIONGAP 2* 2* 3  < > 2*  < > 5   * 106* 99  < > 100*  < > 126*   BUN 29 31* 34*  < > 35*  < > 34*   CR 0.66 0.67 0.65  < > 0.63  < > 0.70   GFRESTIMATED 89 89 >90  < > >90  < > 84   GFRESTBLACK >90 >90 >90  < > >90  < > >90   BIBIANA 9.1 8.7 9.0  < > 8.8  < > 8.4*   MAG  --   --   --   --  2.2  --  2.0   PHOS  --   --  3.5  --   --   --  3.0   < > = values in this interval not displayed.    Recent Labs  Lab 07/02/18  0520   CRP 6.9       Recent Labs  Lab 07/08/18  0657 07/08/18  0512 07/08/18  0007 07/07/18  2007 07/07/18  1643 07/07/18  1246 07/07/18  0612  07/06/18  0530  07/05/18  0600  07/04/18  0550   *  --   --   --   --   --  106*  --  99  --  100*  --  100*   BGM  --  109* 96 102* 127* 186*  --   < >  --   < >  --   < >  --    < > = values in this interval not displayed.  No results for input(s): TROPONIN, TROPI, TROPR in the last 168 hours.    Invalid input(s): TROP, TROPONINIES  No results for input(s): COLOR, APPEARANCE, URINEGLC, URINEBILI, URINEKETONE, SG, UBLD, URINEPH, PROTEIN, UROBILINOGEN, NITRITE, LEUKEST, RBCU, WBCU in the last 168 hours.   Imaging:   Results for orders placed or performed during the hospital encounter of 06/22/18   XR Chest Port 1 View    Narrative    CHEST ONE VIEW PORTABLE    6/22/2018 9:20 PM     HISTORY: Shortness of breath. Confusion.     COMPARISON: 10/20/2017.      Impression    IMPRESSION: Large consolidation in the left mid and lower lung  concerning for pneumonia with left pleural effusion. Right lung is  relatively clear. No pneumothorax visualized. Cardiac silhouette  remains enlarged.    EB CALDERON MD   CT Head w/o Contrast    Narrative    CT HEAD W/O CONTRAST  6/22/2018 11:02 PM     HISTORY: Confusion. Emphysema and dementia.    TECHNIQUE: Axial images of  the head and coronal reformations without  IV contrast material. Radiation dose for this scan was reduced using  automated exposure control, adjustment of the mA and/or kV according  to patient size, or iterative reconstruction technique.    COMPARISON: 7/22/2017.    FINDINGS: No intracranial hemorrhage, mass or mass effect. No acute  infarct identified. No shift of midline structures. No significant  change.      Impression    IMPRESSION: No acute intracranial findings.    HOWARD MENJIVAR MD   US Lower Extremity Venous Duplex Left    Narrative    US LOWER EXTREMITY VENOUS DUPLEX LEFT  6/22/2018 11:24 PM     HISTORY: Evaluate for DVT, pain.    TECHNIQUE: Venous Doppler ultrasound of the lower extremity. Color  flow and spectral Doppler with waveform analysis performed.    COMPARISON: None.    FINDINGS: Ultrasound of the left leg demonstrates no deep vein  thrombus from the common femoral through popliteal veins or in the  visualized segments of posterior tibial or peroneal veins in the calf.  3.5 x 3.7 x 1.2 cm popliteal fossa cyst, likely a Baker's cyst.      Impression    IMPRESSION:  1. No DVT identified left leg.  2. Baker's cyst.    HOWARD MENJIVAR MD   XR Pelvis 1/2 Views    Narrative    XR PELVIS 1/2 VW  6/22/2018 11:36 PM     INDICATION: Pain.    COMPARISON: None.      Impression    IMPRESSION: Negative.    HOWARD MENJIVAR MD   XR Femur Left 2 Views    Narrative    XR FEMUR LT 2 VW  6/22/2018 11:37 PM     INDICATION: Pain.    COMPARISON: None.      Impression    IMPRESSION: No acute fractures. Moderate degenerative and hypertrophic  changes left knee.    HOWARD MENJIVAR MD   XR Chest Port 1 View    Narrative    CHEST ONE VIEW PORTABLE   6/25/2018 10:02 AM     HISTORY:  Endotracheal tube positioning.    COMPARISON: 6/22/2018.      Impression    IMPRESSION: Tip of the endotracheal tube is approximately 5.6 cm above  the juanito. Nasogastric tube extends below the left hemithorax.  Right-sided PICC line  present with the tip in the superior vena cava.  There is near-complete opacification of the left hemithorax which may  be related to atelectasis or pleural fluid. Interstitial opacities in  the right lung are unchanged. No pneumothorax either side.    LM LEON MD   CT Chest w/o Contrast    Narrative    CT CHEST WITHOUT CONTRAST   6/25/2018 3:40 PM    HISTORY: Hypoxia. Dyspnea. Confusion.    TECHNIQUE: Volumetric helical acquisition was performed from the  thoracic inlet through the upper abdomen without IV contrast. Coronal  images were also reconstructed from the axial data. Radiation dose for  this scan was reduced using automated exposure control, adjustment of  the mA and/or kV according to patient size, or iterative  reconstruction technique.    COMPARISON: None.    FINDINGS: Endotracheal tube is in place, with tip 5 cm above the  juanito. Small left pleural effusion. Trace right pleural effusion. No  pericardial effusion. Atherosclerotic calcification of the thoracic  aorta and coronary arteries. There are calcified mediastinal and left  hilar lymph nodes. Calcified granuloma is noted in the left lower lobe  laterally. Emphysematous changes are noted throughout both lungs.  There is extensive consolidation in the left mid and lower lung, with  patchy interstitial and airspace infiltrate within the aerated portion  of the left upper lung. There are several indeterminate nodular  opacities in the right upper lobe with the largest measuring 1.5 x 0.6  cm (series 3 image 27). Enteric tube, tip in the proximal stomach.  Ectasia of the infrarenal abdominal aorta measures up to 2.9 cm AP.  Limited noncontrast views of the upper abdomen are otherwise  unremarkable. Right PICC line is in place, with tip in the upper SVC.       Impression    IMPRESSION:   1. Extensive consolidation in the left mid and lower lung may be  related to pneumonia and/or atelectasis, although underlying lung mass  cannot be  excluded.  2. There are several indeterminate nodular opacities in the right  upper lobe, with the largest measuring 1.5 x 0.6 cm.  3. There are bilateral pleural effusions, small on the left and trace  on the right.  4. Emphysema.    CALI CHAVEZ MD   XR Chest Port 1 View    Narrative    CHEST ONE VIEW PORTABLE   6/26/2018 8:06 AM     HISTORY: Follow up respiratory failure.     COMPARISON: 6/25/2018      Impression    IMPRESSION: Tip of the endotracheal tube is 7.1 cm above the juanito.  Nasogastric tube extends below the left hemidiaphragm. There is a  right-sided PICC line that extends at least to the SVC, although the  tip cannot be seen due to overlying leads and wires. There is  retrocardiac airspace opacity. No pneumothorax on either side.    LM LEON MD   XR Chest Port 1 View    Narrative    CHEST PORTABLE ONE VIEW   6/27/2018 9:49 AM     HISTORY: Follow up pneumonia.     COMPARISON: 6/26/2018.      Impression    IMPRESSION: No significant change since prior. Persistent retrocardiac  consolidation concerning for pneumonia or atelectasis likely with  pleural effusion. Background of diffuse hazy opacities throughout the  lungs and interstitial thickening could represent pulmonary edema  versus infection. Support devices remain in place.    EB CALDERON MD   XR Abdomen Port 1 View    Narrative    XR ABDOMEN PORT 1 VW  6/28/2018 3:27 AM     INDICATION: OG placement.    COMPARISON: None.      Impression    IMPRESSION: Enteric tube tip in the stomach. Small left pleural  effusion, left lung base infiltrate or atelectasis, and small left  pleural effusion.    HOWARD MENJIVAR MD   XR Chest Port 1 View    Narrative    XR CHEST PORT 1 VW 6/29/2018 7:29 AM    COMPARISON: 6/27/2018    HISTORY: Left lower lobe atelectasis.      Impression    IMPRESSION: Bibasilar opacities, left worse than right are again seen  and not significantly changed. No pneumothorax seen on either side.  Support devices  unchanged.    YOLI SHORT MD   XR Chest Port 1 View    Narrative    PORTABLE CHEST ONE VIEW  7/1/2018 5:36 AM     HISTORY: Respiratory failure.     COMPARISON: 6/29/2018.      Impression    IMPRESSION:   1. ET tube and nasogastric tube appears stable. Right PICC line  appears stable.  2. Left lung base atelectasis or airspace disease is unchanged. No  pneumothorax. Right lung shows no new airspace disease. Stable cardiac  silhouette.    SHIRLENE MCDONALD MD   XR Abdomen Port 1 View    Narrative    XR ABDOMEN PORT 1 VW  7/1/2018 5:18 PM     HISTORY:  s/p ft placement. please verify post pyloric;     COMPARISON: None.    FINDINGS:  A feeding tube has been placed. The tip is in the third  portion of the duodenum.    VICKIE WILLINGHAM MD   XR Chest Port 1 View    Narrative    CHEST PORTABLE ONE VIEW 7/6/2018 9:09 AM     HISTORY: Hypoxia.     COMPARISON: 7/1/2018      Impression    IMPRESSION: The endotracheal tube has been removed removed. Right arm  PICC line remains in place. Enteric tube directed into the stomach,  the tip is not seen. There is persistent left basilar opacity which  looks unchanged. No new pulmonary opacities are seen. Multiple old  left-sided rib fractures. The cardiac silhouette is stable.    VANCE HO MD

## 2018-07-08 NOTE — PROGRESS NOTES
Patient remains on HFNC 70%, 35 LPM tolerated well, SpO2 mid to high 90's, O2 titrated down 70% from 80% maintain SpO2 mid 90's, BS diminished. Will continue to monitor pt's respiratory status closely.    Eliane Stone, RT  7/8/2018 6:36 AM

## 2018-07-08 NOTE — PLAN OF CARE
Problem: Patient Care Overview  Goal: Plan of Care/Patient Progress Review  SLP-  Pt with HFNC 70% 35LPM and tube feed running.  Pt not appropriate for treatment today.  Will defer until tomorrow.

## 2018-07-08 NOTE — PLAN OF CARE
Problem: Pneumonia (Adult)  Goal: Signs and Symptoms of Listed Potential Problems Will be Absent, Minimized or Managed (Pneumonia)  Signs and symptoms of listed potential problems will be absent, minimized or managed by discharge/transition of care (reference Pneumonia (Adult) CPG).   Outcome: Improving  Pt forgetful, bed alarm on.  High flow oxygen on, oxygen on 35LPM.  Lung sounds diminished bilaterally.  Telemetry SR/ST.  Gill catheter draining juan jose colored urine.  Blood sugars 107, 147.  Receiving Isosource tube feeding.  Magnesium level 2.2, Potassium level 4.0.  Pt to have eval for [place] possibly tomorrow.      Problem: Chronic Respiratory Difficulty Comorbidity  Goal: Chronic Respiratory Difficulty  Patient comorbidity will be monitored for signs and symptoms of Respiratory Difficulty (Chronic) condition.  Problems will be absent, minimized or managed by discharge/transition of care.   Outcome: Improving  Lung sounds diminished bilaterally.  No cough present.  Hi flow oxygen on at 35 LPM , oxygen saturation 88-96%.    Problem: Memory Impairment Comorbidity  Goal: Memory Impairment Comorbidity  Patient comorbidity will be monitored for signs and symptoms of Memory Impairment condition.  Problems will be absent, minimized or managed by discharge/transition of care.   Outcome: No Change  Pt forgetful, bed alarm on.  Pt pulls off oxygen tubing, replaced per staff.    Problem: Peripheral Vascular/Peripheral Neurovascular Disease Comorbidity  Goal: Peripheral Vascular/Peripheral Neurovascular Disease  Patient comorbidity will be monitored for signs and symptoms of Peripheral Vascular/Peripheral Neurovascular Disease condition.  Problems will be absent, minimized or managed by discharge/transition of care.   Outcome: Improving  No complaints of distress voiced, pedal pulses present bilaterally.

## 2018-07-08 NOTE — PLAN OF CARE
Problem: Patient Care Overview  Goal: Plan of Care/Patient Progress Review  Outcome: No Change  Patient is alert, lift use in room. Denies chest pain and shortness of breath. Continues with tube feeds running at 45 ML/HR. Vital signs stable; high flow oxygen; 70% on 35 LPM. One large incontinent stool this shift. Continues to set off alarms by taking off oxygen; patient given many cues to stop taking it off.

## 2018-07-08 NOTE — PROGRESS NOTES
"[facility] RCAT     Date: 7/8/2018  Admission Dx: Pneumonia  Pulmonary History: COPD  Home Nebulizer/MDI Use: None  Home Oxygen: 4 Lpm NC  Acuity Level (RCAT flow sheet): 3  Aerosol Therapy initiated: Continue Duoneb QID and Albuterol nebs Q2 hours prn.      Pulmonary Hygiene initiated:  Deep breath and cough TID.      Volume Expansion initiated:  IS TID.      Current Oxygen Requirements: HFNC 35 Lpm 70%  Current SpO2: 91    Re-evaluation date: 7/11/2018    Patient Education: Encouraged Pt take deep breaths an to leave her O2 on.        See \"RT Assessments\" flow sheet for patient assessment scoring and Acuity Level Details.     Chapito Epperson  July 8, 2018.3:57 PM              "

## 2018-07-09 NOTE — PROGRESS NOTES
SWS  Spoke to Arely Srinivasan RN from [facility] admissions who states she will be here at approximately 930 to reassess patient.  Also spoke to dtr Lisa to let her know as she would like to be here to talk to Arely.  ( is in [place] for family vacation but has given his approval for transfer if appropriate)  SW will continue to follow    Update-  Pt to transfer to [facility] today at 2:30 PM via [facility] stretcher.  SW consulted with Dr Ceron RN, [place] RN.  Appropriate phone numbers given for RN/RN and MD/MD report.  [facility] has gotten auth from insurance.  Dtr Lisa here and is agreeable to tx.  She has spoke with her father.  SW also spoke to pt who is agreeable  P:  No further SW needs

## 2018-07-09 NOTE — PROVIDER NOTIFICATION
Md paged [phone]: Patient has PICC, but incorrect Flush orders. Could you please add order set: VAD Vascular Access Device - Saline Adult?    Order for 10ml saline lock provided.

## 2018-07-09 NOTE — PLAN OF CARE
Problem: Patient Care Overview  Goal: Plan of Care/Patient Progress Review  Outcome: Improving  Patient is alert, lift use in room. Denies chest pain and shortness of breath. Continues to need high flow oxygen; 60% on 30 LPM, tube feeds running at 45 ml/hr. Plan for dc to [place] at some point, ARU assessment to happen today.

## 2018-07-09 NOTE — PLAN OF CARE
Problem: Patient Care Overview  Goal: Plan of Care/Patient Progress Review  Outcome: Adequate for Discharge Date Met: 07/09/18  Labs/Protocols: K 4.1 Creatinine 1.29  Vital Signs: VSS except O2 saturation - pt desats into low 80's d/t the pt pulling the nasal cannula off  Pain: Pt c/o of headache 6/10 on the pain scale, Oxymizer was off, helped the pt reposition and applied the oxymizer back on, pain resolved  Neuro: A&O to self and place, pt is forgetful and distracted   Cardio/Tele: WDL  Resp: Pt weaned off Hi-Flow to oxymizer nasal cannula at 10-12lpm with saturations at 90-92%  GI/: Continuous tube feeding - isosource 1.5 @ 45ml/hr, fecal incontinence and Gill in place  Skin: Some bruising on UEs otherwise color consistent with race, pt has a history of diarrhea and had 1 large BM in AM  Activity: A2 with lift  Diet: Full Liquids/ Nectar thick, Tube Feeding   IVs/lines: Right PICC, patent & SL  Misc:   Plan: Discharged to [facility]

## 2018-07-09 NOTE — PROGRESS NOTES
Hand-off for Care Transitions to Next Level of Care Provider  Name: Shana Horne  : 1950  MRN #: [id number]  Reason for Hospitalization:  Confusion [R41.0]  Generalized muscle weakness [M62.81]  Chronic bronchitis, unspecified chronic bronchitis type (H) [J42]  Pneumonia of right lung due to infectious organism, unspecified part of lung [J18.9]  Admit Date/Time: 2018  8:28 PM  Discharge Date: 2018    Reason for Communication Hand-off Referral: Admission diagnoses: PN  Admission diagnoses: COPD    Discharge Plan:  Discharged to:  [facility]                   Patient agreeable to post-hospital support suggestions:  Yes    Patient is on new medications:   Yes    MTM follow up recommended: No    Tel-Assurance program:  Ineligible    Follow-up specialty is recommended: Continued care at [facility].     Follow-up plan:  No future appointments.    Any outstanding tests or procedures:  No.       Key Recommendations:  Patient is usually on 4L oxygen at home. She has required significantly higher flow during her hospital stay at [facility]. At discharge, she was on 12L via oxymizer. She also was on tube feeds and has a pain pump. Please follow as able for discharge planning from [facility].     Communicated handoff via EPIC Comm Mgt to Dr. Otoniel Capellan's CC at [phone].     Gail Knox RN, BSN, CTS  [facility]  [phone]

## 2018-07-09 NOTE — PROGRESS NOTES
[facility]     This patient has been accepted for admission today and transportatioin is set up for 1430.     The accepting unit is  [facility] and RN report can be called prior to patient transfer to [phone].    The accepting MD is Dr. Beatty, this provider can be reached for MD sign out report by pager at [phone].    Please have the discharge summary and discharge orders completed, faxed to [phone], and available to accompany patient at least one hour before transfer time.      I have updated Sophie (AKIL) with admission details.       Thank you,    Arely Srinivasan  Referral Specialist  [facility]   [email or web address]  [phone] (direct)

## 2018-07-09 NOTE — PROGRESS NOTES
[facility]  Hospitalist Progress Note  Usman Ceron MD, MD 07/09/2018  (Text Page)  Reason for Stay (Diagnosis): Acute on chronic hypoxic respiratory failure secondary to large left lung pneumonia         Assessment and Plan:      Summary of Stay: Shana Horne is a 67 year old female with a history of dementia with limited communication, COPD on chronic home O2 at 4L/nc, ongoing tobacco use, epilepsy, RSD of LLE with intrathecal pain pump,chronic diarrhea,  who lives with her dtr and typically ambulates with a walker  admitted on 6/22/2018 with a one week hx of some vague complaints, increasing sob and found to have hypoxic respiratory failure and a large LML/LLL dense infiltrate with effusion.  She was placed on BiPAP, initiated on ceftriaxone and azithromycin for CAP, and steroids for suspected COPD exacerbation, and admitted to the ICU. Her urine antigens returned + for pneumococcus, and sputum culture + for haemophilus influenzae   On am of 6/25 she had significant worsening of her respiratory status necessitating intubation and was bronched 6/26/26 with findings consistent with mucus plugging.  She was extubated on 7/3/18          Problem List:   1. Acute on chronic hypoxic respiratory failure known with COPD and chronic home oxygen at 4 L by nasal cannula secondary to left lung pneumonia felt to be community-acquired and COPD and exacerbation  -Initially required BiPAP therapy.  Did have worsening and required intubation on 6/25/18.  Able to be extubated on 7/3/18.  Has been slowly improving to the point of being able to be off of BiPAP.  Unfortunately, did worsen early morning of 7/6/18.  Currently back on BiPAP.  Repeat chest x-ray  shows no significant change from previous.  Wean off of BiPAP as able.   Likely require LTACH at discharge.  She is now on high flow nasal cannula and appears to be tolerating it well.  Highly appreciate continuous input from ID service with escalation  and weaning of her oxygen.  Hopeful that she can get back to her baseline home regimen of 4 L via nasal cannula.  - Completed 5 days of azithromycin and vancomycin.  Completed 12 days of IV ceftriaxone and 10 days of metronidazole.  Antibiotics completed on 7/3/18.  Needs repeat CT scan in 4 weeks to ensure resolution and evaluate for possible underlying malignancy  -Remain on DuoNeb's, IV Medrol, Pulmozyme.  Oxygen supplementation.  -She has been on pulmo-zyme for almost 2 weeks.  I am suspecting this was initiated when she has a significant mucus plugging leading to her worsening respiratory failure.  Now she is much more awake and likely can further expectorate and will not need further Pulmozyme administration.  -Changed her IV Medrol to oral prednisone with slow taper.    2.  Severe malnutrition secondary to acute illness-on tube feedings, SLP following with us if able  -I am anticipating that likely she will tolerate more of SLP evaluation as her mental state has significantly improved.  I will be awaiting any further reevaluation or recommendations from their service.  3.  Chronic pain syndrome, pain team service following, as an intrathecal pump  4.  Seizure disorder remain on lamotrigine and felbamate  5.  Severe physical deconditioning  6.  History of chronic diarrhea  7.  Hypokalemia  8.  Stable mild anemia    Continue inpatient care.  Anticipating changing of her IV Medrol to prednisone in the next day or so.  LTACH evaluation underway.    DVT Prophylaxis: Enoxaparin (Lovenox) SQ  Code Status: Full Code  Estimated Disch Date / # of Days until Disch: LTAC evaluation.  I am anticipating that Ms. Saldana will be ready for hospital discharge in the next 24 hours.  Hopefully she can get to be evaluated by SLP so we can further plan regarding her oral intake.      Interval History (Subjective):      Seen and examined.  Chart reviewed.    Ms. Saldana is is more conversant, cooperative, awake.  She is following  "verbal commands.  No reported significant events occurred overnight.  Remained afebrile.  Oxygen supplementation still being provided via high flow nasal cannula but decreasing FiO2.   Choline stated she feels stronger but somewhat frustrated and depressed regarding her prolonged hospitalization.  She is excited for trial of oral intake once she gets to be seen by SLP.      No reported agitation or combativeness.  She is denying any ongoing nausea, vomiting, chest pain, or palpitations.  Remain afebrile.     # Pain Assessment:  Current Pain Score 7/8/2018   Patient currently in pain?   No   Pain score (0-10) -   Pain location Generalized   Pain descriptors Other (comment)   CPOT pain score -   Shana s pain level was assessed and she currently denies pain.                  Physical Exam:      Last Vital Signs:  BP 98/66 (BP Location: Left arm)  Pulse 81  Temp 98  F (36.7  C) (Oral)  Resp 16  Ht 1.6 m (5' 3\")  Wt 58.3 kg (128 lb 9.6 oz)  SpO2 94%  BMI 22.78 kg/m2    I/O last 3 completed shifts:  In: 20 [I.V.:20]  Out: 1950 [Urine:1350; Emesis/NG output:600]  Wt Readings from Last 1 Encounters:   07/09/18 58.3 kg (128 lb 9.6 oz)     Vitals:    07/03/18 0615 07/05/18 0000 07/06/18 0300 07/08/18 0517   Weight: 58.7 kg (129 lb 6.6 oz) 57.5 kg (126 lb 12.2 oz) 60 kg (132 lb 4.8 oz) 58.5 kg (129 lb)    07/09/18 0600   Weight: 58.3 kg (128 lb 9.6 oz)       Constitutional: Awake, alert, cooperative, no apparent distress   Respiratory:  Minimal wheezing.  Left basilar crackles.  Fair air entry.  O2 sats 90-95% during the time exam while on high flow nasal cannula   Cardiovascular: Regular rate and rhythm, normal S1 and S2, and no murmur noted   Abdomen: Normal bowel sounds, soft, non-distended, non-tender   Skin: No rashes, no cyanosis, dry to touch   Neuro: Alert and oriented x1, no weakness, spontaneous and coherent speech   Extremities: No edema, normal range of motion   Other(s): Euthymic mood, not agitated    " Gill catheter, feeding tube in place.   All other systems: Negative          Medications:      All current medications were reviewed with changes reflected in problem list.         Data:      All new lab and imaging data was reviewed.   Labs:  No results for input(s): CULT in the last 168 hours.    Recent Labs  Lab 07/06/18  0455   PH 7.46*   PO2 52*   PCO2 57*   HCO3 40*   SELENA 14.2       Recent Labs  Lab 07/09/18  0700 07/08/18  0657 07/07/18  0612   WBC 10.0 10.6 9.6   HGB 10.0* 10.3* 10.0*   HCT 33.2* 32.8* 32.9*    98 100    369 378       Recent Labs  Lab 07/09/18 0700 07/08/18  0657 07/07/18  0612 07/06/18  0530  07/04/18  0550    138 139 140  < > 141   POTASSIUM 4.3 4.0 4.2 3.9  < > 3.2*   CHLORIDE 100 99 98 98  < > 95   CO2 33* 37* 39* 39*  < > 44*   ANIONGAP 5 2* 2* 3  < > 2*   GLC 94 107* 106* 99  < > 100*   BUN 27 29 31* 34*  < > 35*   CR 0.68 0.66 0.67 0.65  < > 0.63   GFRESTIMATED 86 89 89 >90  < > >90   GFRESTBLACK >90 >90 >90 >90  < > >90   BIBIANA 9.0 9.1 8.7 9.0  < > 8.8   MAG 2.2  --   --   --   --  2.2   PHOS 4.0  --   --  3.5  --   --    < > = values in this interval not displayed.    Recent Labs  Lab 07/09/18  0700   CRP 6.9       Recent Labs  Lab 07/09/18  0834 07/09/18  0700 07/09/18  0419 07/08/18  2345 07/08/18  2054 07/08/18  1625  07/08/18  0657  07/07/18  0612  07/06/18  0530  07/05/18  0600   GLC  --  94  --   --   --   --   --  107*  --  106*  --  99  --  100*   *  --  110* 118* 123* 152*  < >  --   < >  --   < >  --   < >  --    < > = values in this interval not displayed.  No results for input(s): TROPONIN, TROPI, TROPR in the last 168 hours.    Invalid input(s): TROP, TROPONINIES  No results for input(s): COLOR, APPEARANCE, URINEGLC, URINEBILI, URINEKETONE, SG, UBLD, URINEPH, PROTEIN, UROBILINOGEN, NITRITE, LEUKEST, RBCU, WBCU in the last 168 hours.   Imaging:   Results for orders placed or performed during the hospital encounter of 06/22/18   XR Chest Port 1  View    Narrative    CHEST ONE VIEW PORTABLE    6/22/2018 9:20 PM     HISTORY: Shortness of breath. Confusion.     COMPARISON: 10/20/2017.      Impression    IMPRESSION: Large consolidation in the left mid and lower lung  concerning for pneumonia with left pleural effusion. Right lung is  relatively clear. No pneumothorax visualized. Cardiac silhouette  remains enlarged.    EB CALDERON MD   CT Head w/o Contrast    Narrative    CT HEAD W/O CONTRAST  6/22/2018 11:02 PM     HISTORY: Confusion. Emphysema and dementia.    TECHNIQUE: Axial images of the head and coronal reformations without  IV contrast material. Radiation dose for this scan was reduced using  automated exposure control, adjustment of the mA and/or kV according  to patient size, or iterative reconstruction technique.    COMPARISON: 7/22/2017.    FINDINGS: No intracranial hemorrhage, mass or mass effect. No acute  infarct identified. No shift of midline structures. No significant  change.      Impression    IMPRESSION: No acute intracranial findings.    HOWARD MENJIVAR MD   US Lower Extremity Venous Duplex Left    Narrative    US LOWER EXTREMITY VENOUS DUPLEX LEFT  6/22/2018 11:24 PM     HISTORY: Evaluate for DVT, pain.    TECHNIQUE: Venous Doppler ultrasound of the lower extremity. Color  flow and spectral Doppler with waveform analysis performed.    COMPARISON: None.    FINDINGS: Ultrasound of the left leg demonstrates no deep vein  thrombus from the common femoral through popliteal veins or in the  visualized segments of posterior tibial or peroneal veins in the calf.  3.5 x 3.7 x 1.2 cm popliteal fossa cyst, likely a Baker's cyst.      Impression    IMPRESSION:  1. No DVT identified left leg.  2. Baker's cyst.    HOWARD MENJIVAR MD   XR Pelvis 1/2 Views    Narrative    XR PELVIS 1/2 VW  6/22/2018 11:36 PM     INDICATION: Pain.    COMPARISON: None.      Impression    IMPRESSION: Negative.    HOWARD MENJIVAR MD   XR Femur Left 2 Views     Narrative    XR FEMUR LT 2 VW  6/22/2018 11:37 PM     INDICATION: Pain.    COMPARISON: None.      Impression    IMPRESSION: No acute fractures. Moderate degenerative and hypertrophic  changes left knee.    HOWARD MENJIVAR MD   XR Chest Port 1 View    Narrative    CHEST ONE VIEW PORTABLE   6/25/2018 10:02 AM     HISTORY:  Endotracheal tube positioning.    COMPARISON: 6/22/2018.      Impression    IMPRESSION: Tip of the endotracheal tube is approximately 5.6 cm above  the juanito. Nasogastric tube extends below the left hemithorax.  Right-sided PICC line present with the tip in the superior vena cava.  There is near-complete opacification of the left hemithorax which may  be related to atelectasis or pleural fluid. Interstitial opacities in  the right lung are unchanged. No pneumothorax either side.    LM LEON MD   CT Chest w/o Contrast    Narrative    CT CHEST WITHOUT CONTRAST   6/25/2018 3:40 PM    HISTORY: Hypoxia. Dyspnea. Confusion.    TECHNIQUE: Volumetric helical acquisition was performed from the  thoracic inlet through the upper abdomen without IV contrast. Coronal  images were also reconstructed from the axial data. Radiation dose for  this scan was reduced using automated exposure control, adjustment of  the mA and/or kV according to patient size, or iterative  reconstruction technique.    COMPARISON: None.    FINDINGS: Endotracheal tube is in place, with tip 5 cm above the  juanito. Small left pleural effusion. Trace right pleural effusion. No  pericardial effusion. Atherosclerotic calcification of the thoracic  aorta and coronary arteries. There are calcified mediastinal and left  hilar lymph nodes. Calcified granuloma is noted in the left lower lobe  laterally. Emphysematous changes are noted throughout both lungs.  There is extensive consolidation in the left mid and lower lung, with  patchy interstitial and airspace infiltrate within the aerated portion  of the left upper lung. There are  several indeterminate nodular  opacities in the right upper lobe with the largest measuring 1.5 x 0.6  cm (series 3 image 27). Enteric tube, tip in the proximal stomach.  Ectasia of the infrarenal abdominal aorta measures up to 2.9 cm AP.  Limited noncontrast views of the upper abdomen are otherwise  unremarkable. Right PICC line is in place, with tip in the upper SVC.       Impression    IMPRESSION:   1. Extensive consolidation in the left mid and lower lung may be  related to pneumonia and/or atelectasis, although underlying lung mass  cannot be excluded.  2. There are several indeterminate nodular opacities in the right  upper lobe, with the largest measuring 1.5 x 0.6 cm.  3. There are bilateral pleural effusions, small on the left and trace  on the right.  4. Emphysema.    CALI CHAVEZ MD   XR Chest Port 1 View    Narrative    CHEST ONE VIEW PORTABLE   6/26/2018 8:06 AM     HISTORY: Follow up respiratory failure.     COMPARISON: 6/25/2018      Impression    IMPRESSION: Tip of the endotracheal tube is 7.1 cm above the juanito.  Nasogastric tube extends below the left hemidiaphragm. There is a  right-sided PICC line that extends at least to the SVC, although the  tip cannot be seen due to overlying leads and wires. There is  retrocardiac airspace opacity. No pneumothorax on either side.    LM LEON MD   XR Chest Port 1 View    Narrative    CHEST PORTABLE ONE VIEW   6/27/2018 9:49 AM     HISTORY: Follow up pneumonia.     COMPARISON: 6/26/2018.      Impression    IMPRESSION: No significant change since prior. Persistent retrocardiac  consolidation concerning for pneumonia or atelectasis likely with  pleural effusion. Background of diffuse hazy opacities throughout the  lungs and interstitial thickening could represent pulmonary edema  versus infection. Support devices remain in place.    EB CALDERON MD   XR Abdomen Port 1 View    Narrative    XR ABDOMEN PORT 1 VW  6/28/2018 3:27 AM     INDICATION: OG  placement.    COMPARISON: None.      Impression    IMPRESSION: Enteric tube tip in the stomach. Small left pleural  effusion, left lung base infiltrate or atelectasis, and small left  pleural effusion.    HOWARD MENJIVAR MD   XR Chest Port 1 View    Narrative    XR CHEST PORT 1 VW 6/29/2018 7:29 AM    COMPARISON: 6/27/2018    HISTORY: Left lower lobe atelectasis.      Impression    IMPRESSION: Bibasilar opacities, left worse than right are again seen  and not significantly changed. No pneumothorax seen on either side.  Support devices unchanged.    YOLI SHORT MD   XR Chest Port 1 View    Narrative    PORTABLE CHEST ONE VIEW  7/1/2018 5:36 AM     HISTORY: Respiratory failure.     COMPARISON: 6/29/2018.      Impression    IMPRESSION:   1. ET tube and nasogastric tube appears stable. Right PICC line  appears stable.  2. Left lung base atelectasis or airspace disease is unchanged. No  pneumothorax. Right lung shows no new airspace disease. Stable cardiac  silhouette.    SHIRLENE MCDONALD MD   XR Abdomen Port 1 View    Narrative    XR ABDOMEN PORT 1 VW  7/1/2018 5:18 PM     HISTORY:  s/p ft placement. please verify post pyloric;     COMPARISON: None.    FINDINGS:  A feeding tube has been placed. The tip is in the third  portion of the duodenum.    VICKIE WILLINGHAM MD   XR Chest Port 1 View    Narrative    CHEST PORTABLE ONE VIEW 7/6/2018 9:09 AM     HISTORY: Hypoxia.     COMPARISON: 7/1/2018      Impression    IMPRESSION: The endotracheal tube has been removed removed. Right arm  PICC line remains in place. Enteric tube directed into the stomach,  the tip is not seen. There is persistent left basilar opacity which  looks unchanged. No new pulmonary opacities are seen. Multiple old  left-sided rib fractures. The cardiac silhouette is stable.    VANCE HO MD

## 2018-07-09 NOTE — PROGRESS NOTES
NUTRITION BRIEF NOTE    See RD note 7/3 for full assessment details  Patient due for follow up assessment today, noted d/c to [facility]    New findings in last 24 hours:    Diet order: advanced to full liquids, nectar thick per SLP    Remains on EN at goal rate:  Type of Feeding Tube: ND (7/1, originally with OGT 6/26)  Enteral Frequency:  Continuous  Enteral Regimen: Isosource 1.5 @ 45 ml/hr x 24 hrs  Total Enteral Provisions: 1080 mL provides 1620 kcal (29 kcal/kg), 73 gm protein, 821 ml free H2O, 190 gm CHO and 16 gm Fiber daily.   - 1 pkt Prosource daily for an additional 11 grams protein daily (to total 84 g - 1.5 g/kg)  Free Water Flush: 150 mL q 4 hrs        Weight:  58.3 kg, up ~3 kg since admit    600 mL output noted 7/8 (residuals should not be checked on a post pyloric tube)    BM: 7/8    Meds: Certavite, Prednisone  Recent Labs   Lab Test  07/09/18   0700  07/08/18   0657  07/07/18   0612  07/06/18   0530  07/05/18   0600   POTASSIUM  4.3  4.0  4.2  3.9  3.6     Recent Labs   Lab Test  07/09/18   0700  07/06/18   0530  07/02/18   0520  06/30/18   0445  06/28/18   0450   PHOS  4.0  3.5  3.0  3.1  2.9     Recent Labs   Lab Test  07/09/18   0700  07/04/18   0550  07/02/18   0520  06/30/18   0445  06/27/18   0435   MAG  2.2  2.2  2.0  2.1  2.0     Recent Labs   Lab Test  07/09/18   0700  07/08/18   0657  07/07/18   0612  07/06/18   0530  07/05/18   0600   NA  138  138  139  140  139     Recent Labs   Lab Test  07/09/18   0700  07/08/18   0657  07/07/18   0612  07/06/18   0530  07/05/18   0600   CR  0.68  0.66  0.67  0.65  0.62       Recent Labs  Lab 07/09/18  0700 07/08/18  0657 07/07/18  0612 07/06/18  0530 07/05/18  0600 07/04/18  0550 07/03/18  0636   GLC 94 107* 106* 99 100* 100* 110*     Lab Results   Component Value Date    A1C 5.8 06/25/2018           Meds:     Assessed Nutrition Needs (DW: 55.5 kg - lowest weight upon admit):  Estimated Energy Needs: 6566-3189+ kcals (25-30 Kcal/Kg+)  Justification:  maintenance with extubation  Estimated Protein Needs: 67-83 grams protein (1.2-1.5 g pro/Kg)  Justification: preservation of LBM  Estimated Fluid Needs: >1 mL/Kcal  Justification: maintenance or per MD       Interventions:      Will hold off on transitioning to cyclic regimen with facility transfer today --> if remains at [facility], can update orders for overnight feedings.    Collaboration and Referral of care: Discussed patient during interdisciplinary care rounds this morning    Please page/consult as needed.      Connie Singletary RDN, LD, CNSC  Pager - 3rd floor/ICU: [phone]  Pager - All other floors: [phone]  Pager - Weekend/holiday: [phone]  Office: [phone]

## 2018-07-09 NOTE — DISCHARGE SUMMARY
[facility]  Discharge Summary  Name: Shana Horne    MRN: [id number]  YOB: 1950    Age: 67 year old  Date of Discharge:  7/9/2018  Date of Admission: 6/22/2018  Primary Care Provider: Otoniel Capellan  Discharge Physician:  Usman Ceron MD  Discharging Service:  Hospitalist      Discharge Diagnosis:  Acute on chronic hypoxic respiratory failure secondary to Left lung bacterial pneumonia and COPD in exacerbation  Severe sepsis secondary to community-acquired pneumonia  Severe malnutrition secondary to acute illness  Chronic pain syndrome with prior intrathecal pump  Seizure disorder  physical deconditioning  Stable mild anemia     Other Diagnosis:  Past Medical History:   Diagnosis Date     Arthritis      Blood transfusion     38 yrs ago     Chronic pain     painful feet     COPD (chronic obstructive pulmonary disease) (H)      Dementia      Gastroesophageal reflux disease      History of blood transfusion      Numbness and tingling     fingertips occas     Other chronic pain     generalized     Oxygen dependent     4l nc cont     Parkinsons disease (H)      Seizures (H)     LAST SEIZURE 2 MOS AGO     Stimulus-induced repetitive discharges on nerve conduction studies           Discharge Disposition:  Transferred to LTAC at [facility]     Allergies:  Allergies   Allergen Reactions     Nicotine Other (See Comments)     Nicotine patch caused worsening seizures per 's report        Discharge Medications:   Current Discharge Medication List      START taking these medications    Details   albuterol (2.5 MG/3ML) 0.083% neb solution Take 1 vial (2.5 mg) by nebulization every 2 hours as needed for other (dyspnea)  Qty: 120 mL, Refills: 0    Associated Diagnoses: Pneumonia of right lung due to infectious organism, unspecified part of lung      ipratropium - albuterol 0.5 mg/2.5 mg/3 mL (DUONEB) 0.5-2.5 (3) MG/3ML neb solution Take 1 vial (3 mLs) by nebulization 4 times daily  Qty: 120  "mL, Refills: 0    Associated Diagnoses: Pneumonia of right lung due to infectious organism, unspecified part of lung      predniSONE (DELTASONE) 20 MG tablet Take  2 tabs (40 mg) daily x 3 days, 1 tab (20 mg) daily x 3 days, then 1/2 tab (10 mg) x 3 days.  Qty: 12 tablet, Refills: 0    Associated Diagnoses: Pneumonia of right lung due to infectious organism, unspecified part of lung         CONTINUE these medications which have NOT CHANGED    Details   amitriptyline (ELAVIL) 25 MG tablet Take 75 mg by mouth At Bedtime       dispensed in syringe 1 Device in sodium chloride (PF) 20 mL Intrathecal Pump Infusion by Intrathecal route continuous 8840 TIKI.VN IIB pump per Dr Farooq  Model 8637-20 20 ml, serial number [id number]  Continuous infusion setting delivers:  Hydromorphone 6.008 mg/day  Bupivacaine 3.004 mg/day  Clonidine 40.05 mcg/day      Felbamate (FELBATOL PO) Take 600 mg by mouth 3 times daily       FERROUS GLUCONATE PO Take 324 mg by mouth 2 times daily (with meals) Breakfast & supper      gabapentin (NEURONTIN) 100 MG capsule Take 200 mg by mouth 2 times daily (with meals) Breakfast & Lunch       LamoTRIgine (LAMICTAL) 100 MG tablet Take 100 mg by mouth 3 times daily.      simvastatin (ZOCOR) 40 MG tablet Take 40 mg by mouth At Bedtime               Condition on Discharge:  Discharge condition: Stable   Discharge vitals: Blood pressure 126/64, pulse 81, temperature 98.9  F (37.2  C), temperature source Oral, resp. rate 16, height 1.6 m (5' 3\"), weight 58.3 kg (128 lb 9.6 oz), SpO2 92 %.   Code status on discharge: Full Code     History of Present Illness:  See detailed admission note for full details.        Significant Physical Exam Findings Day of Discharge:  HEENT; Atraumatic, normocephalic, pinkish conjuctiva, pupils bilateral reactive   Skin: warm and moist, no rashes    Lungs: equal chest expansion, clear to auscultation, no wheezes, no stridor, no crackles,   Heart: normal rate, normal rhythm, no " rubs or gallops.   Abdomen: normal bowel sounds, no tenderness, no peritoneal signs, no guarding  Extremities: no deformities, no edema   Neuro; follow commands, alert and oriented x3, spontaneous speech, coherent, moves all extremities spontaneously, poor memory short-term recall  Psych; no hallucination, euthymic mood, not agitated  Feeding tube in place, Gill catheter seen      Procedures other than Imaging:  Mechanical ventilation with intubation, PICC line insertion     Imaging:  Results for orders placed or performed during the hospital encounter of 06/22/18   XR Chest Port 1 View    Narrative    CHEST ONE VIEW PORTABLE    6/22/2018 9:20 PM     HISTORY: Shortness of breath. Confusion.     COMPARISON: 10/20/2017.      Impression    IMPRESSION: Large consolidation in the left mid and lower lung  concerning for pneumonia with left pleural effusion. Right lung is  relatively clear. No pneumothorax visualized. Cardiac silhouette  remains enlarged.    EB CALDERON MD   CT Head w/o Contrast    Narrative    CT HEAD W/O CONTRAST  6/22/2018 11:02 PM     HISTORY: Confusion. Emphysema and dementia.    TECHNIQUE: Axial images of the head and coronal reformations without  IV contrast material. Radiation dose for this scan was reduced using  automated exposure control, adjustment of the mA and/or kV according  to patient size, or iterative reconstruction technique.    COMPARISON: 7/22/2017.    FINDINGS: No intracranial hemorrhage, mass or mass effect. No acute  infarct identified. No shift of midline structures. No significant  change.      Impression    IMPRESSION: No acute intracranial findings.    HOWARD MENJIVAR MD   US Lower Extremity Venous Duplex Left    Narrative    US LOWER EXTREMITY VENOUS DUPLEX LEFT  6/22/2018 11:24 PM     HISTORY: Evaluate for DVT, pain.    TECHNIQUE: Venous Doppler ultrasound of the lower extremity. Color  flow and spectral Doppler with waveform analysis performed.    COMPARISON:  None.    FINDINGS: Ultrasound of the left leg demonstrates no deep vein  thrombus from the common femoral through popliteal veins or in the  visualized segments of posterior tibial or peroneal veins in the calf.  3.5 x 3.7 x 1.2 cm popliteal fossa cyst, likely a Baker's cyst.      Impression    IMPRESSION:  1. No DVT identified left leg.  2. Baker's cyst.    HOWARD MENJIVAR MD   XR Pelvis 1/2 Views    Narrative    XR PELVIS 1/2 VW  6/22/2018 11:36 PM     INDICATION: Pain.    COMPARISON: None.      Impression    IMPRESSION: Negative.    HOWARD MENJIVAR MD   XR Femur Left 2 Views    Narrative    XR FEMUR LT 2 VW  6/22/2018 11:37 PM     INDICATION: Pain.    COMPARISON: None.      Impression    IMPRESSION: No acute fractures. Moderate degenerative and hypertrophic  changes left knee.    HOWARD MENJIVAR MD   XR Chest Port 1 View    Narrative    CHEST ONE VIEW PORTABLE   6/25/2018 10:02 AM     HISTORY:  Endotracheal tube positioning.    COMPARISON: 6/22/2018.      Impression    IMPRESSION: Tip of the endotracheal tube is approximately 5.6 cm above  the juanito. Nasogastric tube extends below the left hemithorax.  Right-sided PICC line present with the tip in the superior vena cava.  There is near-complete opacification of the left hemithorax which may  be related to atelectasis or pleural fluid. Interstitial opacities in  the right lung are unchanged. No pneumothorax either side.    LM LEON MD   CT Chest w/o Contrast    Narrative    CT CHEST WITHOUT CONTRAST   6/25/2018 3:40 PM    HISTORY: Hypoxia. Dyspnea. Confusion.    TECHNIQUE: Volumetric helical acquisition was performed from the  thoracic inlet through the upper abdomen without IV contrast. Coronal  images were also reconstructed from the axial data. Radiation dose for  this scan was reduced using automated exposure control, adjustment of  the mA and/or kV according to patient size, or iterative  reconstruction technique.    COMPARISON:  None.    FINDINGS: Endotracheal tube is in place, with tip 5 cm above the  juanito. Small left pleural effusion. Trace right pleural effusion. No  pericardial effusion. Atherosclerotic calcification of the thoracic  aorta and coronary arteries. There are calcified mediastinal and left  hilar lymph nodes. Calcified granuloma is noted in the left lower lobe  laterally. Emphysematous changes are noted throughout both lungs.  There is extensive consolidation in the left mid and lower lung, with  patchy interstitial and airspace infiltrate within the aerated portion  of the left upper lung. There are several indeterminate nodular  opacities in the right upper lobe with the largest measuring 1.5 x 0.6  cm (series 3 image 27). Enteric tube, tip in the proximal stomach.  Ectasia of the infrarenal abdominal aorta measures up to 2.9 cm AP.  Limited noncontrast views of the upper abdomen are otherwise  unremarkable. Right PICC line is in place, with tip in the upper SVC.       Impression    IMPRESSION:   1. Extensive consolidation in the left mid and lower lung may be  related to pneumonia and/or atelectasis, although underlying lung mass  cannot be excluded.  2. There are several indeterminate nodular opacities in the right  upper lobe, with the largest measuring 1.5 x 0.6 cm.  3. There are bilateral pleural effusions, small on the left and trace  on the right.  4. Emphysema.    CALI CHAVEZ MD   XR Chest Port 1 View    Narrative    CHEST ONE VIEW PORTABLE   6/26/2018 8:06 AM     HISTORY: Follow up respiratory failure.     COMPARISON: 6/25/2018      Impression    IMPRESSION: Tip of the endotracheal tube is 7.1 cm above the juanito.  Nasogastric tube extends below the left hemidiaphragm. There is a  right-sided PICC line that extends at least to the SVC, although the  tip cannot be seen due to overlying leads and wires. There is  retrocardiac airspace opacity. No pneumothorax on either side.    LM LEON MD   XR Chest  Port 1 View    Narrative    CHEST PORTABLE ONE VIEW   6/27/2018 9:49 AM     HISTORY: Follow up pneumonia.     COMPARISON: 6/26/2018.      Impression    IMPRESSION: No significant change since prior. Persistent retrocardiac  consolidation concerning for pneumonia or atelectasis likely with  pleural effusion. Background of diffuse hazy opacities throughout the  lungs and interstitial thickening could represent pulmonary edema  versus infection. Support devices remain in place.    EB CALDERON MD   XR Abdomen Port 1 View    Narrative    XR ABDOMEN PORT 1 VW  6/28/2018 3:27 AM     INDICATION: OG placement.    COMPARISON: None.      Impression    IMPRESSION: Enteric tube tip in the stomach. Small left pleural  effusion, left lung base infiltrate or atelectasis, and small left  pleural effusion.    HOWARD MENJIVAR MD   XR Chest Port 1 View    Narrative    XR CHEST PORT 1 VW 6/29/2018 7:29 AM    COMPARISON: 6/27/2018    HISTORY: Left lower lobe atelectasis.      Impression    IMPRESSION: Bibasilar opacities, left worse than right are again seen  and not significantly changed. No pneumothorax seen on either side.  Support devices unchanged.    YOLI SHORT MD   XR Chest Port 1 View    Narrative    PORTABLE CHEST ONE VIEW  7/1/2018 5:36 AM     HISTORY: Respiratory failure.     COMPARISON: 6/29/2018.      Impression    IMPRESSION:   1. ET tube and nasogastric tube appears stable. Right PICC line  appears stable.  2. Left lung base atelectasis or airspace disease is unchanged. No  pneumothorax. Right lung shows no new airspace disease. Stable cardiac  silhouette.    SHIRLENE MCDONALD MD   XR Abdomen Port 1 View    Narrative    XR ABDOMEN PORT 1 VW  7/1/2018 5:18 PM     HISTORY:  s/p ft placement. please verify post pyloric;     COMPARISON: None.    FINDINGS:  A feeding tube has been placed. The tip is in the third  portion of the duodenum.    VICKIE WILLINGHAM MD   XR Chest Port 1 View    Narrative    CHEST PORTABLE ONE VIEW 7/6/2018  9:09 AM     HISTORY: Hypoxia.     COMPARISON: 7/1/2018      Impression    IMPRESSION: The endotracheal tube has been removed removed. Right arm  PICC line remains in place. Enteric tube directed into the stomach,  the tip is not seen. There is persistent left basilar opacity which  looks unchanged. No new pulmonary opacities are seen. Multiple old  left-sided rib fractures. The cardiac silhouette is stable.    VANCE HO MD        Consultations:  Consultation during this admission received from ICU care.     Recent Lab Results:    Recent Labs  Lab 07/09/18 0700 07/08/18 0657 07/07/18  0612   WBC 10.0 10.6 9.6   HGB 10.0* 10.3* 10.0*   HCT 33.2* 32.8* 32.9*    98 100    369 378     No results for input(s): CULT in the last 168 hours.    Recent Labs  Lab 07/09/18 0700 07/08/18  0657 07/07/18  0612 07/06/18  0530  07/04/18  0550    138 139 140  < > 141   POTASSIUM 4.3 4.0 4.2 3.9  < > 3.2*   CHLORIDE 100 99 98 98  < > 95   CO2 33* 37* 39* 39*  < > 44*   ANIONGAP 5 2* 2* 3  < > 2*   GLC 94 107* 106* 99  < > 100*   BUN 27 29 31* 34*  < > 35*   CR 0.68 0.66 0.67 0.65  < > 0.63   GFRESTIMATED 86 89 89 >90  < > >90   GFRESTBLACK >90 >90 >90 >90  < > >90   BIBIANA 9.0 9.1 8.7 9.0  < > 8.8   MAG 2.2  --   --   --   --  2.2   PHOS 4.0  --   --  3.5  --   --    < > = values in this interval not displayed.    Recent Labs  Lab 07/09/18  0834 07/09/18  0700 07/09/18  0419 07/08/18  2345 07/08/18  2054 07/08/18  1625  07/08/18  0657  07/07/18  0612  07/06/18  0530  07/05/18  0600   GLC  --  94  --   --   --   --   --  107*  --  106*  --  99  --  100*   *  --  110* 118* 123* 152*  < >  --   < >  --   < >  --   < >  --    < > = values in this interval not displayed.  No results for input(s): LACT in the last 168 hours.  No results for input(s): TROPONIN, TROPI, TROPR in the last 168 hours.    Invalid input(s): TROP, TROPONINIES  No results for input(s): COLOR, APPEARANCE, URINEGLC, URINEBILI,  URINEKETONE, SG, UBLD, URINEPH, PROTEIN, UROBILINOGEN, NITRITE, LEUKEST, RBCU, WBCU in the last 168 hours.       Pending Results:    Unresulted Labs Ordered in the Past 30 Days of this Admission     Date and Time Order Name Status Description    6/26/2018 1253 Fungus Culture, non-blood Preliminary     6/26/2018 1253 AFB Culture Non Blood In process            Discharge Instructions and Follow-Up:   Discharge diet:   Active Diet Order      Diet      Combination Diet Full Liquid; Nectar Thickened Liquids (pre-thickened or use instant food thickener) (When alert )   Discharge activity: Activity as tolerated   Discharge follow-up:  As determined upon discharge from LTAC   Outpatient therapy: None    Other instructions: None      Hospital Course:  Continuing CARE today.  Ms. Horne is clinically improving she is much more awake, conversant, cooperative.  She still has poor memory recall.  Her oxygen supplementation is be tapered down now receiving Via Oxymizer.  She still has stable oxygen's saturation this morning.  Remain afebrile.  She is able to participate with speech and swallow earlier and diet advanced to full liquid with nectar thickened liquids.  She is currently now on oral prednisone.  Off antibiotics as earlier discussed.  Remain afebrile.  Stable hemodynamics.  Serum creatinine remains stable than the rest of her labs has been stable.  Serum bicarb has been decreasing almost back to normal levels.  She was evaluated by long-term acute care representative earlier.  Her case was discussed with their physician and was graciously accepted.    Please see excerpts of my prior progress note for details of her prolonged hospitalization.    Summary of Stay: Shana Horne is a 67 year old female with a history of dementia with limited communication, COPD on chronic home O2 at 4L/nc, ongoing tobacco use, epilepsy, RSD of LLE with intrathecal pain pump,chronic diarrhea,  who lives with her dtr and typically  ambulates with a walker  admitted on 6/22/2018 with a one week hx of some vague complaints, increasing sob and found to have hypoxic respiratory failure and a large LML/LLL dense infiltrate with effusion.  She was placed on BiPAP, initiated on ceftriaxone and azithromycin for CAP, and steroids for suspected COPD exacerbation, and admitted to the ICU. Her urine antigens returned + for pneumococcus, and sputum culture + for haemophilus influenzae   On am of 6/25 she had significant worsening of her respiratory status necessitating intubation and was bronched 6/26/26 with findings consistent with mucus plugging.  She was extubated on 7/3/18           Problem List:   1. Acute on chronic hypoxic respiratory failure known with COPD and chronic home oxygen at 4 L by nasal cannula secondary to left lung pneumonia felt to be community-acquired and COPD and exacerbation  -Initially required BiPAP therapy.  Did have worsening and required intubation on 6/25/18.  Able to be extubated on 7/3/18.  Has been slowly improving to the point of being able to be off of BiPAP.  Unfortunately, did worsen early morning of 7/6/18.  Currently back on BiPAP.  Repeat chest x-ray  shows no significant change from previous.  Wean off of BiPAP as able.   Likely require LTACH at discharge.  She is now on high flow nasal cannula and appears to be tolerating it well.  Highly appreciate continuous input from ID service with escalation and weaning of her oxygen.  Hopeful that she can get back to her baseline home regimen of 4 L via nasal cannula.  - Completed 5 days of azithromycin and vancomycin.  Completed 12 days of IV ceftriaxone and 10 days of metronidazole.  Antibiotics completed on 7/3/18.  Needs repeat CT scan in 4 weeks to ensure resolution and evaluate for possible underlying malignancy  -Remain on DuoNeb's, IV Medrol, Pulmozyme.  Oxygen supplementation.  -She has been on pulmo-zyme for almost 2 weeks.  I am suspecting this was initiated  when she has a significant mucus plugging leading to her worsening respiratory failure.  Now she is much more awake and likely can further expectorate and will not need further Pulmozyme administration.  -Changed her IV Medrol to oral prednisone with slow taper.     2.  Severe malnutrition secondary to acute illness-on tube feedings, SLP following with us if able  -I am anticipating that likely she will tolerate more of SLP evaluation as her mental state has significantly improved.  I will be awaiting any further reevaluation or recommendations from their service.  3.  Chronic pain syndrome, pain team service following, as an intrathecal pump  4.  Seizure disorder remain on lamotrigine and felbamate  5.  Severe physical deconditioning  6.  History of chronic diarrhea  7.  Hypokalemia  8.  Stable mild anemia          Total time spent in face to face contact with the patient and coordinating discharge was:  > 30 Minutes.

## 2018-07-09 NOTE — PLAN OF CARE
Problem: Patient Care Overview  Goal: Plan of Care/Patient Progress Review  Discharge Planner SLP   Patient plan for discharge: Discharge to [facility] today.   Current status: Patient seen for swallowing treatment.  Recommend return to nectar thick liquid FULL LIQUIDS with close assistance and only when alert, upright, and on Oxymizer.  Recommend hold PO if requiring BiPAP or HFNC above 30 LPM.   Barriers to return to prior living situation: Weakness, confusion.   Recommendations for discharge: Recommend SLP follow up at discharge.   Rationale for recommendations: Continue SLP services for safe return to regular oral intake, independent use of safe swallowing strategies (generalized).        Entered by: Mary Anthony 07/09/2018 11:35 AM       Speech Language Therapy Discharge Summary    Reason for therapy discharge:    Discharged to [facility]    Progress towards therapy goal(s). See goals on Care Plan in [facility] electronic health record for goal details.  Goals partially met.  Barriers to achieving goals:   discharge from facility.    Therapy recommendation(s):    Continued therapy is recommended.  Rationale/Recommendations:  Continue SLP services as recommended above. .

## 2018-07-09 NOTE — PROGRESS NOTES
Patient remains on HFNC 60%, 30 LPM. Titrated FiO2 down to 60 from 70 and flow 30 from 35, tolerated well. SpO2 mid 90's, BS diminished. Will continue to monitor pt's respiratory status closely.    Eliane Stone, RT  7/9/2018 6:32 AM

## 2018-08-01 NOTE — TELEPHONE ENCOUNTER
MTM referral from: [facility] recruitment    MTM referral outreach attempt #2 on August 1, 2018 at 12:31 PM      Outcome: Spoke with patient's family member and they are not interested.    Chaparrita Aldrich, MTM Coordinator Intern

## 2018-10-05 NOTE — DISCHARGE INSTRUCTIONS
GENERAL ANESTHESIA OR SEDATION ADULT DISCHARGE INSTRUCTIONS   SPECIAL PRECAUTIONS FOR 24 HOURS AFTER SURGERY    IT IS NOT UNUSUAL TO FEEL LIGHT-HEADED OR FAINT, UP TO 24 HOURS AFTER SURGERY OR WHILE TAKING PAIN MEDICATION.  IF YOU HAVE THESE SYMPTOMS; SIT FOR A FEW MINUTES BEFORE STANDING AND HAVE SOMEONE ASSIST YOU WHEN YOU GET UP TO WALK OR USE THE BATHROOM.    YOU SHOULD REST AND RELAX FOR THE NEXT 24 HOURS AND YOU MUST MAKE ARRANGEMENTS TO HAVE SOMEONE STAY WITH YOU FOR AT LEAST 24 HOURS AFTER YOUR DISCHARGE.  AVOID HAZARDOUS AND STRENUOUS ACTIVITIES.  DO NOT MAKE IMPORTANT DECISIONS FOR 24 HOURS.    DO NOT DRIVE ANY VEHICLE OR OPERATE MECHANICAL EQUIPMENT FOR 24 HOURS FOLLOWING THE END OF YOUR SURGERY.  EVEN THOUGH YOU MAY FEEL NORMAL, YOUR REACTIONS MAY BE AFFECTED BY THE MEDICATION YOU HAVE RECEIVED.    DO NOT DRINK ALCOHOLIC BEVERAGES FOR 24 HOURS FOLLOWING YOUR SURGERY.    DRINK CLEAR LIQUIDS (APPLE JUICE, GINGER ALE, 7-UP, BROTH, ETC.).  PROGRESS TO YOUR REGULAR DIET AS YOU FEEL ABLE.    YOU MAY HAVE A DRY MOUTH, A SORE THROAT, MUSCLES ACHES OR TROUBLE SLEEPING.  THESE SHOULD GO AWAY AFTER 24 HOURS.    CALL YOUR DOCTOR FOR ANY OF THE FOLLOWING:  SIGNS OF INFECTION (FEVER, GROWING TENDERNESS AT THE SURGERY SITE, A LARGE AMOUNT OF DRAINAGE OR BLEEDING, SEVERE PAIN, FOUL-SMELLING DRAINAGE, REDNESS OR SWELLING.    IT HAS BEEN OVER 8 TO 10 HOURS SINCE SURGERY AND YOU ARE STILL NOT ABLE TO URINATE (PASS WATER).      HOME CARE FOLLOWING MINOR SURGERY        DRESSING  Keep dressing dry and in place until your doctor instructs you to remove the dressing.    DRAINAGE  There should be minimal drainage. If bleeding should occur and soaks through the dressing apply a sterile, dry dressing over it and tape in place. If bleeding persists, apply gentle, steady pressure with your hand over the dressing for 5 minutes. If the bleeding does not stop, call your doctor.    SKIN CLOSURE  You may have stitches or special skin  closures. You will be given an appointment for the removal of any external stitches. You may have stitches under the skin which will absorb. You may have steri-strips over the incision. These look like thin tapes and will peel off in 5-7 days. If they don t after 7 days, you may carefully remove them. You may shower with the steri-strips but do not soak them, as with swimming or taking a bath. A protective covering of plastic may be placed over external stitches for showering.    NOTIFY YOUR PHYSICIAN IF YOU HAVE ANY OF THE FOLLOWING SYMPTOMS:    1. Fever  2. Excessive bleeding or drainage  3. Disruption of the skin closure  4. Swelling, redness or excessive tenderness at the site  5. Severe pain  6. Drainage that is green, yellow, thick white or has a bad odor    Abdominal binder for comfort as needed.

## 2018-10-05 NOTE — ANESTHESIA CARE TRANSFER NOTE
Patient: Shana Horne    Procedure(s):  Intrathecal pump revision - Wound Class: I-Clean    Diagnosis: Replacing battery  Diagnosis Additional Information: No value filed.    Anesthesia Type:   General, LMA     Note:  Airway :Face Mask  Patient transferred to:PACU  Comments: Report and sign off to RN in PACU.  Good resps, skin pink, monitors on, VSS,  O2 via Face Tent.Handoff Report: Identifed the Patient, Identified the Reponsible Provider, Reviewed the pertinent medical history, Discussed the surgical course, Reviewed Intra-OP anesthesia mangement and issues during anesthesia, Set expectations for post-procedure period and Allowed opportunity for questions and acknowledgement of understanding      Vitals: (Last set prior to Anesthesia Care Transfer)    CRNA VITALS  10/5/2018 0749 - 10/5/2018 0827      10/5/2018             Pulse: 95    Temp: (!)  64  F (17.8  C)    SpO2: 98 %    Resp Rate (observed): (!)  2    EKG: NSR                Electronically Signed By: JIM Cuellar CRNA  October 5, 2018  8:27 AM

## 2018-10-05 NOTE — ANESTHESIA PROCEDURE NOTES
Peripheral nerve/Neuraxial procedure note : Rectus Sheath  Pre-Procedure  Performed by SHAHID DAN  Location: OR      Pre-Anesthestic Checklist: patient identified, IV checked, site marked, risks and benefits discussed, informed consent, monitors and equipment checked, pre-op evaluation, at physician/surgeon's request and post-op pain management    Timeout  Correct Patient: Yes   Correct Procedure: Yes   Correct Site: Yes   Correct Laterality: Yes   Correct Position: Yes   Site Marked: Yes   .   Procedure Documentation    .    Procedure:  left  Rectus Sheath.     Ultrasound used to identify targeted nerve, plexus, or vascular marker and placed a needle adjacent to it., Ultrasound was used to visualize the spread of the anesthetic in close proximity to the above stated nerve.   Patient Prep;mask, sterile gloves, patient draped.  .  Needle: insulated, short bevel Needle Gauge: 22.    Needle Length (Inches) 3.13  Insertion Method: Single Shot.       Assessment/Narrative  Paresthesias: No.  Injection made incrementally with aspirations every 5 mL..  The placement was negative for: blood aspirated, painful injection and site bleeding.  Bolus given via needle..   Secured via.   Complications: none. Test dose of 3 mL at. Test dose negative for signs of intravascular, subdural or intrathecal injection. Comments:  20 ml of 0.5% bupivacaine injected.

## 2018-10-05 NOTE — ANESTHESIA POSTPROCEDURE EVALUATION
Patient: Shana Horne    Procedure(s):  Intrathecal pump revision; tap block - Wound Class: I-Clean    Diagnosis:Replacing battery  Diagnosis Additional Information: PREOPERATIVE DIAGNOSIS:  Chronic regional pain syndrome, type 3.       POSTOPERATIVE DIAGNOSIS:  Chronic regional pain syndrome, type 3.       PROCEDURE PERFORMED:  Explant of battery exhausted Medtronic intrathecal pump and replacement with new intr, athecal pump.     Anesthesia Type:  General, LMA    Note:  Anesthesia Post Evaluation    Patient location during evaluation: Phase 2  Patient participation: Able to fully participate in evaluation  Level of consciousness: awake  Pain management: adequate  Airway patency: patent  Cardiovascular status: acceptable  Respiratory status: acceptable  Hydration status: euvolemic  PONV: controlled     Anesthetic complications: None          Last vitals:  Vitals:    10/05/18 0910 10/05/18 0930 10/05/18 0955   BP: 120/73 129/77 135/70   Resp: 19 16 16   Temp: 98.4  F (36.9  C)  98.1  F (36.7  C)   SpO2: (!) 84% 93% 90%         Electronically Signed By: Willian De La Torre MD  October 5, 2018  2:42 PM

## 2018-10-05 NOTE — OP NOTE
Procedure Date: 10/05/2018     SURGEON: Dr. Vaibhav Paige    ASST: Dr. Gurpreet Farooq     DATE OF SERVICE:  10/05/2018.      PREOPERATIVE DIAGNOSIS:  Chronic regional pain syndrome, type 3.      POSTOPERATIVE DIAGNOSIS:  Chronic regional pain syndrome, type 3.      PROCEDURE PERFORMED:  Explant of battery exhausted Medtronic intrathecal pump and replacement with new intrathecal pump.      ANESTHESIA:  General.      NARRATIVE:  Mrs. Horne was taken to the operative suite where general anesthesia was induced.  At this point, the patient was given intravenous antibiotic prophylaxis with cefazolin 2 grams.  The patient was prepped and draped in a sterile fashion.  At this point, the old placement scar was identified and excised with a #15 blade and electrocautery.  Adequate hemostasis was obtained after excision of the scar to cancel the capsule where the previous pump was then entered.  Care was taken not to manipulate or twist any of the existing intrathecal catheter which has functioned for years.  At this point, the connection was then screwed and loosened and disconnected.  The pump was then expressed from the existing pocket, and one single retaining suture was cut.  At this point, the new Medtronic infusion pump after being adequately prepped and primed with the patient's standing intrathecal medication was then reconnected to the existing catheter.  The Medtronic device is 8637-20, and the serial number is [id number].  The pump was then replaced in the pocket, and, due to its similar size, fit adequately.  A single retaining suture using 0 Prolene was used to anchor the pump in place.  At this point, the skin closure was done in a 2-layer fashion.  Subcutaneous interrupted stitches were done with 3-0 undyed Vicryl; approximately six of these approximated the edges of the skin adequately.  At this point, a running subcuticular stitch utilizing 4-0 undyed Vicryl in a running fashion was done.  At this point, the  incisional area was cleaned.  In addition, it is important to note that the patient was irrigated with Ancef irrigation.  With the skin being approximated, final coverage was utilizing Dermabond.  The patient tolerated the procedure well.  She was taken to the recovery room with LMA in place in a satisfactory condition.      Estimated blood loss for the procedure was 1 mL.  Fluids given during the procedure were approximately 500 mL of crystalloid.         SHAHID DAN MD             D: 10/05/2018   T: 10/05/2018   MT:       Name:     CRYSTAL PIERCE   MRN:      [id number]        Account:        [id number]   :      1950           Procedure Date: 10/05/2018      Document: [id number]

## 2018-10-05 NOTE — IP AVS SNAPSHOT
MRN:[id number]                      After Visit Summary   10/5/2018    Shana Horne    MRN: [id number]           Thank you!     Thank you for choosing [facility] for your care. Our goal is always to provide you with excellent care. Hearing back from our patients is one way we can continue to improve our services. Please take a few minutes to complete the written survey that you may receive in the mail after you visit. If you would like to speak to someone directly about your visit please contact Patient Relations at [phone]. Thank you!          Patient Information     Date Of Birth          1950        About your hospital stay     You were admitted on:  October 5, 2018 You last received care in the:  [facility] PreOP/PostOP    You were discharged on:  October 5, 2018       Who to Call     For medical emergencies, please call 911.  For non-urgent questions about your medical care, please call your primary care provider or clinic, [phone]  For questions related to your surgery, please call your surgery clinic        Attending Provider     Provider Specialty    Vaibhav Paige MD General Surgery       Primary Care Provider Office Phone # Fax #    Otoniel Capellan [phone]      Your next 10 appointments already scheduled     Oct 09, 2018   Procedure with Benny Mitchell, DO   [facility] PeriOp Services (--)    [address] [facility] [id number] [phone]              Further instructions from your care team       GENERAL ANESTHESIA OR SEDATION ADULT DISCHARGE INSTRUCTIONS   SPECIAL PRECAUTIONS FOR 24 HOURS AFTER SURGERY    IT IS NOT UNUSUAL TO FEEL LIGHT-HEADED OR FAINT, UP TO 24 HOURS AFTER SURGERY OR WHILE TAKING PAIN MEDICATION.  IF YOU HAVE THESE SYMPTOMS; SIT FOR A FEW MINUTES BEFORE STANDING AND HAVE SOMEONE ASSIST YOU WHEN YOU GET UP TO WALK OR USE THE BATHROOM.    YOU SHOULD REST AND RELAX FOR THE NEXT 24 HOURS AND  YOU MUST MAKE ARRANGEMENTS TO HAVE SOMEONE STAY WITH YOU FOR AT LEAST 24 HOURS AFTER YOUR DISCHARGE.  AVOID HAZARDOUS AND STRENUOUS ACTIVITIES.  DO NOT MAKE IMPORTANT DECISIONS FOR 24 HOURS.    DO NOT DRIVE ANY VEHICLE OR OPERATE MECHANICAL EQUIPMENT FOR 24 HOURS FOLLOWING THE END OF YOUR SURGERY.  EVEN THOUGH YOU MAY FEEL NORMAL, YOUR REACTIONS MAY BE AFFECTED BY THE MEDICATION YOU HAVE RECEIVED.    DO NOT DRINK ALCOHOLIC BEVERAGES FOR 24 HOURS FOLLOWING YOUR SURGERY.    DRINK CLEAR LIQUIDS (APPLE JUICE, GINGER ALE, 7-UP, BROTH, ETC.).  PROGRESS TO YOUR REGULAR DIET AS YOU FEEL ABLE.    YOU MAY HAVE A DRY MOUTH, A SORE THROAT, MUSCLES ACHES OR TROUBLE SLEEPING.  THESE SHOULD GO AWAY AFTER 24 HOURS.    CALL YOUR DOCTOR FOR ANY OF THE FOLLOWING:  SIGNS OF INFECTION (FEVER, GROWING TENDERNESS AT THE SURGERY SITE, A LARGE AMOUNT OF DRAINAGE OR BLEEDING, SEVERE PAIN, FOUL-SMELLING DRAINAGE, REDNESS OR SWELLING.    IT HAS BEEN OVER 8 TO 10 HOURS SINCE SURGERY AND YOU ARE STILL NOT ABLE TO URINATE (PASS WATER).      HOME CARE FOLLOWING MINOR SURGERY        DRESSING  Keep dressing dry and in place until your doctor instructs you to remove the dressing.    DRAINAGE  There should be minimal drainage. If bleeding should occur and soaks through the dressing apply a sterile, dry dressing over it and tape in place. If bleeding persists, apply gentle, steady pressure with your hand over the dressing for 5 minutes. If the bleeding does not stop, call your doctor.    SKIN CLOSURE  You may have stitches or special skin closures. You will be given an appointment for the removal of any external stitches. You may have stitches under the skin which will absorb. You may have steri-strips over the incision. These look like thin tapes and will peel off in 5-7 days. If they don t after 7 days, you may carefully remove them. You may shower with the steri-strips but do not soak them, as with swimming or taking a bath. A protective covering of  "plastic may be placed over external stitches for showering.    NOTIFY YOUR PHYSICIAN IF YOU HAVE ANY OF THE FOLLOWING SYMPTOMS:    1. Fever  2. Excessive bleeding or drainage  3. Disruption of the skin closure  4. Swelling, redness or excessive tenderness at the site  5. Severe pain  6. Drainage that is green, yellow, thick white or has a bad odor    Abdominal binder for comfort as needed.        Pending Results     No orders found from 10/3/2018 to 10/6/2018.            Admission Information     Date & Time Provider Department Dept. Phone    10/5/2018 Vaibhav Paige MD [facility] PreOP/PostOP [phone]      Your Vitals Were     Blood Pressure Temperature Respirations Height Weight Pulse Oximetry    120/73 98.4  F (36.9  C) (Temporal) 19 1.626 m (5' 4.02\") 60.3 kg (133 lb) 84%    BMI (Body Mass Index)                   22.82 kg/m2           MyChart Information     Check lets you send messages to your doctor, view your test results, renew your prescriptions, schedule appointments and more. To sign up, go to [email or web address] . Click on \"Log in\" on the left side of the screen, which will take you to the Welcome page. Then click on \"Sign up Now\" on the right side of the page.     You will be asked to enter the access code listed below, as well as some personal information. Please follow the directions to create your username and password.     Your access code is: [email or web address]  Expires: 10/7/2018 11:23 AM     Your access code will  in 90 days. If you need help or a new code, please call your [place] clinic or [phone].        Care EveryWhere ID     This is your Care EveryWhere ID. This could be used by other organizations to access your [place] medical records  [id number]        Equal Access to Services     [facility] ENOCH : Tim Cornejo, waaxda luqadaha, qaybta kaalmada santo, roxi mclaughlin. So [facility] [phone].    ATENCIÓN: Si habla " español, tiene a early disposición servicios gratuitos de asistencia lingüística. Princess paulson [phone].    We comply with applicable federal civil rights laws and [place] laws. We do not discriminate on the basis of race, color, national origin, age, disability, sex, sexual orientation, or gender identity.               Review of your medicines      UNREVIEWED medicines. Ask your doctor about these medicines        Dose / Directions    ACETAMINOPHEN PO        Dose:  500 mg   Take 500 mg by mouth every 4 hours as needed for pain   Refills:  0       ALENDRONATE SODIUM PO        Dose:  35 mg   Take 35 mg by mouth once a week   Refills:  0       BACLOFEN PO        Dose:  5 mg   Take 5 mg by mouth 3 times daily   Refills:  0       camphor-menthol 0.5-0.5 % Lotn   Commonly known as:  DERMASARRA        Apply topically every 8 hours as needed for skin care   Refills:  0       cycloSPORINE 0.05 % ophthalmic emulsion   Commonly known as:  RESTASIS        Dose:  1 drop   Place 1 drop into both eyes 2 times daily   Refills:  0       dispensed in syringe 1 Device in sodium chloride (PF) 20 mL Intrathecal Pump Infusion        by Intrathecal route continuous 8840 [employer] IIB pump per Dr Farooq Model 8637-20 20 ml, serial number [id number] Continuous infusion setting delivers: Hydromorphone 6.008 mg/day Bupivacaine 3.004 mg/day Clonidine 40.05 mcg/day   Refills:  0       FELBATOL PO        Dose:  600 mg   Take 600 mg by mouth Take 2 tablets in the morning, 1 tablet at noon and 1 tablet in the evening.   Refills:  0       FERROUS GLUCONATE PO        Dose:  324 mg   Take 324 mg by mouth 2 times daily (with meals) Breakfast & supper   Refills:  0       FLUOXETINE HCL PO        Dose:  40 mg   Take 40 mg by mouth daily   Refills:  0       hypromellose 0.5 % Soln ophthalmic solution   Commonly known as:  ARTIFICIAL TEARS        Dose:  1 drop   Place 1 drop into both eyes every hour as needed for dry eyes   Refills:  0       lamoTRIgine  100 MG tablet   Commonly known as:  LaMICtal        Dose:  100 mg   Take 100 mg by mouth 3 times daily.   Refills:  0       Multi-vitamin Tabs tablet        Dose:  1 tablet   Take 1 tablet by mouth daily   Refills:  0       NONFORMULARY        Ipratropium Bromide/Albuterol 0.5mg/3mg per 3mL nebulizer solution-use I vial via Neb every 6 hours PRN   Refills:  0       PANTOPRAZOLE SODIUM PO        Dose:  40 mg   Take 40 mg by mouth every morning (before breakfast)   Refills:  0       PATADAY 0.2 % Soln   Generic drug:  olopatadine HCl        Dose:  1 drop   Place 1 drop into both eyes 2 times daily   Refills:  0       SEROQUEL PO        Dose:  25 mg   Take 25 mg by mouth At Bedtime   Refills:  0       simvastatin 40 MG tablet   Commonly known as:  ZOCOR        Dose:  40 mg   Take 40 mg by mouth At Bedtime   Refills:  0                Protect others around you: Learn how to safely use, store and throw away your medicines at www.disposemymeds.org.             Medication List: This is a list of all your medications and when to take them. Check marks below indicate your daily home schedule. Keep this list as a reference.      Medications           Morning Afternoon Evening Bedtime As Needed    ACETAMINOPHEN PO   Take 500 mg by mouth every 4 hours as needed for pain                                ALENDRONATE SODIUM PO   Take 35 mg by mouth once a week                                BACLOFEN PO   Take 5 mg by mouth 3 times daily                                camphor-menthol 0.5-0.5 % Lotn   Commonly known as:  DERMASARRA   Apply topically every 8 hours as needed for skin care                                cycloSPORINE 0.05 % ophthalmic emulsion   Commonly known as:  RESTASIS   Place 1 drop into both eyes 2 times daily                                dispensed in syringe 1 Device in sodium chloride (PF) 20 mL Intrathecal Pump Infusion   by Intrathecal route continuous 2684 [employer] IIB pump per Dr Farooq Model 8637-20 20  lakisha, serial number [id number] Continuous infusion setting delivers: Hydromorphone 6.008 mg/day Bupivacaine 3.004 mg/day Clonidine 40.05 mcg/day                                FELBATOL PO   Take 600 mg by mouth Take 2 tablets in the morning, 1 tablet at noon and 1 tablet in the evening.                                FERROUS GLUCONATE PO   Take 324 mg by mouth 2 times daily (with meals) Breakfast & supper                                FLUOXETINE HCL PO   Take 40 mg by mouth daily                                hypromellose 0.5 % Soln ophthalmic solution   Commonly known as:  ARTIFICIAL TEARS   Place 1 drop into both eyes every hour as needed for dry eyes                                lamoTRIgine 100 MG tablet   Commonly known as:  LaMICtal   Take 100 mg by mouth 3 times daily.                                Multi-vitamin Tabs tablet   Take 1 tablet by mouth daily                                NONFORMULARY   Ipratropium Bromide/Albuterol 0.5mg/3mg per 3mL nebulizer solution-use I vial via Neb every 6 hours PRN                                PANTOPRAZOLE SODIUM PO   Take 40 mg by mouth every morning (before breakfast)                                PATADAY 0.2 % Soln   Place 1 drop into both eyes 2 times daily   Generic drug:  olopatadine HCl                                SEROQUEL PO   Take 25 mg by mouth At Bedtime                                simvastatin 40 MG tablet   Commonly known as:  ZOCOR   Take 40 mg by mouth At Bedtime

## 2018-10-05 NOTE — ANESTHESIA PREPROCEDURE EVALUATION
PAC NOTE:       ANESTHESIA PRE EVALUATION:  Anesthesia Evaluation     .             ROS/MED HX    ENT/Pulmonary:     (+)tobacco use, moderate COPD, O2 dependent, during Both 2 liters/min,  , . .    Neurologic:     (+)seizures Parkinson's disease other neuro CRPS    Cardiovascular:        (-) hypertension, CAD, CHF, arrhythmias, pulmonary hypertension and dyslipidemia   METS/Exercise Tolerance:     Hematologic:        (-) anemia   Musculoskeletal:   (+) arthritis, , , -       GI/Hepatic:     (+) GERD Asymptomatic on medication,      (-) other GI/Hepatic   Renal/Genitourinary:     (+) chronic renal disease, type: CRI, Pt does not require dialysis, Pt has no history of transplant,    (-) other renal    Endo:      (-) Type I DM, Type II DM, thyroid disease, chronic steroid usage, other endocrine disorder and obesity   Psychiatric:        (-) psychiatric history   Infectious Disease:  - neg infectious disease ROS       Malignancy:      - no malignancy   Other:    - neg other ROS                 Physical Exam      Airway   Mallampati: II  TM distance: >3 FB  Neck ROM: full    Dental     Cardiovascular   Rhythm and rate: regular and normal  (-) no murmur    Pulmonary    breath sounds clear to auscultation    Other findings: Lab Test        07/09/18 07/08/18 07/07/18      --          06/22/18                       0700          0657          0612           --           2055          WBC          10.0         10.6         9.6            < >        18.9*         HGB          10.0*        10.3*        10.0*          < >        14.1          MCV          100          98           100            < >        98            PLT          359          369          378            < >        319           INR           --           --           --           --          0.97           < > = values in this interval not displayed.                  Lab Test        07/09/18 07/08/18 07/07/18                       0700           0657          0612          NA           138          138          139           POTASSIUM    4.3          4.0          4.2           CHLORIDE     100          99           98            CO2          33*          37*          39*           BUN          27           29           31*           CR           0.68         0.66         0.67          ANIONGAP     5            2*           2*            BIBIANA          9.0          9.1          8.7           GLC          94           107*         106*                 Anesthesia Plan      History & Physical Review  History and physical reviewed and following examination; no interval change.    ASA Status:  3 .    NPO Status:  > 8 hours    Plan for General and LMA with Propofol induction. Maintenance will be Balanced.    PONV prophylaxis:  Ondansetron (or other 5HT-3)       Postoperative Care  Postoperative pain management:  IV analgesics and Oral pain medications.      Consents  Anesthetic plan, risks, benefits and alternatives discussed with:  Patient and Daughter/Son..                            .

## 2018-10-05 NOTE — IP AVS SNAPSHOT
[facility] PreOP/PostOP    [address]    [facility] [address]    Phone:  [phone]    Fax:  [phone]                                       After Visit Summary   10/5/2018    Shana Horne    MRN: [id number]           After Visit Summary Signature Page     I have received my discharge instructions, and my questions have been answered. I have discussed any challenges I see with this plan with the nurse or doctor.    ..........................................................................................................................................  Patient/Patient Representative Signature      ..........................................................................................................................................  Patient Representative Print Name and Relationship to Patient    ..................................................               ................................................  Date                                   Time    ..........................................................................................................................................  Reviewed by Signature/Title    ...................................................              ..............................................  Date                                               Time          22EPIC Rev 08/18

## 2019-01-01 ENCOUNTER — APPOINTMENT (OUTPATIENT)
Dept: GENERAL RADIOLOGY | Facility: CLINIC | Age: 69
DRG: 871 | End: 2019-01-01
Payer: COMMERCIAL

## 2019-01-01 ENCOUNTER — ANESTHESIA EVENT (OUTPATIENT)
Dept: INTENSIVE CARE | Facility: CLINIC | Age: 69
End: 2019-01-01

## 2019-01-01 ENCOUNTER — ANESTHESIA (OUTPATIENT)
Dept: INTENSIVE CARE | Facility: CLINIC | Age: 69
End: 2019-01-01

## 2019-01-01 ENCOUNTER — HOSPITAL ENCOUNTER (INPATIENT)
Facility: CLINIC | Age: 69
LOS: 15 days | Discharge: HOSPICE | DRG: 871 | End: 2019-01-23
Attending: EMERGENCY MEDICINE | Admitting: INTERNAL MEDICINE
Payer: COMMERCIAL

## 2019-01-01 ENCOUNTER — DOCUMENTATION ONLY (OUTPATIENT)
Dept: OTHER | Facility: CLINIC | Age: 69
End: 2019-01-01

## 2019-01-01 ENCOUNTER — TRANSFERRED RECORDS (OUTPATIENT)
Dept: HEALTH INFORMATION MANAGEMENT | Facility: CLINIC | Age: 69
End: 2019-01-01

## 2019-01-01 VITALS
DIASTOLIC BLOOD PRESSURE: 66 MMHG | RESPIRATION RATE: 20 BRPM | HEIGHT: 64 IN | SYSTOLIC BLOOD PRESSURE: 125 MMHG | BODY MASS INDEX: 21.87 KG/M2 | HEART RATE: 88 BPM | TEMPERATURE: 97.6 F | WEIGHT: 128.09 LBS | OXYGEN SATURATION: 90 %

## 2019-01-01 DIAGNOSIS — J18.9 PNEUMONIA DUE TO INFECTIOUS ORGANISM, UNSPECIFIED LATERALITY, UNSPECIFIED PART OF LUNG: ICD-10-CM

## 2019-01-01 DIAGNOSIS — J96.21 ACUTE AND CHRONIC RESPIRATORY FAILURE WITH HYPOXIA (H): Primary | ICD-10-CM

## 2019-01-01 LAB
ALBUMIN SERPL-MCNC: 2.9 G/DL (ref 3.4–5)
ALBUMIN SERPL-MCNC: 3.3 G/DL (ref 3.4–5)
ALP SERPL-CCNC: 110 U/L (ref 40–150)
ALP SERPL-CCNC: 90 U/L (ref 40–150)
ALT SERPL W P-5'-P-CCNC: 19 U/L (ref 0–50)
ALT SERPL W P-5'-P-CCNC: 21 U/L (ref 0–50)
ANION GAP SERPL CALCULATED.3IONS-SCNC: 10 MMOL/L (ref 6–17)
ANION GAP SERPL CALCULATED.3IONS-SCNC: 5 MMOL/L (ref 3–14)
ANION GAP SERPL CALCULATED.3IONS-SCNC: 6 MMOL/L (ref 3–14)
AST SERPL W P-5'-P-CCNC: 17 U/L (ref 0–45)
AST SERPL W P-5'-P-CCNC: 21 U/L (ref 0–45)
BACTERIA SPEC CULT: NO GROWTH
BACTERIA SPEC CULT: NO GROWTH
BASOPHILS # BLD AUTO: 0 10E9/L (ref 0–0.2)
BASOPHILS NFR BLD AUTO: 0.1 %
BILIRUB SERPL-MCNC: 0.3 MG/DL (ref 0.2–1.3)
BILIRUB SERPL-MCNC: 0.3 MG/DL (ref 0.2–1.3)
BUN SERPL-MCNC: 25 MG/DL (ref 7–30)
BUN SERPL-MCNC: 26 MG/DL (ref 7–30)
BUN SERPL-MCNC: 32 MG/DL (ref 7–30)
CA-I BLD-SCNC: 4.4 MG/DL (ref 4.4–5.2)
CALCIUM SERPL-MCNC: 8.4 MG/DL (ref 8.5–10.1)
CALCIUM SERPL-MCNC: 8.9 MG/DL (ref 8.5–10.1)
CHLORIDE BLD-SCNC: 99 MMOL/L (ref 94–109)
CHLORIDE SERPL-SCNC: 101 MMOL/L (ref 94–109)
CHLORIDE SERPL-SCNC: 104 MMOL/L (ref 94–109)
CO2 BLD-SCNC: 29 MMOL/L (ref 20–32)
CO2 BLDCOV-SCNC: 33 MMOL/L (ref 21–28)
CO2 SERPL-SCNC: 31 MMOL/L (ref 20–32)
CO2 SERPL-SCNC: 31 MMOL/L (ref 20–32)
CREAT BLD-MCNC: 1 MG/DL (ref 0.52–1.04)
CREAT SERPL-MCNC: 0.92 MG/DL (ref 0.52–1.04)
CREAT SERPL-MCNC: 1.01 MG/DL (ref 0.52–1.04)
DIFFERENTIAL METHOD BLD: ABNORMAL
EOSINOPHIL # BLD AUTO: 0 10E9/L (ref 0–0.7)
EOSINOPHIL NFR BLD AUTO: 0.1 %
ERYTHROCYTE [DISTWIDTH] IN BLOOD BY AUTOMATED COUNT: 14.2 % (ref 10–15)
ERYTHROCYTE [DISTWIDTH] IN BLOOD BY AUTOMATED COUNT: 14.3 % (ref 10–15)
GFR SERPL CREATININE-BSD FRML MDRD: 55 ML/MIN/{1.73_M2}
GFR SERPL CREATININE-BSD FRML MDRD: 58 ML/MIN/{1.73_M2}
GFR SERPL CREATININE-BSD FRML MDRD: 65 ML/MIN/{1.73_M2}
GLUCOSE BLD-MCNC: 143 MG/DL (ref 70–99)
GLUCOSE BLDC GLUCOMTR-MCNC: 100 MG/DL (ref 70–99)
GLUCOSE BLDC GLUCOMTR-MCNC: 103 MG/DL (ref 70–99)
GLUCOSE BLDC GLUCOMTR-MCNC: 105 MG/DL (ref 70–99)
GLUCOSE BLDC GLUCOMTR-MCNC: 116 MG/DL (ref 70–99)
GLUCOSE BLDC GLUCOMTR-MCNC: 122 MG/DL (ref 70–99)
GLUCOSE BLDC GLUCOMTR-MCNC: 124 MG/DL (ref 70–99)
GLUCOSE BLDC GLUCOMTR-MCNC: 130 MG/DL (ref 70–99)
GLUCOSE BLDC GLUCOMTR-MCNC: 90 MG/DL (ref 70–99)
GLUCOSE BLDC GLUCOMTR-MCNC: 99 MG/DL (ref 70–99)
GLUCOSE SERPL-MCNC: 104 MG/DL (ref 70–99)
GLUCOSE SERPL-MCNC: 127 MG/DL (ref 70–99)
HCT VFR BLD AUTO: 38.9 % (ref 35–47)
HCT VFR BLD AUTO: 48.8 % (ref 35–47)
HCT VFR BLD CALC: 49 %PCV (ref 35–47)
HGB BLD CALC-MCNC: 16.7 G/DL (ref 11.7–15.7)
HGB BLD-MCNC: 12.6 G/DL (ref 11.7–15.7)
HGB BLD-MCNC: 15.6 G/DL (ref 11.7–15.7)
IMM GRANULOCYTES # BLD: 0.1 10E9/L (ref 0–0.4)
IMM GRANULOCYTES NFR BLD: 0.7 %
INTERPRETATION ECG - MUSE: NORMAL
LACTATE BLD-SCNC: 1.7 MMOL/L (ref 0.7–2)
LACTATE BLD-SCNC: 1.7 MMOL/L (ref 0.7–2.1)
LYMPHOCYTES # BLD AUTO: 0.9 10E9/L (ref 0.8–5.3)
LYMPHOCYTES NFR BLD AUTO: 6.2 %
Lab: NORMAL
Lab: NORMAL
MAGNESIUM SERPL-MCNC: 2.4 MG/DL (ref 1.6–2.3)
MCH RBC QN AUTO: 29.9 PG (ref 26.5–33)
MCH RBC QN AUTO: 30.1 PG (ref 26.5–33)
MCHC RBC AUTO-ENTMCNC: 32 G/DL (ref 31.5–36.5)
MCHC RBC AUTO-ENTMCNC: 32.4 G/DL (ref 31.5–36.5)
MCV RBC AUTO: 93 FL (ref 78–100)
MCV RBC AUTO: 94 FL (ref 78–100)
MONOCYTES # BLD AUTO: 0.6 10E9/L (ref 0–1.3)
MONOCYTES NFR BLD AUTO: 3.9 %
MRSA DNA SPEC QL NAA+PROBE: NEGATIVE
NEUTROPHILS # BLD AUTO: 12.5 10E9/L (ref 1.6–8.3)
NEUTROPHILS NFR BLD AUTO: 89 %
NRBC # BLD AUTO: 0 10*3/UL
NRBC BLD AUTO-RTO: 0 /100
NT-PROBNP SERPL-MCNC: 307 PG/ML (ref 0–900)
PCO2 BLDV: 62 MM HG (ref 40–50)
PH BLDV: 7.33 PH (ref 7.32–7.43)
PHOSPHATE SERPL-MCNC: 3.1 MG/DL (ref 2.5–4.5)
PLATELET # BLD AUTO: 207 10E9/L (ref 150–450)
PLATELET # BLD AUTO: 240 10E9/L (ref 150–450)
PO2 BLDV: 22 MM HG (ref 25–47)
POTASSIUM BLD-SCNC: 4.4 MMOL/L (ref 3.4–5.3)
POTASSIUM SERPL-SCNC: 4.1 MMOL/L (ref 3.4–5.3)
POTASSIUM SERPL-SCNC: 4.4 MMOL/L (ref 3.4–5.3)
PROCALCITONIN SERPL-MCNC: 0.41 NG/ML
PROT SERPL-MCNC: 7 G/DL (ref 6.8–8.8)
PROT SERPL-MCNC: 7.7 G/DL (ref 6.8–8.8)
RBC # BLD AUTO: 4.18 10E12/L (ref 3.8–5.2)
RBC # BLD AUTO: 5.21 10E12/L (ref 3.8–5.2)
SAO2 % BLDV FROM PO2: 31 %
SODIUM BLD-SCNC: 138 MMOL/L (ref 133–144)
SODIUM SERPL-SCNC: 137 MMOL/L (ref 133–144)
SODIUM SERPL-SCNC: 141 MMOL/L (ref 133–144)
SPECIMEN SOURCE: NORMAL
WBC # BLD AUTO: 10.3 10E9/L (ref 4–11)
WBC # BLD AUTO: 14.1 10E9/L (ref 4–11)

## 2019-01-01 PROCEDURE — 80047 BASIC METABLC PNL IONIZED CA: CPT

## 2019-01-01 PROCEDURE — 99233 SBSQ HOSP IP/OBS HIGH 50: CPT | Performed by: NURSE PRACTITIONER

## 2019-01-01 PROCEDURE — 87040 BLOOD CULTURE FOR BACTERIA: CPT | Performed by: EMERGENCY MEDICINE

## 2019-01-01 PROCEDURE — 12000000 ZZH R&B MED SURG/OB

## 2019-01-01 PROCEDURE — 71045 X-RAY EXAM CHEST 1 VIEW: CPT

## 2019-01-01 PROCEDURE — 36415 COLL VENOUS BLD VENIPUNCTURE: CPT | Performed by: EMERGENCY MEDICINE

## 2019-01-01 PROCEDURE — 25000128 H RX IP 250 OP 636: Performed by: INTERNAL MEDICINE

## 2019-01-01 PROCEDURE — 94640 AIRWAY INHALATION TREATMENT: CPT

## 2019-01-01 PROCEDURE — 99231 SBSQ HOSP IP/OBS SF/LOW 25: CPT | Performed by: CLINICAL NURSE SPECIALIST

## 2019-01-01 PROCEDURE — 93005 ELECTROCARDIOGRAM TRACING: CPT

## 2019-01-01 PROCEDURE — 25000132 ZZH RX MED GY IP 250 OP 250 PS 637: Performed by: CLINICAL NURSE SPECIALIST

## 2019-01-01 PROCEDURE — 40000671 ZZH STATISTIC ANESTHESIA CASE

## 2019-01-01 PROCEDURE — 25000128 H RX IP 250 OP 636: Performed by: CLINICAL NURSE SPECIALIST

## 2019-01-01 PROCEDURE — 00000146 ZZHCL STATISTIC GLUCOSE BY METER IP

## 2019-01-01 PROCEDURE — 99233 SBSQ HOSP IP/OBS HIGH 50: CPT | Performed by: CLINICAL NURSE SPECIALIST

## 2019-01-01 PROCEDURE — 25000128 H RX IP 250 OP 636: Performed by: EMERGENCY MEDICINE

## 2019-01-01 PROCEDURE — 27210300 ZZH CANNULA HIGH FLOW, ADULT

## 2019-01-01 PROCEDURE — 96375 TX/PRO/DX INJ NEW DRUG ADDON: CPT

## 2019-01-01 PROCEDURE — 25000132 ZZH RX MED GY IP 250 OP 250 PS 637: Performed by: INTERNAL MEDICINE

## 2019-01-01 PROCEDURE — 99291 CRITICAL CARE FIRST HOUR: CPT | Performed by: INTERNAL MEDICINE

## 2019-01-01 PROCEDURE — 20000003 ZZH R&B ICU

## 2019-01-01 PROCEDURE — 95990 SPIN/BRAIN PUMP REFIL & MAIN: CPT

## 2019-01-01 PROCEDURE — 94640 AIRWAY INHALATION TREATMENT: CPT | Mod: 76

## 2019-01-01 PROCEDURE — 83605 ASSAY OF LACTIC ACID: CPT | Performed by: EMERGENCY MEDICINE

## 2019-01-01 PROCEDURE — 87640 STAPH A DNA AMP PROBE: CPT | Performed by: INTERNAL MEDICINE

## 2019-01-01 PROCEDURE — 99232 SBSQ HOSP IP/OBS MODERATE 35: CPT | Performed by: INTERNAL MEDICINE

## 2019-01-01 PROCEDURE — 80053 COMPREHEN METABOLIC PANEL: CPT | Performed by: INTERNAL MEDICINE

## 2019-01-01 PROCEDURE — 99239 HOSP IP/OBS DSCHRG MGMT >30: CPT | Performed by: INTERNAL MEDICINE

## 2019-01-01 PROCEDURE — 99233 SBSQ HOSP IP/OBS HIGH 50: CPT | Performed by: HOSPITALIST

## 2019-01-01 PROCEDURE — 99207 ZZC NON-BILLABLE SERV PER CHARTING: CPT | Performed by: PSYCHOLOGIST

## 2019-01-01 PROCEDURE — 83880 ASSAY OF NATRIURETIC PEPTIDE: CPT | Performed by: EMERGENCY MEDICINE

## 2019-01-01 PROCEDURE — 25000125 ZZHC RX 250: Performed by: INTERNAL MEDICINE

## 2019-01-01 PROCEDURE — 99233 SBSQ HOSP IP/OBS HIGH 50: CPT | Performed by: INTERNAL MEDICINE

## 2019-01-01 PROCEDURE — 25000128 H RX IP 250 OP 636

## 2019-01-01 PROCEDURE — 84100 ASSAY OF PHOSPHORUS: CPT | Performed by: INTERNAL MEDICINE

## 2019-01-01 PROCEDURE — 40000275 ZZH STATISTIC RCP TIME EA 10 MIN

## 2019-01-01 PROCEDURE — 27211040 ZZH CONTINUOUS NEBULIZER MICRO PUMP

## 2019-01-01 PROCEDURE — 99207 ZZC NON-BILLABLE SERV PER CHARTING: CPT | Performed by: INTERNAL MEDICINE

## 2019-01-01 PROCEDURE — 99231 SBSQ HOSP IP/OBS SF/LOW 25: CPT | Performed by: INTERNAL MEDICINE

## 2019-01-01 PROCEDURE — 85014 HEMATOCRIT: CPT

## 2019-01-01 PROCEDURE — 25000125 ZZHC RX 250

## 2019-01-01 PROCEDURE — 99285 EMERGENCY DEPT VISIT HI MDM: CPT | Mod: 25

## 2019-01-01 PROCEDURE — 40000983 ZZH STATISTIC HFNC ADULT NON-CPAP

## 2019-01-01 PROCEDURE — 99231 SBSQ HOSP IP/OBS SF/LOW 25: CPT | Mod: GW | Performed by: CLINICAL NURSE SPECIALIST

## 2019-01-01 PROCEDURE — 83735 ASSAY OF MAGNESIUM: CPT | Performed by: INTERNAL MEDICINE

## 2019-01-01 PROCEDURE — 94660 CPAP INITIATION&MGMT: CPT

## 2019-01-01 PROCEDURE — 80053 COMPREHEN METABOLIC PANEL: CPT | Performed by: EMERGENCY MEDICINE

## 2019-01-01 PROCEDURE — 40000281 ZZH STATISTIC TRANSPORT TIME EA 15 MIN

## 2019-01-01 PROCEDURE — 84145 PROCALCITONIN (PCT): CPT | Performed by: INTERNAL MEDICINE

## 2019-01-01 PROCEDURE — 87641 MR-STAPH DNA AMP PROBE: CPT | Performed by: INTERNAL MEDICINE

## 2019-01-01 PROCEDURE — 99207 ZZC CDG-MDM COMPONENT: MEETS LOW - DOWN CODED: CPT | Performed by: INTERNAL MEDICINE

## 2019-01-01 PROCEDURE — 36415 COLL VENOUS BLD VENIPUNCTURE: CPT | Performed by: INTERNAL MEDICINE

## 2019-01-01 PROCEDURE — 99222 1ST HOSP IP/OBS MODERATE 55: CPT | Performed by: CLINICAL NURSE SPECIALIST

## 2019-01-01 PROCEDURE — 85027 COMPLETE CBC AUTOMATED: CPT | Performed by: INTERNAL MEDICINE

## 2019-01-01 PROCEDURE — 85025 COMPLETE CBC W/AUTO DIFF WBC: CPT | Performed by: EMERGENCY MEDICINE

## 2019-01-01 PROCEDURE — 96365 THER/PROPH/DIAG IV INF INIT: CPT

## 2019-01-01 PROCEDURE — 83605 ASSAY OF LACTIC ACID: CPT

## 2019-01-01 PROCEDURE — 82803 BLOOD GASES ANY COMBINATION: CPT

## 2019-01-01 RX ORDER — HEPARIN SODIUM,PORCINE 10 UNIT/ML
2-5 VIAL (ML) INTRAVENOUS
Status: DISCONTINUED | OUTPATIENT
Start: 2019-01-01 | End: 2019-01-01

## 2019-01-01 RX ORDER — LEVOFLOXACIN 250 MG/1
500 TABLET, FILM COATED ORAL DAILY
Status: DISCONTINUED | OUTPATIENT
Start: 2019-01-01 | End: 2019-01-01

## 2019-01-01 RX ORDER — FELBAMATE 600 MG/1
600 TABLET ORAL 3 TIMES DAILY
Status: DISCONTINUED | OUTPATIENT
Start: 2019-01-01 | End: 2019-01-01 | Stop reason: HOSPADM

## 2019-01-01 RX ORDER — GLYCOPYRROLATE 0.2 MG/ML
0.2 INJECTION, SOLUTION INTRAMUSCULAR; INTRAVENOUS EVERY 4 HOURS PRN
Status: DISCONTINUED | OUTPATIENT
Start: 2019-01-01 | End: 2019-01-01 | Stop reason: HOSPADM

## 2019-01-01 RX ORDER — IPRATROPIUM BROMIDE AND ALBUTEROL SULFATE 2.5; .5 MG/3ML; MG/3ML
3 SOLUTION RESPIRATORY (INHALATION) EVERY 4 HOURS
Status: DISCONTINUED | OUTPATIENT
Start: 2019-01-01 | End: 2019-01-01

## 2019-01-01 RX ORDER — ACETAMINOPHEN 325 MG/1
325-650 TABLET ORAL EVERY 4 HOURS PRN
Qty: 100 TABLET | Refills: 1 | Status: SHIPPED | OUTPATIENT
Start: 2019-01-01 | End: 2019-01-01

## 2019-01-01 RX ORDER — ACETAMINOPHEN 325 MG/1
650 TABLET ORAL EVERY 4 HOURS PRN
Status: DISCONTINUED | OUTPATIENT
Start: 2019-01-01 | End: 2019-01-01 | Stop reason: HOSPADM

## 2019-01-01 RX ORDER — BISACODYL 10 MG
SUPPOSITORY, RECTAL RECTAL
Qty: 12 SUPPOSITORY | Refills: 1 | Status: SHIPPED | OUTPATIENT
Start: 2019-01-01 | End: 2019-01-01

## 2019-01-01 RX ORDER — HYDROMORPHONE HYDROCHLORIDE 2 MG/1
1 TABLET ORAL
Qty: 30 TABLET | Refills: 0 | Status: SHIPPED | OUTPATIENT
Start: 2019-01-01 | End: 2019-01-01

## 2019-01-01 RX ORDER — IPRATROPIUM BROMIDE AND ALBUTEROL SULFATE 2.5; .5 MG/3ML; MG/3ML
3 SOLUTION RESPIRATORY (INHALATION)
Status: DISCONTINUED | OUTPATIENT
Start: 2019-01-01 | End: 2019-01-01

## 2019-01-01 RX ORDER — METHYLPREDNISOLONE SODIUM SUCCINATE 40 MG/ML
40 INJECTION, POWDER, LYOPHILIZED, FOR SOLUTION INTRAMUSCULAR; INTRAVENOUS 2 TIMES DAILY
Status: DISCONTINUED | OUTPATIENT
Start: 2019-01-01 | End: 2019-01-01

## 2019-01-01 RX ORDER — ATROPINE SULFATE 10 MG/ML
2-4 SOLUTION/ DROPS OPHTHALMIC
Qty: 5 ML | Refills: 1 | Status: SHIPPED | OUTPATIENT
Start: 2019-01-01 | End: 2019-01-01

## 2019-01-01 RX ORDER — ONDANSETRON 2 MG/ML
4 INJECTION INTRAMUSCULAR; INTRAVENOUS EVERY 6 HOURS PRN
Status: DISCONTINUED | OUTPATIENT
Start: 2019-01-01 | End: 2019-01-01 | Stop reason: HOSPADM

## 2019-01-01 RX ORDER — LORAZEPAM 2 MG/ML
.25-.5 INJECTION INTRAMUSCULAR EVERY 4 HOURS PRN
Status: DISCONTINUED | OUTPATIENT
Start: 2019-01-01 | End: 2019-01-01

## 2019-01-01 RX ORDER — LEVOFLOXACIN 5 MG/ML
500 INJECTION, SOLUTION INTRAVENOUS EVERY 24 HOURS
Status: DISCONTINUED | OUTPATIENT
Start: 2019-01-01 | End: 2019-01-01

## 2019-01-01 RX ORDER — FELBAMATE 600 MG/1
600 TABLET ORAL 2 TIMES DAILY
Status: ON HOLD | COMMUNITY
End: 2019-01-01

## 2019-01-01 RX ORDER — NALOXONE HYDROCHLORIDE 0.4 MG/ML
.1-.4 INJECTION, SOLUTION INTRAMUSCULAR; INTRAVENOUS; SUBCUTANEOUS
Status: DISCONTINUED | OUTPATIENT
Start: 2019-01-01 | End: 2019-01-01 | Stop reason: HOSPADM

## 2019-01-01 RX ORDER — HALOPERIDOL 0.5 MG/1
1 TABLET ORAL EVERY 6 HOURS PRN
Qty: 30 TABLET | Refills: 1 | Status: SHIPPED | OUTPATIENT
Start: 2019-01-01 | End: 2019-01-01

## 2019-01-01 RX ORDER — LORAZEPAM 0.5 MG/1
0.5 TABLET ORAL EVERY 4 HOURS PRN
Qty: 30 TABLET | Refills: 1 | Status: SHIPPED | OUTPATIENT
Start: 2019-01-01 | End: 2019-01-01

## 2019-01-01 RX ORDER — IPRATROPIUM BROMIDE AND ALBUTEROL SULFATE 2.5; .5 MG/3ML; MG/3ML
3 SOLUTION RESPIRATORY (INHALATION)
Qty: 3 ML | Refills: 3 | Status: SHIPPED | OUTPATIENT
Start: 2019-01-01 | End: 2019-01-01

## 2019-01-01 RX ORDER — LORAZEPAM 2 MG/ML
0.5 INJECTION INTRAMUSCULAR EVERY 4 HOURS PRN
Status: DISCONTINUED | OUTPATIENT
Start: 2019-01-01 | End: 2019-01-01

## 2019-01-01 RX ORDER — LIDOCAINE 40 MG/G
CREAM TOPICAL
Status: DISCONTINUED | OUTPATIENT
Start: 2019-01-01 | End: 2019-01-01

## 2019-01-01 RX ORDER — LAMOTRIGINE 100 MG/1
100 TABLET ORAL 3 TIMES DAILY
Status: DISCONTINUED | OUTPATIENT
Start: 2019-01-01 | End: 2019-01-01 | Stop reason: HOSPADM

## 2019-01-01 RX ORDER — IPRATROPIUM BROMIDE AND ALBUTEROL SULFATE 2.5; .5 MG/3ML; MG/3ML
3 SOLUTION RESPIRATORY (INHALATION)
Status: DISCONTINUED | OUTPATIENT
Start: 2019-01-01 | End: 2019-01-01 | Stop reason: HOSPADM

## 2019-01-01 RX ORDER — AMOXICILLIN 250 MG
1 CAPSULE ORAL 2 TIMES DAILY PRN
Status: DISCONTINUED | OUTPATIENT
Start: 2019-01-01 | End: 2019-01-01 | Stop reason: HOSPADM

## 2019-01-01 RX ORDER — AMITRIPTYLINE HYDROCHLORIDE 75 MG/1
75 TABLET ORAL AT BEDTIME
Qty: 30 TABLET | Refills: 0 | Status: SHIPPED | OUTPATIENT
Start: 2019-01-01 | End: 2019-01-01

## 2019-01-01 RX ORDER — FAMOTIDINE 20 MG/1
20 TABLET, FILM COATED ORAL 2 TIMES DAILY
Status: DISCONTINUED | OUTPATIENT
Start: 2019-01-01 | End: 2019-01-01

## 2019-01-01 RX ORDER — GABAPENTIN 100 MG/1
100 CAPSULE ORAL 2 TIMES DAILY
COMMUNITY

## 2019-01-01 RX ORDER — FELBAMATE 600 MG/1
600 TABLET ORAL 3 TIMES DAILY
Qty: 90 TABLET | Refills: 0 | Status: SHIPPED | OUTPATIENT
Start: 2019-01-01 | End: 2019-01-01

## 2019-01-01 RX ORDER — METHYLPREDNISOLONE SODIUM SUCCINATE 125 MG/2ML
INJECTION, POWDER, LYOPHILIZED, FOR SOLUTION INTRAMUSCULAR; INTRAVENOUS
Status: COMPLETED
Start: 2019-01-01 | End: 2019-01-01

## 2019-01-01 RX ORDER — NALOXONE HYDROCHLORIDE 0.4 MG/ML
.1-.4 INJECTION, SOLUTION INTRAMUSCULAR; INTRAVENOUS; SUBCUTANEOUS
Status: DISCONTINUED | OUTPATIENT
Start: 2019-01-01 | End: 2019-01-01

## 2019-01-01 RX ORDER — NALOXONE HYDROCHLORIDE 0.4 MG/ML
0.1 INJECTION, SOLUTION INTRAMUSCULAR; INTRAVENOUS; SUBCUTANEOUS ONCE
Status: COMPLETED | OUTPATIENT
Start: 2019-01-01 | End: 2019-01-01

## 2019-01-01 RX ORDER — ONDANSETRON 4 MG/1
4 TABLET, ORALLY DISINTEGRATING ORAL EVERY 6 HOURS PRN
Status: DISCONTINUED | OUTPATIENT
Start: 2019-01-01 | End: 2019-01-01 | Stop reason: HOSPADM

## 2019-01-01 RX ORDER — IBUPROFEN 400 MG/1
400 TABLET, FILM COATED ORAL EVERY 6 HOURS PRN
Status: DISCONTINUED | OUTPATIENT
Start: 2019-01-01 | End: 2019-01-01 | Stop reason: HOSPADM

## 2019-01-01 RX ORDER — BUMETANIDE 0.25 MG/ML
1 INJECTION INTRAMUSCULAR; INTRAVENOUS EVERY 12 HOURS
Status: COMPLETED | OUTPATIENT
Start: 2019-01-01 | End: 2019-01-01

## 2019-01-01 RX ORDER — CEFTRIAXONE 1 G/1
1 INJECTION, POWDER, FOR SOLUTION INTRAMUSCULAR; INTRAVENOUS ONCE
Status: DISCONTINUED | OUTPATIENT
Start: 2019-01-01 | End: 2019-01-01

## 2019-01-01 RX ORDER — LORAZEPAM 2 MG/ML
.5-1 INJECTION INTRAMUSCULAR
Status: DISCONTINUED | OUTPATIENT
Start: 2019-01-01 | End: 2019-01-01 | Stop reason: HOSPADM

## 2019-01-01 RX ORDER — NALOXONE HYDROCHLORIDE 0.4 MG/ML
INJECTION, SOLUTION INTRAMUSCULAR; INTRAVENOUS; SUBCUTANEOUS
Status: COMPLETED
Start: 2019-01-01 | End: 2019-01-01

## 2019-01-01 RX ORDER — AMOXICILLIN 250 MG
2 CAPSULE ORAL 2 TIMES DAILY PRN
Status: DISCONTINUED | OUTPATIENT
Start: 2019-01-01 | End: 2019-01-01 | Stop reason: HOSPADM

## 2019-01-01 RX ORDER — QUETIAPINE FUMARATE 50 MG/1
50 TABLET, FILM COATED ORAL AT BEDTIME
Status: DISCONTINUED | OUTPATIENT
Start: 2019-01-01 | End: 2019-01-01 | Stop reason: HOSPADM

## 2019-01-01 RX ORDER — HYDROMORPHONE HYDROCHLORIDE 10 MG/ML
1 INJECTION INTRAMUSCULAR; INTRAVENOUS; SUBCUTANEOUS
Status: DISCONTINUED | OUTPATIENT
Start: 2019-01-01 | End: 2019-01-01 | Stop reason: HOSPADM

## 2019-01-01 RX ORDER — METHYLPREDNISOLONE SODIUM SUCCINATE 125 MG/2ML
125 INJECTION, POWDER, LYOPHILIZED, FOR SOLUTION INTRAMUSCULAR; INTRAVENOUS ONCE
Status: COMPLETED | OUTPATIENT
Start: 2019-01-01 | End: 2019-01-01

## 2019-01-01 RX ORDER — KETOROLAC TROMETHAMINE 30 MG/ML
15 INJECTION, SOLUTION INTRAMUSCULAR; INTRAVENOUS EVERY 6 HOURS PRN
Status: DISCONTINUED | OUTPATIENT
Start: 2019-01-01 | End: 2019-01-01 | Stop reason: HOSPADM

## 2019-01-01 RX ORDER — IPRATROPIUM BROMIDE AND ALBUTEROL SULFATE 2.5; .5 MG/3ML; MG/3ML
SOLUTION RESPIRATORY (INHALATION)
Status: COMPLETED
Start: 2019-01-01 | End: 2019-01-01

## 2019-01-01 RX ORDER — ATROPINE SULFATE 10 MG/ML
2 SOLUTION/ DROPS OPHTHALMIC EVERY 4 HOURS PRN
Status: DISCONTINUED | OUTPATIENT
Start: 2019-01-01 | End: 2019-01-01 | Stop reason: HOSPADM

## 2019-01-01 RX ORDER — HYDROMORPHONE HYDROCHLORIDE 1 MG/ML
0.5 INJECTION, SOLUTION INTRAMUSCULAR; INTRAVENOUS; SUBCUTANEOUS
Status: DISCONTINUED | OUTPATIENT
Start: 2019-01-01 | End: 2019-01-01 | Stop reason: HOSPADM

## 2019-01-01 RX ORDER — LORAZEPAM 0.5 MG/1
0.5 TABLET ORAL EVERY 4 HOURS PRN
Status: DISCONTINUED | OUTPATIENT
Start: 2019-01-01 | End: 2019-01-01 | Stop reason: HOSPADM

## 2019-01-01 RX ADMIN — FELBAMATE 600 MG: 600 TABLET ORAL at 09:23

## 2019-01-01 RX ADMIN — IPRATROPIUM BROMIDE AND ALBUTEROL SULFATE 3 ML: .5; 3 SOLUTION RESPIRATORY (INHALATION) at 01:09

## 2019-01-01 RX ADMIN — LAMOTRIGINE 100 MG: 100 TABLET ORAL at 21:31

## 2019-01-01 RX ADMIN — HYDROMORPHONE HYDROCHLORIDE 1 MG: 10 INJECTION INTRAMUSCULAR; INTRAVENOUS; SUBCUTANEOUS at 22:41

## 2019-01-01 RX ADMIN — AMITRIPTYLINE HYDROCHLORIDE 75 MG: 50 TABLET, FILM COATED ORAL at 21:26

## 2019-01-01 RX ADMIN — FELBAMATE 600 MG: 600 TABLET ORAL at 15:52

## 2019-01-01 RX ADMIN — QUETIAPINE FUMARATE 50 MG: 50 TABLET ORAL at 00:02

## 2019-01-01 RX ADMIN — FELBAMATE 600 MG: 600 TABLET ORAL at 20:33

## 2019-01-01 RX ADMIN — FELBAMATE 600 MG: 600 TABLET ORAL at 12:39

## 2019-01-01 RX ADMIN — FELBAMATE 600 MG: 600 TABLET ORAL at 21:21

## 2019-01-01 RX ADMIN — AMITRIPTYLINE HYDROCHLORIDE 75 MG: 50 TABLET, FILM COATED ORAL at 20:32

## 2019-01-01 RX ADMIN — LAMOTRIGINE 100 MG: 100 TABLET ORAL at 08:53

## 2019-01-01 RX ADMIN — FELBAMATE 600 MG: 600 TABLET ORAL at 10:37

## 2019-01-01 RX ADMIN — LAMOTRIGINE 100 MG: 100 TABLET ORAL at 20:33

## 2019-01-01 RX ADMIN — HYDROMORPHONE HYDROCHLORIDE 0.5 MG: 1 INJECTION, SOLUTION INTRAMUSCULAR; INTRAVENOUS; SUBCUTANEOUS at 05:50

## 2019-01-01 RX ADMIN — AMITRIPTYLINE HYDROCHLORIDE 75 MG: 50 TABLET, FILM COATED ORAL at 00:19

## 2019-01-01 RX ADMIN — LAMOTRIGINE 100 MG: 100 TABLET ORAL at 08:30

## 2019-01-01 RX ADMIN — LORAZEPAM 1 MG: 2 LIQUID ORAL at 01:38

## 2019-01-01 RX ADMIN — AMITRIPTYLINE HYDROCHLORIDE 75 MG: 50 TABLET, FILM COATED ORAL at 21:16

## 2019-01-01 RX ADMIN — HYDROMORPHONE HYDROCHLORIDE 1 MG: 10 INJECTION INTRAMUSCULAR; INTRAVENOUS; SUBCUTANEOUS at 21:21

## 2019-01-01 RX ADMIN — FELBAMATE 600 MG: 600 TABLET ORAL at 17:33

## 2019-01-01 RX ADMIN — LAMOTRIGINE 100 MG: 100 TABLET ORAL at 09:01

## 2019-01-01 RX ADMIN — AMITRIPTYLINE HYDROCHLORIDE 75 MG: 50 TABLET, FILM COATED ORAL at 21:13

## 2019-01-01 RX ADMIN — IPRATROPIUM BROMIDE AND ALBUTEROL SULFATE 3 ML: .5; 3 SOLUTION RESPIRATORY (INHALATION) at 07:20

## 2019-01-01 RX ADMIN — FELBAMATE 600 MG: 600 TABLET ORAL at 17:26

## 2019-01-01 RX ADMIN — FELBAMATE 600 MG: 600 TABLET ORAL at 08:57

## 2019-01-01 RX ADMIN — FELBAMATE 600 MG: 600 TABLET ORAL at 17:04

## 2019-01-01 RX ADMIN — QUETIAPINE FUMARATE 50 MG: 50 TABLET ORAL at 20:32

## 2019-01-01 RX ADMIN — LORAZEPAM 1 MG: 2 LIQUID ORAL at 13:23

## 2019-01-01 RX ADMIN — HYDROMORPHONE HYDROCHLORIDE 0.5 MG: 1 INJECTION, SOLUTION INTRAMUSCULAR; INTRAVENOUS; SUBCUTANEOUS at 08:36

## 2019-01-01 RX ADMIN — LAMOTRIGINE 100 MG: 100 TABLET ORAL at 21:21

## 2019-01-01 RX ADMIN — LAMOTRIGINE 100 MG: 100 TABLET ORAL at 21:04

## 2019-01-01 RX ADMIN — FELBAMATE 600 MG: 600 TABLET ORAL at 21:16

## 2019-01-01 RX ADMIN — IPRATROPIUM BROMIDE AND ALBUTEROL SULFATE 3 ML: .5; 3 SOLUTION RESPIRATORY (INHALATION) at 15:12

## 2019-01-01 RX ADMIN — LAMOTRIGINE 100 MG: 100 TABLET ORAL at 12:39

## 2019-01-01 RX ADMIN — LAMOTRIGINE 100 MG: 100 TABLET ORAL at 08:17

## 2019-01-01 RX ADMIN — HYDROMORPHONE HYDROCHLORIDE 1 MG: 10 INJECTION INTRAMUSCULAR; INTRAVENOUS; SUBCUTANEOUS at 17:28

## 2019-01-01 RX ADMIN — LAMOTRIGINE 100 MG: 100 TABLET ORAL at 15:52

## 2019-01-01 RX ADMIN — BUMETANIDE 1 MG: 0.25 INJECTION INTRAMUSCULAR; INTRAVENOUS at 09:40

## 2019-01-01 RX ADMIN — FAMOTIDINE 20 MG: 20 TABLET ORAL at 21:24

## 2019-01-01 RX ADMIN — LORAZEPAM 1 MG: 2 INJECTION, SOLUTION INTRAMUSCULAR; INTRAVENOUS at 17:16

## 2019-01-01 RX ADMIN — FELBAMATE 600 MG: 600 TABLET ORAL at 21:24

## 2019-01-01 RX ADMIN — FELBAMATE 600 MG: 600 TABLET ORAL at 12:04

## 2019-01-01 RX ADMIN — LAMOTRIGINE 100 MG: 100 TABLET ORAL at 09:23

## 2019-01-01 RX ADMIN — IBUPROFEN 400 MG: 400 TABLET ORAL at 14:57

## 2019-01-01 RX ADMIN — FELBAMATE 600 MG: 600 TABLET ORAL at 21:26

## 2019-01-01 RX ADMIN — LORAZEPAM 1 MG: 2 INJECTION, SOLUTION INTRAMUSCULAR; INTRAVENOUS at 23:45

## 2019-01-01 RX ADMIN — FELBAMATE 600 MG: 600 TABLET ORAL at 09:04

## 2019-01-01 RX ADMIN — HYDROMORPHONE HYDROCHLORIDE 0.5 MG: 1 INJECTION, SOLUTION INTRAMUSCULAR; INTRAVENOUS; SUBCUTANEOUS at 14:38

## 2019-01-01 RX ADMIN — FELBAMATE 600 MG: 600 TABLET ORAL at 21:12

## 2019-01-01 RX ADMIN — IPRATROPIUM BROMIDE AND ALBUTEROL SULFATE 3 ML: .5; 3 SOLUTION RESPIRATORY (INHALATION) at 23:38

## 2019-01-01 RX ADMIN — DEXTRAN 70, AND HYPROMELLOSE 2910 3 DROP: 1; 3 SOLUTION/ DROPS OPHTHALMIC at 09:58

## 2019-01-01 RX ADMIN — HYDROMORPHONE HYDROCHLORIDE 1 MG: 10 INJECTION INTRAMUSCULAR; INTRAVENOUS; SUBCUTANEOUS at 05:53

## 2019-01-01 RX ADMIN — QUETIAPINE FUMARATE 50 MG: 50 TABLET ORAL at 21:31

## 2019-01-01 RX ADMIN — FELBAMATE 600 MG: 600 TABLET ORAL at 20:32

## 2019-01-01 RX ADMIN — HYDROMORPHONE HYDROCHLORIDE 1 MG: 10 INJECTION INTRAMUSCULAR; INTRAVENOUS; SUBCUTANEOUS at 12:39

## 2019-01-01 RX ADMIN — HYDROMORPHONE HYDROCHLORIDE 0.5 MG: 1 INJECTION, SOLUTION INTRAMUSCULAR; INTRAVENOUS; SUBCUTANEOUS at 12:34

## 2019-01-01 RX ADMIN — QUETIAPINE FUMARATE 50 MG: 50 TABLET ORAL at 20:25

## 2019-01-01 RX ADMIN — AMITRIPTYLINE HYDROCHLORIDE 75 MG: 50 TABLET, FILM COATED ORAL at 21:25

## 2019-01-01 RX ADMIN — AMITRIPTYLINE HYDROCHLORIDE 75 MG: 50 TABLET, FILM COATED ORAL at 20:33

## 2019-01-01 RX ADMIN — NALOXONE HYDROCHLORIDE 0.1 MG: 0.4 INJECTION, SOLUTION INTRAMUSCULAR; INTRAVENOUS; SUBCUTANEOUS at 20:20

## 2019-01-01 RX ADMIN — HYDROMORPHONE HYDROCHLORIDE 1 MG: 10 INJECTION INTRAMUSCULAR; INTRAVENOUS; SUBCUTANEOUS at 13:18

## 2019-01-01 RX ADMIN — FELBAMATE 600 MG: 600 TABLET ORAL at 08:52

## 2019-01-01 RX ADMIN — LAMOTRIGINE 100 MG: 100 TABLET ORAL at 12:43

## 2019-01-01 RX ADMIN — Medication 1 MG: at 15:52

## 2019-01-01 RX ADMIN — HYDROMORPHONE HYDROCHLORIDE 0.5 MG: 1 INJECTION, SOLUTION INTRAMUSCULAR; INTRAVENOUS; SUBCUTANEOUS at 20:48

## 2019-01-01 RX ADMIN — HYDROMORPHONE HYDROCHLORIDE 1 MG: 10 INJECTION INTRAMUSCULAR; INTRAVENOUS; SUBCUTANEOUS at 19:23

## 2019-01-01 RX ADMIN — HYDROMORPHONE HYDROCHLORIDE 0.5 MG: 1 INJECTION, SOLUTION INTRAMUSCULAR; INTRAVENOUS; SUBCUTANEOUS at 09:35

## 2019-01-01 RX ADMIN — Medication 1 MG: at 08:52

## 2019-01-01 RX ADMIN — HYDROMORPHONE HYDROCHLORIDE 1 MG: 10 INJECTION INTRAMUSCULAR; INTRAVENOUS; SUBCUTANEOUS at 19:43

## 2019-01-01 RX ADMIN — TAZOBACTAM SODIUM AND PIPERACILLIN SODIUM 3.38 G: 375; 3 INJECTION, SOLUTION INTRAVENOUS at 08:01

## 2019-01-01 RX ADMIN — LORAZEPAM 1 MG: 2 INJECTION, SOLUTION INTRAMUSCULAR; INTRAVENOUS at 13:08

## 2019-01-01 RX ADMIN — METHYLPREDNISOLONE 40 MG: 40 INJECTION, POWDER, LYOPHILIZED, FOR SOLUTION INTRAMUSCULAR; INTRAVENOUS at 09:29

## 2019-01-01 RX ADMIN — LORAZEPAM 1 MG: 2 LIQUID ORAL at 00:01

## 2019-01-01 RX ADMIN — LAMOTRIGINE 100 MG: 100 TABLET ORAL at 09:35

## 2019-01-01 RX ADMIN — FELBAMATE 600 MG: 600 TABLET ORAL at 09:01

## 2019-01-01 RX ADMIN — LORAZEPAM 0.5 MG: 2 INJECTION INTRAMUSCULAR; INTRAVENOUS at 03:59

## 2019-01-01 RX ADMIN — FELBAMATE 600 MG: 600 TABLET ORAL at 00:35

## 2019-01-01 RX ADMIN — AMITRIPTYLINE HYDROCHLORIDE 75 MG: 50 TABLET, FILM COATED ORAL at 20:25

## 2019-01-01 RX ADMIN — FELBAMATE 600 MG: 600 TABLET ORAL at 09:19

## 2019-01-01 RX ADMIN — IPRATROPIUM BROMIDE AND ALBUTEROL SULFATE 3 ML: .5; 3 SOLUTION RESPIRATORY (INHALATION) at 03:36

## 2019-01-01 RX ADMIN — FELBAMATE 600 MG: 600 TABLET ORAL at 08:17

## 2019-01-01 RX ADMIN — LAMOTRIGINE 100 MG: 100 TABLET ORAL at 00:02

## 2019-01-01 RX ADMIN — HYDROMORPHONE HYDROCHLORIDE 1 MG: 10 INJECTION INTRAMUSCULAR; INTRAVENOUS; SUBCUTANEOUS at 22:43

## 2019-01-01 RX ADMIN — ACETAMINOPHEN 650 MG: 325 TABLET, FILM COATED ORAL at 23:31

## 2019-01-01 RX ADMIN — FELBAMATE 600 MG: 600 TABLET ORAL at 21:30

## 2019-01-01 RX ADMIN — IPRATROPIUM BROMIDE AND ALBUTEROL SULFATE 3 ML: .5; 3 SOLUTION RESPIRATORY (INHALATION) at 03:39

## 2019-01-01 RX ADMIN — LORAZEPAM 1 MG: 2 INJECTION, SOLUTION INTRAMUSCULAR; INTRAVENOUS at 12:11

## 2019-01-01 RX ADMIN — FAMOTIDINE 20 MG: 20 TABLET ORAL at 08:57

## 2019-01-01 RX ADMIN — FELBAMATE 600 MG: 600 TABLET ORAL at 10:35

## 2019-01-01 RX ADMIN — IPRATROPIUM BROMIDE AND ALBUTEROL SULFATE 3 ML: .5; 3 SOLUTION RESPIRATORY (INHALATION) at 20:11

## 2019-01-01 RX ADMIN — Medication 1 MG: at 08:37

## 2019-01-01 RX ADMIN — FAMOTIDINE 20 MG: 20 TABLET ORAL at 21:30

## 2019-01-01 RX ADMIN — LAMOTRIGINE 100 MG: 100 TABLET ORAL at 08:37

## 2019-01-01 RX ADMIN — FAMOTIDINE 20 MG: 10 INJECTION, SOLUTION INTRAVENOUS at 00:02

## 2019-01-01 RX ADMIN — LORAZEPAM 0.5 MG: 0.5 TABLET ORAL at 17:04

## 2019-01-01 RX ADMIN — LAMOTRIGINE 100 MG: 100 TABLET ORAL at 21:12

## 2019-01-01 RX ADMIN — LORAZEPAM 1 MG: 2 INJECTION, SOLUTION INTRAMUSCULAR; INTRAVENOUS at 20:37

## 2019-01-01 RX ADMIN — LAMOTRIGINE 100 MG: 100 TABLET ORAL at 21:26

## 2019-01-01 RX ADMIN — HYDROMORPHONE HYDROCHLORIDE 1 MG: 10 INJECTION INTRAMUSCULAR; INTRAVENOUS; SUBCUTANEOUS at 11:39

## 2019-01-01 RX ADMIN — LAMOTRIGINE 100 MG: 100 TABLET ORAL at 12:04

## 2019-01-01 RX ADMIN — DEXTRAN 70, AND HYPROMELLOSE 2910 2 DROP: 1; 3 SOLUTION/ DROPS OPHTHALMIC at 23:45

## 2019-01-01 RX ADMIN — TAZOBACTAM SODIUM AND PIPERACILLIN SODIUM 3.38 G: 375; 3 INJECTION, SOLUTION INTRAVENOUS at 08:38

## 2019-01-01 RX ADMIN — LAMOTRIGINE 100 MG: 100 TABLET ORAL at 14:20

## 2019-01-01 RX ADMIN — LORAZEPAM 1 MG: 2 INJECTION, SOLUTION INTRAMUSCULAR; INTRAVENOUS at 23:55

## 2019-01-01 RX ADMIN — LAMOTRIGINE 100 MG: 100 TABLET ORAL at 17:04

## 2019-01-01 RX ADMIN — TAZOBACTAM SODIUM AND PIPERACILLIN SODIUM 3.38 G: 375; 3 INJECTION, SOLUTION INTRAVENOUS at 18:45

## 2019-01-01 RX ADMIN — SODIUM CHLORIDE, POTASSIUM CHLORIDE, SODIUM LACTATE AND CALCIUM CHLORIDE 1000 ML: 600; 310; 30; 20 INJECTION, SOLUTION INTRAVENOUS at 20:24

## 2019-01-01 RX ADMIN — FELBAMATE 600 MG: 600 TABLET ORAL at 08:36

## 2019-01-01 RX ADMIN — QUETIAPINE FUMARATE 50 MG: 50 TABLET ORAL at 21:04

## 2019-01-01 RX ADMIN — AZITHROMYCIN MONOHYDRATE 500 MG: 500 INJECTION, POWDER, LYOPHILIZED, FOR SOLUTION INTRAVENOUS at 21:27

## 2019-01-01 RX ADMIN — LAMOTRIGINE 100 MG: 100 TABLET ORAL at 08:52

## 2019-01-01 RX ADMIN — HYDROMORPHONE HYDROCHLORIDE 1 MG: 10 INJECTION INTRAMUSCULAR; INTRAVENOUS; SUBCUTANEOUS at 13:28

## 2019-01-01 RX ADMIN — LORAZEPAM 1 MG: 2 LIQUID ORAL at 06:31

## 2019-01-01 RX ADMIN — FELBAMATE 600 MG: 600 TABLET ORAL at 14:20

## 2019-01-01 RX ADMIN — LAMOTRIGINE 100 MG: 100 TABLET ORAL at 17:33

## 2019-01-01 RX ADMIN — FELBAMATE 600 MG: 600 TABLET ORAL at 08:30

## 2019-01-01 RX ADMIN — HYDROMORPHONE HYDROCHLORIDE 1 MG: 10 INJECTION INTRAMUSCULAR; INTRAVENOUS; SUBCUTANEOUS at 12:11

## 2019-01-01 RX ADMIN — Medication 1 MG: at 06:47

## 2019-01-01 RX ADMIN — LORAZEPAM 1 MG: 2 INJECTION, SOLUTION INTRAMUSCULAR; INTRAVENOUS at 09:04

## 2019-01-01 RX ADMIN — LAMOTRIGINE 100 MG: 100 TABLET ORAL at 21:13

## 2019-01-01 RX ADMIN — HYDROMORPHONE HYDROCHLORIDE 0.5 MG: 1 INJECTION, SOLUTION INTRAMUSCULAR; INTRAVENOUS; SUBCUTANEOUS at 20:38

## 2019-01-01 RX ADMIN — HYDROMORPHONE HYDROCHLORIDE 1 MG: 10 INJECTION INTRAMUSCULAR; INTRAVENOUS; SUBCUTANEOUS at 05:46

## 2019-01-01 RX ADMIN — LORAZEPAM 1 MG: 2 INJECTION, SOLUTION INTRAMUSCULAR; INTRAVENOUS at 14:38

## 2019-01-01 RX ADMIN — AMITRIPTYLINE HYDROCHLORIDE 75 MG: 50 TABLET, FILM COATED ORAL at 21:30

## 2019-01-01 RX ADMIN — QUETIAPINE FUMARATE 50 MG: 50 TABLET ORAL at 21:16

## 2019-01-01 RX ADMIN — HYDROMORPHONE HYDROCHLORIDE 1 MG: 10 INJECTION INTRAMUSCULAR; INTRAVENOUS; SUBCUTANEOUS at 11:58

## 2019-01-01 RX ADMIN — BUMETANIDE 1 MG: 0.25 INJECTION INTRAMUSCULAR; INTRAVENOUS at 21:30

## 2019-01-01 RX ADMIN — IPRATROPIUM BROMIDE AND ALBUTEROL SULFATE 3 ML: .5; 3 SOLUTION RESPIRATORY (INHALATION) at 07:19

## 2019-01-01 RX ADMIN — HYDROMORPHONE HYDROCHLORIDE 1 MG: 10 INJECTION INTRAMUSCULAR; INTRAVENOUS; SUBCUTANEOUS at 20:01

## 2019-01-01 RX ADMIN — FELBAMATE 600 MG: 600 TABLET ORAL at 08:53

## 2019-01-01 RX ADMIN — IBUPROFEN 400 MG: 400 TABLET ORAL at 09:00

## 2019-01-01 RX ADMIN — FAMOTIDINE 20 MG: 20 TABLET ORAL at 09:04

## 2019-01-01 RX ADMIN — FELBAMATE 600 MG: 600 TABLET ORAL at 09:35

## 2019-01-01 RX ADMIN — IPRATROPIUM BROMIDE AND ALBUTEROL SULFATE 3 ML: .5; 3 SOLUTION RESPIRATORY (INHALATION) at 12:23

## 2019-01-01 RX ADMIN — FELBAMATE 600 MG: 600 TABLET ORAL at 08:37

## 2019-01-01 RX ADMIN — HYDROMORPHONE HYDROCHLORIDE 1 MG: 10 INJECTION INTRAMUSCULAR; INTRAVENOUS; SUBCUTANEOUS at 15:05

## 2019-01-01 RX ADMIN — LAMOTRIGINE 100 MG: 100 TABLET ORAL at 08:46

## 2019-01-01 RX ADMIN — HYDROMORPHONE HYDROCHLORIDE 1 MG: 10 INJECTION INTRAMUSCULAR; INTRAVENOUS; SUBCUTANEOUS at 17:26

## 2019-01-01 RX ADMIN — LORAZEPAM 0.5 MG: 2 INJECTION INTRAMUSCULAR; INTRAVENOUS at 11:43

## 2019-01-01 RX ADMIN — FAMOTIDINE 20 MG: 20 TABLET ORAL at 10:35

## 2019-01-01 RX ADMIN — LORAZEPAM 0.5 MG: 2 INJECTION INTRAMUSCULAR; INTRAVENOUS at 03:47

## 2019-01-01 RX ADMIN — HYDROMORPHONE HYDROCHLORIDE 0.5 MG: 1 INJECTION, SOLUTION INTRAMUSCULAR; INTRAVENOUS; SUBCUTANEOUS at 11:42

## 2019-01-01 RX ADMIN — HYDROMORPHONE HYDROCHLORIDE 1 MG: 10 INJECTION INTRAMUSCULAR; INTRAVENOUS; SUBCUTANEOUS at 08:42

## 2019-01-01 RX ADMIN — FAMOTIDINE 20 MG: 20 TABLET ORAL at 08:53

## 2019-01-01 RX ADMIN — LAMOTRIGINE 100 MG: 100 TABLET ORAL at 21:16

## 2019-01-01 RX ADMIN — QUETIAPINE FUMARATE 50 MG: 50 TABLET ORAL at 21:26

## 2019-01-01 RX ADMIN — HYDROMORPHONE HYDROCHLORIDE 1 MG: 10 INJECTION INTRAMUSCULAR; INTRAVENOUS; SUBCUTANEOUS at 18:14

## 2019-01-01 RX ADMIN — FELBAMATE 600 MG: 600 TABLET ORAL at 08:46

## 2019-01-01 RX ADMIN — FELBAMATE 600 MG: 600 TABLET ORAL at 17:08

## 2019-01-01 RX ADMIN — FAMOTIDINE 20 MG: 20 TABLET ORAL at 09:35

## 2019-01-01 RX ADMIN — FELBAMATE 600 MG: 600 TABLET ORAL at 12:43

## 2019-01-01 RX ADMIN — FELBAMATE 600 MG: 600 TABLET ORAL at 20:25

## 2019-01-01 RX ADMIN — HYDROMORPHONE HYDROCHLORIDE 1 MG: 10 INJECTION INTRAMUSCULAR; INTRAVENOUS; SUBCUTANEOUS at 14:52

## 2019-01-01 RX ADMIN — HYDROMORPHONE HYDROCHLORIDE 1 MG: 10 INJECTION INTRAMUSCULAR; INTRAVENOUS; SUBCUTANEOUS at 21:26

## 2019-01-01 RX ADMIN — Medication 1 MG: at 18:48

## 2019-01-01 RX ADMIN — Medication 1 MG: at 17:04

## 2019-01-01 RX ADMIN — LAMOTRIGINE 100 MG: 100 TABLET ORAL at 17:59

## 2019-01-01 RX ADMIN — LAMOTRIGINE 100 MG: 100 TABLET ORAL at 09:04

## 2019-01-01 RX ADMIN — LORAZEPAM 0.5 MG: 0.5 TABLET ORAL at 08:52

## 2019-01-01 RX ADMIN — LORAZEPAM 0.5 MG: 2 INJECTION INTRAMUSCULAR; INTRAVENOUS at 14:38

## 2019-01-01 RX ADMIN — ENOXAPARIN SODIUM 40 MG: 40 INJECTION SUBCUTANEOUS at 08:38

## 2019-01-01 RX ADMIN — LORAZEPAM 0.5 MG: 0.5 TABLET ORAL at 17:08

## 2019-01-01 RX ADMIN — QUETIAPINE FUMARATE 50 MG: 50 TABLET ORAL at 21:13

## 2019-01-01 RX ADMIN — LORAZEPAM 0.5 MG: 0.5 TABLET ORAL at 12:19

## 2019-01-01 RX ADMIN — HYDROMORPHONE HYDROCHLORIDE 0.5 MG: 1 INJECTION, SOLUTION INTRAMUSCULAR; INTRAVENOUS; SUBCUTANEOUS at 23:45

## 2019-01-01 RX ADMIN — HYDROMORPHONE HYDROCHLORIDE 1 MG: 10 INJECTION INTRAMUSCULAR; INTRAVENOUS; SUBCUTANEOUS at 10:19

## 2019-01-01 RX ADMIN — AMITRIPTYLINE HYDROCHLORIDE 75 MG: 50 TABLET, FILM COATED ORAL at 21:12

## 2019-01-01 RX ADMIN — HYDROMORPHONE HYDROCHLORIDE 1 MG: 10 INJECTION INTRAMUSCULAR; INTRAVENOUS; SUBCUTANEOUS at 09:29

## 2019-01-01 RX ADMIN — Medication 1 MG: at 20:23

## 2019-01-01 RX ADMIN — LAMOTRIGINE 100 MG: 100 TABLET ORAL at 20:25

## 2019-01-01 RX ADMIN — METHYLPREDNISOLONE 40 MG: 40 INJECTION, POWDER, LYOPHILIZED, FOR SOLUTION INTRAMUSCULAR; INTRAVENOUS at 08:17

## 2019-01-01 RX ADMIN — LAMOTRIGINE 100 MG: 100 TABLET ORAL at 08:58

## 2019-01-01 RX ADMIN — HYDROMORPHONE HYDROCHLORIDE 1 MG: 10 INJECTION INTRAMUSCULAR; INTRAVENOUS; SUBCUTANEOUS at 07:58

## 2019-01-01 RX ADMIN — TAZOBACTAM SODIUM AND PIPERACILLIN SODIUM 3.38 G: 375; 3 INJECTION, SOLUTION INTRAVENOUS at 21:05

## 2019-01-01 RX ADMIN — HYDROMORPHONE HYDROCHLORIDE 0.5 MG: 1 INJECTION, SOLUTION INTRAMUSCULAR; INTRAVENOUS; SUBCUTANEOUS at 10:36

## 2019-01-01 RX ADMIN — HYDROMORPHONE HYDROCHLORIDE 1 MG: 10 INJECTION INTRAMUSCULAR; INTRAVENOUS; SUBCUTANEOUS at 19:17

## 2019-01-01 RX ADMIN — Medication 1 MG: at 17:08

## 2019-01-01 RX ADMIN — LAMOTRIGINE 100 MG: 100 TABLET ORAL at 10:36

## 2019-01-01 RX ADMIN — QUETIAPINE FUMARATE 50 MG: 50 TABLET ORAL at 21:21

## 2019-01-01 RX ADMIN — LAMOTRIGINE 100 MG: 100 TABLET ORAL at 17:26

## 2019-01-01 RX ADMIN — LAMOTRIGINE 100 MG: 100 TABLET ORAL at 12:22

## 2019-01-01 RX ADMIN — LAMOTRIGINE 100 MG: 100 TABLET ORAL at 09:19

## 2019-01-01 RX ADMIN — LORAZEPAM 1 MG: 2 INJECTION, SOLUTION INTRAMUSCULAR; INTRAVENOUS at 06:30

## 2019-01-01 RX ADMIN — LORAZEPAM 0.5 MG: 2 INJECTION INTRAMUSCULAR; INTRAVENOUS at 17:11

## 2019-01-01 RX ADMIN — LORAZEPAM 1 MG: 2 LIQUID ORAL at 21:12

## 2019-01-01 RX ADMIN — FAMOTIDINE 20 MG: 20 TABLET ORAL at 08:21

## 2019-01-01 RX ADMIN — LAMOTRIGINE 100 MG: 100 TABLET ORAL at 08:36

## 2019-01-01 RX ADMIN — FELBAMATE 600 MG: 600 TABLET ORAL at 17:59

## 2019-01-01 RX ADMIN — HYDROMORPHONE HYDROCHLORIDE 1 MG: 10 INJECTION INTRAMUSCULAR; INTRAVENOUS; SUBCUTANEOUS at 12:18

## 2019-01-01 RX ADMIN — HYDROMORPHONE HYDROCHLORIDE 1 MG: 10 INJECTION INTRAMUSCULAR; INTRAVENOUS; SUBCUTANEOUS at 10:58

## 2019-01-01 RX ADMIN — LORAZEPAM 1 MG: 2 LIQUID ORAL at 15:21

## 2019-01-01 RX ADMIN — FELBAMATE 600 MG: 600 TABLET ORAL at 12:22

## 2019-01-01 RX ADMIN — HYDROMORPHONE HYDROCHLORIDE 1 MG: 10 INJECTION INTRAMUSCULAR; INTRAVENOUS; SUBCUTANEOUS at 21:27

## 2019-01-01 RX ADMIN — TAZOBACTAM SODIUM AND PIPERACILLIN SODIUM 3.38 G: 375; 3 INJECTION, SOLUTION INTRAVENOUS at 02:18

## 2019-01-01 RX ADMIN — LAMOTRIGINE 100 MG: 100 TABLET ORAL at 16:00

## 2019-01-01 RX ADMIN — Medication 1 MG: at 17:13

## 2019-01-01 RX ADMIN — METHYLPREDNISOLONE SODIUM SUCCINATE 125 MG: 125 INJECTION, POWDER, FOR SOLUTION INTRAMUSCULAR; INTRAVENOUS at 20:11

## 2019-01-01 RX ADMIN — LEVOFLOXACIN 500 MG: 250 TABLET, FILM COATED ORAL at 09:47

## 2019-01-01 RX ADMIN — ENOXAPARIN SODIUM 40 MG: 40 INJECTION SUBCUTANEOUS at 08:17

## 2019-01-01 RX ADMIN — AMITRIPTYLINE HYDROCHLORIDE 75 MG: 50 TABLET, FILM COATED ORAL at 21:21

## 2019-01-01 RX ADMIN — LORAZEPAM 1 MG: 2 INJECTION, SOLUTION INTRAMUSCULAR; INTRAVENOUS at 18:14

## 2019-01-01 RX ADMIN — FELBAMATE 600 MG: 600 TABLET ORAL at 21:04

## 2019-01-01 RX ADMIN — LAMOTRIGINE 100 MG: 100 TABLET ORAL at 20:32

## 2019-01-01 RX ADMIN — HYDROMORPHONE HYDROCHLORIDE 1 MG: 10 INJECTION INTRAMUSCULAR; INTRAVENOUS; SUBCUTANEOUS at 16:15

## 2019-01-01 RX ADMIN — QUETIAPINE FUMARATE 50 MG: 50 TABLET ORAL at 20:33

## 2019-01-01 RX ADMIN — LORAZEPAM 1 MG: 2 INJECTION, SOLUTION INTRAMUSCULAR; INTRAVENOUS at 21:04

## 2019-01-01 RX ADMIN — HYDROMORPHONE HYDROCHLORIDE 1 MG: 10 INJECTION INTRAMUSCULAR; INTRAVENOUS; SUBCUTANEOUS at 21:12

## 2019-01-01 RX ADMIN — HYDROMORPHONE HYDROCHLORIDE 1 MG: 10 INJECTION INTRAMUSCULAR; INTRAVENOUS; SUBCUTANEOUS at 14:23

## 2019-01-01 RX ADMIN — LAMOTRIGINE 100 MG: 100 TABLET ORAL at 10:35

## 2019-01-01 RX ADMIN — HYDROMORPHONE HYDROCHLORIDE 1 MG: 10 INJECTION INTRAMUSCULAR; INTRAVENOUS; SUBCUTANEOUS at 08:56

## 2019-01-01 RX ADMIN — IBUPROFEN 400 MG: 400 TABLET ORAL at 08:46

## 2019-01-01 RX ADMIN — HYDROMORPHONE HYDROCHLORIDE 1 MG: 10 INJECTION INTRAMUSCULAR; INTRAVENOUS; SUBCUTANEOUS at 08:57

## 2019-01-01 RX ADMIN — HYDROMORPHONE HYDROCHLORIDE 0.5 MG: 1 INJECTION, SOLUTION INTRAMUSCULAR; INTRAVENOUS; SUBCUTANEOUS at 06:30

## 2019-01-01 RX ADMIN — SODIUM CHLORIDE, POTASSIUM CHLORIDE, SODIUM LACTATE AND CALCIUM CHLORIDE 1000 ML: 600; 310; 30; 20 INJECTION, SOLUTION INTRAVENOUS at 21:35

## 2019-01-01 RX ADMIN — IPRATROPIUM BROMIDE AND ALBUTEROL SULFATE 3 ML: .5; 3 SOLUTION RESPIRATORY (INHALATION) at 20:01

## 2019-01-01 RX ADMIN — METHYLPREDNISOLONE 40 MG: 40 INJECTION, POWDER, LYOPHILIZED, FOR SOLUTION INTRAMUSCULAR; INTRAVENOUS at 21:23

## 2019-01-01 RX ADMIN — ONDANSETRON 4 MG: 2 INJECTION INTRAMUSCULAR; INTRAVENOUS at 20:37

## 2019-01-01 RX ADMIN — LEVOFLOXACIN 500 MG: 5 INJECTION, SOLUTION INTRAVENOUS at 12:35

## 2019-01-01 RX ADMIN — TAZOBACTAM SODIUM AND PIPERACILLIN SODIUM 3.38 G: 375; 3 INJECTION, SOLUTION INTRAVENOUS at 14:18

## 2019-01-01 RX ADMIN — AMITRIPTYLINE HYDROCHLORIDE 75 MG: 50 TABLET, FILM COATED ORAL at 21:04

## 2019-01-01 RX ADMIN — METHYLPREDNISOLONE SODIUM SUCCINATE 125 MG: 125 INJECTION INTRAMUSCULAR; INTRAVENOUS at 20:11

## 2019-01-01 RX ADMIN — HYDROMORPHONE HYDROCHLORIDE 0.5 MG: 1 INJECTION, SOLUTION INTRAMUSCULAR; INTRAVENOUS; SUBCUTANEOUS at 06:42

## 2019-01-01 RX ADMIN — QUETIAPINE FUMARATE 50 MG: 50 TABLET ORAL at 21:12

## 2019-01-01 RX ADMIN — FELBAMATE 600 MG: 600 TABLET ORAL at 21:13

## 2019-01-01 ASSESSMENT — ACTIVITIES OF DAILY LIVING (ADL)
ADLS_ACUITY_SCORE: 23
ADLS_ACUITY_SCORE: 22
ADLS_ACUITY_SCORE: 23
ADLS_ACUITY_SCORE: 27
ADLS_ACUITY_SCORE: 22
DRESS: 2-->ASSISTIVE PERSON
ADLS_ACUITY_SCORE: 22
ADLS_ACUITY_SCORE: 23
ADLS_ACUITY_SCORE: 22
ADLS_ACUITY_SCORE: 23
ADLS_ACUITY_SCORE: 22
ADLS_ACUITY_SCORE: 23
ADLS_ACUITY_SCORE: 23
ADLS_ACUITY_SCORE: 21
ADLS_ACUITY_SCORE: 22
ADLS_ACUITY_SCORE: 23
ADLS_ACUITY_SCORE: 22
ADLS_ACUITY_SCORE: 23
SWALLOWING: 0-->SWALLOWS FOODS/LIQUIDS WITHOUT DIFFICULTY
ADLS_ACUITY_SCORE: 22
ADLS_ACUITY_SCORE: 23
ADLS_ACUITY_SCORE: 23
ADLS_ACUITY_SCORE: 22
ADLS_ACUITY_SCORE: 23
ADLS_ACUITY_SCORE: 22
ADLS_ACUITY_SCORE: 23
TOILETING: 2-->ASSISTIVE PERSON
ADLS_ACUITY_SCORE: 23
ADLS_ACUITY_SCORE: 26
ADLS_ACUITY_SCORE: 23
ADLS_ACUITY_SCORE: 22
ADLS_ACUITY_SCORE: 21
ADLS_ACUITY_SCORE: 22
ADLS_ACUITY_SCORE: 23
ADLS_ACUITY_SCORE: 26
ADLS_ACUITY_SCORE: 23
ADLS_ACUITY_SCORE: 25
ADLS_ACUITY_SCORE: 26
ADLS_ACUITY_SCORE: 23
ADLS_ACUITY_SCORE: 23
ADLS_ACUITY_SCORE: 29
ADLS_ACUITY_SCORE: 22
ADLS_ACUITY_SCORE: 26
ADLS_ACUITY_SCORE: 24
ADLS_ACUITY_SCORE: 27
WHICH_OF_THE_ABOVE_FUNCTIONAL_RISKS_HAD_A_RECENT_ONSET_OR_CHANGE?: AMBULATION
RETIRED_COMMUNICATION: 0-->UNDERSTANDS/COMMUNICATES WITHOUT DIFFICULTY
ADLS_ACUITY_SCORE: 26
ADLS_ACUITY_SCORE: 23
ADLS_ACUITY_SCORE: 22
ADLS_ACUITY_SCORE: 23
ADLS_ACUITY_SCORE: 23
ADLS_ACUITY_SCORE: 27
ADLS_ACUITY_SCORE: 23
ADLS_ACUITY_SCORE: 22
ADLS_ACUITY_SCORE: 23
ADLS_ACUITY_SCORE: 29
ADLS_ACUITY_SCORE: 24
ADLS_ACUITY_SCORE: 23
BATHING: 2-->ASSISTIVE PERSON
ADLS_ACUITY_SCORE: 23
RETIRED_EATING: 0-->INDEPENDENT
ADLS_ACUITY_SCORE: 23
ADLS_ACUITY_SCORE: 22
ADLS_ACUITY_SCORE: 23
ADLS_ACUITY_SCORE: 26

## 2019-01-01 ASSESSMENT — ENCOUNTER SYMPTOMS
FEVER: 0
SHORTNESS OF BREATH: 1
COUGH: 1
WEAKNESS: 1

## 2019-01-01 ASSESSMENT — COLUMBIA-SUICIDE SEVERITY RATING SCALE - C-SSRS
1. IN THE PAST MONTH, HAVE YOU WISHED YOU WERE DEAD OR WISHED YOU COULD GO TO SLEEP AND NOT WAKE UP?: NO
2. HAVE YOU ACTUALLY HAD ANY THOUGHTS OF KILLING YOURSELF IN THE PAST MONTH?: NO

## 2019-01-01 ASSESSMENT — MIFFLIN-ST. JEOR
SCORE: 1106
SCORE: 1089

## 2019-01-01 ASSESSMENT — COPD QUESTIONNAIRES
COPD: 1
CAT_SEVERITY: SEVERE

## 2019-01-08 PROBLEM — J18.9 PNEUMONIA: Status: ACTIVE | Noted: 2019-01-01

## 2019-01-09 NOTE — CONSULTS
Critical Care  Note      01/09/2019    Name: Shana Horne MRN#: [id number]   Age: 68 year old YOB: 1950     [facility] [place] [facility]  ICU DAY #1                 Problem List:   Active Problems:    Pneumonia    1. Acute respiratory failure due to CAP, and oxygen dependent COPD- this is a severe case and she is on dual coverage (Zosyn, Levaquin), bronchodilators, and steroids. She is on high flow oxygen (60-80%) but is very comfortable and ventilating well, speaking in full sentences. We will continue pulmonary hygiene and increasing mobility as tolerated.   2. CAP- as above  3. 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 Severe COPD- on home oxygen and with scarring (chronic) at left base  5. History of seizure disorder- remains on Lamictal  6. Dementia  7. History of diarrhea  8. Reflex sympathetic dystrophy- with pain pump in abdomen   9. GERD- pepcid   10. History of Parkinson's Disease  11. Smoker     Of note, I provided ICU care for her last June, 2018 while she was here with acute respiratory failure/ARDS on ventilator (admission 6/22 - 7/9/18). She was discharged to LTAC and then nursing home afterwards, and it was a total of about 3 months (per ) before she was able to be discharged home.                Summary/Hospital Course:     She was admitted last night through the ED for acute respiratory failure due to likely pneumonia; of note, her CXR is markedly abnormal but unchanged from prior admissions.       Assessment and plan :     Shana Horne IS a 68 year old female admitted on 1/8/2019 for acute respiratory failure.   I have personally reviewed the daily labs, imaging studies, cultures and discussed the case with referring physician and consulting physicians.     My assessment and plan by system for this patient is as follows:    Neurology/Psychiatry:   1. Confused and appears slightly more so per     Cardiovascular:   1.Hemodynamics - are well compensated     Pulmonary/Ventilator  Management:   1. Airway- on high flow oxygen at present; will add BIPAP if need be but does not appear necessary at present.     GI and Nutrition :   1. PO's as tolerated     Renal/Fluids/Electrolytes:   1. Creatinine slightly elevated, and will monitor closely     Infectious Disease:   1. Cultures pending; on antibiotics                 Endocrine:   1. Compensated     Hematology/Oncology:   1. Compensated      IV/Access:   1. Venous access -   2. Arterial access -   3.  Plan  - central access required and necessary      ICU Prophylaxis:   1. DVT: Hep Subq/ LMWH/mechanical  2. VAP: HOB 30 degrees, chlorhexidine rinse  3. Stress Ulcer: PPI/H2 blocker  5. Wound care  -   6. Feeding - PO's as tolerated   7. Family Update: discussed with  at bedside   8. Disposition - continue ICU care        Key goals for next 24 hours:   1. Oxygen, antibiotics, supportive care   2.  3.               Medical History:     Past Medical History:   Diagnosis Date     Arthritis      Blood transfusion     38 yrs ago     Chronic pain     painful feet     COPD (chronic obstructive pulmonary disease) (H)      Dementia      Depression      Gastroesophageal reflux disease      History of blood transfusion      Hypercholesterolemia      Numbness and tingling     fingertips occas     Osteopenia      Other chronic pain     generalized     Oxygen dependent     4l nc cont     Parkinsons disease (H)      Renal disease      Seizures (H)     LAST SEIZURE 2 MOS AGO     Stimulus-induced repetitive discharges on nerve conduction studies      Past Surgical History:   Procedure Laterality Date     AMPUTATE TOE(S) Right 10/19/2017    Procedure: AMPUTATE TOE(S);  Right second toe amputation at metatarsophalangeal joint.;  Surgeon: Montrell Arce MD;  Location: [facility] OR     ARTHROSCOPY KNEE WITH FIXATION OF OSTEOCHONDRAL DISSECANS      x3     BRONCHOSCOPY (RIGID OR FLEXIBLE), DIAGNOSTIC N/A 6/26/2018    Procedure: COMBINED BRONCHOSCOPY (RIGID OR  FLEXIBLE), LAVAGE;  BRONCHOSCOPY (RIGID OR FLEXIBLE), LAVAGE  in ;  Surgeon: Shala Gonzales MD;  Location:  GI     COLONOSCOPY  11/20/2014    Dr. Schaeffer [facility]     COLONOSCOPY N/A 11/20/2014    Procedure: COLONOSCOPY;  Surgeon: Steven Schaeffer MD;  Location:  GI     GYN SURGERY      tear repair after delivery child     HEAD & NECK SURGERY      wisdom teeth extraction     INSERT PUMP MORPHINE      pump replacement x2     MOUTH SURGERY      numerous times     ORTHOPEDIC SURGERY      foot surg left     REPLACE INTRATHECAL PAIN PUMP Left 10/5/2018    Procedure: REPLACE INTRATHECAL PAIN PUMP;  Intrathecal pump revision; tap block;  Surgeon: Gurpreet Farooq MD;  Location:  OR     REVISE PUMP MORPHINE  11/29/2011    Procedure:REVISE PUMP MORPHINE; Pump Replacement, abdominal area; Surgeon:ALIRIO RIVERA; Location: OR     surgery for recto vaginal fistula repair       TUBAL LIGATION       Social History     Socioeconomic History     Marital status:      Spouse name: Not on file     Number of children: Not on file     Years of education: Not on file     Highest education level: Not on file   Social Needs     Financial resource strain: Not on file     Food insecurity - worry: Not on file     Food insecurity - inability: Not on file     Transportation needs - medical: Not on file     Transportation needs - non-medical: Not on file   Occupational History     Not on file   Tobacco Use     Smoking status: Current Every Day Smoker     Packs/day: 0.50     Smokeless tobacco: Never Used   Substance and Sexual Activity     Alcohol use: No     Drug use: No     Sexual activity: Not on file   Other Topics Concern     Parent/sibling w/ CABG, MI or angioplasty before 65F 55M? Not Asked   Social History Narrative     Not on file        Allergies   Allergen Reactions     Nicotine Other (See Comments)     Nicotine patch caused worsening seizures per 's report              Key Medications:        amitriptyline  75 mg Oral At Bedtime     enoxaparin  40 mg Subcutaneous Q24H     famotidine  20 mg Oral BID     felbamate  600 mg Oral TID     ipratropium - albuterol 0.5 mg/2.5 mg/3 mL  3 mL Nebulization Q4H     lamoTRIgine  100 mg Oral TID     levofloxacin  500 mg Intravenous Q24H     methylPREDNISolone  40 mg Intravenous BID     piperacillin-tazobactam  3.375 g Intravenous Q6H     QUEtiapine  50 mg Oral At Bedtime          Home Meds    No current facility-administered medications on file prior to encounter.   Current Outpatient Medications on File Prior to Encounter:  amitriptyline (ELAVIL) 25 MG tablet Take 25 mg by mouth At Bedtime   dispensed in syringe 1 Device in sodium chloride (PF) 20 mL Intrathecal Pump Infusion by Intrathecal route continuous 8840 Modanisa IIB pump per Dr Owen 8637-20 20 ml, serial number [id number] infusion setting delivers:Hydromorphone 6.008 mg/dayBupivacaine 3.004 mg/dayClonidine 40.05 mcg/day   Felbamate (FELBATOL PO) Take 1,200 mg by mouth every morning    felbamate (FELBATOL) 600 MG tablet Take 600 mg by mouth 2 times daily In the afternoon and evening   FERROUS GLUCONATE PO Take 324 mg by mouth 2 times daily (with meals) Breakfast & supper   gabapentin (NEURONTIN) 100 MG capsule Take 100 mg by mouth 2 times daily   LamoTRIgine (LAMICTAL) 100 MG tablet Take 100 mg by mouth 3 times daily.   QUEtiapine Fumarate (SEROQUEL PO) Take 50 mg by mouth At Bedtime    simvastatin (ZOCOR) 40 MG tablet Take 40 mg by mouth At Bedtime               Physical Examination:   Temp:  [97.1  F (36.2  C)-99.5  F (37.5  C)] 98.7  F (37.1  C)  Pulse:  [] 96  Heart Rate:  [] 105  Resp:  [9-31] 19  BP: ()/(49-96) 120/75  FiO2 (%):  [40 %-80 %] 80 %  SpO2:  [76 %-100 %] 96 %    Intake/Output Summary (Last 24 hours) at 1/9/2019 1219  Last data filed at 1/9/2019 0800  Gross per 24 hour   Intake 250 ml   Output --   Net 250 ml     Wt Readings from Last 4 Encounters:    01/08/19 56.4 kg (124 lb 5.4 oz)   10/05/18 60.3 kg (133 lb)   07/09/18 58.3 kg (128 lb 9.6 oz)   10/19/17 47.2 kg (104 lb)     BP - Mean:  [] 89  FiO2 (%): 80 %  Resp: 19    No lab results found in last 7 days.    GEN: no acute distress; remarkably compenestad   HEENT: normal in appearance  PULM: comfortable; decreased breath sounds left base   CV/COR: RRR S1S2 no gallop,  No rub, no murmur; distant heart sounds   ABD: soft nontender  EXT:  Trace edema   warm  NEURO: grossly intact; confused but ffol  SKIN: no obvious rash; no cyanosis or mottling  LINES: clean, dry intact         Data:   All data and imaging reviewed     ROUTINE ICU LABS (Last four results)  CMP  Recent Labs   Lab 01/08/19 2100 01/08/19 2021    138   POTASSIUM 4.4 4.4   CHLORIDE 101 99   CO2 31  --    ANIONGAP 5 10   * 143*   BUN 25 26   CR 0.92  --    GFRESTIMATED 65 55*   GFRESTBLACK 75 67   BIBIANA 8.9  --    PROTTOTAL 7.7  --    ALBUMIN 3.3*  --    BILITOTAL 0.3  --    ALKPHOS 110  --    AST 21  --    ALT 21  --      CBC  Recent Labs   Lab 01/08/19 2022 01/08/19 2021   WBC 14.1*  --    RBC 5.21*  --    HGB 15.6 16.7*   HCT 48.8*  --    MCV 94  --    MCH 29.9  --    MCHC 32.0  --    RDW 14.2  --      --      INRNo lab results found in last 7 days.  Arterial Blood GasNo lab results found in last 7 days.    All cultures:  Recent Labs   Lab 01/08/19 2059 01/08/19 2020   CULT No growth after 8 hours No growth after 8 hours     Recent Results (from the past 24 hour(s))   Chest  XR, 1 view PORTABLE    Narrative    CHEST ONE VIEW PORTABLE   1/8/2019 8:32 PM     HISTORY: Short of breath.     COMPARISON: 7/6/2018      Impression    IMPRESSION: There is increased density in the retrocardiac region most  likely due to a consolidative infiltrate. There is also some  infiltrate in the left midlung zone. The heart is enlarged. There is a  battery pack over the left axillary region which appears to be  associated with a  stimulator in the left lower neck. Right lung  appears clear. Pulmonary vasculature does not appear to be engorged  although there are some increased interstitial markings.    MD Lester JARQUIN MD    Billing: This patient is critically ill: Yes. Total critical care time today 60 min.

## 2019-01-09 NOTE — PHARMACY-ADMISSION MEDICATION HISTORY
Admission medication history interview status for this patient is complete. See [facility] admission navigator for allergy information, prior to admission medications and immunization status.     Medication history interview source(s):[facility]est[facility] office  Medication history resources (including written lists, pill bottles, clinic record):None  Primary pharmacy: [facility]    Changes made to PTA medication list:  Added: Gabapentin and Amitriptyline  Deleted: acetaminophen, alendronate, baclofen, dermasarra, cyclosporine, fluoxetine, artificial tears, MVI, olopatadine, pantoprazole, ipratropium/albuterol  Changed: Quetiapine was increased to 50 mg at bedtime     Actions taken by pharmacist (provider contacted, etc):None     Additional medication history information: Patient gets Intrathecal pump filled by [facility]     Medication reconciliation/reorder completed by provider prior to medication history? Yes    Do you take OTC medications (eg tylenol, ibuprofen, fish oil, eye/ear drops, etc)?  No      Prior to Admission medications    Medication Sig Last Dose Taking? Auth Provider   amitriptyline (ELAVIL) 25 MG tablet Take 25 mg by mouth At Bedtime 1/7/2019 at Unknown time Yes Unknown, Entered By History   dispensed in syringe 1 Device in sodium chloride (PF) 20 mL Intrathecal Pump Infusion by Intrathecal route continuous 8840 SynchroMed IIB pump per Dr Farooq  Model 8637-20 20 ml, serial number [id number]  Continuous infusion setting delivers:  Hydromorphone 6.008 mg/day  Bupivacaine 3.004 mg/day  Clonidine 40.05 mcg/day 1/8/2019 at Unknown time Yes Unknown, Entered By History   Felbamate (FELBATOL PO) Take 1,200 mg by mouth every morning  1/8/2019 at am Yes Unknown, Entered By History   felbamate (FELBATOL) 600 MG tablet Take 600 mg by mouth 2 times daily In the afternoon and evening 1/8/2019 at afternoon Yes Unknown, Entered By History   FERROUS GLUCONATE PO Take 324 mg by mouth 2 times daily (with  meals) Breakfast & supper 1/8/2019 at am Yes Unknown, Entered By History   gabapentin (NEURONTIN) 100 MG capsule Take 100 mg by mouth 2 times daily 1/8/2019 at am Yes Unknown, Entered By History   LamoTRIgine (LAMICTAL) 100 MG tablet Take 100 mg by mouth 3 times daily. 1/8/2019 at afternoon Yes Reported, Patient   QUEtiapine Fumarate (SEROQUEL PO) Take 50 mg by mouth At Bedtime  1/7/2019 at Unknown time Yes Reported, Patient   simvastatin (ZOCOR) 40 MG tablet Take 40 mg by mouth At Bedtime  1/7/2019 at Unknown time Yes Reported, Patient         Sophia Myesr, PharmD

## 2019-01-09 NOTE — CONSULTS
[facility]    Palliative Care Consultation   Text Page    Date of Admission:  1/8/2019    Assessment & Plan   Shana Horne is a 68 year old female who was admitted on 1/8/2019. I was asked by Hospitalist Patrice Little MD to see the patient for goals of care, intrathecal morphine pump assistance.    Recommendations:    1.  Decisional Capacity -  Questionable- patient has advanced dementia and does not demonstrate medical capacity. Patient does not have an advance directive. Per  informed consent policy next of kin should be involved in all consent and decision making.    2.  Pain - Chronic regional pain syndrome. [employer] intrathecal pump managed by Gurpreet Farooq MD in Anesthesiology who has been contacted in regard to refilling the pump.     3.  Dyspnea with continued tobacco abuse -  aware of COPD disease progression and chooses intubation if needed, but would not pursue tracheostomy if she was not able to wean from the vent. He also acknowledged she had worsening seizures when attempt to use a nicotine patch in the past. He request that she is not counseled for smoking cessation as she would not recall the information.     4.  Dysphagia -  Problem after intubation in June 2018. Follow safe swallowing strategies and may need to consider Speech Therapy consultation.     5.  Unintentional weight loss - Current weight 124 pounds and historically at 136 pounds on 10/4/2018. Appreciate input of Registered Dietitian Connie Singletary RD, LD.    6.  Spiritual Care - Spiritual Health services declined at this time.     7. Care Planning -   is hopeful she would make a full recovery to return home, but he is open to options of TCU if needed.    Goal of Care: DNR/DNI - Restorative care.  is aware of progressive nature of COPD and would not choose tracheostomy.    Disease Process/es & Symptoms:  Shana Horne is a 68 year old patient admitted 1/8/2019 with symptoms of weakness  and somnolence. Chest x-ray compatible with pneumonia and she was started on IV antibiotics of Zosyn and azithromycin for sepsis due to pneumonia. Her medical history is significant for PMH including dementia, COPD (on chronic 4 liters oxygen at home), chronic pain intrathecal pump, parkinson's. She began having URI symptoms about four days prior to hospital arrival.    She was hospitalized at [facility] 6/22/2018 for left lower lob pneumonia and transferred to [facility] on 7/9/2018 where she remained hospitalized until 7/24/2018 and was dismissed to [facility] TCU until 8/19/2018.    Findings & plan of care discussed with: Bedside nurse Luz Maria Pollard RN and Hospitalist Stephanie Posey MD.  Follow-up plan from palliative team: Will follow along during the patient's hospitalization.   Thank you for involving us in the patient's care.     Sophie Bolanos MS, RN, CNS, APRN, ACHPN, FAACVPR  Pain and Palliative Care  Pager [phone]  Office [phone]       Time Spent on this Encounter   I spent 55 minutes (11:05 AM - 11:20 AM and 1:15 PM - 1:55 PM) in assessment of the patient, counseling and discussion with the patient and family as documented in sections below. Another 30 minutes in review of chart, documentation, coordination of care and discussion with the health care team.    Primary Care Physician   BERNARDA WAITE    Chief Complaint   Shortness of Breath    History is obtained from the electronic health record and patient's spouse    Decision-Making & Goals of Care:  Discussed on January 9, 2019 with Sophie FERNANDEZ, CNS:     Met with Shana as she was resting in bed on the BiPAP. She indicated she would not want intubation, but unable to provide insight into the rational for intubation. Shana was more interested in watching the TV than discussing her care. I phoned her /next-of-kin Oliverio Horne at [phone]. He recalled meeting me with Shana's hospitalization last  "June. He acknowledged \"I was surprised she made it through all of that, but here she is\". He confirmed FULL CODE status. If Shana would not make a recovery off the ventilator their family would choose \"take her off the machine and die in peace.\"     Past Medical History   I have reviewed this patient's medical history and updated it with pertinent information if needed.   Past Medical History:   Diagnosis Date     Arthritis      Blood transfusion     38 yrs ago     Chronic pain     painful feet     COPD (chronic obstructive pulmonary disease) (H)      Dementia      Depression      Gastroesophageal reflux disease      History of blood transfusion      Hypercholesterolemia      Numbness and tingling     fingertips occas     Osteopenia      Other chronic pain     generalized     Oxygen dependent     4l nc cont     Parkinsons disease (H)      Renal disease      Seizures (H)     LAST SEIZURE 2 MOS AGO     Stimulus-induced repetitive discharges on nerve conduction studies        Past Surgical History   I have reviewed this patient's surgical history and updated it with pertinent information if needed.  Past Surgical History:   Procedure Laterality Date     AMPUTATE TOE(S) Right 10/19/2017    Procedure: AMPUTATE TOE(S);  Right second toe amputation at metatarsophalangeal joint.;  Surgeon: Montrell Arce MD;  Location:  OR     ARTHROSCOPY KNEE WITH FIXATION OF OSTEOCHONDRAL DISSECANS      x3     BRONCHOSCOPY (RIGID OR FLEXIBLE), DIAGNOSTIC N/A 6/26/2018    Procedure: COMBINED BRONCHOSCOPY (RIGID OR FLEXIBLE), LAVAGE;  BRONCHOSCOPY (RIGID OR FLEXIBLE), LAVAGE  in ;  Surgeon: Shala Gonzales MD;  Location:  GI     COLONOSCOPY  11/20/2014    Dr. Schaeffer [facility]     COLONOSCOPY N/A 11/20/2014    Procedure: COLONOSCOPY;  Surgeon: Steven Schaeffer MD;  Location:  GI     GYN SURGERY      tear repair after delivery child     HEAD & NECK SURGERY      wisdom teeth extraction     INSERT PUMP MORPHINE      " pump replacement x2     MOUTH SURGERY      numerous times     ORTHOPEDIC SURGERY      foot surg left     REPLACE INTRATHECAL PAIN PUMP Left 10/5/2018    Procedure: REPLACE INTRATHECAL PAIN PUMP;  Intrathecal pump revision; tap block;  Surgeon: Gurpreet Farooq MD;  Location: [facility] OR     REVISE PUMP MORPHINE  11/29/2011    Procedure:REVISE PUMP MORPHINE; Pump Replacement, abdominal area; Surgeon:ALIRIO RIVERA; Location:[facility] OR     surgery for recto vaginal fistula repair       TUBAL LIGATION         Prior to Admission Medications   Prior to Admission Medications   Prescriptions Last Dose Informant Patient Reported? Taking?   ACETAMINOPHEN PO   Yes No   Sig: Take 500 mg by mouth every 4 hours as needed for pain   ALENDRONATE SODIUM PO   Yes No   Sig: Take 35 mg by mouth once a week   BACLOFEN PO   Yes No   Sig: Take 5 mg by mouth 3 times daily   FERROUS GLUCONATE PO   Yes No   Sig: Take 324 mg by mouth 2 times daily (with meals) Breakfast & supper   FLUOXETINE HCL PO   Yes No   Sig: Take 40 mg by mouth daily   Felbamate (FELBATOL PO)   Yes No   Sig: Take 600 mg by mouth Take 2 tablets in the morning, 1 tablet at noon and 1 tablet in the evening.   LamoTRIgine (LAMICTAL) 100 MG tablet   Yes No   Sig: Take 100 mg by mouth 3 times daily.   NONFORMULARY   Yes No   Sig: Ipratropium Bromide/Albuterol 0.5mg/3mg per 3mL nebulizer solution-use I vial via Neb every 6 hours PRN   PANTOPRAZOLE SODIUM PO   Yes No   Sig: Take 40 mg by mouth every morning (before breakfast)   QUEtiapine Fumarate (SEROQUEL PO)   Yes No   Sig: Take 25 mg by mouth At Bedtime   camphor-menthol (DERMASARRA) 0.5-0.5 % LOTN   Yes No   Sig: Apply topically every 8 hours as needed for skin care   cycloSPORINE (RESTASIS) 0.05 % ophthalmic emulsion   Yes No   Sig: Place 1 drop into both eyes 2 times daily   dispensed in syringe 1 Device in sodium chloride (PF) 20 mL Intrathecal Pump Infusion   Yes No   Sig: by Intrathecal route continuous 8840  SynchroMed IIB pump per Dr Farooq  Model 8637-20 20 ml, serial number [id number]  Continuous infusion setting delivers:  Hydromorphone 6.008 mg/day  Bupivacaine 3.004 mg/day  Clonidine 40.05 mcg/day   hypromellose (ARTIFICIAL TEARS) 0.5 % SOLN ophthalmic solution   Yes No   Sig: Place 1 drop into both eyes every hour as needed for dry eyes   multivitamin, therapeutic with minerals (MULTI-VITAMIN) TABS tablet   Yes No   Sig: Take 1 tablet by mouth daily   olopatadine HCl (PATADAY) 0.2 % SOLN   Yes No   Sig: Place 1 drop into both eyes 2 times daily   simvastatin (ZOCOR) 40 MG tablet   Yes No   Sig: Take 40 mg by mouth At Bedtime       Facility-Administered Medications: None     Allergies   Allergies   Allergen Reactions     Nicotine Other (See Comments)     Nicotine patch caused worsening seizures per 's report       Social History   Living situation: Lives with the support of her  and daughter  Family system:  and 5 adult children are supportive  Functional status: Needs help with ADLs   History of substance use/abuse:  reports that she has been smoking.  She has been smoking about 0.50 packs per day. she has never used smokeless tobacco.  reports that she does not drink alcohol.  Spiritism affiliation: Undesignated    Family History   I have reviewed this patient's family history and updated it with pertinent information if needed.   No family history on file.    Review of Systems   Review of systems not obtained due to patient factors - critical condition and mental status    Physical Exam   Temp:  [97.1  F (36.2  C)-99.5  F (37.5  C)] 98.2  F (36.8  C)  Pulse:  [] 96  Heart Rate:  [] 86  Resp:  [12-31] 12  BP: ()/(49-96) 115/94  FiO2 (%):  [40 %-80 %] 80 %  SpO2:  [76 %-100 %] 92 %  124 lbs 5.43 oz  GEN:  Lethargic, cachetic  female who looks older than documented age. She is on BiPAP, oriented to self.  HEENT:  Normocephalic/atraumatic, no scleral icterus, no nasal  discharge, mouth moist.  CV:  RRR, S1, S2; no murmurs or other irregularities noted.  +3 DP/PT pulses bilaterally; no edema bilaterally.  RESP:  Clear to auscultation bilaterally without rales/rhonchi/wheezing/retractions.  Symmetric chest rise on inhalation noted.  Normal respiratory effort.  ABD:  Rounded, soft, non-tender/non-distended.  +BS  EXT:  Edema & pulses as noted above.  CMS intact x 4.     M/S:   Denies pain.   SKIN:  Dry to touch, no exanthems noted in the visualized areas.    NEURO: Symmetric strength +5/5.  Sensation to touch intact all extremities.   There is no area of allodynia or hyperesthesia.  Psych:  Flat affect.  Calm, cooperative, conversant.     Data   Results for orders placed or performed during the hospital encounter of 01/08/19   Chest  XR, 1 view PORTABLE    Narrative    CHEST ONE VIEW PORTABLE   1/8/2019 8:32 PM     HISTORY: Short of breath.     COMPARISON: 7/6/2018      Impression    IMPRESSION: There is increased density in the retrocardiac region most  likely due to a consolidative infiltrate. There is also some  infiltrate in the left midlung zone. The heart is enlarged. There is a  battery pack over the left axillary region which appears to be  associated with a stimulator in the left lower neck. Right lung  appears clear. Pulmonary vasculature does not appear to be engorged  although there are some increased interstitial markings.    DENIA ROY MD   CBC with platelets differential   Result Value Ref Range    WBC 14.1 (H) 4.0 - 11.0 10e9/L    RBC Count 5.21 (H) 3.8 - 5.2 10e12/L    Hemoglobin 15.6 11.7 - 15.7 g/dL    Hematocrit 48.8 (H) 35.0 - 47.0 %    MCV 94 78 - 100 fl    MCH 29.9 26.5 - 33.0 pg    MCHC 32.0 31.5 - 36.5 g/dL    RDW 14.2 10.0 - 15.0 %    Platelet Count 240 150 - 450 10e9/L    Diff Method Automated Method     % Neutrophils 89.0 %    % Lymphocytes 6.2 %    % Monocytes 3.9 %    % Eosinophils 0.1 %    % Basophils 0.1 %    % Immature Granulocytes 0.7 %     Nucleated RBCs 0 0 /100    Absolute Neutrophil 12.5 (H) 1.6 - 8.3 10e9/L    Absolute Lymphocytes 0.9 0.8 - 5.3 10e9/L    Absolute Monocytes 0.6 0.0 - 1.3 10e9/L    Absolute Eosinophils 0.0 0.0 - 0.7 10e9/L    Absolute Basophils 0.0 0.0 - 0.2 10e9/L    Abs Immature Granulocytes 0.1 0 - 0.4 10e9/L    Absolute Nucleated RBC 0.0    Lactate for Sepsis Protocol   Result Value Ref Range    Lactate for Sepsis Protocol 1.7 0.7 - 2.0 mmol/L   Nt probnp inpatient   Result Value Ref Range    N-Terminal Pro BNP Inpatient 307 0 - 900 pg/mL   Comprehensive metabolic panel   Result Value Ref Range    Sodium 137 133 - 144 mmol/L    Potassium 4.4 3.4 - 5.3 mmol/L    Chloride 101 94 - 109 mmol/L    Carbon Dioxide 31 20 - 32 mmol/L    Anion Gap 5 3 - 14 mmol/L    Glucose 127 (H) 70 - 99 mg/dL    Urea Nitrogen 25 7 - 30 mg/dL    Creatinine 0.92 0.52 - 1.04 mg/dL    GFR Estimate 65 >60 mL/min/[1.73_m2]    GFR Estimate If Black 75 >60 mL/min/[1.73_m2]    Calcium 8.9 8.5 - 10.1 mg/dL    Bilirubin Total 0.3 0.2 - 1.3 mg/dL    Albumin 3.3 (L) 3.4 - 5.0 g/dL    Protein Total 7.7 6.8 - 8.8 g/dL    Alkaline Phosphatase 110 40 - 150 U/L    ALT 21 0 - 50 U/L    AST 21 0 - 45 U/L   Glucose by meter   Result Value Ref Range    Glucose 130 (H) 70 - 99 mg/dL   Procalcitonin   Result Value Ref Range    Procalcitonin 0.41 ng/ml   Glucose by meter   Result Value Ref Range    Glucose 122 (H) 70 - 99 mg/dL   Glucose by meter   Result Value Ref Range    Glucose 103 (H) 70 - 99 mg/dL   EKG 12 lead   Result Value Ref Range    Interpretation ECG Click View Image link to view waveform and result    ISTAT Basic Met ICa HCT POCT   Result Value Ref Range    Sodium 138 133 - 144 mmol/L    Potassium 4.4 3.4 - 5.3 mmol/L    Chloride 99 94 - 109 mmol/L    Total CO2 29 20 - 32 mmol/L    Anion Gap 10 6 - 17 mmol/L    Glucose 143 (H) 70 - 99 mg/dL    Urea Nitrogen 26 7 - 30 mg/dL    Creatinine 1.0 0.52 - 1.04 mg/dL    GFR Estimate 55 (L) >60 mL/min/[1.73_m2]    GFR  Estimate If Black 67 >60 mL/min/[1.73_m2]    Calcium Ionized 4.4 4.4 - 5.2 mg/dL    Hemoglobin 16.7 (H) 11.7 - 15.7 g/dL    Hematocrit - POCT 49 (H) 35.0 - 47.0 %PCV   ISTAT gases lactate avery POCT   Result Value Ref Range    Ph Venous 7.33 7.32 - 7.43 pH    PCO2 Venous 62 (H) 40 - 50 mm Hg    PO2 Venous 22 (L) 25 - 47 mm Hg    Bicarbonate Venous 33 (H) 21 - 28 mmol/L    O2 Sat Venous 31 %    Lactic Acid 1.7 0.7 - 2.1 mmol/L   Blood culture ONE site   Result Value Ref Range    Specimen Description Blood Left Hand     Special Requests Received in aerobic bottle only     Culture Micro No growth after 8 hours    Blood culture ONE site   Result Value Ref Range    Specimen Description Blood Right Arm     Special Requests Aerobic and anaerobic bottles received     Culture Micro No growth after 8 hours    Methicillin Resist/Sens S. aureus PCR   Result Value Ref Range    Specimen Description Nares     Methicillin Resist/Sens S. aureus PCR Negative NEG^Negative

## 2019-01-09 NOTE — PLAN OF CARE
ICU End of Shift Summary.  For vital signs and complete assessments, please see documentation flowsheets.     Pertinent assessments: Pt is confused, alert to self and place, she is periodically pulling her bipap mask off and pulling on the IV tubing, VSS, afeb, lungs are dim, has the bipap on, high flow was attempted, when pt received her normal HS meds, her sats dropped to 82, the bipap was put back on, then her b/p was soft in the 80's systolic, map was 59, did swallow her meds ok, family says that they have a difficult time getting pt to eat good food, they say all she will eat is cake and ice cream and once in a while some soup, also they said she stays in bed all day and watches TV and is very stubborn,  said that sometimes she is left at home alone,  Major Shift Events: sats dropped on highflow and b/p soft after HS meds given.  Plan (Upcoming Events): con't using bipap, reevaluate HS meds, home situation needs to be  evaluated.  Discharge/Transfer Needs: TBD    Bedside Shift Report Completed : y  Bedside Safety Check Completed: y

## 2019-01-09 NOTE — PROGRESS NOTES
RT: Received patient on BIPAP 14/6, 60%. Patient switched back and forth between BIPAP and HFNC, 60% and 30L. She had a couple de-sat events where she required 100% BIPAP.    Current BIPAP settings: 16/8, 65%. SPO2 88-94%. Duoneb given as ordered. BBS diminished t/o. Will continue to follow and assess.

## 2019-01-09 NOTE — PROGRESS NOTES
RT- Patient arrived to ICU on BIPAP 14/7, Rate 14, 60%. Weaned to HFNC 40 LPM 60% at 0100 and tolerated well. Will continue to monitor and support.    Temp: 98.4  F (36.9  C) Temp src: Axillary BP: 119/78 Pulse: 88 Heart Rate: 88 Resp: 24 SpO2: 95 % O2 Device: High Flow Nasal Cannula (HFNC) Oxygen Delivery: Other (Comments)(40 LPM)    Parmjit Baltazar, RT on 1/9/2019 at 1:20 AM

## 2019-01-09 NOTE — PROGRESS NOTES
RT Note:    Patient started on BiPAP 14/7, FiO2 currently 60%. RT will continue to monitor.    Update 22:45: Patient transported from ER to ICU on BiPAP    Tonia Calero  January 8, 2019.8:29 PM

## 2019-01-09 NOTE — ED PROVIDER NOTES
History     Chief Complaint:  shortness of breath     HPI   The patient's history is limited due to altered mental status     Shana Horne is a 68 year old female with a history of COPD, pneumonia, and seizures who presents with shortness of breath and weakness. Per the patient's , the patient became ill with cold symptoms 4 days ago. Since then she has had increasing cough and congestion. For the last two days she has had increasing shortness of breath and weakness. During this time, she had a fall at home and was too weak to get up on her own. She has not had any fevers at home. Here in the ED the patient is somnolent.  The patient's  states that the patient had pneumonia 6 months ago and was either hospitalized or in transitional care for the next 3 months. During this time she had multiple episodes of respiratory failure. The patient is on 4 L of oxygen at all times at home and her last neb treatment was an hour ago. She does have a pump which administers morphine 4 times a day.     Allergies:  Nicotine     Medications:    Acetaminophen   Alendronate  Baclofen  Dermasarra  Restasis  Felbatol  Fluoxetine  Lamictal  Morphine  Pataday  Seroqual  Zocor  Pantoprazole     Past Medical History:    Arthritis  Chronic pain  COPD   Dementia  Depression   GERD  Hypercholesterolemia  Osteopenia  Oxygen dependent  Parkinsons disease  Renal disease  Seizures  Pneumonia    Past Surgical History:    Amputate toes  Arthroscopy knee  Bronchoscopy  GYN surgery  Insert morphine pump   Orthopedic surgery  Surgery for recto vaginal fistula repair    Family History:    No past pertinent family history.    Social History:  Patient presents with family  Current smoker  Negative for alcohol use.  Marital Status:        Review of Systems   Constitutional: Negative for fever.   HENT: Positive for congestion.    Respiratory: Positive for cough and shortness of breath.    Neurological: Positive for weakness.   All  other systems reviewed and are negative.      Physical Exam     Patient Vitals for the past 24 hrs:   BP Temp Temp src Pulse Heart Rate Resp SpO2   01/08/19 2300 134/87 98.4  F (36.9  C) Axillary 104 98 -- 93 %   01/08/19 2245 -- -- -- -- 101 28 93 %   01/08/19 2215 137/83 -- -- 103 101 13 --   01/08/19 2214 -- -- -- -- 99 17 91 %   01/08/19 2200 128/88 -- -- 106 103 18 92 %   01/08/19 2145 123/84 -- -- 104 104 14 92 %   01/08/19 2130 133/83 -- -- 105 105 20 93 %   01/08/19 2115 134/79 -- -- 106 105 18 93 %   01/08/19 2112 -- -- -- -- 106 16 93 %   01/08/19 2100 125/79 -- -- 109 107 14 92 %   01/08/19 2045 126/79 -- -- 110 110 16 95 %   01/08/19 2042 -- -- -- -- 113 16 95 %   01/08/19 2034 -- 99.5  F (37.5  C) Axillary -- -- -- --   01/08/19 2030 135/82 -- -- 113 112 24 96 %   01/08/19 2028 -- -- -- -- -- 18 92 %   01/08/19 2025 -- -- -- -- 116 18 100 %   01/08/19 2020 -- -- -- -- 118 20 90 %   01/08/19 2019 134/83 -- -- 117 116 24 90 %   01/08/19 2015 153/84 -- -- 122 123 (!) 31 90 %   01/08/19 2009 -- -- -- -- 122 -- (!) 82 %   01/08/19 2008 (!) 160/96 -- -- 123 120 -- (!) 80 %   01/08/19 2007 -- -- -- -- -- -- (!) 79 %   01/08/19 2006 -- -- -- -- -- -- (!) 76 %     Physical Exam  General: Sitting on chair slumped over, cyanotic, appears old then stated age  Head:  The scalp, face, and head appear normal  Eyes:  Conjunctivae are normal  ENT:    The nose is normal    Pinnae are normal    External acoustic canals are normal, perioral cyanosis     Dry mucous membranes   Neck:  Trachea midline  CV:  Pulses are normal, RRR  Resp:  No respiratory distress, poor air movement, expiratory wheezing bilaterally   Abdomen:      Soft, non-tender, non-distended  Musc:  Normal muscular tone    No major joint effusions    No asymmetric leg swelling  Skin:  No rash or lesions noted  Neuro:  Speech is normal and fluent. Face is symmetric.     Moving all extremities well.       Emergency Department Course   ECG:  Time: 2020  Vent.  Rate 118 bpm. FL interval 158. QRS duration 88. QT/QTc 310/434. P-R-T axis 82 75 76. Sinus tachycardia. Right atrial enlargement. Anteroseptal infarct, age undetermined. Abnormal ECG.  Read time: 2020      Imaging:  Radiographic findings were communicated with the patient and family who voiced understanding of the findings.  X-ray Chest, 2 views:  There is increased density in the retrocardiac region most  likely due to a consolidative infiltrate. There is also some  infiltrate in the left midlung zone. The heart is enlarged. There is a  battery pack over the left axillary region which appears to be  associated with a stimulator in the left lower neck. Right lung  appears clear. Pulmonary vasculature does not appear to be engorged  although there are some increased interstitial markings.  Result per radiology.      Laboratory:  BNP: 307  CMP: Glucose 127 (H), Albumin: 3.3 (L), o/w WNL (Creatinine: 0.92)  Blood culture x2: pending  CBC: WBC: 14.1 (H), HGB: 15.6, PLT: 240  Lactate: 1.7  ISTAT BMP: Glucose 143 (H), GFR: 55 (L), HGB: 16.7 (H), Hematocrit: 49 (H), o/w WNL (Creatinine: 1.0)  ISTAT gases lactate venous: pH: 7.33, pCO2: 62 (H), pO2: 22 (L), Bicarbonate venous: 33 (H), Lactic acid: 1.7    Interventions:  2011 - Albuterol-Ipratropium 2.5mg-0.5mg/3ml, inhalation solution, Nebulizer  2011 - solu-Medrol 125 mg IV  2020 - Narcan 0.1 mg IV  2020 - Lactated ringers 1 L IV  2105 - Zosyn 3.375 g IV  2127 - Zithromax 500 mg IV  2135 - Lactated ringers 1 L IV    Emergency Department Course:  Nursing notes and vitals reviewed.  2002: I performed an exam of the patient as documented above.     2005 - Portable chest x-ray and EKG ordered.     2010 - I performed a bedside ultrasound.     2015 - I spoke with the patients family regarding resuscitation/intubation if needed. They consent to have us perform live saving measures if needed.     2016 - Portable chest x-ray taken and reviewed by me. (see imaging result).     2019  - BiPAP initiated.     2020 - 0.1 mg Narcan administered.    2050 - Recheck. Findings and plan explained to the Patient who consents to admission.     2141: Discussed the patient with Dr. Vera, who will admit the patient to a ICU bed for further monitoring, evaluation, and treatment.     Impression & Plan    Medical Decision Making:  Shana Horne is a 68 year old female with a history of COPD and dementia who presents with respiratory distress. Patient was clearly altered and almost unresponsive on arrival. She was hypoxic into the 40s in triage and with oxygen she was at 70%. We placed her on 100% oxygen at 15 L and her sats slowly started to come up. Bedside ultrasound revealed bilateral sliding so less concerned for pneumothorax. Portable chest x-ray revealed a clear pneumonia. Given the pneumonia, we did start her on BiPAP with great improvement of her sats. We were able to wean down her FIO2 to keep her sats between 93-95%. Her lactate was normal. She was not hypercarbic. She was sleeping and quite unresponsive which could have been secondary to hypoxia, but I did give her 0.1 mg of Narcan given that she has a morphine pump. This seemed to somewhat improve her mental status, although her altered mental status could have been from her hypoxia as well. Patient was given Zosyn and Zithromax. Blood cultures were obtained. No signs of severe sepsis at this point. Patient was much more stable on the BiPAP so patient will be admitted to the ICU in stable condition. No need for intubation.     Diagnosis:    ICD-10-CM    1. Pneumonia due to infectious organism, unspecified laterality, unspecified part of lung J18.9          I, Chapito De Oliveira, am serving as a scribe on 1/8/2019 at 8:02 PM to personally document services performed by Erika Holland MD based on my observations and the provider's statements to me.     Chapito De Oliveira  1/8/2019   [facility] EMERGENCY DEPARTMENT       Erika Holland,  MD  01/09/19 0103

## 2019-01-09 NOTE — PROGRESS NOTES
[facility]    Hospitalist Progress Note  Provider : Stephanie Posey MD  Date of Service (when I saw the patient): 01/09/2019    Assessment & Plan    Shana Horne is a 68 year old female with PMH including dementia, copd, chronic pain w/ morphine pump in place, parkinson's who presents with numerous issues including generalized weakness and somnolence. This was preceded by upper respiratory tract infection type symptoms which started about 4 days ago since when she has developed increasing cough and congestion.  She is appeared to be more short of breath and weak and was brought to the ER by family.  At home she has been on her baseline 4 L of supplemental oxygen and they have been giving her nebs.  In the ER, she was found to have leukocytosis to 14 K with low oxygen levels into the 80s and upper 70s on 4 L. Chest x-ray  showed retrocardiac infiltrate compatible with pneumonia. She was started on IV antibiotics with Zosyn and azithromycin and placed on BiPAP for work of breathing which she seemed to tolerate well. She was also given 0.1 mg of Narcan and she apparently became much more alert with that and interactive. She was put on BIPAP and admitted to ICU for further management.     Assessment and Plan:   1. Sepsis due to pneumonia: As evidenced by tachycardia, leukocytosis and hypoxia.  --Was transitioned from BIPAP to high flow oxygen.   --Continue IV Zosyn and Levaquin, IV steroids.   --Scheduled nebs  --Continue IV fluids  --Await culture results  --Procalcitonin 0.14. Cultures pending  --SLP consult prior to advancing diet, ?aspiration risk.     2.   Toxic metabolic encephalopathy compounded by baseline dementia:   --Was given 1 dose of Narcan in the ER.   --Suspect that the combination of infection and baseline minimal reserve, morphine pump is actually intrathecal.  --prn narcan     3.   History of COPD with chronic hypoxemic oxygen dependent respiratory failure:   --Continue IV  Solu-Medrol 40 mg twice daily for now in the setting of pneumonia and possible exacerbation  --Schedule nebs  --Continue high flow oxygen. Low threshold for intubation     4.   Parkinson's:   --Will resume her pta meds.     5.   Chronic pain with intrathecal morphine pump:   --Managed by Dr. Farooq. Pain management service consulted to help with management while she is here.       6.  Seizure disorder: No recent seizure activity reported. Resume home Lamictal and felbamate.     7.  Recent diagnosis dementia: per family this is frontal dementia.    --Takes seroquel at night and tolerates it well in spite of her parkinsons.  --consider sitter and/or low dose IV ativan in case agitation/impulsivity becomes an issue as family notes this has in the past.     DVT Prophylaxis: Pneumatic Compression Devices  Code Status: Full Code    DVT Prophylaxis: Pneumatic Compression Devices  Code Status: Full Code    Disposition: Expected discharge: Anticipate more than two nights of hospital course until her breathing improves.     Stephanie Posey MD    Interval History   Patient seen and examined. She is confused and not providing any reliable history. She is on high flow oxygen.     -Data reviewed today: I reviewed all new labs and imaging results over the last 24 hours. I personally reviewed no images or EKG's today.    Physical Exam   Temp: 98.2  F (36.8  C) Temp src: Oral BP: 117/74 Pulse: 93 Heart Rate: 96 Resp: 15 SpO2: 91 % O2 Device: High Flow Nasal Cannula (HFNC) Oxygen Delivery: Other (Comments)(30L)  Vitals:    01/08/19 2300   Weight: 56.4 kg (124 lb 5.4 oz)     Vital Signs with Ranges  Temp:  [97.1  F (36.2  C)-99.5  F (37.5  C)] 98.2  F (36.8  C)  Pulse:  [] 93  Heart Rate:  [] 96  Resp:  [9-31] 15  BP: ()/(49-96) 117/74  FiO2 (%):  [40 %-80 %] 80 %  SpO2:  [76 %-100 %] 91 %  I/O last 3 completed shifts:  In: 150 [P.O.:150]  Out: -     GEN:  Alert, oriented x 3, appears comfortable, NAD.  HEENT:   Normocephalic/atraumatic, no scleral icterus, no nasal discharge, mouth moist.  CV:  Regular rate and rhythm, no murmur or JVD.  S1 + S2 noted, no S3 or S4.  LUNGS:  Clear to auscultation bilaterally without rales/rhonchi/wheezing/retractions.  Symmetric chest rise on inhalation noted.  ABD:  Active bowel sounds, soft, non-tender/non-distended.  No rebound/guarding/rigidity.  EXT:  No edema or cyanosis.  Hands/feet warm to touch with good signs of peripheral perfusion.  No joint synovitis noted.  SKIN:  Dry to touch, no exanthems noted in the visualized areas.  NEURO:  Symmetric muscle strength, sensation to touch grossly intact.  No new focal deficits appreciated.    Medications       amitriptyline  75 mg Oral At Bedtime     enoxaparin  40 mg Subcutaneous Q24H     famotidine  20 mg Oral BID     felbamate  600 mg Oral TID     ipratropium - albuterol 0.5 mg/2.5 mg/3 mL  3 mL Nebulization Q4H     lamoTRIgine  100 mg Oral TID     methylPREDNISolone  40 mg Intravenous BID     piperacillin-tazobactam  3.375 g Intravenous Q6H     QUEtiapine  50 mg Oral At Bedtime       Data   Recent Labs   Lab 01/08/19  2100 01/08/19 2022 01/08/19 2021   WBC  --  14.1*  --    HGB  --  15.6 16.7*   MCV  --  94  --    PLT  --  240  --      --  138   POTASSIUM 4.4  --  4.4   CHLORIDE 101  --  99   CO2 31  --   --    BUN 25  --  26   CR 0.92  --   --    ANIONGAP 5  --  10   BIBIANA 8.9  --   --    *  --  143*   ALBUMIN 3.3*  --   --    PROTTOTAL 7.7  --   --    BILITOTAL 0.3  --   --    ALKPHOS 110  --   --    ALT 21  --   --    AST 21  --   --        Recent Results (from the past 24 hour(s))   Chest  XR, 1 view PORTABLE    Narrative    CHEST ONE VIEW PORTABLE   1/8/2019 8:32 PM     HISTORY: Short of breath.     COMPARISON: 7/6/2018      Impression    IMPRESSION: There is increased density in the retrocardiac region most  likely due to a consolidative infiltrate. There is also some  infiltrate in the left midlung zone. The heart is  enlarged. There is a  battery pack over the left axillary region which appears to be  associated with a stimulator in the left lower neck. Right lung  appears clear. Pulmonary vasculature does not appear to be engorged  although there are some increased interstitial markings.    DENIA ROY MD

## 2019-01-09 NOTE — PLAN OF CARE
Speech therapy orders received, chart reviewed. Pt currently on 35 L HFNC with sats between 87-91% during observation, RN also reporting pt required intermittent BiPAP use throughout the day. Will hold swallow evaluation until respiratory status more stable. RN in agreement. Will closely follow.  Page [phone] as needed.

## 2019-01-09 NOTE — H&P
[facility]  Hospitalist Admission Note  Name: Shana Horne    MRN: [id number]  YOB: 1950    Age: 68 year old  Date of admission: 1/8/2019  Primary care provider: Otoniel Capellan    Chief Complaint:  pneumonia    Shana Horne is a 68 year old female with PMH including dementia, copd, chronic pain w/ morphine pump in place, parkinson's who presents with numerous issues including generalized weakness and somnolence.  This was preceded by upper respiratory tract infection type symptoms which started about 4 days ago since when she has developed increasing cough and congestion.  She is appeared to be more short of breath and weak and was brought to the ER by family.  At home she has been on her baseline 4 L of supplemental oxygen and they have been giving her nebs.    Here in the emergency room she was found to have leukocytosis to 14 K with low oxygen levels into the 80s and upper 70s on 4 L.  Chest x-ray was obtained and showed retrocardiac infiltrate compatible with pneumonia.  She was started on IV antibiotics with Zosyn and azithromycin and placed on BiPAP for work of breathing which she seemed to tolerate well.  She was also given 0.1 mg of Narcan and she apparently became much more alert with that and interactive.  Otherwise she has been mildly tachycardic but hemodynamically stable.  She is being admitted to the ICU for BiPAP and hopeful transition to high flow nasal cannula overnight as well as ongoing IV antibiotics and supportive cares.  She is at risk for developing agitation as per her family which will need to be monitored closely.    Assessment and Plan:   1. Sepsis due to pneumonia: As evidenced by tachycardia, leukocytosis and hypoxia.  --Admit to the ICU  --Attempt to transition off BiPAP to high flow oxygen.  Goal oxygen levels below 90s.  --Continue IV Zosyn as started in the ER  --Continue IV steroids for now  --Scheduled nebs  --continue IV fluids  --Await  culture results  --Check pro calcitonin, influenza swab  --SLP consult prior to advancing diet, ?aspiration risk.  --Patient signed out to overnight telemetry intensivist.  Appreciate input.    2.   Toxic metabolic encephalopathy compounded by baseline dementia: Was given 1 dose of Narcan in the ER and then became alert.  Suspect that the combination of infection and baseline minimal reserve are the primary drivers here as I believe her morphine pump is actually intrathecal.  --if less responsive try 0.1-0.4 narcan again (ordered) and check a blood gas.    3.   History of COPD with chronic hypoxemic oxygen dependent respiratory failure:   --Continue IV Solu-Medrol 40 mg twice daily for now in the setting of pneumonia and possible exacerbation  --schedule nebs  --Would prefer high flow oxygen to BiPAP if possible.  Will trial this in the ICU.    4.   Parkinson's: Will resume home meds once verified.  I do not believe she is on sinemet per my discussion w/ family.    5.   Chronic pain with intrathecal morphine pump: managed by Dr. Farooq.  Family thinks this is due to be filled soon.  Will consult the pain management service to help with management while she is here.      6.  Seizure disorder: No recent seizure activity reported.  Resume home Lamictal and felbamate.    7.  Recent diagnosis dementia: per family this is frontal dementia.  She takes seroquel at night and tolerates it well in spite of her parkinsons.  --consider sitter and/or low dose IV ativan in case agitation/impulsivity becomes an issue as family notes this has in the past.    DVT Prophylaxis: Pneumatic Compression Devices  Code Status: Full Code  Discharge Dispo: Admit to the ICU      History of Present Illness:  Shana Horne is a 68 year old female with PMH including dementia, copd, chronic pain w/ morphine pump in place, parkinson's who presents with numerous issues including generalized weakness and somnolence.  This was preceded by upper  respiratory tract infection type symptoms which started about 4 days ago since when she has developed increasing cough and congestion.  She is appeared to be more short of breath and weak and was brought to the ER by family.  Her family is present and helps provide some of the history and they do note that she had a hospitalization for pneumonia last year followed by a long TCU stay.    At home she has been on her baseline 4 L of supplemental oxygen and they have been giving her nebs.    Her  and her daughter who seem very involved in her care provided most of the history in the emergency room.  They note that last year she had a prolonged stay in the ICU for pneumonia followed by an LTAC and then TCU stay.  They do note that at night she tends to get impulsive and somewhat agitated.  She does take Seroquel at night which in spite of her Parkinson she has tolerated well.  We did discuss having low-dose as needed medications available for agitation as well.  In the past she is required nonviolent restraints per their report.     Past Medical History:  Past Medical History:   Diagnosis Date     Arthritis      Blood transfusion     38 yrs ago     Chronic pain     painful feet     COPD (chronic obstructive pulmonary disease) (H)      Dementia      Depression      Gastroesophageal reflux disease      History of blood transfusion      Hypercholesterolemia      Numbness and tingling     fingertips occas     Osteopenia      Other chronic pain     generalized     Oxygen dependent     4l nc cont     Parkinsons disease (H)      Renal disease      Seizures (H)     LAST SEIZURE 2 MOS AGO     Stimulus-induced repetitive discharges on nerve conduction studies      Past Surgical History:  Past Surgical History:   Procedure Laterality Date     AMPUTATE TOE(S) Right 10/19/2017    Procedure: AMPUTATE TOE(S);  Right second toe amputation at metatarsophalangeal joint.;  Surgeon: Montrell Arce MD;  Location:  OR      ARTHROSCOPY KNEE WITH FIXATION OF OSTEOCHONDRAL DISSECANS      x3     BRONCHOSCOPY (RIGID OR FLEXIBLE), DIAGNOSTIC N/A 6/26/2018    Procedure: COMBINED BRONCHOSCOPY (RIGID OR FLEXIBLE), LAVAGE;  BRONCHOSCOPY (RIGID OR FLEXIBLE), LAVAGE  in ;  Surgeon: Shala Gonzales MD;  Location:  GI     COLONOSCOPY  11/20/2014    Dr. Schaeffer [facility]     COLONOSCOPY N/A 11/20/2014    Procedure: COLONOSCOPY;  Surgeon: Steven Schaeffer MD;  Location:  GI     GYN SURGERY      tear repair after delivery child     HEAD & NECK SURGERY      wisdom teeth extraction     INSERT PUMP MORPHINE      pump replacement x2     MOUTH SURGERY      numerous times     ORTHOPEDIC SURGERY      foot surg left     REPLACE INTRATHECAL PAIN PUMP Left 10/5/2018    Procedure: REPLACE INTRATHECAL PAIN PUMP;  Intrathecal pump revision; tap block;  Surgeon: Gurpreet Farooq MD;  Location:  OR     REVISE PUMP MORPHINE  11/29/2011    Procedure:REVISE PUMP MORPHINE; Pump Replacement, abdominal area; Surgeon:ALIRIO RIVERA; Location: OR     surgery for recto vaginal fistula repair       TUBAL LIGATION       Social History:  Social History     Tobacco Use     Smoking status: Current Every Day Smoker     Packs/day: 0.50     Smokeless tobacco: Never Used   Substance Use Topics     Alcohol use: No     Social History     Social History Narrative     Not on file     Family History:  Her father had parkinson's and seizures and dementia.  Her mother is still alive and relatively healthy at 93.    Allergies:  Allergies   Allergen Reactions     Nicotine Other (See Comments)     Nicotine patch caused worsening seizures per 's report     Medications:    No current facility-administered medications on file prior to encounter.   Current Outpatient Medications on File Prior to Encounter:  ACETAMINOPHEN PO Take 500 mg by mouth every 4 hours as needed for pain   ALENDRONATE SODIUM PO Take 35 mg by mouth once a week   BACLOFEN PO Take 5 mg by  mouth 3 times daily   camphor-menthol (DERMASARRA) 0.5-0.5 % LOTN Apply topically every 8 hours as needed for skin care   cycloSPORINE (RESTASIS) 0.05 % ophthalmic emulsion Place 1 drop into both eyes 2 times daily   dispensed in syringe 1 Device in sodium chloride (PF) 20 mL Intrathecal Pump Infusion by Intrathecal route continuous 8840 SynchroMed IIB pump per Dr Owen 8637-20 20 ml, serial number [id number] infusion setting delivers:Hydromorphone 6.008 mg/dayBupivacaine 3.004 mg/dayClonidine 40.05 mcg/day   Felbamate (FELBATOL PO) Take 600 mg by mouth Take 2 tablets in the morning, 1 tablet at noon and 1 tablet in the evening.   FERROUS GLUCONATE PO Take 324 mg by mouth 2 times daily (with meals) Breakfast & supper   FLUOXETINE HCL PO Take 40 mg by mouth daily   hypromellose (ARTIFICIAL TEARS) 0.5 % SOLN ophthalmic solution Place 1 drop into both eyes every hour as needed for dry eyes   LamoTRIgine (LAMICTAL) 100 MG tablet Take 100 mg by mouth 3 times daily.   multivitamin, therapeutic with minerals (MULTI-VITAMIN) TABS tablet Take 1 tablet by mouth daily   NONFORMULARY Ipratropium Bromide/Albuterol 0.5mg/3mg per 3mL nebulizer solution-use I vial via Neb every 6 hours PRN   olopatadine HCl (PATADAY) 0.2 % SOLN Place 1 drop into both eyes 2 times daily   PANTOPRAZOLE SODIUM PO Take 40 mg by mouth every morning (before breakfast)   QUEtiapine Fumarate (SEROQUEL PO) Take 25 mg by mouth At Bedtime   simvastatin (ZOCOR) 40 MG tablet Take 40 mg by mouth At Bedtime          Review of Systems:  A Comprehensive greater than 10 system review of systems was carried out.  Pertinent positives and negatives are noted above.  Otherwise negative for contributory information.     Physical Exam:  Blood pressure 134/79, pulse 106, temperature 99.5  F (37.5  C), temperature source Axillary, resp. rate 18, SpO2 93 %.  Wt Readings from Last 1 Encounters:   10/05/18 60.3 kg (133 lb)     Exam:  General: Alert, awake, bipap  in place.  HEENT: NC/AT, eyes anicteric Dentition WNL, MM moist.  Cardiac: RRR, S1, S2.  No murmurs appreciated.  Pulmonary: Normal chest rise, normal work of breathing on bipap.  Abdomen: left abd sub-cu device c/w pain pump.  soft, non-tender, non-distended.  Bowel Sounds Present.  No guarding.  Extremities: no deformities.  Warm, well perfused.  Skin: no rashes or lesions noted.  Warm and Dry.  Neuro: No focal deficits noted.  Follows commands, makes her needs known, answers questions as able w/ bipap in place.  Coordination and strength grossly normal.  Psych: Appropriate affect.    Data:  EKG:  None.  Imaging:  Recent Results (from the past 48 hour(s))   Chest  XR, 1 view PORTABLE    Narrative    CHEST ONE VIEW PORTABLE   1/8/2019 8:32 PM     HISTORY: Short of breath.     COMPARISON: 7/6/2018      Impression    IMPRESSION: There is increased density in the retrocardiac region most  likely due to a consolidative infiltrate. There is also some  infiltrate in the left midlung zone. The heart is enlarged. There is a  battery pack over the left axillary region which appears to be  associated with a stimulator in the left lower neck. Right lung  appears clear. Pulmonary vasculature does not appear to be engorged  although there are some increased interstitial markings.    DENIA ROY MD     Labs:  Recent Labs   Lab 01/08/19 2022 01/08/19 2021   WBC 14.1*  --    HGB 15.6 16.7*   HCT 48.8*  --    MCV 94  --      --           Lab Results   Component Value Date     01/08/2019     01/08/2019     07/09/2018    Lab Results   Component Value Date    CHLORIDE 101 01/08/2019    CHLORIDE 99 01/08/2019    CHLORIDE 100 07/09/2018    Lab Results   Component Value Date    BUN 25 01/08/2019    BUN 26 01/08/2019    BUN 27 07/09/2018      Lab Results   Component Value Date    POTASSIUM 4.4 01/08/2019    POTASSIUM 4.4 01/08/2019    POTASSIUM 4.3 07/09/2018    Lab Results   Component Value Date    CO2 31  01/08/2019    CO2 33 07/09/2018    CO2 37 07/08/2018    Lab Results   Component Value Date    CR 0.92 01/08/2019    CR 0.68 07/09/2018    CR 0.66 07/08/2018        No results for input(s): CULT in the last 168 hours.  Recent Labs   Lab 01/08/19  2020   LACT 1.7         Tomer Little MD  Hospitalist  [facility]    Evaluation and management time exclusive of procedures was 40 minutes critical care time including: urgent examination and evaluation of the patient, discussion of the patient's condition with other physicians and members of the care team, reviewing data and chart related to the patient, discussion of patient's condition with the family and time utilizing the EMR for documentation of this patient's care.

## 2019-01-09 NOTE — CONSULTS
"CLINICAL NUTRITION SERVICES  -  ASSESSMENT NOTE      Future/Additional Recommendations:    Pending SLP and diet advancement + input from palliative care team    Will benefit from nutrition support if ultimately intubated   Malnutrition:   % Weight Loss:Weight loss does not meet criteria for malnutrition   % Intake: suspect <75% for >/= 1 month (non-severe malnutrition)  Subcutaneous Fat Loss:Orbital region mild or greater depletion (BiPAP mask on), Upper arm region moderate depletion and Thoracic region mild or greater depletion  Muscle Loss:Temporal region mild depletion, Clavicle bone region moderate depletion, Acromion bone region moderate depletion, Scapular bone region moderate depletion and Dorsal hand region moderate depletion  Fluid Retention:None noted    Malnutrition Diagnosis: Non-Severe malnutrition (only 1 severe criteria for fat loss met, so coded non severe)  In Context of:  Chronic illness or disease     REASON FOR ASSESSMENT  Shana Horne is a 68 year old female seen by the dietitian for positive nutrition risk screen - Reduced oral intake over the last month    History of dementia, COPD, chronic pain, Parkinson's - admitted with somnolence and generalized weakness.    NUTRITION HISTORY    Information obtained from chart review    Food allergies/intolerances: NKFA     Notes indicate intake of ice cream and cake and soup on rare occasions. Suspect patient is meeting <75% of needs for >3 months    Familiar with patient from past admits, requiring intubation, enteral nutrition and dysphagia diet after extubation.     From 6/26/18 RD note:   Patient is on a mechanical/dental soft (self selects) diet at home. Wears dentures that fit ok but tender gums from frequent seizures. Appetite and intake has steadily declined and family has to \"force\" patient to eat. Patient cooks but daughter has been staying with pt for last month and taking on more meal preparation responsibilities    CURRENT NUTRITION " "ORDERS  - Diet: NPO since admit  - Factors affecting nutrition intake include: BiPAP needs, dysphagia    PHYSICAL FINDINGS  Observed  See malnutrition section below  Obtained from Chart/Interdisciplinary Team  Skin: ecchymotic  Generalized weakness  Confusion  BiPAP/HFNC    ANTHROPOMETRICS  Height: 5' 4\"  Weight: 56 kg   Body mass index is 21.34 kg/m .  Weight Status:  Normal BMI  Ideal body weight: 54.6 kg +/- 10%, 103% of IBW   Weight History:  Weight fluctuations in last six months, as noted below  Wt Readings from Last 10 Encounters:   01/08/19 56.4 kg (124 lb 5.4 oz)   10/05/18 60.3 kg (133 lb)   07/09/18 58.3 kg (128 lb 9.6 oz)   10/19/17 47.2 kg (104 lb)   11/20/14 54.4 kg (120 lb)   11/29/11 54.4 kg (120 lb)       ASSESSED NUTRITION NEEDS (PER APPROVED PRACTICE GUIDELINES, Dosing weight: 56 kg):  Estimated Energy Needs: 9192-9502 kcals (25-30 Kcal/Kg)  Justification: overweight and vented  Estimated Protein Needs:  grams protein (1.2-1.8+ g pro/Kg)  Justification: hypercatabolism with critical illness  Estimated Fluid Needs: >1 mL/Kcal  Justification: maintenance    LABS  Labs reviewed  Lab Results   Component Value Date    URINEKETONE Negative 06/22/2018     Recent Labs   Lab Test 01/08/19 2100 01/08/19 2021 07/09/18  0700 07/08/18  0657 07/07/18  0612   POTASSIUM 4.4 4.4 4.3 4.0 4.2     Recent Labs   Lab Test 07/09/18  0700 07/06/18  0530 07/02/18  0520 06/30/18  0445 06/28/18  0450   PHOS 4.0 3.5 3.0 3.1 2.9     Recent Labs   Lab Test 07/09/18  0700 07/04/18  0550 07/02/18  0520 06/30/18  0445 06/27/18  0435   MAG 2.2 2.2 2.0 2.1 2.0     Recent Labs   Lab Test 01/08/19  2100 01/08/19 2021 07/09/18  0700 07/08/18  0657 07/07/18  0612    138 138 138 139     Recent Labs   Lab Test 01/08/19  2100 07/09/18  0700 07/08/18  0657 07/07/18  0612 07/06/18  0530   CR 0.92 0.68 0.66 0.67 0.65     Recent Labs   Lab 01/08/19  2100 01/08/19  2021   * 143*     Lab Results   Component Value Date    " A1C 5.8 06/25/2018       MEDICATIONS  Medications reviewed  Solumedrol    MALNUTRITION:  % Weight Loss:Weight loss does not meet criteria for malnutrition   % Intake: suspect <75% for >/= 1 month (non-severe malnutrition)  Subcutaneous Fat Loss:Orbital region mild or greater depletion (BiPAP mask on), Upper arm region moderate depletion and Thoracic region mild or greater depletion  Muscle Loss:Temporal region mild depletion, Clavicle bone region moderate depletion, Acromion bone region moderate depletion, Scapular bone region moderate depletion and Dorsal hand region moderate depletion  Fluid Retention:None noted    Malnutrition Diagnosis: Non-Severe malnutrition (only 1 severe criteria for fat loss met, so coded non severe)  In Context of:  Chronic illness or disease    NUTRITION DIAGNOSIS:  Malnutrition related to intake of low nutrient density foods and increased needs 2/2 chronic disease as evidenced by intake of < 75% of estimated needs for >3 months, fat and muscle wasting    INTERVENTIONS  Recommendations / Nutrition Prescription  Diet advancement per MD/SLP discretion    Noted palliative care involvement. Will need nutrition support if intubated.    Implementation  Nutrition education: Not appropriate at this time due to patient condition  Collaboration and Referral of care: Discussed patient during interdisciplinary care rounds this morning    Goals  Diet >/=FL within 48 hours       MONITORING AND EVALUATION:  Progress towards goals will be monitored and evaluated per protocol and Practice Guidelines      Connie Singletary RDN, LD, CNSC  Pager - 3rd floor/ICU: [phone]  Pager - All other floors: [phone]  Pager - Weekend/holiday: [phone]  Office: [phone]

## 2019-01-09 NOTE — ED TRIAGE NOTES
"Cold since Friday. Hx of RFD, COPD, pneumonia. Fallen recently, \"can't talk\"  \"no strength\" per family. This has been getting progressively worse the last couple days per . Patient arrives in room with low O2 saturations.   "

## 2019-01-10 NOTE — PROGRESS NOTES
"[facility]  Palliative Care Progress Note  Text Page    Met with the patient's  Oliverio at bedside. He acknowledged that Shana \"has been asking about the kids and the grand kids\". He explained the he and their children have decided to make her comfortable and asked to speak privately. We moved to the ICU conference room. Oliverio explained that he has a talk with their five daughters and son who all agree \"She's miserable. She doesn't want to live like this. This is not how she wanted it to be.\" Oliverio explained that Shana \"spent 3 months last year in the hospital, at [place] and at [place]\". He was quite disgusted by the care Shana had at [facility] for the 6 weeks she resided at that facility. He explained that he wanted to give her a shower on a Friday night and they would not allow him and she wouldn't be able to bath until Monday. Oliverio has multiple financial stressors in caring for Shana. He is currently unemployed, but has a job interview tomorrow and so he plans to leave at 11:30 AM to go and get a hair cut to make a good impression. We discussed code status and Oliverio confirmed a plan for DNR/DNI - Comfort focused care. \"Her life is a 50 foot radius at home. She can't shower by herself, my daughter helps her. She can't stand her life how it is now\". He explained \"[place] . We ended up at different schools, but got back together in 9th or 10 grade and we've been together ever since\". He was tearful in acknowledging \"I know she probably won't get out of here, but if she does I want her home\".   Discussed request with Hospitalist Stephanie Posey MD; Intensivist Jrodi Zambrano MD; bedside nurse Arely Haider RN and Registered Dietitian Connie Singletary RD, LD.        Assessment & Plan     1.  Decisional Capacity -  Questionable- patient has advanced dementia and does not demonstrate medical capacity. Patient does not have an advance directive. Per [facility] " informed consent policy next of kin should be involved in all consent and decision making.     2.  Pain - Chronic regional pain syndrome. [employer] intrathecal pump managed by Gurpreet Farooq MD. Anesthesiology refilled the pump today.      3.  Dyspnea with continued tobacco abuse -  request a plan for comfort. Reasonable to use IV, oral or sublingual Dilaudid for complaint of dyspnea or a respiratory rate greater than 20.  plans to take the patient home if she should stabilize on a comfort plan.      4.  Dysphagia -   acknowledged risk of aspiration and request a plan for comfort eating.      5.  Unintentional weight loss - Current weight 124 pounds and historically at 136 pounds on 10/4/2018. Appreciate input of Registered Dietitian Connie Singletary RD, SHAI.     6.  Spiritual Care - Spiritual Health services declined at this time.      7. Care Planning -   request a comfort plan and is open to using the hospice benefit with a plan to take Shana home if she should stabilize.     Goal of Care: DNR/DNI - Comfort care.     Sophie Bolanos MS, RN, CNS, APRN, ACHPN, FAACVPR  Pain and Palliative Care  Pager [phone]  Office [phone]       Time Spent on this Encounter   I spent 20 minutes (10:40 AM-11 AM) in assessment of the patient, counseling and discussion with her  as documented in sections above. Another 30 minutes in review of chart, documentation, coordination of care and discussion with the health care team.    Interval History   Chart reviewed    Review of Systems       Unable as patient lethargic on high flow oxygen    Physical Exam   Temp:  [97  F (36.1  C)-98.7  F (37.1  C)] 97  F (36.1  C)  Pulse:  [] 91  Heart Rate:  [] 94  Resp:  [8-36] 12  BP: ()/(56-99) 143/99  FiO2 (%):  [50 %-80 %] 50 %  SpO2:  [81 %-100 %] 97 %  128 lbs 1.4 oz  GEN:  Lethargic, cachetic and frail on high flow oxygen (question if she is hallucinating as she was performing an  eating motion with her empty left hand).   HEENT:  Normocephalic/atraumatic, no scleral icterus, no nasal discharge, mouth moist.  RESP:  High flow oxygen with symmetric chest rise on inhalation noted.      Medications     Medication given by intrathecal pump: This is NOT an order to dispense medication. For information only.         amitriptyline  75 mg Oral At Bedtime     bumetanide  1 mg Intravenous Q12H     famotidine  20 mg Oral BID     felbamate  600 mg Oral TID     lamoTRIgine  100 mg Oral TID     methylPREDNISolone  40 mg Intravenous BID     piperacillin-tazobactam  3.375 g Intravenous Q6H     QUEtiapine  50 mg Oral At Bedtime       Data   Results for orders placed or performed during the hospital encounter of 01/08/19 (from the past 24 hour(s))   Glucose by meter   Result Value Ref Range    Glucose 116 (H) 70 - 99 mg/dL   Magnesium (Add on or recollect)   Result Value Ref Range    Magnesium 2.4 (H) 1.6 - 2.3 mg/dL   Glucose by meter   Result Value Ref Range    Glucose 105 (H) 70 - 99 mg/dL   Glucose by meter   Result Value Ref Range    Glucose 99 70 - 99 mg/dL   Glucose by meter   Result Value Ref Range    Glucose 100 (H) 70 - 99 mg/dL   Glucose by meter   Result Value Ref Range    Glucose 124 (H) 70 - 99 mg/dL   Comprehensive metabolic panel (AM Draw)   Result Value Ref Range    Sodium 141 133 - 144 mmol/L    Potassium 4.1 3.4 - 5.3 mmol/L    Chloride 104 94 - 109 mmol/L    Carbon Dioxide 31 20 - 32 mmol/L    Anion Gap 6 3 - 14 mmol/L    Glucose 104 (H) 70 - 99 mg/dL    Urea Nitrogen 32 (H) 7 - 30 mg/dL    Creatinine 1.01 0.52 - 1.04 mg/dL    GFR Estimate 58 (L) >60 mL/min/[1.73_m2]    GFR Estimate If Black 67 >60 mL/min/[1.73_m2]    Calcium 8.4 (L) 8.5 - 10.1 mg/dL    Bilirubin Total 0.3 0.2 - 1.3 mg/dL    Albumin 2.9 (L) 3.4 - 5.0 g/dL    Protein Total 7.0 6.8 - 8.8 g/dL    Alkaline Phosphatase 90 40 - 150 U/L    ALT 19 0 - 50 U/L    AST 17 0 - 45 U/L   CBC (AM Draw)   Result Value Ref Range    WBC 10.3  4.0 - 11.0 10e9/L    RBC Count 4.18 3.8 - 5.2 10e12/L    Hemoglobin 12.6 11.7 - 15.7 g/dL    Hematocrit 38.9 35.0 - 47.0 %    MCV 93 78 - 100 fl    MCH 30.1 26.5 - 33.0 pg    MCHC 32.4 31.5 - 36.5 g/dL    RDW 14.3 10.0 - 15.0 %    Platelet Count 207 150 - 450 10e9/L   Phosphorus (AM Draw)   Result Value Ref Range    Phosphorus 3.1 2.5 - 4.5 mg/dL   Glucose by meter   Result Value Ref Range    Glucose 90 70 - 99 mg/dL   XR Chest Port 1 View    Narrative    CHEST ONE VIEW PORTABLE   1/10/2019 9:20 AM     HISTORY:  Respiratory failure.    COMPARISON: 1/8/2019      Impression    IMPRESSION: Stimulator pack again projected over the left axilla.  Retrocardiac airspace opacity similar to prior. Right lung clear. No  pneumothorax or pleural effusion. Appearance of the chest unchanged.    LM LEON MD

## 2019-01-10 NOTE — ANESTHESIA PREPROCEDURE EVALUATION
Anesthesia Pre-Procedure Evaluation    Patient: Shana Horne   MRN: [id number] : 1950          Preoperative Diagnosis: * No pre-op diagnosis entered *    * No procedures listed *    Past Medical History:   Diagnosis Date     Arthritis      Blood transfusion     38 yrs ago     Chronic pain     painful feet     COPD (chronic obstructive pulmonary disease) (H)      Dementia      Depression      Gastroesophageal reflux disease      History of blood transfusion      Hypercholesterolemia      Numbness and tingling     fingertips occas     Osteopenia      Other chronic pain     generalized     Oxygen dependent     4l nc cont     Parkinsons disease (H)      Renal disease      Seizures (H)     LAST SEIZURE 2 MOS AGO     Stimulus-induced repetitive discharges on nerve conduction studies      Past Surgical History:   Procedure Laterality Date     AMPUTATE TOE(S) Right 10/19/2017    Procedure: AMPUTATE TOE(S);  Right second toe amputation at metatarsophalangeal joint.;  Surgeon: Montrell Arce MD;  Location:  OR     ARTHROSCOPY KNEE WITH FIXATION OF OSTEOCHONDRAL DISSECANS      x3     BRONCHOSCOPY (RIGID OR FLEXIBLE), DIAGNOSTIC N/A 2018    Procedure: COMBINED BRONCHOSCOPY (RIGID OR FLEXIBLE), LAVAGE;  BRONCHOSCOPY (RIGID OR FLEXIBLE), LAVAGE  in ;  Surgeon: Shala Gonzales MD;  Location:  GI     COLONOSCOPY  2014    Dr. Schaeffer [facility]     COLONOSCOPY N/A 2014    Procedure: COLONOSCOPY;  Surgeon: Steven Schaeffer MD;  Location:  GI     GYN SURGERY      tear repair after delivery child     HEAD & NECK SURGERY      wisdom teeth extraction     INSERT PUMP MORPHINE      pump replacement x2     MOUTH SURGERY      numerous times     ORTHOPEDIC SURGERY      foot surg left     REPLACE INTRATHECAL PAIN PUMP Left 10/5/2018    Procedure: REPLACE INTRATHECAL PAIN PUMP;  Intrathecal pump revision; tap block;  Surgeon: Gurpreet Farooq MD;  Location:  OR     REVISE PUMP  MORPHINE  11/29/2011    Procedure:REVISE PUMP MORPHINE; Pump Replacement, abdominal area; Surgeon:ALIRIO RIVERA; Location:[facility] OR     surgery for recto vaginal fistula repair       TUBAL LIGATION       Anesthesia Evaluation     .             ROS/MED HX    ENT/Pulmonary:     (+)severe COPD, O2 dependent, , recent URI unresolved . .    Neurologic:     (+)Parkinson's disease     Cardiovascular:         METS/Exercise Tolerance:     Hematologic:         Musculoskeletal:         GI/Hepatic:     (+) GERD Asymptomatic on medication,       Renal/Genitourinary:     (+) chronic renal disease,       Endo:         Psychiatric:         Infectious Disease:         Malignancy:         Other:    (+) No chance of pregnancy C-spine cleared: N/A, H/O Chronic Pain,other significant disability                         Physical Exam  Normal systems: cardiovascular    Airway     Dental     Cardiovascular       Pulmonary             Lab Results   Component Value Date    WBC 10.3 01/10/2019    HGB 12.6 01/10/2019    HCT 38.9 01/10/2019     01/10/2019    CRP 6.9 07/09/2018    SED 26 06/22/2018     01/10/2019    POTASSIUM 4.1 01/10/2019    CHLORIDE 104 01/10/2019    CO2 31 01/10/2019    BUN 32 (H) 01/10/2019    CR 1.01 01/10/2019     (H) 01/10/2019    BIBIANA 8.4 (L) 01/10/2019    PHOS 3.1 01/10/2019    MAG 2.4 (H) 01/09/2019    ALBUMIN 2.9 (L) 01/10/2019    PROTTOTAL 7.0 01/10/2019    ALT 19 01/10/2019    AST 17 01/10/2019    ALKPHOS 90 01/10/2019    BILITOTAL 0.3 01/10/2019    JOSÉ ANTONIO 17 06/22/2018    INR 0.97 06/22/2018    TSH 0.23 (L) 06/22/2018       Preop Vitals  BP Readings from Last 3 Encounters:   01/10/19 (!) 158/91   10/05/18 135/70   07/09/18 134/74    Pulse Readings from Last 3 Encounters:   01/10/19 98   07/08/18 81   10/19/17 72      Resp Readings from Last 3 Encounters:   01/10/19 14   10/05/18 16   07/09/18 16    SpO2 Readings from Last 3 Encounters:   01/10/19 98%   10/05/18 90%   07/09/18 91%      Temp Readings  "from Last 1 Encounters:   01/10/19 97  F (36.1  C) (Axillary)    Ht Readings from Last 1 Encounters:   01/08/19 1.626 m (5' 4\")      Wt Readings from Last 1 Encounters:   01/10/19 58.1 kg (128 lb 1.4 oz)    Estimated body mass index is 21.99 kg/m  as calculated from the following:    Height as of this encounter: 1.626 m (5' 4\").    Weight as of this encounter: 58.1 kg (128 lb 1.4 oz).       Anesthesia Plan  Procedure only, no anesthetic delivered    History & Physical Review  History and physical reviewed and following examination; no interval change.    ASA Status:  4 .        Plan for     Refill of intrathecal pain pump      Postoperative Care      Consents                 Anurag Epperson MD                    .  "

## 2019-01-10 NOTE — PLAN OF CARE
ICU End of Shift Summary.  For vital signs and complete assessments, please see documentation flowsheets.     Pertinent assessments: Oriented to self. Hx dementia. Intermittently agitated and needs reorientation. Denies pain. Has preexisting implanted morphine pain pump. Bedrest. Tele SR in the 80's. When agitated HR will go 110-120's. L/S diminished with intermittent wheezing. Infrequent, good, congested cough. Bilateral nasal congestion, PRN saline spray at bedside. Incontinent of urine. Blanchable pink coccyx, skin intact. X2 PIV. NS at TKO. NPO, meds and ice chips.   Major Shift Events: On and off the bipap throughout the shift. Started on HFNC at 60% and 30L. Patient started desaturating into lower 80's and fi02 increased to 100% with little improvement. Placed on bipap at 60% fi02, patient improved with this and was eventually able to go back on to HFNC at 65% and 35L. Around 1400 the patient became very restless. PRN ativan given x1. Following ativan the patient began desaturating. Of note, other VSS at this time. Patient was then placed back onto bipap and 02 was increased to 100% fi02 to get sats >90%. RT notified and adjusted settings on the bipap. Was able to wean 02 on bipap back to 65%. Around 1800 patient placed back on HFNC at 65% and 35L.   Plan (Upcoming Events): Wean 02 as able. Levaquin and zosyn. PT, OT and SLP to consult. Palliative following.  Discharge/Transfer Needs: TBD, continue POC.    Bedside Shift Report Completed : yes  Bedside Safety Check Completed: yes

## 2019-01-10 NOTE — PROGRESS NOTES
RT- Patient on HFNC 65% 35L before bed and switched to BIPAP 16/8 75% to sleep. Duoneb Q4 continued. BS diminished. Will continue to monitor and support.     Temp: 98.6  F (37  C) Temp src: Axillary BP: 109/68 Pulse: 76 Heart Rate: 75 Resp: 13 SpO2: 93 % O2 Device: BiPAP/CPAP Oxygen Delivery: Other (Comments)(BIPAP)    Parmjit Baltazar, RT on 1/10/2019 at 2:17 AM

## 2019-01-10 NOTE — PROGRESS NOTES
Discharge Planner   Discharge Plans in progress: Aware of consult for Hospice support.   Barriers to discharge plan: MD/Pal care team met with family twice today  To define goals with family   Follow up plan: Plan is to see how pt does overnight before discussion about Hopsice  Will follow        Entered by: Corinne C. White 01/10/2019 2:24 PM

## 2019-01-10 NOTE — PLAN OF CARE
ICU End of Shift Summary.  For vital signs and complete assessments, please see documentation flowsheets.     Pertinent assessments: Pt has been resting most of the shift, at 0225 she became agitated, broke her bipap mask, tried to climb out of bed, mitts were applied and slowly pt settled down, has had periodically episode of being restless, agitated and trying to climb out of bed, lungs con't to be dim,VSS,afeb, takes oral pills good, had another episode of trying to climb out of bed, ativan given, pt is resting now.  Major Shift Events: agitation, trying to climb out of bed  Plan (Upcoming Events): con't with mitts, frequent rounding, ativan  Discharge/Transfer Needs: TBD    Bedside Shift Report Completed : y  Bedside Safety Check Completed: y

## 2019-01-10 NOTE — ANESTHESIA POSTPROCEDURE EVALUATION
Patient: Shana Horne    * No procedures listed *    Diagnosis:* No pre-op diagnosis entered *  Diagnosis Additional Information: Empty pain pump     Anesthesia Type:  No value filed.    Note:  Anesthesia Post Evaluation    Patient location during evaluation: PACU  Patient participation: Able to fully participate in evaluation  Level of consciousness: awake and alert  Pain management: adequate  Airway patency: patent  Cardiovascular status: acceptable  Respiratory status: acceptable  Hydration status: acceptable  PONV: controlled     Anesthetic complications: None          Last vitals:  Vitals:    01/10/19 1100 01/10/19 1200 01/10/19 1300   BP: (!) 158/91     Pulse: 98     Resp: 9 10 14   Temp:      SpO2: 98%           Electronically Signed By: Anurag Epperson MD  January 10, 2019  3:15 PM

## 2019-01-10 NOTE — PROGRESS NOTES
[facility]  Palliative Care Progress Note  Text Page       Patient's /next-of-kin Oliverio Horne called our office and I returned his call. He request that the patient's care is not discussed with anyone but himself.  He will relay any information with his family and confirmed his request for a comfort focused plan. tHe discussed the same with Dr. Zambrano. I have notified the nursing staff of his request.       Sophie Bolanos MS, RN, CNS, APRN, ACHPN, FAACVPR  Pain and Palliative Care  Pager [phone]  Office [phone]

## 2019-01-10 NOTE — PROGRESS NOTES
Intensivist note  I assessed her this morning, and she was on BIPAP and looked like she had declined. Her lungs showed coarse rhonchi with marginal air movement; heart was RRR; abdomen was soft and non-tender; extremities were warm with minimal edema; skin showed no cyanosis or mottling    CXR was mostly unchanged, but suggestion of increased markings consistent with mild volume overload. She was given a dose of IV Bumex 1mgm. She was in acute respiratory failure, and clearly declining, and I was concerned that she may need ventilatory support going forward.     Overall, she appeared to be doing poorly. I discussed case with Sophie Bolanos and appreciate her input and efforts. After her discussion with , he felt strongly that patient would want comfort only at this time. Patient has been moved to comfort only, and I strongly support this decision.     I spent 30 minutes of critical care time with her today.     MD Lester  January 10, 2019    I spoke with bedside nursing. 2 daughters had called, were informed that patient had been moved to comfort care at 's request, and they became very angry. They were unhappy with decision, and wanted it reversed. I called  who again stated strongly that she was to be on comfort care, and requested that we not speak with the children about her condition as we move forward. He requested that patient's children and other family members be referred to him for further information, and I told him we would do so. jrd    Addendum  I had a long talk at bedside with  and 3 daughters. I helped patient  explain to daughters that patient had been moved to comfort care given severity of acute pneumonia and likelihood of need vent support with corresponding poor long term outcome for this patient. After discussion, the daughters understood and agreed with decision, but were obviously upset about this. The conversation seemed to go well, and children seemed  at peace with changes. Orders are in process of being changed with focus solely on comfort. jrthomas    Problem List  1. Acute respiratory failure due to CAP, and oxygen dependent COPD- this is a severe case and she is on dual coverage (Zosyn, Levaquin), bronchodilators, and steroids. She is on high flow oxygen (60-80%) but is very comfortable and ventilating well, speaking in full sentences. We will continue pulmonary hygiene and increasing mobility as tolerated.   2. CAP- as above  3. 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 Severe COPD- on home oxygen and with scarring (chronic) at left base  5. History of seizure disorder- remains on Lamictal  6. Dementia  7. History of diarrhea  8. Reflex sympathetic dystrophy- with pain pump in abdomen   9. GERD- pepcid   10. History of Parkinson's Disease  11. Smoker

## 2019-01-10 NOTE — PROGRESS NOTES
[facility]    Hospitalist Progress Note  Provider : Stephanie Posey MD  Date of Service (when I saw the patient): 01/10/2019    Assessment & Plan      Shana Horne is a 68 year old female with PMH including dementia, copd, chronic pain w/ morphine pump in place, parkinson's who presents with numerous issues including generalized weakness and somnolence. This was preceded by upper respiratory tract infection type symptoms which started about 4 days ago since when she has developed increasing cough and congestion.  She is appeared to be more short of breath and weak and was brought to the ER by family.  At home she has been on her baseline 4 L of supplemental oxygen and they have been giving her nebs.  In the ER, she was found to have leukocytosis to 14 K with low oxygen levels into the 80s and upper 70s on 4 L. Chest x-ray  showed retrocardiac infiltrate compatible with pneumonia. She was started on IV antibiotics with Zosyn and azithromycin and placed on BiPAP for work of breathing which she seemed to tolerate well. She was also given 0.1 mg of Narcan and she apparently became much more alert with that and interactive. She was put on BIPAP and admitted to ICU for further management.    Assessment and Plan:     1. Sepsis due to pneumonia: As evidenced by tachycardia, leukocytosis and hypoxia.  --looks more lethargic today  --Was initially on BIPAP. Now on high flow oxygen. Continue IV Zosyn and Levaquin, IV steroids, Scheduled nebs  --Started on Bumex 1 mg IV q12 for 2 doses for volume overload.   --Procalcitonin 0.14. Cultures pending  --SLP consult prior to advancing diet, ?aspiration risk.  --Palliative care was consulted and she dicussed with patient's  who wanted patient transitioned to comfort care only. Patient's daughters were updated and were extremely angry that she was moved to comfort care. They wanted the decision be reversed. Dr. Zambrano contacted her  again and  he wanted to continue comfort care only. Now off BIPAP.    2.  History of COPD with chronic hypoxemic oxygen dependent respiratory failure:   --Continue IV Solu-Medrol 40 mg twice daily for now in the setting of pneumonia and possible exacerbation  --Schedule nebs  --Continue high flow oxygen.  Low threshold for intubation. She is full code    3.   Toxic metabolic encephalopathy compounded by baseline dementia:   --Suspect that the combination of pnumonia and baseline minimal reserve, morphine pump is actually intrathecal.  --prn narcan     4.   Parkinson's:   --Will resume her pta meds.     5.   Chronic pain with intrathecal morphine pump:   --Managed by Dr. Farooq. Pain management service consulted to help with management while she is here.       6.  Seizure disorder: No recent seizure activity reported. Continue home Lamictal and felbamate.     7.  Recent diagnosis dementia: per family this is frontal dementia.    --Takes seroquel at night and tolerates it well in spite of her parkinsons.  --Consider sitter and/or low dose IV ativan in case agitation/impulsivity becomes an issue as family notes this has in the past.     DVT Prophylaxis: Pneumatic Compression Devices  Code Status: Full Code    DVT Prophylaxis: Pneumatic Compression Devices  Code Status: Full Code    Disposition: Expected discharge: Anticipate more than two nights of hospital course until her breathing improves.     Stephanie Posey MD    Interval History   Patient seen and examined. She looks more lethargic today. On BIPAP. Patient unable to provide reliable history at this moment.     -Data reviewed today: I reviewed all new labs and imaging results over the last 24 hours. I personally reviewed no images or EKG's today.    Physical Exam   Temp: 97  F (36.1  C) Temp src: Axillary BP: 129/82 Pulse: 97 Heart Rate: 97 Resp: 11 SpO2: 97 % O2 Device: BiPAP/CPAP Oxygen Delivery: Other (Comments)(30 lpm)  Vitals:    01/08/19 2300 01/10/19 0500   Weight:  56.4 kg (124 lb 5.4 oz) 58.1 kg (128 lb 1.4 oz)     Vital Signs with Ranges  Temp:  [97  F (36.1  C)-98.7  F (37.1  C)] 97  F (36.1  C)  Pulse:  [] 97  Heart Rate:  [] 97  Resp:  [8-36] 11  BP: ()/(56-99) 129/82  FiO2 (%):  [50 %-80 %] 50 %  SpO2:  [81 %-100 %] 97 %  I/O last 3 completed shifts:  In: 625 [P.O.:175; I.V.:450]  Out: 50 [Urine:50]    GEN:  Alert,not oriented, on BIPAP, NAD.  HEENT:  Normocephalic/atraumatic, no scleral icterus, no nasal discharge, mouth moist.  CV:  Regular rate and rhythm, no murmur or JVD.  S1 + S2 noted, no S3 or S4.  LUNGS: scattered wheezes.  Symmetric chest rise on inhalation noted.  ABD:  Active bowel sounds, soft, non-tender/non-distended.  No rebound/guarding/rigidity.  EXT:  No edema or cyanosis.  Hands/feet warm to touch with good signs of peripheral perfusion.  No joint synovitis noted.  SKIN:  Dry to touch, no exanthems noted in the visualized areas.  NEURO:  Symmetric muscle strength, sensation to touch grossly intact.  No new focal deficits appreciated.    Medications       amitriptyline  75 mg Oral At Bedtime     bumetanide  1 mg Intravenous Q12H     enoxaparin  40 mg Subcutaneous Q24H     famotidine  20 mg Oral BID     felbamate  600 mg Oral TID     ipratropium - albuterol 0.5 mg/2.5 mg/3 mL  3 mL Nebulization Q4H     lamoTRIgine  100 mg Oral TID     levofloxacin  500 mg Intravenous Q24H     methylPREDNISolone  40 mg Intravenous BID     piperacillin-tazobactam  3.375 g Intravenous Q6H     QUEtiapine  50 mg Oral At Bedtime       Data   Recent Labs   Lab 01/10/19  0555 01/08/19  2100 01/08/19 2022 01/08/19 2021   WBC 10.3  --  14.1*  --    HGB 12.6  --  15.6 16.7*   MCV 93  --  94  --      --  240  --     137  --  138   POTASSIUM 4.1 4.4  --  4.4   CHLORIDE 104 101  --  99   CO2 31 31  --   --    BUN 32* 25  --  26   CR 1.01 0.92  --   --    ANIONGAP 6 5  --  10   BIBIANA 8.4* 8.9  --   --    * 127*  --  143*   ALBUMIN 2.9* 3.3*  --    --    PROTTOTAL 7.0 7.7  --   --    BILITOTAL 0.3 0.3  --   --    ALKPHOS 90 110  --   --    ALT 19 21  --   --    AST 17 21  --   --        No results found for this or any previous visit (from the past 24 hour(s)).

## 2019-01-11 NOTE — PLAN OF CARE
Pt made comfort cares today after  discussed with Palliative.   stated that he and his children decided this.  Daughter called later this afternoon checking in on her mother.  She had no idea of this plan, along with all other siblings.  None were aware.  Daughter was hysterical.  Told daughter to call her father and other siblings and to come into hospital.  Family was told by  that he just thought they weren't going to trach her.   When  arrived Dr paul and myself went in with family to confirm plan of care and facilitate with familys decision. Pt remained comfort care. Pt now on 3L NC. Ativan given x2.  Family present. Oxygen sats 70% at the time of this note. Continue to support pts comfort with meds as needed.

## 2019-01-11 NOTE — PROGRESS NOTES
SPIRITUAL HEALTH SERVICES Progress Note  [facility]  ICU    Saw pt's family per their request for prayer as pt is actively dying.  Pt's spouse Oliverio, mother, brother, sister, son, four daughters, and a son-in-law were present.  Pt was not responsive.  Offered a bedside prayer service, and family gathered around pt's bed in prayer.  Provided reflective conversation to facilitate storytelling and the processing of thoughts and feelings.  Family shared memories of pt and reflected on her personality.  They are talking to her and holding her hand.  Family is appropriately grieving and supporting one another.  Reviewed how they can request further  support as needs arise.    This author and other chaplains remain available per family's request.    Chaka Berrios M.Div., [facility]  Staff   Pager [phone]

## 2019-01-11 NOTE — OP NOTE
Procedure Date: 01/10/2019      START TIME:  10:49   END TIME:  11:09.      PRIMARY SURGEON:  Anurag Epperson MD      SUBJECTIVE:  Ms. Horne is a patient who is well known to the [facility].  She is a patient of Dr. Farooq. The patient was admitted 2 days ago to [facility] with a diagnosis of pneumonia.  She was admitted to the Intensive Care Unit for treatment.  During her intensive Care Unit admission, it was noted that her intrathecal pain pump was beeping.  The chronic pain service was contacted.  The pain pump was interrogated and the reservoir was found to be at 0.5 mL.  Dr. Farooq was informed.  He ordered a refill of the patient's medication.  I was contacted and agreed to refill the patient's pain pump.      OBJECTIVE:  The patient is admitted to the Intensive Care Unit on high flow nasal oxygen.  Her oxygen sats are approximately mid 90s.  She is coherent.  She was able to fully answer any questions that were asked in regards to her pain pump.  The pain pump was easily palpable on the left lower abdomen.      ASSESSMENT:  A 68-year-old chronic pain patient in need of a refill of her intrathecal pain pump, now admitted for treatment of pneumonia.      DESCRIPTION OF PROCEDURE:  The patient was in the supine position on the ICU bed.  The pain pump was palpated and the area cleaned with Betadine.  The entrance to the reservoir of the pain pump was easily located.  A very small amount of medication was withdrawn from the reservoir.  I would estimate it to be approximately 0.2 mL.  The 20 mL refill syringe was then connected to the refill tubing.  The reservoir was refilled over the course of 3-4 minutes.  The needle was removed, and the pain pump re-interrogated.  The new refill date was noted to be 04/14/2019.  The reservoir was filled with 20 mL of the usual mixture of medications that she receives.  The patient will follow up with Dr. Farooq in the Chronic Pain Clinic as needed.          JOSE ELIAS ZHONG MD             D: 01/10/2019   T: 2019   MT: JAZ      Name:     CRYSTAL PIERCE   MRN:      [id number]        Account:        [id number]   :      1950           Procedure Date: 01/10/2019      Document: [id number]

## 2019-01-11 NOTE — PROGRESS NOTES
[facility]    Hospitalist Progress Note  Provider : Stephanie Posey MD  Date of Service (when I saw the patient): 01/11/2019    Assessment & Plan      Shana Horne is a 68 year old female with PMH including dementia, copd, chronic pain w/ morphine pump in place, parkinson's who presents with numerous issues including generalized weakness and somnolence. This was preceded by upper respiratory tract infection type symptoms which started about 4 days ago since when she has developed increasing cough and congestion.  She is appeared to be more short of breath and weak and was brought to the ER by family.  At home she has been on her baseline 4 L of supplemental oxygen and they have been giving her nebs.  In the ER, she was found to have leukocytosis to 14 K with low oxygen levels into the 80s and upper 70s on 4 L. Chest x-ray  showed retrocardiac infiltrate compatible with pneumonia. She was started on IV antibiotics with Zosyn and azithromycin and placed on BiPAP for work of breathing which she seemed to tolerate well. She was also given 0.1 mg of Narcan and she apparently became much more alert with that and interactive. She was put on BIPAP and admitted to ICU for further management. Patient continued to decline. Her status was changed to comfort care only.    Assessment and Plan:     1. Sepsis due to pneumonia: As evidenced by tachycardia, leukocytosis and hypoxia.  --Was initially on BIPAP. Now on high flow oxygen. She was treated with IV Zosyn and Levaquin, IV steroids, Scheduled nebs, Bumex 1 mg IV q12 for 2 doses for volume overload.   --Patient's condition continued to decline. She was seen by Dr. Zambrano(intensivist) and recommended comfort care.   --Palliative care was consulted and she dicussed with patient's  who wanted patient transitioned to comfort care only. Patient's daughters were updated and were extremely angry that she was moved to comfort care. They wanted the  decision be reversed. Dr. Zambrano met with patient's  and daughters and they all agreed to comfort care only. Comfort care orders were placed.   --discharge with home hospice tomorrow if she makes tonight.  following for discharge planning.    2.  History of COPD with chronic hypoxemic oxygen dependent respiratory failure:   --She was initially treated with IV Solu-Medrol, scheduled nebs, BIPAP.   --Now on comfort care only    3.   Toxic metabolic encephalopathy compounded by baseline dementia:   --Suspect multifactorial etiology -pneumonia, dementia,      4.   Parkinson's:      5.   Chronic pain with intrathecal morphine pump:   --Managed by Dr. Farooq. Pain/palliative care consulted for pain management     6.  Seizure disorder: No recent seizure activity reported. on Lamictal and felbamate.     7.  Recent diagnosis dementia: per family this is frontal dementia.    --Takes seroquel at night and tolerates it well in spite of her parkinsons.  --ativan for agitation/impulsivity becomes an issue as family notes this has in the past.     Code Status: DNR/DNI/comfort care only    Disposition: social work consult for discharge planning. Will transfer to medical floor    Interval History   Patient seen and examined. She is not providing history. She is now on comfort care    -Data reviewed today: I reviewed all new labs and imaging results over the last 24 hours. I personally reviewed no images or EKG's today.    Physical Exam       BP: (!) 158/91 Pulse: 98 Heart Rate: 110 Resp: 22 SpO2: (!) 68 % O2 Device: Nasal cannula Oxygen Delivery: 3 LPM  Vitals:    01/08/19 2300 01/10/19 0500   Weight: 56.4 kg (124 lb 5.4 oz) 58.1 kg (128 lb 1.4 oz)     Vital Signs with Ranges  Pulse:  [98] 98  Heart Rate:  [] 110  Resp:  [8-25] 22  BP: (158)/(91) 158/91  SpO2:  [61 %-98 %] 68 %  I/O last 3 completed shifts:  In: 160 [P.O.:100; I.V.:60]  Out: 1820 [Urine:1820]    GEN:  Alert,not oriented, on BIPAP,  NAD.  HEENT:  Normocephalic/atraumatic, no scleral icterus, no nasal discharge, mouth moist.  CV:  Regular rate and rhythm, no murmur or JVD.  S1 + S2 noted, no S3 or S4.  LUNGS: scattered wheezes.  Symmetric chest rise on inhalation noted.  ABD:  Active bowel sounds, soft, non-tender/non-distended.  No rebound/guarding/rigidity.  EXT:  No edema or cyanosis.  Hands/feet warm to touch with good signs of peripheral perfusion.  No joint synovitis noted.  SKIN:  Dry to touch, no exanthems noted in the visualized areas.  NEURO:  Symmetric muscle strength, sensation to touch grossly intact.  No new focal deficits appreciated.    Medications     Medication given by intrathecal pump: This is NOT an order to dispense medication. For information only.         amitriptyline  75 mg Oral At Bedtime     famotidine  20 mg Oral BID     felbamate  600 mg Oral TID     lamoTRIgine  100 mg Oral TID     QUEtiapine  50 mg Oral At Bedtime       Data   Recent Labs   Lab 01/10/19  0555 01/08/19  2100 01/08/19 2022 01/08/19 2021   WBC 10.3  --  14.1*  --    HGB 12.6  --  15.6 16.7*   MCV 93  --  94  --      --  240  --     137  --  138   POTASSIUM 4.1 4.4  --  4.4   CHLORIDE 104 101  --  99   CO2 31 31  --   --    BUN 32* 25  --  26   CR 1.01 0.92  --   --    ANIONGAP 6 5  --  10   BIBIANA 8.4* 8.9  --   --    * 127*  --  143*   ALBUMIN 2.9* 3.3*  --   --    PROTTOTAL 7.0 7.7  --   --    BILITOTAL 0.3 0.3  --   --    ALKPHOS 90 110  --   --    ALT 19 21  --   --    AST 17 21  --   --        No results found for this or any previous visit (from the past 24 hour(s)).

## 2019-01-11 NOTE — PLAN OF CARE
ICU End of Shift Summary.  For vital signs and complete assessments, please see documentation flowsheets.     Pertinent assessments: follows some commands and has some mumbled speech but unable to assess orientation, unable to swallow pills, ativan and dilaudid given PRN for restlessness, ST, sats 60-75% on 3L NC with no signs of dyspnea, repositioned as needed but pt prefers to lean far over to her left  Major Shift Events: stopped swallowing pills properly  Plan (Upcoming Events): continue comfort cares  Discharge/Transfer Needs: transfer to floor, begin home hospice set up soon per CC

## 2019-01-11 NOTE — PROGRESS NOTES
Discharge Planner   Discharge Plans in progress: Per Care rounds. WIll move to med floor.   Barriers to discharge plan: Have not talked about d.c home with Hospice support as MD would like another 24 hours before this decision has been made   Follow up plan: JUAN will follow.        Entered by: Corinne C. White 01/11/2019 1:02 PM

## 2019-01-11 NOTE — PLAN OF CARE
"Patient noted to be in 60% range for SpO2,  awoken and informed how this may progress and impact patient. RN told  more O2 can be given if patient seems in distress but he feels she is comfortable at this time. RN asked if any family should be updated at this time and  declined and stated \"I'll take care of that\". Pt positioned for comfort, will continue to monitor.  "

## 2019-01-11 NOTE — PLAN OF CARE
ICU End of Shift Summary.  For vital signs and complete assessments, please see documentation flowsheets.     Pertinent assessments: Pt received HS meds because family asked for them to be given, about 0300 pt became restless and ativan was given, is comfortable now.  Major Shift Events: none  Plan (Upcoming Events): comfort care  Discharge/Transfer Needs: TBD    Bedside Shift Report Completed : y  Bedside Safety Check Completed: y

## 2019-01-12 NOTE — PLAN OF CARE
Unable to assess orientation, Opens eyes spontaneously.Comfort cares. Family in room. Repositioning/oral care offered every 2 hours. PRN Ativan and dilaudid given as needed.

## 2019-01-12 NOTE — PROGRESS NOTES
[facility]    Hospitalist Progress Note  Provider : Stephanie Posey MD  Date of Service (when I saw the patient): 01/12/2019    Assessment & Plan      Shana Horne is a 68 year old female with PMH including dementia, copd, chronic pain w/ morphine pump in place, parkinson's who presents with numerous issues including generalized weakness and somnolence. This was preceded by upper respiratory tract infection type symptoms which started about 4 days ago since when she has developed increasing cough and congestion.  She is appeared to be more short of breath and weak and was brought to the ER by family.  At home she has been on her baseline 4 L of supplemental oxygen and they have been giving her nebs.  In the ER, she was found to have leukocytosis to 14 K with low oxygen levels into the 80s and upper 70s on 4 L. Chest x-ray  showed retrocardiac infiltrate compatible with pneumonia. She was started on IV antibiotics with Zosyn and azithromycin and placed on BiPAP for work of breathing which she seemed to tolerate well. She was also given 0.1 mg of Narcan and she apparently became much more alert with that and interactive. She was put on BIPAP and admitted to ICU for further management. Patient continued to decline. Her status was changed to comfort care only.    Assessment and Plan:     1. Sepsis due to pneumonia: As evidenced by tachycardia, leukocytosis and hypoxia.  --Was initially on BIPAP. Now on high flow oxygen. She was treated with IV Zosyn and Levaquin, IV steroids, Scheduled nebs, Bumex 1 mg IV q12 for 2 doses for volume overload.   --Patient's condition continued to decline. She was seen by Dr. Zambrano(intensivist) and recommended comfort care.   --Family decided to transition her care to comfort care only.  --We had a family conference at [facility] with patient's , daughters and patient's brother. Patient was unable to participate in any conversation because of her change in  mental status.  Dr. Zambrano was available at the time of meeting.  Patient's family had lots of questions which were answered by . Dr. Zambrano explained to them that patient has poor prognosis due to her severe oxygen dependent COPD, dementia, declining mental status. Family confirmed that they don't want feeding tube placement. She is DNR/DNI/comfort care.  Family felt comfortable to continue comfort care only and would not want to have more aggressive care at this moment.  following for discharge with home hospice.     2.  History of COPD with chronic hypoxemic oxygen dependent respiratory failure:   --She was initially treated with IV Solu-Medrol, scheduled nebs, BIPAP.   --Now on comfort care only    3.   Toxic metabolic encephalopathy compounded by baseline dementia:   --Suspect multifactorial etiology -pneumonia, dementia,      4.   Parkinson's:      5.   Chronic pain with intrathecal morphine pump:   --Managed by Dr. Farooq. Pain/palliative care consulted for pain management     6.  Seizure disorder: No recent seizure activity reported. on Lamictal and felbamate.     7.  Recent diagnosis dementia: per family this is frontal dementia.    --Takes seroquel at night and tolerates it well in spite of her parkinsons.  --ativan for agitation/impulsivity becomes an issue as family notes this has in the past.     Code Status: DNR/DNI/comfort care only    Disposition: social work consult for discharge planning. Anticipate discharge home with home hospice once arranged.  following    Interval History   Patient seen and examined.  She is not following commands and unable to provide any history    -Data reviewed today: I reviewed all new labs and imaging results over the last 24 hours. I personally reviewed no images or EKG's today.    Physical Exam       BP: 127/70 Pulse: 100 Heart Rate: 118 Resp: 18 SpO2: (!) 86 % O2 Device: Nasal cannula Oxygen Delivery: 3 LPM  Vitals:    01/08/19  2300 01/10/19 0500   Weight: 56.4 kg (124 lb 5.4 oz) 58.1 kg (128 lb 1.4 oz)     Vital Signs with Ranges  Pulse:  [100] 100  Heart Rate:  [108-118] 118  Resp:  [18-21] 18  BP: (127)/(70) 127/70  SpO2:  [70 %-86 %] 86 %  I/O last 3 completed shifts:  In: 0   Out: 525 [Urine:525]    GEN:  Alert,not oriented, on BIPAP, NAD.  HEENT:  Normocephalic/atraumatic, no scleral icterus, no nasal discharge, mouth moist.  CV:  Regular rate and rhythm, no murmur or JVD.  S1 + S2 noted, no S3 or S4.  LUNGS: scattered wheezes.  Symmetric chest rise on inhalation noted.  ABD:  Active bowel sounds, soft, non-tender/non-distended.  No rebound/guarding/rigidity.  EXT:  No edema or cyanosis.  Hands/feet warm to touch with good signs of peripheral perfusion.  No joint synovitis noted.  SKIN:  Dry to touch, no exanthems noted in the visualized areas.  NEURO:  Symmetric muscle strength, sensation to touch grossly intact.  No new focal deficits appreciated.    Medications     Medication given by intrathecal pump: This is NOT an order to dispense medication. For information only.         amitriptyline  75 mg Oral At Bedtime     famotidine  20 mg Oral BID     felbamate  600 mg Oral TID     lamoTRIgine  100 mg Oral TID     QUEtiapine  50 mg Oral At Bedtime       Data   Recent Labs   Lab 01/10/19  0555 01/08/19  2100 01/08/19 2022 01/08/19 2021   WBC 10.3  --  14.1*  --    HGB 12.6  --  15.6 16.7*   MCV 93  --  94  --      --  240  --     137  --  138   POTASSIUM 4.1 4.4  --  4.4   CHLORIDE 104 101  --  99   CO2 31 31  --   --    BUN 32* 25  --  26   CR 1.01 0.92  --   --    ANIONGAP 6 5  --  10   BIBIANA 8.4* 8.9  --   --    * 127*  --  143*   ALBUMIN 2.9* 3.3*  --   --    PROTTOTAL 7.0 7.7  --   --    BILITOTAL 0.3 0.3  --   --    ALKPHOS 90 110  --   --    ALT 19 21  --   --    AST 17 21  --   --        No results found for this or any previous visit (from the past 24 hour(s)).

## 2019-01-13 NOTE — PLAN OF CARE
Opens eyes spontaneously. Comfort cares. in room. Repositioning/oral care offered every 2 hours. PRN Ativan and dilaudid given as needed. Responding to questions

## 2019-01-13 NOTE — PROGRESS NOTES
[facility]    Hospitalist Progress Note  Provider : Stephanie Posey MD  Date of Service (when I saw the patient): 01/13/2019    Assessment & Plan      Shana Horne is a 68 year old female with PMH including dementia, copd, chronic pain w/ morphine pump in place, parkinson's who presents with numerous issues including generalized weakness and somnolence. This was preceded by upper respiratory tract infection type symptoms which started about 4 days ago since when she has developed increasing cough and congestion.  She is appeared to be more short of breath and weak and was brought to the ER by family.  At home she has been on her baseline 4 L of supplemental oxygen and they have been giving her nebs.  In the ER, she was found to have leukocytosis to 14 K with low oxygen levels into the 80s and upper 70s on 4 L. Chest x-ray  showed retrocardiac infiltrate compatible with pneumonia. She was started on IV antibiotics with Zosyn and azithromycin and placed on BiPAP for work of breathing which she seemed to tolerate well. She was also given 0.1 mg of Narcan and she apparently became much more alert with that and interactive. She was put on BIPAP and admitted to ICU for further management. Patient continued to decline. Her status was changed to comfort care only.    Assessment and Plan:     1. Sepsis due to pneumonia: As evidenced by tachycardia, leukocytosis and hypoxia.  --Was initially on BIPAP. Now on high flow oxygen. She was treated with IV Zosyn and Levaquin, IV steroids, Scheduled nebs, Bumex 1 mg IV q12 for 2 doses for volume overload.   --Patient's condition continued to decline. She was seen by Dr. Zambrano(intensivist) and recommended comfort care.   --Family decided to transition her care to comfort care only.  --1/13- Patient seen today. Also discussed with patient's  at bedside. Dr. Zambrano updated her  and he is in agreement to continue comfort care. Palliative team  will meet with them tomorrow for further comfort care plan.   --1/12- We had a family conference at [facility] with patient's , daughters and patient's brother. Patient was unable to participate in any conversation because of her change in mental status.  Dr. Zambrano was available at the time of meeting.  Patient's family had lots of questions which were answered by . Dr. Zambrano explained to them that patient has poor prognosis due to her severe oxygen dependent COPD, dementia, declining mental status. Family confirmed that they don't want feeding tube placement. She is DNR/DNI/comfort care.  Family felt comfortable to continue comfort care only and would not want to have more aggressive care at this moment.  following for discharge with home hospice.     2.  History of COPD with chronic hypoxemic oxygen dependent respiratory failure:   --She was initially treated with IV Solu-Medrol, scheduled nebs, BIPAP.   --Now on comfort care only    3.   Toxic metabolic encephalopathy compounded by baseline dementia:   --Suspect multifactorial etiology -pneumonia, dementia,      4.   Parkinson's:      5.   Chronic pain with intrathecal morphine pump:   --Managed by Dr. Farooq. Pain/palliative care consulted for pain management     6.  Seizure disorder: No recent seizure activity reported. on Lamictal and felbamate.     7.  Recent diagnosis dementia: per family this is frontal dementia.    --Takes seroquel at night and tolerates it well in spite of her parkinsons.  --ativan for agitation/impulsivity becomes an issue as family notes this has in the past.    8. Non-Severe malnutrition (only 1 severe criteria for fat loss met, so coded non severe) in context of chronic illness or disease. Seen by dietician      Code Status: DNR/DNI/comfort care only    Disposition: social work consult for discharge planning. Anticipate discharge home with home hospice once arranged.  following    Interval  History   Patient seen and examined.  She is not following commands and unable to provide any history. Her  at bedside.     -Data reviewed today: I reviewed all new labs and imaging results over the last 24 hours. I personally reviewed no images or EKG's today.    Physical Exam       BP: 96/68   Heart Rate: 100 Resp: 18 SpO2: (!) 84 % O2 Device: Nasal cannula Oxygen Delivery: 3 LPM  Vitals:    01/08/19 2300 01/10/19 0500   Weight: 56.4 kg (124 lb 5.4 oz) 58.1 kg (128 lb 1.4 oz)     Vital Signs with Ranges  Heart Rate:  [100] 100  Resp:  [18] 18  BP: (96)/(68) 96/68  SpO2:  [84 %] 84 %  I/O last 3 completed shifts:  In: 50 [P.O.:50]  Out: 500 [Urine:500]    GEN:  Alert,not oriented, on BIPAP, NAD.  HEENT:  Normocephalic/atraumatic, no scleral icterus, no nasal discharge, mouth moist.  CV:  Regular rate and rhythm, no murmur or JVD.  S1 + S2 noted, no S3 or S4.  LUNGS: scattered wheezes.  Symmetric chest rise on inhalation noted.  ABD:  Active bowel sounds, soft, non-tender/non-distended.  No rebound/guarding/rigidity.  EXT:  No edema or cyanosis.  Hands/feet warm to touch with good signs of peripheral perfusion.  No joint synovitis noted.  SKIN:  Dry to touch, no exanthems noted in the visualized areas.  NEURO:  Symmetric muscle strength, sensation to touch grossly intact.  No new focal deficits appreciated.    Medications     Medication given by intrathecal pump: This is NOT an order to dispense medication. For information only.         amitriptyline  75 mg Oral At Bedtime     famotidine  20 mg Oral BID     felbamate  600 mg Oral TID     lamoTRIgine  100 mg Oral TID     QUEtiapine  50 mg Oral At Bedtime       Data   Recent Labs   Lab 01/10/19  0555 01/08/19  2100 01/08/19 2022 01/08/19 2021   WBC 10.3  --  14.1*  --    HGB 12.6  --  15.6 16.7*   MCV 93  --  94  --      --  240  --     137  --  138   POTASSIUM 4.1 4.4  --  4.4   CHLORIDE 104 101  --  99   CO2 31 31  --   --    BUN 32* 25  --  26    CR 1.01 0.92  --   --    ANIONGAP 6 5  --  10   BIBIANA 8.4* 8.9  --   --    * 127*  --  143*   ALBUMIN 2.9* 3.3*  --   --    PROTTOTAL 7.0 7.7  --   --    BILITOTAL 0.3 0.3  --   --    ALKPHOS 90 110  --   --    ALT 19 21  --   --    AST 17 21  --   --        No results found for this or any previous visit (from the past 24 hour(s)).

## 2019-01-13 NOTE — CONSULTS
Care Transition Initial Assessment - SW     Met with: PATIENT,FAMILY  Active Problems:    Pneumonia       DATA  Lives With: spouse   Living Arrangements: house     Description of Support System: Supportive, Involved  Who is your support system?: , Children  Support Assessment: Adequate family and caregiver support.  Identified issues/concerns regarding health management: SW consult for rehabilitation vs hospice.     ASSESSMENT  Cognitive Status: pt was resting during this encounter.   Concerns to be addressed: SW consult for rehabilitation vs hospice. Pt's daughter Ayah was present along with 4 other family members. Ayah would like to discuss the discharge plan when her father Dru is present.  Ayah questioned if JUAN/SHEELA was kicking pt out. SW explained that SW was consulted to discuss a hospice plan with pt's family. Another family member stated that they thought pt wouldn't be leaving the hospital alive. JUAN explained that the MD makes an estimate but sometimes people live longer than the estimate. SW further explained that SW's role is to help the family develop a plan for transitioning out of the hospital. The weekday SW will follow up with the family on Monday. JUAN called Dru to inform him of the plan to discuss the discharge plan on Monday with the weekday SW. Dru asked if JUAN/SHEELA was kicking pt out. JUAN explained that SW was consulted to discuss a hospice plan with pt's family.  JUAN reiterated that the weekday SW will follow up with him and family on Monday.     PLAN  Patient anticipates discharging to:  Home with Hospice.    KENTON Pérez  Casual JUAN [id number]

## 2019-01-13 NOTE — PLAN OF CARE
RN SHIFT SUMMARY :  DX/STORY : admit 1/8 from home with sepsis/ Hypoxia /pneumonia   Had  Zosyn and azithromycin , steroids , nebs , BiPAP    HX : parkinsons, dementia , seizures, COPD, Chronic pain - has implanted Morphine pump   LABS/PROTOCOLS : none- d/t Comfort care   TELE : n/a  ASSESS : has varying levels of alertness, at times sits up in bed, squeezes my hand for yes/no responses,  Has been hugging family members, has had Dilaudid x 1 & ativan x2 IV. Unable to swallow meds safely  So no meds given orally. No real nutrition - decided on no TF for nutrition.has díaz for comfort but low UO  D/t no intake. No BM today, oral cares & shampoo this am, family requests turns as needed.   TEACHING : discussed POC with pt/family -continue Comfort Cares   PLAN : at baseline is  & has 5 adult children. Was living in [facility] & had [facility] -  & spouse & a couple adult daughters assisted with cares. Now is Comfort cares . Palliative consulting.  Dr Zambrano & Dr. Posey had mini-care conference with family this am iin room as some possible disagreements  On goals, etc. Everything well clarified in am discussion  ( Her spouse -Oliverio at bedside & several  family members ) spouse & Daughters seem  In agreement on Comfort as Goal. Brother has been a sl. Barrier but sl. more relaxed  In afternoon.

## 2019-01-14 NOTE — PLAN OF CARE
"RN SHIFT SUMMARY :  DX/STORY : admit 1/8 from home with sepsis/ Hypoxia /pneumonia   Had  Zosyn and azithromycin , steroids , nebs , BiPAP in ICU - transferred to [facility] on Comfort care 1/11   HX : parkinsons, dementia , seizures, COPD, Chronic pain - has implanted Morphine pump ( refilled q 3 months & last filled 1/11 )   LABS/PROTOCOLS : none- d/t Comfort care   TELE : n/a  ASSESS : has varying levels of alertness over w/e But very alert most of today & a/o but forgetful , at times sits up in bed, -used Ceiling lift to recliner & up 2 hrs .   Has been hugging family members, has had Dilaudid x 2 & ativan x2 IV.was able to swallow meds with pudding today   . No real nutrition - decided on no TF for nutrition.has díaz for comfort but low UO- only 100 dark UO   D/t no intake. Had 3 soft  BM's  Today,( had been constipated prior .  oral cares & family requests turns as needed.   TEACHING : discussed POC with pt/family -continue Comfort Cares   PLAN : at baseline is  & has 5 adult children. Was living in [facility] & had [facility] -  & spouse & a couple adult daughters assisted with cares. Now is Comfort cares . Palliative consulting.  Dr Zambrano & Dr. Posey had mini-care conference with family Sat.  am in room as some possible disagreements  On goals, etc. Everything was well clarified in am discussion  ( Her spouse -Oliverio at bedside & several  family members ) spouse & Daughters seem in agreement on Comfort as Goal. Brother \"Polo\"  has been a  Barrier d/t different opinion- very verbal & angry in patrick & lounge with family members in  afternoon. He feels she needs transfer to [place] for opinion.       "

## 2019-01-14 NOTE — PLAN OF CARE
"BP 99/45 (BP Location: Left arm)   Pulse 100   Temp 98.1  F (36.7  C) (Axillary)   Resp 18   Ht 1.626 m (5' 4\")   Wt 58.1 kg (128 lb 1.4 oz)   SpO2 94%   BMI 21.99 kg/m      VS obtained this AM per family request. Pt turned and repositioned as tolerated. Incontinent of bowel. Gill in place. Comfort eating. Loss of PIV during night shift-PIV remains out. Continues on PO pain meds as needed. Intrathecal pain pump. Palliative and SW following.   "

## 2019-01-14 NOTE — PROGRESS NOTES
[facility]  Hospitalist Progress Note  Provider : Sarah Brandt  Date of Service (when I saw the patient): 01/14/2019    Assessment & Plan      Shana Horne is a 68 year old female with PMH including dementia, copd, chronic pain w/ morphine pump in place, parkinson's who presents with numerous issues including generalized weakness and somnolence. This was preceded by upper respiratory tract infection type symptoms which started about 4 days ago since when she has developed increasing cough and congestion.  She appeared to be more short of breath and weak and was brought to the ER by family.  At home she has been on her baseline 4 L of supplemental oxygen and they have been giving her nebs.  In the ER, she was found to have leukocytosis to 14 K with low oxygen levels into the 80s and upper 70s on 4 L. Chest x-ray  showed retrocardiac infiltrate compatible with pneumonia. She was started on IV antibiotics with Zosyn and azithromycin and placed on BiPAP for work of breathing which she seemed to tolerate well. She was also given 0.1 mg of Narcan and she apparently became much more alert with that and interactive. She was put on BIPAP and admitted to ICU for further management. Patient continued to decline. Her status was changed to comfort care. Awaiting arrangements for home hospice    Assessment and Plan:   1. Sepsis due to pneumonia: As evidenced by tachycardia, leukocytosis and hypoxia.  --Was initially on BIPAP. Now on high flow oxygen. She was treated with IV Zosyn and Levaquin, IV steroids, Scheduled nebs, Bumex 1 mg IV q12 for 2 doses for volume overload.   --Patient's condition continued to decline. She was seen by Dr. Zambrano(intensivist) and recommended comfort care.   --Family decided to transition her care to comfort care only.  1/14: pts  and her daughter continue to support hospice although pts brother is having a difficult time accepting the plan. Pt is more responsive  today. Awaiting palliative care team consult  --1/13-  Also discussed with patient's  at bedside. Dr. Zambrano updated her  and he is in agreement to continue comfort care. Palliative team will meet with them for further comfort care plan.   --1/12- We had a family conference at [facility] with patient's , daughters and patient's brother. Patient was unable to participate in any conversation because of her change in mental status.  Dr. Zambrano was available at the time of meeting.  Patient's family had lots of questions which were answered by . Dr. Zambrano explained to them that patient has poor prognosis due to her severe oxygen dependent COPD, dementia, declining mental status. Family confirmed that they don't want feeding tube placement. She is DNR/DNI/comfort care.  Family felt comfortable to continue comfort care only and would not want to have more aggressive care at this moment.  following for discharge with home hospice.     2.  History of COPD with chronic hypoxemic oxygen dependent respiratory failure:   --She was initially treated with IV Solu-Medrol, scheduled nebs, BIPAP.   --Now on comfort care only    3.   Toxic metabolic encephalopathy compounded by baseline dementia:   --Suspect multifactorial etiology -pneumonia, dementia,      4.   Parkinson's:      5.   Chronic pain with intrathecal morphine pump:   --Managed by Dr. Farooq. Pain/palliative care consulted for pain management     6.  Seizure disorder: No recent seizure activity reported. on Lamictal and felbamate.     7.  Recent diagnosis dementia: per family this is frontal dementia.    --Takes seroquel at night and tolerates it well in spite of her parkinsons.  --ativan for agitation/impulsivity becomes an issue as family notes this has in the past.     Code Status: DNR/DNI/comfort care only    Disposition: social work consult for discharge planning. Anticipate discharge home with home hospice once  arranged.  following    Interval History   Patient seen and examined.  Long discussion with patient's  and daughter.  Patient has been questioned if hospice was the right decision for the patient as she is more alert today.  Discussed that her long-term prognosis remains quite poor, they will be fluctuations in her alertness on a day to day basis  The requested that I talk to patient's brother if he has any questions later in the day    -Data reviewed today: I reviewed all new labs and imaging results over the last 24 hours. I personally reviewed no images or EKG's today.    Physical Exam   Temp: 98.1  F (36.7  C) Temp src: Axillary BP: 99/45     Resp: 18     Oxygen Delivery: 4 LPM  Vitals:    01/08/19 2300 01/10/19 0500   Weight: 56.4 kg (124 lb 5.4 oz) 58.1 kg (128 lb 1.4 oz)     Vital Signs with Ranges  Temp:  [98.1  F (36.7  C)] 98.1  F (36.7  C)  Resp:  [18] 18  BP: (99)/(45) 99/45  No intake/output data recorded.    GEN:  Alert,not oriented, on BIPAP, NAD.  HEENT:  Normocephalic/atraumatic, no scleral icterus, no nasal discharge, mouth moist.  CV:  Regular rate and rhythm, no murmur or JVD.  S1 + S2 noted, no S3 or S4.  LUNGS: scattered wheezes.  Symmetric chest rise on inhalation noted.  ABD:  Active bowel sounds, soft, non-tender/non-distended.  No rebound/guarding/rigidity.  EXT:  No edema or cyanosis.  Hands/feet warm to touch with good signs of peripheral perfusion.  No joint synovitis noted.  SKIN:  Dry to touch, no exanthems noted in the visualized areas.  NEURO:  Symmetric muscle strength, sensation to touch grossly intact.  No new focal deficits appreciated.    Medications     Medication given by intrathecal pump: This is NOT an order to dispense medication. For information only.         amitriptyline  75 mg Oral At Bedtime     felbamate  600 mg Oral TID     lamoTRIgine  100 mg Oral TID     QUEtiapine  50 mg Oral At Bedtime       Data   Recent Labs   Lab 01/10/19  5179  01/08/19 2100 01/08/19 2022 01/08/19 2021   WBC 10.3  --  14.1*  --    HGB 12.6  --  15.6 16.7*   MCV 93  --  94  --      --  240  --     137  --  138   POTASSIUM 4.1 4.4  --  4.4   CHLORIDE 104 101  --  99   CO2 31 31  --   --    BUN 32* 25  --  26   CR 1.01 0.92  --   --    ANIONGAP 6 5  --  10   BIBIANA 8.4* 8.9  --   --    * 127*  --  143*   ALBUMIN 2.9* 3.3*  --   --    PROTTOTAL 7.0 7.7  --   --    BILITOTAL 0.3 0.3  --   --    ALKPHOS 90 110  --   --    ALT 19 21  --   --    AST 17 21  --   --        No results found for this or any previous visit (from the past 24 hour(s)).

## 2019-01-14 NOTE — PLAN OF CARE
Alert. Confused at times. Comfort cares.Repositioning/oral care offered every 2 hours. Restless at times. PRN Ativan and dilaudid given as needed. Gill patent and draining.

## 2019-01-14 NOTE — PROVIDER NOTIFICATION
Patient found on floor, no injuries noted. Patient assisted back to bed, skin assessment done. Family notified. Thanks

## 2019-01-14 NOTE — PROGRESS NOTES
SW discussed with palliative care who recommended discussing hospice consult with pts , Dru. She reported no concerns about discussing this today.    SW placed call to pts , Dru. He stated that the weekend SW had brought this up to him and that he is open to this but wants it to be done in person and reports he is most available in the mornings. He was in agreement with referral to  hospice.    SW spoke with  hospice intake who reported they will contact pts  to arrange hospice consult.    Discharge Planner   Discharge Plans in progress: Home with  Hospice- pending hospice and palliative consults  Barriers to discharge plan: hospice and palliative consults  Follow up plan: SW to follow and assist with discharge planning       Entered by: Roya Madrid 01/14/2019 3:49 PM

## 2019-01-15 NOTE — PROGRESS NOTES
[facility]  Palliative Care Progress Note  Text Page    Met with Shana as she was resting in bed. She was able to introduce met to her mother, sister and . Her /next-of-kin requested to speak in private. After completing and assessment of th patient we moved to the [facility]. Daniel acknowledged he is not able to take Shana home. He was wondering about options for her care. I encouraged him to discuss placement options with  SAMANTHA Johnson, and asked if he would be interested in placement at [facility] (as he has previously explained his financial hardship in caring for Shana. He is interested in pursing placement at [facility], but would prefer that any dismissal plan be discussed with him and not brought up to Shana or their family.      Assessment & Plan   1.  Decisional Capacity -  Questionable- patient has advanced dementia and does not demonstrate medical capacity. Patient does not have an advance directive. Per  informed consent policy next of kin should be involved in all consent and decision making. Her  Daniel Horne is next-of-kin and has chosen a comfort plan.      2.  Pain - Chronic regional pain syndrome. [employer] intrathecal pump managed by Gurpreet Farooq MD who refilled the pump on 1/10/2019.      3.  Dyspnea with continued tobacco abuse -  request a plan for comfort. Continue the use of IV, oral or sublingual Dilaudid for complaint of dyspnea or a respiratory rate greater than 20.      4.  Dysphagia -   acknowledged risk of aspiration and request a plan for comfort eating.      5.  Spiritual Care - Appreciate input of Aruna Chaka Berrios.      7. Care Planning -   acknowledged he is unable to care for the patient at home. Finances are a concern. Application for [facility] submitted. Appreciate input of  SAMANTHA Holden.  request the dismissal plan is only discussed with  him.     Goal of Care: DNR/DNI - Comfort care. POLST indicating DNR/DNI - Comfort plan of care sent to [email or web address]    Sohpie Bolanos MS, RN, CNS, APRN, ACHPN, FAACVPR  Pain and Palliative Care  Pager [phone]  Office [phone]       Time Spent on this Encounter   I spent 45 minutes (12:15 PM-1 PM) in assessment of the patient, counseling and discussion her  as documented in sections below. Another 20 minutes in review of chart, documentation, coordination of care and discussion with the health care team.    Interval History   Chart reviewed    Palliative Symptom Review (0=no symptom/no concern, 1=mild, 2=moderate, 3=severe):      Pain: 0-none      Fatigue: 3-severe      Nausea: 0-none      Constipation: 0-none      Diarrhea: 0-none      Depressive Symptoms: 0-none      Anxiety: 0-none      Drowsiness: 2-moderate      Poor Appetite: 3-severe      Shortness of Breath: 1-mild      Insomnia: 0-none        Physical Exam   Temp:  [96.9  F (36.1  C)-98.7  F (37.1  C)] 98.7  F (37.1  C)  Pulse:  [] 105  Heart Rate:  [112] 112  Resp:  [18-20] 20  BP: (102-123)/(61-69) 123/69  SpO2:  [83 %-87 %] 87 %  128 lbs 1.4 oz  GEN:  Alert, oriented to self only, appears comfortable on 4 liters nasal cannula.  HEENT:  Normocephalic/atraumatic, no scleral icterus, no nasal discharge, mouth moist.  CV:  RRR, S1, S2; no murmurs or other irregularities noted.  +2 DP/PT pulses bilaterally; no edema BLE.  RESP:  Bilateral scattered wheezes. Symmetric chest rise on inhalation noted.  Normal respiratory effort.  ABD:  Rounded, soft, non-tender/non-distended.  +BS   SKIN:  Pale with cyanotic fingers. No mottling.   PAIN BEHAVIOR: Cooperative  Psych:  Flat affect.  Calm, cooperative, conversant and confused.    Medications     Medication given by intrathecal pump: This is NOT an order to dispense medication. For information only.         amitriptyline  75 mg Oral At Bedtime     felbamate  600 mg Oral TID      lamoTRIgine  100 mg Oral TID     QUEtiapine  50 mg Oral At Bedtime       Data   No results found for this or any previous visit (from the past 24 hour(s)).

## 2019-01-15 NOTE — PLAN OF CARE
Pt on comfort cares. On 4L O2, baseline. Tolerating oral meds with applesauce and frequent reminders to swallow. PO solution Diluadid given x2 this shift and PO solution ativan given x1 this shift. SW following.

## 2019-01-15 NOTE — PROGRESS NOTES
[facility]    Medicine Progress Note - Hospitalist Service       Date of Admission:  1/8/2019  Assessment & Plan   Shana Horne is a 68 year old female with PMH including dementia, copd, chronic pain w/ morphine pump in place, parkinson's who presented with numerous issues including generalized weakness and somnolence. This was preceded by upper respiratory tract infection type symptoms which started about 4 days prior to admission.  She appeared to be more short of breath and weak and was brought to the ER by family.  At home she has been on her baseline 4 L of supplemental oxygen and they have been giving her nebs.  In the ER, she was found to have leukocytosis to 14 K with low oxygen levels into the 80s and upper 70s on 4 L. Chest x-ray  showed retrocardiac infiltrate compatible with pneumonia. She was started on IV antibiotics with Zosyn and azithromycin and placed on BiPAP for work of breathing which she seemed to tolerate well. She was also given 0.1 mg of Narcan and she apparently became much more alert with that and interactive. She was put on BIPAP and admitted to ICU for further management.      Assessment and Plan:   1. Sepsis due to pneumonia: As evidenced by tachycardia, leukocytosis and hypoxia.  --Was initially on BIPAP.   --Now back to prior to admission level of supplemental oxygen by nasal cannula.  --Family decided to transition her care to comfort care only.  --Initially on IV antibiotics with azithromycin, levofloxacin, and Zosyn.  Antibiotics stopped after decision for comfort care.  --Also initially on IV methylprednisolone.     2.  History of COPD with chronic hypoxemic oxygen dependent respiratory failure:   --She was initially treated with IV Solu-Medrol, scheduled nebs, BIPAP.   --Now on comfort care only     3.   Toxic metabolic encephalopathy compounded by baseline dementia:   --Suspect multifactorial etiology -pneumonia, dementia,       4.   Parkinson's:      5.   Chronic pain with intrathecal morphine pump:   --Managed by Dr. Farooq. Pain/palliative care consulted for pain management     6.  Seizure disorder: No recent seizure activity reported. on Lamictal and felbamate.     7.  Recent diagnosis dementia: per family this is frontal dementia.    --Takes seroquel at night and tolerates it well in spite of her parkinsons.  --ativan for agitation/impulsivity becomes an issue as family notes this has in the past.     Code Status: DNR/DNI/comfort care only     Disposition: social work consult for discharge planning. Anticipate discharge home with home hospice once arranged.  following.  To discharge as soon as discharge plan determined by social work.            Diet: Regular Diet Adult    DVT Prophylaxis: comfort care.  Gill Catheter: in place, indication: End of Life;Retention  Code Status: DNR/DNI      Disposition Plan   Expected discharge: once discharge placement determined by social work.  Entered: Mitchel Andino, DO 01/15/2019, 3:54 PM         ______________________________________________________________________    Interval History   Short of breath at times.  Coughing.  Does have chronic pain.  No acute pain at this time.  Denies chest pain, fevers, chills, nausea, vomiting.    Data reviewed today: I reviewed all medications, new labs and imaging results over the last 24 hours.     Physical Exam   Vital Signs: Temp: 98.7  F (37.1  C) Temp src: Oral BP: 123/69 Pulse: 105 Heart Rate: 112 Resp: 20 SpO2: (!) 87 % O2 Device: Nasal cannula Oxygen Delivery: 4 LPM  Weight: 128 lbs 1.4 oz  Gen: Cachectic, NAD, A&Ox 2 to person and place.  Has trouble with time.  Eyes:  PERRL, sclera anicteric.  OP:  MMM, no lesions.  Neck:  Supple.  CV:  Regular, mildly tachycardic, no murmurs.  Lung: Very prolonged expiratory phase.  Occasional wheeze b/l.  Ab:  +BS, soft.  Skin:  Warm, dry to touch.  No rash.  Ext:  No pitting edema LE  b/l.      Data   Recent Labs   Lab 01/10/19  0555 01/08/19  2100 01/08/19 2022 01/08/19 2021   WBC 10.3  --  14.1*  --    HGB 12.6  --  15.6 16.7*   MCV 93  --  94  --      --  240  --     137  --  138   POTASSIUM 4.1 4.4  --  4.4   CHLORIDE 104 101  --  99   CO2 31 31  --   --    BUN 32* 25  --  26   CR 1.01 0.92  --   --    ANIONGAP 6 5  --  10   BIBIANA 8.4* 8.9  --   --    * 127*  --  143*   ALBUMIN 2.9* 3.3*  --   --    PROTTOTAL 7.0 7.7  --   --    BILITOTAL 0.3 0.3  --   --    ALKPHOS 90 110  --   --    ALT 19 21  --   --    AST 17 21  --   --

## 2019-01-15 NOTE — PLAN OF CARE
Pt on comfort cares. On 4L O2, baseline. PO solution dilaudid given x2 this PM. Pt can be impulsive at times, planning on moving pt closer to nursing desk for closer monitoring. Family at bedside and spoke with daughter Briseida and she agreed that moving her closer would be ok for safety. Pt resting comfortably. SW following.    Pt moved to . All belongings taken with pt to new RM. Family at bedside. Dilaudid PO solution given to pt this shift x4, Ativan PO solution x1. Gill output 300.  Left sticky note and message for palliative to come see  and family tomorrow 1/16/19 per  request.

## 2019-01-15 NOTE — CONSULTS
Hospice consult, informational visit only. Writer and orienting MSW Inna CHOWDARY met with pt's  Oliverio in conference room. Spoke briefly to palliative NP Sophie REAGAN prior to visit, and she made us aware that Oliverio was not thinking he could take patient home, and was actually hoping for placement at [facility].  We met anyway to discuss hospice and placement options.  Involved floor SW Melida as well. Made Oliverio aware that [facility] often has a waiting list. He stated he would prefer pt to remain at the hospital until a bed at [facility] is available, states he does not have funds for private pay placement. Melida did place a call to [facility] to inquire as to bed availability but had to leave a message. Discussed options with Oliverio in the event that [facility] does not have an immediate opening and pt has to discharge from [facility]. He has limited funds, and if pt could go to a nursing facility under Medicare coverage, that would be optimal.  If she does not have a skilled need to qualify her for MCR coverage at a nursing a facility, he understands he would have to pay privately, but that Medicare would pay for hospice to supplement cares. Melida will follow up regarding placement/discharge planning but again going home with hospice is not an option, per pt's spouse, at this time. Please keep  hospice updated as to pt's progress/plans - Samira . Thank you for referral. wyatt marinelli

## 2019-01-15 NOTE — PLAN OF CARE
Pt on comfort cares.  Repo Q2hrs.  No IV access.  4L O2 baseline.  Meds crushed in applesauce and needs frequent reminders to swallow.  SW following and awaiting palliative care consult.  Anticipate discharge home with  hospice, pending arrangement and hospice consult.

## 2019-01-15 NOTE — PROGRESS NOTES
D:  Per record review, pt's discharge recommendation is hospice.    I:  Alexey spoke with Palliative Care who filled out an application for [facility] with the pt and spouse.  Sw faxed the application to the facility ([phone]) for bed availability.  Alexey spoke with the pt's spouse, Oliverio, and  hospice.  Oliverio reported that they do not have the funds to pay for a facility.  Oliverio is concerned that if they take the pt home, she will not get the cares that she needs due to family not being able to care for her even with the support of hospice.  Oliverio said that they do their best, on Mondays the pt's mom who is 93 years old helps the pt and on Tuesday - Friday, the pt's 70 year old sister helps out.  Oliverio said it has been a lot on them to help her like they have been doing.  Alexey called [facility] who reported that they will follow-up with sw tomorrow regarding bed availability and if they are able to accept the pt.    A/P:  Sw will continue with discharge planning and will be available as needed until discharge.

## 2019-01-15 NOTE — PLAN OF CARE
Comfort cares, 4L O2 baseline, pt refused repo x1 this shift, nonblanchable redness on left buttock, left elbow red from fall from previous shift, dilaudid given x6, ativan given x1 for restlessness, díaz in place, fecal incontinence x1, Patient ate a few bites.

## 2019-01-16 NOTE — PROGRESS NOTES
[facility] Progress Note  FR Med Surg 3    Pt unresponsive and appears comfortable. Spouse, Oliverio and three daughters are present at bedside. Family shared extensive history of illness and challenges of care giving. Also expressed concerns about goals of care as pt has been transitioned to comfort cares .    Spouse states family raised [employer] and appreciated prayer for peace and comfort. Family is grieving appropriately.      Plan: [facility] remains available for additional emotional/spiritual support.    Bud Raman MA  Staff   Pager: [phone]  Phone: [phone]

## 2019-01-16 NOTE — PROGRESS NOTES
[facility]    Medicine Progress Note - Hospitalist Service       Date of Admission:  1/8/2019  Assessment & Plan   Shana Horne is a 68 year old female with PMH including dementia, copd, chronic pain w/ morphine pump in place, parkinson's who presented with numerous issues including generalized weakness and somnolence. This was preceded by upper respiratory tract infection type symptoms which started about 4 days prior to admission.  She appeared to be more short of breath and weak and was brought to the ER by family.  At home she has been on her baseline 4 L of supplemental oxygen and they have been giving her nebs.  In the ER, she was found to have leukocytosis to 14 K with low oxygen levels into the 80s and upper 70s on 4 L. Chest x-ray  showed retrocardiac infiltrate compatible with pneumonia. She was started on IV antibiotics with Zosyn and azithromycin and placed on BiPAP for work of breathing which she seemed to tolerate well. She was also given 0.1 mg of Narcan and she apparently became much more alert with that and interactive. She was put on BIPAP and admitted to ICU for further management.  Discussion with family was held.  Patient has been made DNR/DNI and comfort care only.  Social work currently working on placement.     Assessment and Plan:   1. Sepsis due to pneumonia: As evidenced by tachycardia, leukocytosis and hypoxia.  --Was initially on BIPAP.   --Now back to prior to admission level of supplemental oxygen by nasal cannula.  --Family decided to transition her care to comfort care only.  --Initially on IV antibiotics with azithromycin, levofloxacin, and Zosyn.  Antibiotics stopped after decision for comfort care.  --Also initially on IV methylprednisolone.     2.  History of COPD with chronic hypoxemic oxygen dependent respiratory failure:   --She was initially treated with IV Solu-Medrol, scheduled nebs, BIPAP.   --Now on comfort care only     3.   Toxic metabolic  encephalopathy compounded by baseline dementia:   --Suspect multifactorial etiology -pneumonia, dementia,      4.   Parkinson's:      5.   Chronic pain with intrathecal morphine pump:   --Managed by Dr. Farooq. Pain/palliative care consulted for pain management     6.  Seizure disorder: No recent seizure activity reported. on Lamictal and felbamate.     7.  Recent diagnosis dementia: per family this is frontal dementia.    --Takes seroquel at night and tolerates it well in spite of her parkinsons.  --ativan for agitation/impulsivity becomes an issue as family notes this has in the past.    Goal of care discussions: 2 daughters at bedside asking for x-rays and further treatments.  Discussed with the daughters that no need to obtain chest x-ray at this time.  I explained the goal of palliative approach of care.       Code Status: DNR/DNI/comfort care only     Disposition:  will need goal of care clarification, TLC reconsulted to assist with further goal of care discussions.  Patient's  who is the power of  as well as multiple family members need to be present for further discussion and a family meeting regarding this patient's care.  If patient goes to hospice care, pain pump need to be continued to prevent withdrawal symptoms        Diet: Regular Diet Adult    DVT Prophylaxis: Comfort care.  Gill Catheter: in place, indication: End of Life  Code Status: DNR/DNI       ______________________________________________________________________    Interval History    patient was seen and examined by me this morning.  Multiple family present during discussion.  Patient is comfortable but confused.  She is on nasal oxygen and no evidence of distress.  We raised multiple issues regarding pain pump, goals of care discussions.  It appears that the family has different understanding of palliative approach of care.      Data reviewed today: I reviewed all medications, new labs and imaging results over the last 24  hours.     Physical Exam   Vital Signs:     BP: 92/54   Heart Rate: 110 Resp: 18 SpO2: (!) 86 % O2 Device: Nasal cannula Oxygen Delivery: 4 LPM  Weight: 128 lbs 1.4 oz  Gen:  Quite thin, NAD, A&Ox2 to person and place.  Has trouble with time.  Eyes:  PERRL, sclera anicteric.  OP:  MMM, no lesions.  Neck:  Supple.  CV:  Regular, no murmurs.  Lung: Clear to auscultation bilaterally.  Very prolonged expiratory phase.  Ab:  +BS, soft.  Skin:  Warm, dry to touch.  No rash.  Ext:  No pitting edema LE b/l.      Data   Recent Labs   Lab 01/10/19  0555   WBC 10.3   HGB 12.6   MCV 93         POTASSIUM 4.1   CHLORIDE 104   CO2 31   BUN 32*   CR 1.01   ANIONGAP 6   BIBIANA 8.4*   *   ALBUMIN 2.9*   PROTTOTAL 7.0   BILITOTAL 0.3   ALKPHOS 90   ALT 19   AST 17

## 2019-01-16 NOTE — PROGRESS NOTES
D:  Per record review, pt is comfort cares and will discharge with hospice.    I:  Sw met with the pt and 2 of her dtrs were present.  Pt was very upset asking why this would happen to her when she has done everything for everyone and she is a good person.  She expressed concern that she is not sure if she should tell her family how she feels about them or not, she afraid they will not like what she said.  She said that she wants to fight and one of the drs asked the pt if she wants to fight or be in peace, but the pt did not respond.  There was question of a family meeting, sw left their number so they can call and set-up a time if they wish.  The pt requested sw be present if family is there and she talks with them about how she feels.    Sw received a call from [facility] ([facility]) that they do not have any open beds and that they would not take the pt due to her having an internal pain pump.  Alexey called the pt's  Oliverio ([phone]) and left him a vm to update him on [facility].    A/P:  Sw will continue with discharge planning and will be available as needed until discharge.  Alexey will further discuss discharge options with the pt tomorrow.

## 2019-01-16 NOTE — PLAN OF CARE
"RN SHIFT SUMMARY :  DX/STORY : admit 1/8 from home with sepsis/ Hypoxia /pneumonia   Had  Zosyn and azithromycin , steroids , nebs , BiPAP in ICU - transferred to [facility] on Comfort care 1/11   HX : parkinsons, dementia , seizures, COPD, Chronic pain - has implanted Morphine pump ( refilled q 3 months & last filled 1/11 )   LABS/PROTOCOLS : none- d/t Comfort care   TELE : n/a  ASSESS : had  varying levels of alertness over w/e But very alert most of today & a/o but forgetful . Actually telling jokes, etc. Then weepy when family left room. \"I have put my heart & soul into my family & I hate to see them go through this \"  at times sits up in bed,    Has been hugging family members,. No dilaudid so far today, Has Morphine pump-filled q 3 months ( last on 1/11)  ativan x1. was able to swallow meds with applesauce today   . No real nutrition - decided on no TF for nutrition.has díaz for comfort but low UO- only 100 dark UO   D/t no intake. Has dry , red elbows (L >R) . 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  oral cares & family requests turns as needed.   TEACHING : discussed POC with pt/family -continue Comfort Cares   PLAN : at baseline is  & has 5 adult children. Was living in [facility] & had [facility] -  & spouse & a couple adult daughters assisted with cares. Mom & sister assist 1 day a week .  Comfort cares . Palliative consulting.Dr Zambrano & Dr. Posey had mini-care conference with family Sat.  am in room as some possible disagreements  On goals, etc. Everything was well clarified in am discussion  ( Her spouse -Oliverio at bedside & several  family members ) spouse & Daughters seem in agreement on Comfort as Goal. Brother \"Polo\"  has been a  Barrier d/t different opinion- very verbal & angry in patrick & lounge with family members on W/E.  He feels she needs transfer to [place] for opinion. Palliative & SW consulting for Hospice- prefer '[facility] in [facility] . Pleases ee updated SW note re: not accepted at [facility]. Only spouse aware of this - do not discuss plan with anyone but spouse. All will discuss Thfrank AM

## 2019-01-16 NOTE — PLAN OF CARE
Please see flowsheets for detailed vital signs and assessments.   Neuro: somnolent, lethargic this shift. Aroused to pain.  Vital Signs: na  Pain: absence of nonverbal pain indicators  O2: 4L NC  : díaz in place, draining well   Activity: turning and repositioning for comfort  Plan: continue pain management, comfort cares  Discharge: 1-2 days when placement is found and agreed upon by pt and family

## 2019-01-16 NOTE — PROGRESS NOTES
"[facility]  Palliative Care Progress Note  Text Page       Assessment & Plan   1.  Decisional Capacity -  Questionable- patient has advanced dementia and does not demonstrate medical capacity. Patient does not have an advance directive. Per  informed consent policy next of kin should be involved in all consent and decision making. Her  Daniel Horne is next-of-kin and has chosen a comfort plan. Patient is more alert today, \"like herself\" per family.  They are struggling with telling her the details of plan of care in setting of dementia for concern it will cause her more distress and she will just forget again to have to retell the story again.  She is asking to go home.  We discussed options that may work for them from being honest despite risk of recurrent distress to \"therapeutic fibbing\" or giving broad supportive answers like, \"You are very sick, we are working to make the best plan of care for you with the best support.\"       2.  Pain - Chronic regional pain syndrome. [employer] intrathecal pump managed by Gurpreet Farooq MD who refilled the pump on 1/10/2019.  Notes pain is well managed currently, has not use PRN dilaudid overnight.     3.  Dyspnea with continued tobacco abuse - Denies today, has not used PRN dilaudid overnight.  Continue the use of PRN Dilaudid for complaint of dyspnea or a respiratory rate greater than 20.      4.  Dysphagia -   acknowledged risk of aspiration and request a plan for comfort eating. Patient enjoying a cookie, \"they think I am a pig.  This is good.\"  No obvious distress in swallow.      5.  Spiritual Care - Appreciate input of Aruna Chaka Berrios.      7. Care Planning -   acknowledged he is unable to care for the patient at home. Finances are a concern. Application for [facility] submitted. Appreciate input of  SAMANTHA Holden.  request the dismissal plan is only discussed with him.     Goal of Care: DNR/DNI " "- Comfort care.     Arely Coyle APRN, CNS, CNP  Pain Management and Palliative Care  [facility]  Pgr: [phone]      Time Spent on this Encounter   I spent 55 minutes total, >50% in assessment of the patient, counseling and discussion her  and daughters as documented in sections below as well as coordination of care and discussion with the health care team.    Interval History   Chart reviewed  Patient noted more alert and \"like herself\" then she has been through entire hospitalization.      Met with , Oliverio and 2 daughters.  They are asking may questions about her current status and specifically any physical signs that may have changed or mean anything to her prognosis of longevity.  We discussed signs of decline but acknowledged the unknown.  Family wanting to make sure they are making the right decision especially as she has lived longer than the 1-2 days they had heard when she was critically ill. Upon reviewing her chronic illness and decline which she had expressed feeling of \"being in nursing home\", a medical approach focused on quality continued to feel appropriate. A big concern remains where and how will we be able to give her the best care, feeling unable to take her home with current care needs.  They are awaiting to here if [facility] in [place] has a bed for her.     Palliative Symptom Review (0=no symptom/no concern, 1=mild, 2=moderate, 3=severe):      Pain: 0-none      Fatigue: 3-severe      Nausea: 0-none      Constipation: 0-none      Diarrhea: 0-none      Depressive Symptoms: 0-none      Anxiety: 0-none      Drowsiness: 2-moderate      Poor Appetite: 0-none      Shortness of Breath: 0-none      Insomnia: 0-none        Physical Exam   Heart Rate:  [110] 110  Resp:  [16-18] 18  BP: (92)/(54) 92/54  SpO2:  [86 %] 86 %  128 lbs 1.4 oz  GEN:  Alert, oriented to self and place, appears comfortable on 4 liters nasal cannula.  HEENT:  Normocephalic/atraumatic, no scleral " icterus, no nasal discharge, mouth moist.  CV:  RRR, S1, S2; no murmurs or other irregularities noted.  +2 DP/PT pulses bilaterally; no edema BLE.  RESP:  Bilateral scattered wheezes. Symmetric chest rise on inhalation noted.  Normal respiratory effort.  ABD:  Rounded, soft, non-tender/non-distended.  +BS   SKIN:  Pale, general cyanosis in face and extremeties. No mottling.   PAIN BEHAVIOR: Cooperative  Psych:  Calm, cooperative, conversant and confused.    Medications     Medication given by intrathecal pump: This is NOT an order to dispense medication. For information only.         amitriptyline  75 mg Oral At Bedtime     felbamate  600 mg Oral TID     lamoTRIgine  100 mg Oral TID     QUEtiapine  50 mg Oral At Bedtime       Data   No results found for this or any previous visit (from the past 24 hour(s)).

## 2019-01-17 NOTE — PROGRESS NOTES
[facility]  Palliative Care Progress Note  Text Page    Met patient as she was resting in bed. She denied complaint. At bedside her , french three daughters and Shana's sister.  The patient asked about why she was here. Her  Oliverio gave a review of her arrival to the hospital and a synopsis of her hospitalization. Discussed options for dismissal. Patient was not pleased by the TCU she was placed at after her lengthy hospitalization at [facility] and her options are limited due to her intrathecal pain pump.  SAMANTHA Johnson participated in the discussion to discuss options for placement. [facility] has turned Shana down. Hospitalist Tom Barrett MD joined the discussion and offered the burden and benefit of medical options. Oliverio acknowledged that Shana seemed to be suffering and would like her to focus on her own comfort. Their three daughters and her sister agrees. Discussed the hospice benefit. Shana asked to meet with her family privately to discuss options.       Assessment & Plan   1.  Decisional Capacity -  Questionable- patient has advanced dementia and does not demonstrate medical capacity. Patient does not have an advance directive. Per  informed consent policy next of kin should be involved in all consent and decision making. Her  Daniel Horne is next-of-kin and has chosen a comfort plan. Her family is encouraging her to be part of the decision making process as she is more alert.      2.  Pain - Chronic regional pain syndrome. [employer] intrathecal pump managed by Gurpreet Farooq MD who refilled the pump on 1/10/2019.  Notes pain is well managed currently, used 1 mg oral Dilaudid during the past day.      3.  Dyspnea with continued tobacco abuse - Denies.  Continue Dilaudid PRN for complaint of dyspnea or a respiratory rate greater than 20.      4.  Dysphagia -  Patient acknowledged agreement of families request for comfort eating  plan.      5.  Spiritual Care - Appreciate input of [place] Bud Raman.      7. Care Planning -  Family present are in agreement with comfort plan. She has been declined by [facility]. Appreciate input of  SAMANTHA Johnson.      Goal of Care: DNR/DNI - Comfort care.     Sophie Bolanos MS, RN, CNS, APRN, ACHPN, FAACVPR  Pain and Palliative Care  Pager [phone]  Office [phone]       Time Spent on this Encounter   I spent 55 minutes (2:10 PM - 3:05 PM) in assessment of the patient, counseling and discussion with the patient and family as documented in sections below. Another 30 minutes in review of chart, documentation, coordination of care and discussion with the health care team.    Interval History   Chart reviewed    Palliative Symptom Review (0=no symptom/no concern, 1=mild, 2=moderate, 3=severe):      Pain: 1-mild      Fatigue: 3-severe      Nausea: 0-none      Constipation: 0-none      Diarrhea: 0-none      Depressive Symptoms: 1-mild      Anxiety: 0-none      Drowsiness: 1-mild      Poor Appetite: 2-moderate      Shortness of Breath: 0-none      Insomnia: 0-none    Physical Exam   Heart Rate:  [] 94  Resp:  [18] 18  BP: (126-134)/(72-74) 134/72  SpO2:  [85 %-88 %] 85 %  128 lbs 1.4 oz  GEN:  Alert, oriented to self and situation, yet lack insight of her medical care. Appears comfortable, NAD.  HEENT:  Normocephalic/atraumatic, no scleral icterus, no nasal discharge, mouth moist.  RESP:  Symmetric chest rise on inhalation noted.  Normal respiratory effort.  PAIN BEHAVIOR: Cooperative  Psych:  Normal affect.  Calm, cooperative, conversant appropriately.    Medications     Medication given by intrathecal pump: This is NOT an order to dispense medication. For information only.         amitriptyline  75 mg Oral At Bedtime     felbamate  600 mg Oral TID     lamoTRIgine  100 mg Oral TID     QUEtiapine  50 mg Oral At Bedtime       Data   No results found for this or any previous visit  (from the past 24 hour(s)).

## 2019-01-17 NOTE — PLAN OF CARE
"RN SHIFT SUMMARY :  DX/STORY : admit 1/8 from home with sepsis/ Hypoxia /pneumonia   Had  Zosyn and azithromycin , steroids , nebs , BiPAP in ICU - transferred to [facility] on Comfort care 1/11   HX : parkinsons, dementia , seizures, COPD, Chronic pain - has implanted Morphine pump ( refilled q 3 months & last filled 1/10)   LABS/PROTOCOLS : none- d/t Comfort care   TELE : n/a  ASSESS : had  varying levels of alertness over last week  But very alert most of today & a/o but forgetful . Actually telling jokes, etc. Then occ. weepy . Has had 2 soft BM's today.  Transferred to commode & up to recliner for 2 hrs.    dilaudid x2 so far today, Has Morphine pump-filled q 3 months ( last on 1/10)  ativan x2.  Again  able to swallow meds with applesauce today   . No real nutrition - decided on no TF for nutrition.  has díaz for comfort but low UO- only 150 dark UO   D/t decreased  intake. Has dry , red elbows (L >R) . 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  oral cares & family requests turns as needed.    TEACHING : discussed POC with pt/family -continue Comfort Cares   PLAN : at baseline is  & has 5 adult children. Was living in [facility] & had [facility] home Oxygen -   spouse & a couple adult daughters assisted with cares. Mom & sister assist 1 day a week .  Comfort cares . Palliative consulting.has had mini-care conferences with family several times in last week d/t some possible disagreements  On goals, etc. Everything was well clarified ( Her spouse -Oliverio at bedside & several  family members ) spouse & Daughters seem in agreement on Comfort as Goal. Brother \"Polo\"  has been a  Barrier d/t different opinion- very verbal & angry in patrick & lounge with family members on W/E.  He feels she needs transfer to [place] for opinion. Palliative & SW consulting for Hospice-[facility] will not accept .   Family aware. Continue towards safe discharge plan --  Pending ? LTC ?     "

## 2019-01-17 NOTE — PROGRESS NOTES
Patient was seen and examined by me this morning.  Patient care assumed today.  Medical record reviewed and care discussed with medical staff as well as family at bedside.  Patient is 68-year-old female with complex medical problems admitted with pneumonia and sepsis.  Patient has history of dementia, chronic pain with pain pump in place, COPD, seizure disorder as well as Parkinson's disease.  Family decision for comfort measures noted but apparently there is some misunderstanding about care plan with no consensus in the family regarding comfort measures.  I spoke with 2 daughters at bedside and explained the 2 options including restorative care versus palliative/hospice care approach.  Patient may continue pain pump on a palliative approach.  I will ask palliative care team to arrange a family meeting for discussion of goals of care and clarification of care plan.

## 2019-01-17 NOTE — PLAN OF CARE
Pt. Alert/confused at times, up with assist of 2/lift, O2 at 4L (baseline Home O2) with sat's at 88%. Lung sounds diminished (hx of COPD). Comfort cares. SW working on placement for discharge. Pt. denies any needs at this time. Will continue with POC.

## 2019-01-18 NOTE — PROGRESS NOTES
[facility]    Medicine Progress Note - Hospitalist Service       Date of Admission:  1/8/2019  Assessment & Plan        Shana Horne is a 68 year old female with PMH including dementia, copd, chronic pain w/ morphine pump in place, parkinson's who presented with numerous issues including generalized weakness and somnolence. This was preceded by upper respiratory tract infection type symptoms which started about 4 days prior to admission.  She appeared to be more short of breath and weak and was brought to the ER by family.  At home she has been on her baseline 4 L of supplemental oxygen and they have been giving her nebs.  In the ER, she was found to have leukocytosis to 14 K with low oxygen levels into the 80s and upper 70s on 4 L. Chest x-ray  showed retrocardiac infiltrate compatible with pneumonia. She was started on IV antibiotics with Zosyn and azithromycin and placed on BiPAP for work of breathing which she seemed to tolerate well. She was also given 0.1 mg of Narcan and she apparently became much more alert with that and interactive. She was put on BIPAP and admitted to ICU for further management.     Patient was transferred to the floor and closely monitor.  Care plan was discussed with the patient, patient's family as well as palliative care team and it was noted that the patient's family decided comfort measures only.  However discussions continued due to confusion related to what comfort measures mean.  I involved palliative care team again and further discussions continued.    On January 18, 2019 patient was alert and oriented x3 and was able to speak with me about her care plan.  She appears to be decisional to me and I discussed the current care plan.  Psychiatry was consulted to assist with computers as well as neurology service.     Assessment and Plan:   1. Sepsis due to pneumonia:   --Patient does not look septic, apparently no indication for intravenous antibiotic at this  time.    2.  History of COPD with chronic hypoxemic oxygen dependent respiratory failure:   --She was initially treated with IV Solu-Medrol, scheduled nebs, BIPAP.   --Now on comfort care only      3.   Toxic metabolic encephalopathy compounded by baseline dementia:   --Suspect multifactorial etiology -pneumonia, dementia,      4.   Parkinson's:      5.   Chronic pain with intrathecal morphine pump:   --Managed by Dr. Farooq. Pain/palliative care consulted for pain management     6.  Seizure disorder: No recent seizure activity reported. on Lamictal and felbamate.     7.  Recent diagnosis dementia: per family this is frontal dementia.    --Takes seroquel at night and tolerates it well in spite of her parkinsons.  --ativan for agitation/impulsivity becomes an issue as family notes this has in the past.     Goal of care:  Patient appears to be decisional today, we will continue to monitor patient per her wishes.  Continue comfort measures for now.  Discharge planning      Diet: Regular Diet Adult    DVT Prophylaxis: Pneumatic Compression Devices  Gill Catheter: in place, indication: End of Life  Code Status: DNR/DNI      Disposition Plan      Expected discharge: Patient has dementia apparently her mental status improved and she is participating in decision-making process.  She may discharge in the next 1 or 2 days.  I will request follow-up consultation by psychiatric service for further discharge planning.    Entered: Tom Barrett MD 01/18/2019, 3:54 PM       The patient's care was discussed with the Patient's Family.    Tom Barrett MD  Hospitalist Service  [facility]    ______________________________________________________________________    Interval History    Patient was seen and examined by me this morning.  I spoke with multiple family members as well.  Care plan was also discussed with psychiatry/psychology service.  I did speak with this patient who preferred to speak with me by  herself without family members present.  She voiced that she wanted to be participating in her own medical decision making.  I also spoke with neurology service.      Physical Exam   Vital Signs:     BP: 134/69 Pulse: 88 Heart Rate: 97 Resp: 18 SpO2: 90 % O2 Device: None (Room air) Oxygen Delivery: 4 LPM  Weight: 128 lbs 1.4 oz     She seems alert and oriented x3 to me although she needed some assistance in correcting her time.  Neck is supple no JVD no thyromegaly or mass.  Lung exams showed no respiratory distress but she has decreased air entry without crackles or wheezing  Cardiovascular exam unremarkable with regular rate and rhythm no murmur gallop  Abdomen is soft nontender nondistended  Extremities no edema  Neurologic exam showed no focal neurologic deficit  Psych exam is significant for anxiety and occasional confusion otherwise she is improving terms of her alertness and communication with me today.    Data   All laboratory and imaging data in the past 24 hours reviewed

## 2019-01-18 NOTE — CONSULTS
"This document was created with voice recognition software.  As result, there may be errors in grammar and syntax.  Please consider this when reading this document.    Psychiatric consult, under supervision of Dr. Yu, ordered by Dr. Barrett.  This was an initial consult, which took a total of 80 minutes, with half the time coordinating care including talking with family.    Summary of consult  Please see the H&P by Dr. Little for detailed information about why Shana Horne was admitted.  For the purposes of this report, it should be noted that she is a 68-year-old female with PMH significant for dementia, COPD, chronic pain and Parkinson's.  She presented at the [facility] ED with generalized weakness, SOB and somnolence.  She is brought in by her family, who report concerned that she will develop agitation and impulsivity at night.  This is treated on an outpatient basis by Seroquel, which apparently is tolerated well.  She was admitted as a voluntary patient for treatment of complex problems, including metabolic encephalopathy compounded by baseline dementia.    I was asked to see this patient to, in consultation with Dr. Yu, help assess her current decision making capacity.    Consultation with other team members  I conferred remotely with Dr. Yu, who agreed that she currently has the capacity to make medical decisions for herself.  -----------------------------------------------------------------------------------------    Records reviewed   H&P done by Dr. Little, including review of systems,  as well as chart review including  previous admissions, Care Everywhere records and  the pharmacy admission note.     Progress notes/consults  Dr. Barrett's recent progress note documents that she is better today, asked to speak with him privately, was generally oriented, and he assessed her as \"appears to be stressed from decisions about her and she is wondering who is making " "decisions for her.  She is not comfortable with decisions made about her.\"    I also reviewed a palliative care consultation dated 1-9-19.  At that time she was assessed as having \"questionable\" decision-making capacity.    Finally, I reviewed a social work consult dated 1-17-19, which documented a plan for discharge to hospice care services.  In the meeting with the , the family supported this plan but the patient expressed reservations and concern about \"making the wrong decision.\"    Collateral Information  Chichi had several family members visiting.  I asked him to step out of the room in order to visit with her privately.  They readily agreed.  I subsequently spoke with one daughter, Winter, who seemed to be the spokesman for the family group.  She emphasized with her primary concern is her comfort, and they believe that she would be best served with hospice care.  She also reports that Shana  has tendency to focus on concerns about others, and has difficulty focusing on her own needs.  The family is been encouraging her to focus on her needs, and Winter believes that Shana is slowly coming around to this.    Patient Report of Admission Events/Circumstances  Clean cannot began by stating, \"I am dying, I only have 1-4 days to live.\"  We discussed discharge options, with a focus on hospice options.  In particular, I discussed in-home services versus placement in a hospice care center.  She reports, \"nobody will take me\" in regard to her care center.  After discussing the work that the  is doing to arrange placement, she said, \"I understand there has to be some place for me.\"  She is still concerned, however, about the impact of placement in a hospice care setting on her family, and in particular her .  Several times she expressed concern about his needs, and the fact that he recently lost his job.  She does understand the need to have a en discussion with her family about " "what would be best for her, and requested a follow-up visit from the [facility] psychiatric team to help her \"talk to them.\"  I assured her that I will pass this on to the hospitalist team.    Mental and Chemical Health Treatment History    Shana reports that she has no history of psychiatric/behavioral/MARISELA services of any type.     Social Background  She lives with her ,  44 years, in their own home.  They have 5 children.  All are reported to be very supportive.  As noted above, he lost his job a few months ago.    Behavioral Assessment  Shana was resting in her bed with a grandchild also in bed with her and providing comfort, when I introduced myself.  She greeted me in a quiet and reserved manner.  She asked, assertively, that the family leave in order to be able to talk to me privately.  They readily agreed.  Her appearance was generally typical for her age and medical status, although she looked distressed off and on during the interview.       Today, she was oriented in all spheres.  She had a slight difficulty, as with Dr. Barrett, identifying the current year, initially saying that it is 2018, but before I could respond she corrected herself and accurately said, \"2019.\"   Eye contact was bit intense, which would be typical for the topics that we had to discuss.  Attention and concentration were intact. No unusual movements were observed. Muscle strength/tone, gait and station are deferred to the hospitalist team.     Shana's speech was soft, fluent, logical and goal-directed.  It was not pressured, and she made no unusual statements.  She has no memory at all of circumstances that resulted in admission, and it appears that remote memory is generally intact, as best I can tell during her brief interview, but recent memory is patchy.  Associations are intact. IQ and fund of knowledge are cautiously estimated to be in the Average range.     She described her current mood as " "\"kind of depressed.\"  Affect was variable, quite sad at times but also euthymic at times.. No history of bipolar behavioral patterns. No history of hallucinations, and there were no obvious behavioral indications of problems with basic perceptual and cognitive process.     The focus of the interview was on competency to make medical decisions for herself.  She tolerated en discussion of her prognosis and treatment needs very well.  There are no indications of cognitive or psychiatric impairments that would impair her capacity to make medical decisions for herself.    Insight/judgement are at baseline    Risk Assessment  She has no history of suicidal behavior, and denied, with solid eye contact and convincing affect, current suicidal ideation.     Impressions  Strengths:  Shana appears to be a temperamentally pleasant person with a very supportive family.  Liabilities:  Shana is struggling with end-of-life issues, complicated by her tendency to focus on the needs of others and difficulty shifting to focus on her own needs.    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    Recommendations   1.  The results of this consultation were discussed with Dr. Barrett.  2.  Shana would like a follow-up visit with a member of the [facility] psychiatric consult team to discuss \"how to talk with my family.\"  If the hospitalist team agrees this will need to be ordered.  3. The [facility] Psychiatric Consult Team is available to follow-up, if needed; this will need to be ordered.      Arnaldo Sexton Psy.D., L.P.  [phone]          "

## 2019-01-18 NOTE — PLAN OF CARE
Pt. Alert, forgetful, impulsive at times, up with assist of 1-2, O2 at 4L (baseline). Pt. Denies any complaints at this time, denies any needs at this time. Pt. On comfort cares. Will continue with POC.

## 2019-01-18 NOTE — PROGRESS NOTES
Patient was seen and examined by me this morning.  She appears to be improving today and wanted to speak with me with her family outside the room.  She is alert and knows where she is, she knows who the president is but she missed on the year but she was easily redirected to the right ear at 2019.  She appears to be stressed from decisions about her and she is wondering who is making decisions for her.  She is not comfortable with decisions made about her.  I discussed with her that we get psychiatrist/psychologist consultation to assess her decision-making capacity and discuss further plan of care.  I have updated patient daughters away from the patient and discussed the concern the patient has.  At this point patient appears to be medically stable but has emotional distress related to her illness and prognosis.  We will get psychiatry consultation to assist with further evaluation of decision-making capacity.  I will get neurology consultation as well for prognosis of her diagnosis of dementia.

## 2019-01-19 NOTE — PROGRESS NOTES
"[facility]  Palliative Care Progress Note  Text Page    Met with Shana while she was resting in bed. Her , Oliverio at bedside. Shana was in great spirits and interested in telling me about her hospitalization. She whispered \"The big important doctors told me I had less than 3 days to live\". I asked what she thought of that. Shana explained \"Everyone dies. I don't want it to be my time and leave my kids\". She went on to tell about raising her four daughters and her \"tall and intimidating looking son\". Oliverio explained that \"she had enough of a job just raising the five kids. She didn't need another job\". 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  Oliverio acknowledged that she took care of up to 12 kids in their home. I asked how her kids are handling the hospitalization. Shana acknowledged \"They want me to be safe and to go to a home where they can take care of me and keep me safe\".  I asked what she would want and she responded \"I want to go home\". Shana acknowledged she has often had falls at home. I asked if her family felt concerned when she fell at home. Shana acknowledged  \"I don't want to fall and have my family feel guilty for what happens.\" I asked what it would look like if she were cared for someplace else. She acknowledged her family wants her \"to go to a home where they can take care of me and keep me safe\".  I asked what she thought of this and she acknowledged that she would have her family visit \"and they wouldn't have to feel guilty if something happened to me\". Shana acknowledged  \"I just want to be safe and comfortable until it's my time\". Oliverio asked what the next steps are and I encouraged him to fill out the MA paperwork that Social Work could assist in filing. Once Social Work finds a LTC facility that is able to accept Shana with the support of hospice (mindful of her intrathecal pain pump) it would be a safe dismissal plan.      " "      Assessment & Plan     1.  Decisional Capacity -  Intact - Patient acknowledged terminal trajectory of illness. She would like to go home, but understands that she needs 24 hour care: \"I don't want to fall and have my family feel guilty for what happens\".   She is in agreement with plan \"to go to a home where they can take care of me and keep me safe\". She is aware her  \"lost his job and can't afford to take care of me\". Shana request \"I just want to be safe and comfortable until it's my time\".  Patient does not have an advance directive. Per  informed consent policy next of kin should be involved in all consent and decision making. Her  Daniel Horne is next-of-kin and has chosen a comfort plan. Patient, , sister and children agree on a comfort plan.       2.  Pain - Chronic regional pain syndrome. [employer] intrathecal pump managed by Gurpreet Farooq MD who refilled the pump on 1/10/2019.  Patient denies pain and has used 2 mg oral Dilaudid in the past day.     3.  Dyspnea  - Denies.  Continue Dilaudid PRN for complaint of dyspnea or a respiratory rate greater than 20.      4.  Dysphagia -  Patient request \"I want to eat what I want!\" for comfort eating plan.      5.  Spiritual Care - Appreciate input of [place] Bud Raman.      7. Care Planning -  Patient request placement with support of hospice. Her  agrees to fill out MA paperwork in effort for dismissal planning. Patient has been declined by [facility]. Appreciate input of  SAMANTHA Johnson.      Goal of Care: DNR/DNI - Comfort care.      Sophie Bolanos MS, RN, CNS, APRN, ACHPN, FAACVPR  Pain and Palliative Care  Pager [phone]  Office [phone]     Time Spent on this Encounter   I spent  80 minutes (5 PM-6:20 PM) in assessment of the patient, counseling and discussion with the patient and her  as documented in sections below. Another 30 minutes in review of chart, documentation, coordination " of care and discussion with the health care team.    Interval History   Chart reviewed    Palliative Symptom Review (0=no symptom/no concern, 1=mild, 2=moderate, 3=severe):      Pain: 1-mild      Fatigue: 1-mild      Nausea: 0-none      Constipation: 0-none      Diarrhea: 0-none      Depressive Symptoms: 1-mild      Anxiety: 1-mild      Drowsiness: 1-mild      Poor Appetite: 1-mild      Shortness of Breath: 0-none      Insomnia: 1-mild        Physical Exam   Heart Rate:  [97] 97  Resp:  [18] 18  BP: (104-134)/(55-69) 134/69  SpO2:  [90 %] 90 %  128 lbs 1.4 oz  GEN:  Alert, oriented x 3, appears comfortable, no apparent distress.  HEENT:  Normocephalic/atraumatic, no scleral icterus, no nasal discharge, mouth moist.  RESP:  Symmetric chest rise on inhalation noted.  Normal respiratory effort.  PAIN BEHAVIOR: Cooperative  Psych:  Normal affect.  Calm, cooperative, conversant appropriately.    Medications     Medication given by intrathecal pump: This is NOT an order to dispense medication. For information only.         amitriptyline  75 mg Oral At Bedtime     felbamate  600 mg Oral TID     lamoTRIgine  100 mg Oral TID     QUEtiapine  50 mg Oral At Bedtime       Data   Results for orders placed or performed during the hospital encounter of 01/08/19 (from the past 24 hour(s))   Psychiatry IP Consult: Decision making capacity, depression anxiety and end of life  decision concerns; Consultant may enter orders: Yes; Patient to be seen: ASAP; Call back #: [phone]    Narrative    Arnaldo Sexton LP     1/18/2019  1:06 PM  This document was created with voice recognition software.  As   result, there may be errors in grammar and syntax.  Please   consider this when reading this document.    Psychiatric consult, under supervision of Dr. Yu, ordered by   Dr. Barrett.  This was an initial consult, which took a total of   80 minutes, with half the time coordinating care including   talking with family.    Summary of  "consult  Please see the H&P by Dr. Little for detailed information   about why Shana Horne was admitted.  For the purposes of this   report, it should be noted that she is a 68-year-old female with   PMH significant for dementia, COPD, chronic pain and Parkinson's.    She presented at the [facility] ED with   generalized weakness, SOB and somnolence.  She is brought in by   her family, who report concerned that she will develop agitation   and impulsivity at night.  This is treated on an outpatient basis   by Seroquel, which apparently is tolerated well.  She was   admitted as a voluntary patient for treatment of complex   problems, including metabolic encephalopathy compounded by   baseline dementia.    I was asked to see this patient to, in consultation with Dr. Yu, help assess her current decision making capacity.    Consultation with other team members  I conferred remotely with Dr. Yu, who agreed that she   currently has the capacity to make medical decisions for herself.  ------------------------------------------------------------------  -----------------------    Records reviewed   H&P done by Dr. Little, including review of systems,  as   well as chart review including  previous admissions, Care   Everywhere records and  the pharmacy admission note.     Progress notes/consults  Dr. Barrett's recent progress note documents that she is better   today, asked to speak with him privately, was generally oriented,   and he assessed her as \"appears to be stressed from decisions   about her and she is wondering who is making decisions for her.    She is not comfortable with decisions made about her.\"    I also reviewed a palliative care consultation dated 1-9-19.  At   that time she was assessed as having \"questionable\"   decision-making capacity.    Finally, I reviewed a social work consult dated 1-17-19, which   documented a plan for discharge to hospice care services.  In " "the   meeting with the , the family supported this plan   but the patient expressed reservations and concern about \"making   the wrong decision.\"    Collateral Information  Chichi had several family members visiting.  I asked him to step   out of the room in order to visit with her privately.  They   readily agreed.  I subsequently spoke with one daughter, Winter,   who seemed to be the spokesman for the family group.  She   emphasized with her primary concern is her comfort, and they   believe that she would be best served with hospice care.  She   also reports that Shana  has tendency to focus on concerns   about others, and has difficulty focusing on her own needs.  The   family is been encouraging her to focus on her needs, and Winter   believes that Shana is slowly coming around to this.    Patient Report of Admission Events/Circumstances  Clean cannot began by stating, \"I am dying, I only have 1-4 days   to live.\"  We discussed discharge options, with a focus on   hospice options.  In particular, I discussed in-home services   versus placement in a hospice care center.  She reports, \"nobody   will take me\" in regard to her care center.  After discussing the   work that the  is doing to arrange placement, she   said, \"I understand there has to be some place for me.\"  She is   still concerned, however, about the impact of placement in a   hospice care setting on her family, and in particular her   .  Several times she expressed concern about his needs,   and the fact that he recently lost his job.  She does understand   the need to have a en discussion with her family about what   would be best for her, and requested a follow-up visit from the   [facility] psychiatric team to help her \"talk to   them.\"  I assured her that I will pass this on to the hospitalist   team.    Mental and Chemical Health Treatment History    Shana reports that she has no history " "of   psychiatric/behavioral/MARISELA services of any type.     Social Background  She lives with her ,  44 years, in their own home.    They have 5 children.  All are reported to be very supportive.    As noted above, he lost his job a few months ago.    Behavioral Assessment  Shana was resting in her bed with a grandchild also in bed with   her and providing comfort, when I introduced myself.  She greeted   me in a quiet and reserved manner.  She asked, assertively, that   the family leave in order to be able to talk to me privately.    They readily agreed.  Her appearance was generally typical for   her age and medical status, although she looked distressed off   and on during the interview.       Today, she was oriented in all spheres.  She had a slight   difficulty, as with Dr. Barrett, identifying the current year,   initially saying that it is 2018, but before I could respond she   corrected herself and accurately said, \"2019.\"   Eye contact was   bit intense, which would be typical for the topics that we had to   discuss.  Attention and concentration were intact. No unusual   movements were observed. Muscle strength/tone, gait and station   are deferred to the hospitalist team.     Shana's speech was soft, fluent, logical and goal-directed.  It   was not pressured, and she made no unusual statements.  She has   no memory at all of circumstances that resulted in admission, and   it appears that remote memory is generally intact, as best I can   tell during her brief interview, but recent memory is patchy.    Associations are intact. IQ and fund of knowledge are cautiously   estimated to be in the Average range.     She described her current mood as \"kind of depressed.\"  Affect   was variable, quite sad at times but also euthymic at times.. No   history of bipolar behavioral patterns. No history of   hallucinations, and there were no obvious behavioral indications   of problems with basic " "perceptual and cognitive process.     The focus of the interview was on competency to make medical   decisions for herself.  She tolerated en discussion of her   prognosis and treatment needs very well.  There are no   indications of cognitive or psychiatric impairments that would   impair her capacity to make medical decisions for herself.    Insight/judgement are at baseline    Risk Assessment  She has no history of suicidal behavior, and denied, with solid   eye contact and convincing affect, current suicidal ideation.     Impressions  Strengths:  Shana appears to be a temperamentally pleasant   person with a very supportive family.  Liabilities:  Shana is struggling with end-of-life issues,   complicated by her tendency to focus on the needs of others and   difficulty shifting to focus on her own needs.    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    Recommendations   1.  The results of this consultation were discussed with Dr. Barrett.  2.  Shana would like a follow-up visit with a member of the   [facility] psychiatric consult team to discuss \"how   to talk with my family.\"  If the hospitalist team agrees this   will need to be ordered.  3. The [place] [facility] Psychiatric Consult   Team is available to follow-up, if needed; this will need to be   ordered.      Arnaldo Sexton Psy.D., L.P.  [phone]               "

## 2019-01-19 NOTE — PLAN OF CARE
Comfort cares. Implanted pain pump in place and PRN dilaudid for Bilateral feet pain and HA. Reposition PRN. O2 4L- baseline for pt. Gill in place.

## 2019-01-19 NOTE — PROGRESS NOTES
[facility]    Medicine Progress Note - Hospitalist Service       Date of Admission:  1/8/2019  Assessment & Plan        Shana Horne is a 68 year old female with PMH including dementia, copd, chronic pain w/ morphine pump in place, parkinson's who presented with numerous issues including generalized weakness and somnolence. This was preceded by upper respiratory tract infection type symptoms which started about 4 days prior to admission.  She appeared to be more short of breath and weak and was brought to the ER by family.  At home she has been on her baseline 4 L of supplemental oxygen and they have been giving her nebs.  In the ER, she was found to have leukocytosis to 14 K with low oxygen levels into the 80s and upper 70s on 4 L. Chest x-ray  showed retrocardiac infiltrate compatible with pneumonia. She was started on IV antibiotics with Zosyn and azithromycin and placed on BiPAP for work of breathing which she seemed to tolerate well. She was also given 0.1 mg of Narcan and she apparently became much more alert with that and interactive. She was put on BIPAP and admitted to ICU for further management.     Patient was transferred to the floor and closely monitor.  Care plan was discussed with the patient, patient's family as well as palliative care team and it was noted that the patient's family decided comfort measures only.  However discussions continued due to confusion related to what comfort measures mean.  I involved palliative care team again and further discussions continued.    On January 18, 2019 patient was alert and oriented x3 and was able to speak with me about her care plan.  She appears to be decisional to me and I discussed the current care plan.  Psychiatry was consulted to assist with computers as well as neurology service.     Assessment and Plan:   1. Sepsis due to pneumonia:   --Patient does not look septic, apparently no indication for intravenous antibiotic at this  time.    2.  History of COPD with chronic hypoxemic oxygen dependent respiratory failure:   --She was initially treated with IV Solu-Medrol, scheduled nebs, BIPAP.   --Now on comfort care only      3.   Toxic metabolic encephalopathy compounded by baseline dementia:   --Suspect multifactorial etiology -pneumonia, dementia,      4.   Parkinson's:      5.   Chronic pain with intrathecal morphine pump:   --Managed by Dr. Farooq. Pain/palliative care consulted for pain management     6.  Seizure disorder: No recent seizure activity reported. on Lamictal and felbamate.     7.  Recent diagnosis dementia: per family this is frontal dementia.    --Takes seroquel at night and tolerates it well in spite of her parkinsons.  --ativan for agitation/impulsivity becomes an issue as family notes this has in the past.     Goal of care: Comfort measures only with focus on pain suffering this patient.  Psychiatry recommendation was obtained.  Patient was alert and communicative yesterday but today seems to be confused.  Goal of care remains comfort measures only.  Discharge planning in progress      Diet: Regular Diet Adult    DVT Prophylaxis: Pneumatic Compression Devices  Gill Catheter: in place, indication: End of Life  Code Status: DNR/DNI      Disposition Plan      Expected discharge: Discharge to long-term facility likely on Monday  Entered: Tom Barrett MD 01/19/2019, 3:09 PM       The patient's care was discussed with the Patient's Family.    Tom Barrett MD  Hospitalist Service  [facility]    ______________________________________________________________________    Interval History    Patient was seen and examined by me this morning.  She appears to be comfortable complaining of some headache but denies any chest pain or shortness of breath.  She appears very confused today.      Physical Exam   Vital Signs: Temp: 99  F (37.2  C) Temp src: Oral BP: 131/79   Heart Rate: 102 Resp: 18 SpO2: (!) 85 % O2  Device: Nasal cannula Oxygen Delivery: 4 LPM  Weight: 128 lbs 1.4 oz     She seems alert and oriented x3 to me although she needed some assistance in correcting her time.  Neck is supple no JVD no thyromegaly or mass.  Lung exams showed no respiratory distress but she has decreased air entry without crackles or wheezing  Cardiovascular exam unremarkable with regular rate and rhythm no murmur gallop  Abdomen is soft nontender nondistended  Extremities no edema  Neurologic exam showed no focal neurologic deficit  Psych exam is significant for anxiety and occasional confusion otherwise she is improving terms of her alertness and communication with me today.    Data   All laboratory and imaging data in the past 24 hours reviewed

## 2019-01-19 NOTE — PLAN OF CARE
Patient on comfort cares. On 4L 02 chronically. Complained of pain in bilateral feet and head and pain med's given. Slept well throughout the night.

## 2019-01-20 NOTE — PLAN OF CARE
Patient on comfort cares. Turned for comfort. Pain med's given for pain in bilateral feet, hands, and head. Slept well throughout the night.

## 2019-01-20 NOTE — PROGRESS NOTES
[facility]    Medicine Progress Note - Hospitalist Service       Date of Admission:  1/8/2019  Assessment & Plan      Shana Horne is a 68 year old female with PMH including dementia, copd, chronic pain w/ morphine pump in place, parkinson's who was admitted at this hospital on 1/8/19 due to generalized weakness and somnolence.  At the emergency room she was found to have leukocytosis to 14 K with low oxygen levels into the 80s and upper 70s on 4 L.  Chest x-ray was obtained and showed retrocardiac infiltrate compatible with pneumonia.  She was started on IV antibiotics with Zosyn and azithromycin and placed on BiPAP for work of breathing which she seemed to tolerate well.  She was also given 0.1 mg of Narcan and she apparently became much more alert with that and interactive. She was admitted to ICU with BiPAP with high flow O2.    1. Sepsis due to pneumonia:   --she was initially treated with IV Zosyn. Patient does not look septic at this time, apparently no indication to continue intravenous antibiotic.     2.  History of COPD with chronic hypoxemic oxygen dependent respiratory failure:   --She was initially treated with IV Solu-Medrol, scheduled nebs, BIPAP.   --Now on comfort care only      3.   Toxic metabolic encephalopathy with baseline dementia:   --Suspect multifactorial etiology -pneumonia, dementia, resolved at this time.     4.   Parkinson's: not on medication.     5.   Chronic pain with intrathecal morphine pump:   --Managed by Dr. Farooq. Pain/palliative care consulted for pain management     6.  Seizure disorder: No recent seizure activity reported. on Lamictal and felbamate.     7.  Recent diagnosis dementia and depression:  --continue seroquel at night.  --ativan for agitation/impulsivity becomes an issue as family notes this has in the past.    8. Deconditioning: was in bed since admission. PT/OT, wants to be her baseline prior discontinue home, and  Does not like TCU idea at this  time.     Goal of care: Comfort measures only with focus on pain management.  Psychiatry recommendation was obtained      Diet: Regular Diet Adult    DVT Prophylaxis: Pneumatic Compression Devices  Gill Catheter: in place, indication: End of Life  Code Status: DNR/DNI      Disposition Plan   Expected discharge: Tomorrow, recommended to prior living arrangement once rehab evaluation.  Entered: Fletcher Duran MD 01/20/2019, 8:18 AM       The patient's care was discussed with the Patient.    Fletcher Duran MD  Hospitalist Service  [facility]    ______________________________________________________________________    Interval History   Feels tired and weak, worries to get out of bed as she is not able to go bathroom since admission.    Data reviewed today: I reviewed all medications, new labs and imaging results over the last 24 hours. I personally reviewed no images or EKG's today.    Physical Exam   Vital Signs: Temp: 98.9  F (37.2  C) Temp src: Oral BP: 134/78   Heart Rate: 92 Resp: 18 SpO2: 90 % O2 Device: Nasal cannula Oxygen Delivery: 4 LPM  Weight: 128 lbs 1.4 oz    PHYSICAL EXAMINATION:  GENERAL:  No acute distress.  SKIN:  Dry and warm.  There is no rash, lesions, ulcers or juandice at area of skin examined.  HEENT:  Head without trauma.  Pupils round, reactive. Exam of conjunctiva and lids are normal. Sclera without icterus. There is no oral thrush.  NECK:  Supple.  There is no cervical adenopathy, no thyromegaly. No jugular venous distension.  CHEST: No reproducible chest tenderness.   LUNGS:  Normal respiratory effort. Lungs reveal decreased breath sounds diffusely. No rales or wheezes.  HEART:  Regular rate and rhythm.  No murmur, gallops or rubs auscultated.  ABDOMEN:  Soft, bowel sounds positive.  There is no tenderness or guarding.   EXTREMITIES: No edema. Normal range of motion. No calf swelling or tenderness.  NEUROLOGIC:  Alert and oriented x3.  There is no focal deficit  appreciated.  PSYCH: Recent and remote memory is impaired. Mood and affect are normal.      Data   No lab results found in last 7 days.    No results found for this or any previous visit (from the past 24 hour(s)).  Medications     Medication given by intrathecal pump: This is NOT an order to dispense medication. For information only.         amitriptyline  75 mg Oral At Bedtime     felbamate  600 mg Oral TID     lamoTRIgine  100 mg Oral TID     QUEtiapine  50 mg Oral At Bedtime

## 2019-01-20 NOTE — PLAN OF CARE
Comfort cares. Implanted pain pump in place and PRN dilaudid for Bilateral feet pain and HA. Reposition PRN. O2 4L- baseline for pt. Gill in place. Pt stated she has not had a BM in a few days, denies abdominal discomfort, passing flatus. Refused to take anything to help promote BM.   PT/OT orders- pt refused to get OOB for nursing multiple times. Stated she did does not want to get to chair or to ambulate. Up to bedside commode x 2  Daughter Briseida updated with plan

## 2019-01-21 NOTE — PLAN OF CARE
Comfort cares. Implanted pain pump in place and PRN dilaudid for Bilateral feet pain and HA. Reposition PRN. O2 4L- baseline for pt. Gill in place. Pt stated she has not had a BM in a few days, denies abdominal discomfort, passing flatus. Refused to take anything to help promote BM.   Daughter Briseida updated with plan   Possible Discharge to [facility] tomorrow

## 2019-01-21 NOTE — PLAN OF CARE
Patient on comfort cares. Complained of pain to bilateral hands, feet, and head and pain med's given with relief. Turned for comfort. Slept well throughout the night.

## 2019-01-21 NOTE — CONSULTS
**Ronnell is aware of new consult.  Please see ronnell note from 1/13/18 for initial consult and assessment information.    D:  Per record review, pt's discharge recommendation is possible hospice services and LTC facility.    I:  Ronnell spoke with Lilibeth at [facility] [phone] who said that they do accept pt's with an internal pain pump as long as the facility does not have to do anything with it.  She said that they will take a pt with a pending MA jessy as long as they have a copy of the application to review on their own.  Lilibeth said that they have a shared [facility] female bed available at this time.  Ronnell sent a referral to [facility].  Ronnell spoke with pt's spouse Oliverio who said that his dtr has the [facility]-MA jessy, but he will talk with her so they can get it filled out.  Ronnell let Oliverio know that they need additional documentation to support the information on the application.  He said he is going to talk with his family regarding the pt's possible discharge to [facility] and get the application filled out and returned to  [facility].  Ronnell spoke with Lilibeth who is going to call Oliverio to set-up a tour of the facility.  Ronnell told Oliverio that they need to know ASAP if they want the bed so ronnell can let Lilibeth know.   Pt may discharge with hospice services.    A/P:  Ronnell will continue with discharge planning and will be available as needed until discharge.

## 2019-01-21 NOTE — PROGRESS NOTES
She is a 68-year-old female with multiple medical problems admitted on January 8, 2019 with pneumonia and sepsis.  After multiple family meeting and discussion with patient and family, it was decided that patient benefits from comfort measures approach.   Goal of care was changed to comfort measures only.  Currently patient not complaining of any pain or discomfort.  Discharge planning is in progress.  Continue to control pain and discomfort as well as shortness of breath with the goal of addressing pain and suffering  Discharge planning is in progress.  I suggest social service assist with discharge planning possible in the next 1 or 2 days to hospice care at the facility or hospice at home.

## 2019-01-21 NOTE — PROGRESS NOTES
Patient was seen and examined by me this morning.  She looks comfortable.  She is confused but no evidence of distress.  Discharge planning and progress but issues with accepting facility.  Social service, care coordinator team to assist with discharge likely in the next 1 or 2 days.

## 2019-01-22 NOTE — PLAN OF CARE
VSS, afebrile.  4L O2 via nasal canula, breathing without difficult except CAMPO.  Generalized pain, po Dilaudid x 2 with some relief.  Gill patent with juan jose urine.  POC reviewed with pt, questions answered.

## 2019-01-22 NOTE — PROGRESS NOTES
D:  Per record review, pt's discharge recommendation is to a LTC facility with hospice or home with hospice.  LTC referral was sent to [facility].    I:  Ronnell spoke with the pt's spouse, Oliverio [phone], to get an update on the progress they have made on the LTC-MA jessy.  Ronnell gave them the jessy last Thursday on 1/17/18.    Oliverio gave the completed LTC-MA jessy to ronnell.  He asked if he sold their house, would some of the profit be taken to pay for her cares and if they would take any of his 401K to pay for the pt's LTC stay.  Ronnell informed him that he is allowed to keep 1 car and his house for him to live.  Ronnell did inform him that they may touch his 401K since they are .  Ronnell explained that their assets will be divided prior to pt's admission.    Oliverio is now contemplating taking the pt home vs having her go to [facility] LTC.  Oliverio said he will call ronnell later today to give a definite answer of where the pt will discharge to.  Oliverio said that they do want to go with  Hospice.  Ronnell called and set up a meeting for tomorrow at 1300 to sign consents either at [facility] or the pt's home.    A/P:  Sw will continue with discharge planning and will be available as needed until discharge.

## 2019-01-22 NOTE — PROGRESS NOTES
Patient was seen and examined by me today.I discussed care plan with and addressed questions and concerns of patient family and staff.    Shana Horne is a 68 year old  female with a significant past medical history of dementia, copd, chronic pain w/ morphine pump in place, parkinson's who presents with numerous issues including generalized weakness and somnolence.      Patient and family elected for comfort measures only and TLC ,SW following patient to assist with disposition planning.    Currently patient appears comfortable and responding well to comfort medications     Plan to continue current care and plan disposition.

## 2019-01-22 NOTE — PLAN OF CARE
Presentation/Diagnosis: Pt admitted 1/8 due to SOB and weakness. Pt diagnosed with sepsis due to pneumonia. Pt admitted to ICU on Bipap. Pt made comfort cares 1/10. Transfer to [facility] 1/12.   History: Dementia, seizures, parkinsons, COPD, GERD, CKD and chronic pain.  Labs/Protocols: N/A, comfort cares.  Vitals: Per pt's family, do VS once per day. Pt on 4L O2 per NC, pt wears this at home all the time. Denies pain this shift. Morphine pain pump in place to LLQ of abdomen.   Cardiac: WDL.  Telemetry: N/A  Respiratory: Dyspnea with exertion. On 4L O2 per NC, baseline.   Neuro: A&Ox4 this shift. Calm and cooperative with cares.   GI/: Gill in place.   Skin: Scattered bruising, intact.   LDAs: No IV access. Pain pump to LLQ of abdomen.   Diet: Regular diet, poor appetite.   Activity: Ax2 with gait belt.   Teaching: Pt educated on plan of care.   Plan: Continue comfort measures. Discharge with hospice. SW following to assist with discharge plan, home v. Long term care.    Will continue to monitor.

## 2019-01-22 NOTE — PLAN OF CARE
Pt slept well.  Comfort cares and continue paint control and discomfort.  Discharge in progress 1-2 days Hospice care facility or home hospice.  O2  93% 4L nc.

## 2019-01-23 NOTE — PROGRESS NOTES
CM was asked to set up WC ride via HE to home. Contacted HE ([phone]) spoke with [employer]. First available time is 6:45pm. Accepted time. Will update bedside RN.     CM will continue to follow patient until discharge for any additional needs.     Gail Knox RN, BSN, CTS  [facility]  [phone]

## 2019-01-23 NOTE — PLAN OF CARE
Comfort care. Dilaudid given x2 for pain. Pt ambulated in the bathroom. Gill in place. Family at bedside. Pill  given. Awaiting home O2 delivery and HE transport to home at 1845.Will continue comfort measures.     Discharge medications placed in the lock box in the med room.

## 2019-01-23 NOTE — PLAN OF CARE
Pt AOx4. Pivot transferring w/ 1. Pt reporting pain generalized pain 7/10 - prn SL dilaudid given @1730 & 1920. 4L via n/c. Breathing comfortably. No IV access. Family / visitors present throughout evening. SW working w/  to decided on discharge plan. Continue comfort cares.

## 2019-01-23 NOTE — PROGRESS NOTES
Discharge Planner   Discharge Plans in progress: Pt will discharge home today with [facility].  Hospice consents will be signed at the pt's home at 1300.  Ronnell updated hospice and pt's spouse Oliverio.  After speaking with Oliverio, ronnell requested a wheelchair and bedside commode be delivered to the pt's home through [facility].  Equipment will be delivered between 1300 and 1700.     01/23/19 0843   Final Resources   Resources List [facility] [phone], Fax: [phone]     Barriers to discharge plan: Oliverio is making sure that the pt's home O2 machine is working.  Follow up plan: Sw will continue to be available as needed until discharge.       Entered by: Melida Peralta 01/23/2019 8:44 AM

## 2019-01-23 NOTE — PLAN OF CARE
RN discussed discharge instruction to patients daughter, handed prescription medication, questions answered.  Atropine medication missing from the list of discharge medication, called pharmacy and was told it was unavailable for now.  Daughter was advised to call pharmacy later this week.  [employer] transport will  patient at 1645.    Patient left at 1910.

## 2019-01-23 NOTE — DISCHARGE SUMMARY
[facility]  Hospitalist Discharge Summary       Date of Admission:  1/8/2019  Date of Discharge:  1/23/2019  Discharging Provider: Tom Barrett MD      Discharge Diagnoses     #1.  Community-acquired pneumonia with acute hypoxic respiratory failure acute on chronic  #2.  Sepsis secondary #1  #3.  Toxic metabolic encephalopathy  #4.  Baseline cognitive deficit with dementia  #5.  Multiple comorbidities including Parkinson's disease, chronic pain with indwelling intrathecal morphine pump, COPD, chronic respiratory failure    Follow-ups Needed After Discharge   Follow-up Appointments     Follow-up and recommended labs and tests       Follow with hospice team as instructed.               Unresulted Labs Ordered in the Past 30 Days of this Admission     No orders found from 11/9/2018 to 1/9/2019.      =    Hospital Course   CRYSTAL PIERCE is an unfortunate 68-year-old female with early onset dementia and multiple medical problems including chronic respiratory failure on oxygen, seizure disorder, Parkinson's disease, who has been here several times and required multiple hospitalizations admitted with sepsis and pneumonia.  Patient has been in the hospital for over 2 weeks now and multiple family meetings contacted to assess the patient's condition and design goal of care for the patient.  Pain palliative care team consulted and assisted with family discussions multiple times.  Eventually family decided that patient benefit from palliative care approach with comfort measures only and focusing on quality of life to minimize pain suffering.  Patient remained in the hospital due to discharge disposition she was due to her indwelling intrathecal morphine pump.  Eventually family decided to take patient home and get hospice care at home.  On the day of discharge she is comfortable, she is alert oriented x2 but occasionally confused.  She has no insight as to what is going on with her terminal illness.  I  did discuss with multiple family members as well as treatment team including care coordinators and social service regarding patient's needs on discharge.      Consultations This Hospital Stay   PALLIATIVE CARE ADULT IP CONSULT  SPEECH LANGUAGE PATH ADULT IP CONSULT  PHYSICAL THERAPY ADULT IP CONSULT  OCCUPATIONAL THERAPY ADULT IP CONSULT  VASCULAR ACCESS ADULT IP CONSULT  SOCIAL WORK IP CONSULT  NUTRITION SERVICES ADULT IP CONSULT  NUTRITION SERVICES ADULT IP CONSULT  SOCIAL WORK IP CONSULT  SPIRITUAL HEALTH SERVICES IP CONSULT  SOCIAL WORK IP CONSULT  PALLIATIVE CARE ADULT IP CONSULT  NEUROLOGY IP CONSULT  PSYCHIATRY IP CONSULT  SOCIAL WORK IP CONSULT  PHYSICAL THERAPY ADULT IP CONSULT  OCCUPATIONAL THERAPY ADULT IP CONSULT    Code Status   DNR/DNI    Time Spent on this Encounter   I, Tom Barrett, personally saw the patient today and spent greater than 30 minutes discharging this patient.       Tom Barrett MD  [facility]  ______________________________________________________________________    Physical Exam   Vital Signs: Temp: 98.8  F (37.1  C) Temp src: Oral BP: 113/60   Heart Rate: 89 Resp: 12 SpO2: (!) 85 % O2 Device: Nasal cannula with humidification Oxygen Delivery: 4 LPM  Weight: 128 lbs 1.4 oz         Primary Care Physician   BERNARDA WAITE    Discharge Disposition   Discharged to home  Condition at discharge: Stable    Significant Results and Procedures   Results for orders placed or performed during the hospital encounter of 01/08/19   Chest  XR, 1 view PORTABLE    Narrative    CHEST ONE VIEW PORTABLE   1/8/2019 8:32 PM     HISTORY: Short of breath.     COMPARISON: 7/6/2018      Impression    IMPRESSION: There is increased density in the retrocardiac region most  likely due to a consolidative infiltrate. There is also some  infiltrate in the left midlung zone. The heart is enlarged. There is a  battery pack over the left axillary region which appears to be  associated with a  stimulator in the left lower neck. Right lung  appears clear. Pulmonary vasculature does not appear to be engorged  although there are some increased interstitial markings.    DENIA ROY MD   XR Chest Port 1 View    Narrative    CHEST ONE VIEW PORTABLE   1/10/2019 9:20 AM     HISTORY:  Respiratory failure.    COMPARISON: 1/8/2019      Impression    IMPRESSION: Stimulator pack again projected over the left axilla.  Retrocardiac airspace opacity similar to prior. Right lung clear. No  pneumothorax or pleural effusion. Appearance of the chest unchanged.    LM LEON MD       Discharge Orders      Reason for your hospital stay    Sepsis     Activity    Your activity upon discharge: activity as tolerated     Follow-up and recommended labs and tests     Follow with hospice team as instructed.     DNR/DNI     Diet    Follow this diet upon discharge: Orders Placed This Encounter      Regular Diet Adult     Discharge Medications   Current Discharge Medication List      START taking these medications    Details   acetaminophen (TYLENOL) 325 MG tablet Take 1-2 tablets (325-650 mg) by mouth every 4 hours as needed for mild pain or fever  Qty: 100 tablet, Refills: 1    Associated Diagnoses: Acute and chronic respiratory failure with hypoxia (H)      atropine 1 % ophthalmic solution Take 2-4 drops by mouth, place under tongue or place inside cheek every 2 hours as needed for other (terminal respiratory secretions) Not for ophthalmic use.  Qty: 5 mL, Refills: 1    Associated Diagnoses: Acute and chronic respiratory failure with hypoxia (H)      bisacodyl (DULCOLAX) 10 MG suppository Unwrap and insert 1 suppository rectally twice daily as needed for constipation.  Qty: 12 suppository, Refills: 1    Associated Diagnoses: Acute and chronic respiratory failure with hypoxia (H)      haloperidol (HALDOL) 0.5 MG tablet Take 2 tablets (1 mg) by mouth, place under tongue or insert rectally every 6 hours as needed for agitation  (nausea)  Qty: 30 tablet, Refills: 1    Associated Diagnoses: Acute and chronic respiratory failure with hypoxia (H)      HYDROmorphone (DILAUDID) 2 MG tablet Take 0.5 tablets (1 mg) by mouth every hour as needed for breakthrough pain, pain, moderate pain, moderate to severe pain or severe pain (dyspnea or respiratory rate greater than 20)  Qty: 30 tablet, Refills: 0    Associated Diagnoses: Acute and chronic respiratory failure with hypoxia (H)      ipratropium - albuterol 0.5 mg/2.5 mg/3 mL (DUONEB) 0.5-2.5 (3) MG/3ML neb solution Take 1 vial (3 mLs) by nebulization every hour as needed for wheezing  Qty: 3 mL, Refills: 3    Associated Diagnoses: Acute and chronic respiratory failure with hypoxia (H)      LORazepam (ATIVAN) 0.5 MG tablet Take 1 tablet (0.5 mg) by mouth, place under tongue or insert rectally every 4 hours as needed for agitation, anxiety, muscle spasms, nausea, pain, seizures, sleep or vomiting (restlessness or air hunger)  Qty: 30 tablet, Refills: 1    Associated Diagnoses: Acute and chronic respiratory failure with hypoxia (H)      MEDICATION INSTRUCTION If care facility cannot accept or use ranges, facility is instructed to use lower end of dosing range    Associated Diagnoses: Acute and chronic respiratory failure with hypoxia (H)         CONTINUE these medications which have CHANGED    Details   amitriptyline (ELAVIL) 75 MG tablet Take 1 tablet (75 mg) by mouth At Bedtime  Qty: 30 tablet, Refills: 0    Associated Diagnoses: Acute and chronic respiratory failure with hypoxia (H)      felbamate (FELBATOL) 600 MG tablet Take 1 tablet (600 mg) by mouth 3 times daily  Qty: 90 tablet, Refills: 0    Associated Diagnoses: Acute and chronic respiratory failure with hypoxia (H)         CONTINUE these medications which have NOT CHANGED    Details   dispensed in syringe 1 Device in sodium chloride (PF) 20 mL Intrathecal Pump Infusion by Intrathecal route continuous 8840 [employer] IIB pump per   [facility]  Model 8637-20 20 ml, serial number [id number]  Continuous infusion setting delivers:  Hydromorphone 6.008 mg/day  Bupivacaine 3.004 mg/day  Clonidine 40.05 mcg/day      gabapentin (NEURONTIN) 100 MG capsule Take 100 mg by mouth 2 times daily      LamoTRIgine (LAMICTAL) 100 MG tablet Take 100 mg by mouth 3 times daily.      QUEtiapine Fumarate (SEROQUEL PO) Take 50 mg by mouth At Bedtime          STOP taking these medications       FERROUS GLUCONATE PO Comments:   Reason for Stopping:         simvastatin (ZOCOR) 40 MG tablet Comments:   Reason for Stopping:             Allergies   Allergies   Allergen Reactions     Nicotine Other (See Comments)     Nicotine patch caused worsening seizures per 's report

## 2019-01-23 NOTE — PROGRESS NOTES
[facility]  Palliative Care Progress Note  Text Page    Met with patient as she was resting in bed. In good spirits and wanting to leave the hospital.      Assessment & Plan     1.  Decisional Capacity -  Questionable - Patient has acknowledged terminal trajectory of illness and would like to go home.  Patient does not have an advance directive. Per  informed consent policy next of kin should be involved in all consent and decision making. Her  Daniel Horne is next-of-kin and has chosen a comfort plan. Patient, , sister and children agree on a comfort plan.       2.  Pain - Chronic regional pain syndrome. [employer] intrathecal pump managed by Gurpreet Farooq MD at [facility]. Pump refilled on 1/10/2019.  New refill date is 04/14/2019.      3.  Dyspnea  - Denies.  Continue Dilaudid PRN for complaint of pain, dyspnea or a respiratory rate greater than 20.      4.  Dysphagia - Patient and family request comfort eating plan.      5.  Spiritual Care - Appreciate input of Aruna Bud Raman.      7. Care Planning -  Appreciate input of  SAMANTHA Johnson. Patient to dismiss home with the support of [facility] later today.      Goal of Care: DNR/DNI - Comfort care. Hospice prescriptions sent to pharmacy to be filled.      Sophie Bolanos MS, RN, CNS, APRN, ACHPN, FAACVPR  Pain and Palliative Care  Pager [phone]  Office [phone]     Time Spent on this Encounter   I spent 15 minutes (9 AM-9:15 AM) in assessment of the patient, counseling and discussion with the patient and family as documented in sections below. Another 15 minutes in review of chart, documentation, coordination of care and discussion with the health care team.    Interval History   Chart reviewed     Palliative Symptom Review (0=no symptom/no concern, 1=mild, 2=moderate, 3=severe):      Pain: 1-mild      Fatigue: 2-moderate      Nausea: 0-none      Constipation: 0-none      Diarrhea:  0-none      Depressive Symptoms: 0-none      Anxiety: 0-none      Drowsiness: 0-none      Poor Appetite: 2-moderate      Shortness of Breath: 0-none      Insomnia: 0-none        Physical Exam   Temp:  [98.8  F (37.1  C)] 98.8  F (37.1  C)  Heart Rate:  [89] 89  Resp:  [12] 12  BP: (113)/(60) 113/60  SpO2:  [85 %] 85 %  128 lbs 1.4 oz  GEN:  Alert, oriented to self and situation, appears comfortable, no apparent distress.  HEENT:  Normocephalic/atraumatic, no scleral icterus, no nasal discharge, mouth moist.  RESP:   Symmetric chest rise on inhalation noted.  Normal respiratory effort.  PAIN BEHAVIOR: Cooperative  Psych:  Normal affect.  Calm, cooperative, conversant appropriately.    Medications     Medication given by intrathecal pump: This is NOT an order to dispense medication. For information only.         amitriptyline  75 mg Oral At Bedtime     felbamate  600 mg Oral TID     lamoTRIgine  100 mg Oral TID     QUEtiapine  50 mg Oral At Bedtime       Data   No results found for this or any previous visit (from the past 24 hour(s)).

## 2020-05-18 NOTE — ANESTHESIA PREPROCEDURE EVALUATION
Anesthesia Evaluation     . Pt has had prior anesthetic. Type: General    No history of anesthetic complications          ROS/MED HX    ENT/Pulmonary:     (+)tobacco use, Current use 1 packs/day  moderate COPD, O2 dependent, during Both 2 liters/min,  , . .    Neurologic:     (+)dementia, neuropathy - CRPS 3 in LLE, seizures last seizure: 4 wks ago Parkinson's disease     Cardiovascular:  - neg cardiovascular ROS       METS/Exercise Tolerance:     Hematologic:  - neg hematologic  ROS       Musculoskeletal:   (+) arthritis, , , -       GI/Hepatic:     (+) GERD Asymptomatic on medication,       Renal/Genitourinary:  - ROS Renal section negative       Endo:  - neg endo ROS       Psychiatric:  - neg psychiatric ROS       Infectious Disease:  - neg infectious disease ROS       Malignancy:      - no malignancy   Other:    - neg other ROS                 Physical Exam  Normal systems: cardiovascular, pulmonary and dental    Airway   Mallampati: II  TM distance: >3 FB  Neck ROM: full    Dental     Cardiovascular   Rhythm and rate: regular and normal      Pulmonary    breath sounds clear to auscultation    Other findings: Lab Test        07/22/17 2120          WBC          5.7           HGB          12.4          MCV          94            PLT          251            Lab Test        07/22/17 2120          NA           137           POTASSIUM    4.3           CHLORIDE     101           CO2          32            BUN          16            CR           1.11*         ANIONGAP     4             BIBIANA          9.4           GLC          85                                  Anesthesia Plan      History & Physical Review  History and physical reviewed and following examination; no interval change.    ASA Status:  3 .    NPO Status:  > 8 hours    Plan for General and LMA with Intravenous induction. Maintenance will be Balanced.    PONV prophylaxis:  Ondansetron (or other 5HT-3) and  Date of Service: 05/18/2020    The patient has a history of diabetes, history of chronic sinusitis, presently asymptomatic.  History of hyperlipidemia, on Simvastatin.  History of depression, stable now.  History of type 2 diabetes, on Metformin.  Her last sugar was 130.  Blood pressure is slightly high, but she has been eating salty food, on Lisinopril/Hydrochlorothiazide as well as Amlodipine.  The patient is due for blood work.  We will do that in August.    PAST MEDICAL HISTORY:    Same as above.    REVIEW OF SYSTEMS:  Negative.      Blood workup reviewed.    VITAL SIGNS:  Reviewed.    IMPRESSION AND PLAN:  Essential hypertension.  Low salt diet, increase exercise, walking, weight loss.      Diabetes.  Continue Metformin.    Depression is stable, in partial remission.    Hyperlipidemia.  Continue statin.      The patient will follow up with me in 3 months.    Total duration of time, 14 minutes.      Dictated By: Bayron Arboleda MD  Signing Provider: Bayron Arboleda MD    CK/SP2 ([id number])  DD: 05/18/2020 09:34:35 TD: 05/18/2020 11:52:04    Copy Sent To:    Dexamethasone or Solumedrol  Will increase IT pump for surgery with 50% increase.  Plan on reduction in 1-2 weeks      Postoperative Care  Postoperative pain management:  IV analgesics, Oral pain medications, Neuraxial analgesia, Peripheral nerve block (Single Shot) and Multi-modal analgesia.      Consents  Anesthetic plan, risks, benefits and alternatives discussed with:  Patient.  Use of blood products discussed: No .   .                          .

## 2021-06-01 VITALS — WEIGHT: 129 LBS | BODY MASS INDEX: 22.14 KG/M2

## 2021-06-01 VITALS — WEIGHT: 130.3 LBS | BODY MASS INDEX: 22.37 KG/M2

## 2021-06-19 NOTE — PROGRESS NOTES
[facility]    Facility:   [facility] [[id number]]   Code Status: POLST AVAILABLE    67-year-old female is seen for follow up evaluation and discharge planning. 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 On initial admission to TCU significant confusion present, cognition cleared greatly during stay, fully oriented at time of discharge. Chronic LE pain adequately controlled. Profound deconditioning and weakness improved significantly with physical therapy, patient participated fully in physical therapy. Chief complaint of pain initially involving right index finger, eczematous rash present, responded to hydrocortisone application, subsequently developed pain involving the majority of digits of right hand, patient desired continued application of hydrocortisone cream, interdigital spaces with inflammation which responded to hydrocortisone, lubricant use was encouraged. On three occasions violaceous appearance of digital tips present suggestive of Raynaud's phenomena. Increasing hypersensitivity to touch of digital tips consistent with neuropathy. No change in motor function to suggest reflex sympathetic dystrophy which is present in patient's lower extremity. Multiple concerns regarding ability to function in home setting setting and family relationship recurred during stay. Anticipated discharge to home is in 24 hours.    Review of systems: strength improved. Denies confusion. Chronic pain lower extremity controlled. Digital pain increasing, troublesome at HS, currently on gabapentin which patient well tolerates. Denies cardiac or pulmonary symptoms. No cough or sputum production. Remainder of 12 point review of systems obtained negative.    Exam: alert  and oriented times three, highly conversant, 02 supplemental in place. Blood pressure 114/66, 02 89% - 92%. Off 02 desaturation to 85%. Pulse 92, respiratory 18, temperature 98.2, weight 130.3. No facial asymmetry. Mucous membranes moist. No carotid bruits. S1 and S2 regular. Pulmonary exam with diminished air movement bases, no rales or wheezes. Abdomen without masses or tenderness. Periphery without edema. Digital tips right hand with bluish discoloration, hypersensitivity to touch, no skin lesions present. Strength symmetrical hand grasp. No hand drift.    Impression and plan:   02 dependent COPD, recent prolonged intubation for pneumonia, pulmonary status at baseline on 4L, recommendation was made in hospital for follow-up CT scan of chest to assure clearing of pneumonia, this should be performed over next one month   Seizure disorder on two antiepileptics, no seizure activity.   Reflex sympathetic dystrophy lower extremity on intrathecal pump, gabapentin additionally in use, with increase in neuropathic pain right hand increase p.m. gabapentin to 400 mg. Screening tests for B12 satisfactory, TSH 2.12, magnesium 2.2.   Chronic anemia, follow up in hemoglobin indicated  over next one month.   Significant deconditioning with strength improved.   Follow up will be with regular physician over next one month, discharge medications are as follows:  Current Outpatient Prescriptions:      albuterol (PROVENTIL) 2.5 mg /3 mL (0.083 %) nebulizer solution, Take 2.5 mg by nebulization every 2 (two) hours as needed for shortness of breath., Disp: , Rfl:      amitriptyline (ELAVIL) 75 MG tablet, Take 1 tablet (75 mg total) by mouth at bedtime., Disp: , Rfl: 0     felbamate (FELBATOL) 600 MG tablet, Take 1 tablet (600 mg total) by mouth 3 (three) times a day., Disp: , Rfl: 0     ferrous sulfate 300 mg (60 mg iron)/5 mL syrup, Take 5 mL (300 mg total) by mouth 2 (two) times a day., Disp: , Rfl: 0     gabapentin (NEURONTIN) 100  MG capsule, Take 200 mg by mouth 2 (two) times a day., Disp: , Rfl: 0     hydrocortisone 1 % cream, Apply 1 application topically 2 (two) times a day. And two times a day prn, Disp: , Rfl:      ipratropium-albuterol (DUO-NEB) 0.5-2.5 mg/3 mL nebulizer, Take 3 mL by nebulization 4 (four) times a day., Disp: , Rfl:      lamoTRIgine (LAMICTAL) 100 MG tablet, Take 100 mg by mouth 3 (three) times a day., Disp: , Rfl:      menthol-zinc oxide (CALMOSEPTINE) 0.44-20.6 % Oint ointment, Apply to perianal erythema/irritation two times a day prn, Disp: , Rfl: 0     phenol-phenolate sodium (CHLORASEPTIC) 1.4 % SprA, Apply 2 sprays to the mouth or throat every 6 (six) hours as needed (sorethroat)., Disp: , Rfl: 0     simvastatin (ZOCOR) 40 MG tablet, Take 1 tablet (40 mg total) by mouth at bedtime., Disp: , Rfl: 0 gabapentin dose p.m. increased to 400 mg. Total time of discharge evaluation greater than 45 minutes, greater than 50% of time spent in coordination of care and counseling.      Electronically signed by: Holley Monsivais MD

## 2021-06-19 NOTE — PROGRESS NOTES
[facility]    Facility:   [facility] [[id number]]   Code Status: POLST AVAILABLE       Reassessment of 67-year-old female with 02 dependent COPD, admitted to hospital with acute on chronic respiratory failure, pneumonia, required prolonged intubation, eventual stabilization in hospital, recuperated in [place], transferred to TCU for rehabilitation, management of medical problems which include seizure disorder, 02 dependent COPD, chronic kidney disease, opioid dependence for reflex sympathetic dystrophy lower extremity.    Review of systems: improvement in strength, participating fully in physical therapy. Denies fever sweats or chills. Continues with sense of dyspnea majority of time, Limited duration with ambulation the. No chest pain or palpitations. No seizure activity. No focal neurologic deficits. Pruritic rash bilateral hands, hydrocortisone beneficial, desires to apply this personally and b.i.d. PRN. Remainder of 12 point review of systems obtained negative.    Exam: blood pressure 110/70, temperature 90.6, pulse 86, respiratory 25, 02 91% on 4L, weight 127.8. Delightfully conversant, oriented times three. Infrequently takes off supplemental 02, dyspneic with walking short distance. Air hunger with short distance walking. Mucous membranes moist. No HJR. S1 and S2 regular. Pulmonary exam with diminished air movement bases, no rales or wheezes. Abdomen without masses or tenderness. Periphery without edema. Mildly antalgic gait. Minimal tremor bilateral hands. Erythema, no warmth interdigital spaces and extending right first digit.    Impression and plan: 02 dependent COPD, oxygenation on 4 L low normal, no evidence of acute respiratory process. Dementia, mild, cognition continues to improve. Reflex sympathetic dystrophy, chronic pain syndrome with pain pump, pain adequately controlled. Significant deconditioning with ongoing need for rehabilitation. Seizure  disorder without evidence of seizure activity. Eczema bilateral hands, 1% hydrocortisone BID PRN. Multiple personal concerns are reviewed, care plan and medication reviewed.        Electronically signed by: Holley Monsivais MD

## 2021-06-19 NOTE — PROGRESS NOTES
[facility]    Facility:   [facility] [[id number]]   Code Status: DNR      CHIEF COMPLAINT/REASON FOR VISIT:  Chief Complaint   Patient presents with     Review Of Multiple Medical Conditions     physical deconditioning, s/p pneumonia, COPD exacerbation        HISTORY:      HPI: Shana is a 67 y.o. female who is 02 dependent with COPD on home 02 at 4L, memory deficit, ongoing tobacco habituation, seizure disorder, reflex sympathetic dystrophy right lower extremity with intrathecal pain pump, presented to hospital 6/22 with complaints of shortness of breath, initiated on IV antibiotic for left lower lobe pneumonia, worsening respiratory status, required intubation 6/25 - 7/3, Haemophilus influenza and  pneumococcus, multiple antibiotic administration, required bronchoscopy with lavage for mucous plugging, stabilized, subsequently transferred to [place] for 15 days, returned to baseline 4 L 02, continues prednisone taper, followed by pulmonologist, recommendation for CT scan follow-up in two weeks to ensure resolution of pneumonia and rule out underlying process, required NG tube for severe malnutrition, diet regular with thin liquids as of 7/18, chronic pain syndrome with reflex sympathetic dystrophy managed with intrathecal pump, hemoglobin 9.6, anemia thought to be multifactorial, chronic diarrhea, C. difficile negative, no seizure activity continuing on 2 antiepileptics, mild memory deficit continued, continued on simvastatin for dyslipidemia, transferred to TCU for ongoing rehabilitation and management of medical problems.  Today patient is being evaluated for a routine review of multiple medical problems. She states she is doing fine but has multiple issues that she wishes were better. She states she hates being being in the TCU. She wishes she was home. She does feel that she is getting stronger but also feels as if she has a long way to go. She is  participating in PT/OT without any issue. She does like working with PT/OT but also states it is difficult to go through all of the memory testing multiple times. She has been at previous LTC hospitals and TCU's and has undergone some of the same testing. I did describe that often providers are trying to see if there has been any improvement. She does feel better hearing this. She does have some pain and discomfort of her right index finger, she did just get treated with hydrocortisone cream only a few hours ago, even though it was ordered on Monday. She doesn't have any other complaints or issues. She feels she is otherwise stable. She denies any other concerns including fevers/chills, cough or cold symptoms, headaches, vision changes, chest pain/pressure, difficulty breathing, SOB, abdominal pain, nausea, vomiting, diarrhea, dysuria, increasing weakness, increasing pain.     Past Medical History:   Diagnosis Date     Chronic respiratory failure (H)      COPD (chronic obstructive pulmonary disease) (H)      Dementia      GERD (gastroesophageal reflux disease)      Parkinson's disease (H)      RSD (reflex sympathetic dystrophy)      Seizure disorder (H)              Family History   Problem Relation Age of Onset     Alzheimer's disease Father      Social History     Social History     Marital status:      Spouse name: Dru Horne     Number of children: 5     Years of education: N/A     Social History Main Topics     Smoking status: Current Every Day Smoker     Packs/day: 1.00     Years: 20.00     Types: Cigarettes     Smokeless tobacco: Never Used     Alcohol use No     Drug use: No     Sexual activity: Not Currently     Other Topics Concern     Not on file     Social History Narrative       REVIEW OF SYSTEM:  Pertinent items are noted in HPI.    PHYSICAL EXAM:   /64  Pulse 84  Temp 97.8  F (36.6  C)  Resp 24  Wt 129 lb (58.5 kg)  LMP  (LMP Unknown)  SpO2 94%  BMI 22.14 kg/m2  General  appearance: alert, appears stated age and cooperative  Head: Normocephalic, without obvious abnormality, atraumatic  Lungs: diminished lung sounds throughout   Heart: regular rate and rhythm, S1, S2 normal, no murmur, click, rub or gallop  Abdomen: soft, non-tender; bowel sounds normal; no masses,  no organomegaly  Extremities: extremities normal, atraumatic, no cyanosis or edema  Pulses: 2+ and symmetric  Skin: Skin color, texture, turgor normal. No rashes or lesions  Neurologic: Grossly normal      LABS:   None today.    ASSESSMENT:      ICD-10-CM    1. Physical deconditioning R53.81    2. COPD exacerbation (H) J44.1        PLAN:    Physical Deconditioning  -Continue PT/OT and other therapies as per care plan.  -Encouraged good nutrition and movement habits.   -Discussed care plan and expected course of stay.   -Continue to follow-up per routine schedule or sooner if needed.     Otherwise continue current care plan for all other chronic medical conditions, as they are stable. Encouraged patient to engage in healthy lifestyle behaviors such as engaging in social activities, exercising (PT/OT), eating well, and following care plan. Follow up for routine check-up, or sooner if needed. Will continue to monitor patient and work with nursing staff collaboratively to work toward positive patient outcomes.    Greater than 25 minutes spent with patient with at least 55% of this time spent on review of previous records, counseling, education, and discussion of the above care plan with nursing staff, and patient.     Electronically signed by: Kalyani Medel CNP

## 2021-06-19 NOTE — PROGRESS NOTES
[facility]    Facility:   [facility] [[id number]]   Code Status: POLST AVAILABLE       Three assessment of 67-year-old female with COPD, chronic respiratory failure on 4 L home 02, admitted to hospital with pneumonia, acute respiratory failure, required intubation, tube feeding, prolonged course of antibiotic, stabilized in hospital, recuperated at [place], transferred to TCU for rehabilitation, management of medical problems which include reflex sympathetic dystrophy with intrathecal pain pump, dementia, chronic anemia, seizure disorder.    Review of systems: continue supplemental 02, dyspnea with activity, does not enjoy physical therapy. Denies fever sweats or chills. Chronic cough at baseline, no sputum production. Denies orthopnea or PND. Aware of confusion, believes cognition is at baseline. Restlessness often present. No change in chronic pain which is adequately controlled with intrathecal pump. Numbness and discoloration right index finger, denies injury to region, no definite history of rainouts, symptoms present for a number of weeks. DJD changes of digital joints. Remainder of 12 point review of systems obtained negative.    Exam: vital signs reviewed over past four days. Oriented times two, pleasant and cooperative, jovial. No use of accessory muscles at rest. Leans to right with ambulation, stands with listing to right. No HJR. S1 and S2 regular with systolic murmur. Pulmonary exam with delayed inspiratory flow, decreased air movement lung bases, no wheezes or rales. Abdomen without masses or tenderness. Periphery without edema. Right second digit from mid joint with discoloration, drying of skin, decreased sensation, bluish hue, DJD changes multiple joints.    Impression and plan: chronic hypoxic respiratory failure, recent prolonged hospitalization with acute respiratory failure, intubation prolonged, satisfactory oxygenation continuing 4 L,  clinically without significant respiratory impairment. Continue cautious observation. Dementia, cognition at baseline per patient report, no encephalopathy on exam. History of malnutrition, continue to monitor PO intake. Right second digit irritation, 1% hydrocortisone cream for superficial irritation, bluish hue and decreased sensation suggest mechanical nerve compression, to review with physical therapy. No definite evidence of rainouts. Seizure disorder continuing on antiepileptic, no seizure activity. Multiple concerns are reviewed. Continue rehabilitation for significant deconditioning.        Electronically signed by: Holley Monsivais MD

## 2021-06-19 NOTE — PROGRESS NOTES
[facility]    Facility:   [facility] [[id number]]   Code Status: POLST AVAILABLE  Reassessment of 67-year-old female 02 dependent COPD, admitted to hospital with pneumonia, acute on chronic respiratory failure, prolonged intubation and tube feedings, [place] stay, transferred to TCU for ongoing rehabilitation and management of medical problems.    Review of systems: patient is seen with youngest daughter on this occasion. Patient believes her strength is improving, daughter with multiple concerns regarding her mother's chronic respiratory illness, states her mother has been ill throughout her 30 year life, however believes mother is stronger than she has been in some years. Continued irritation of digital tips and interdigital space. Applying hydrocortisone cream with some relief. Bilateral medial thighs with irritation, no vaginal irritation. Strength gradually improving. Continues with pain pump for chronic neuropathic pain  lower extremity. Denies sweats or chills. No focal neurologic deficit. Believes memory is gradually improving. Remainder of 12 point review of systems obtained negative.    Exam: alert, oriented times three, intermittently tearful, daughter intermittently tearful. Blood pressure 104/68, 02 90%, 4 L, pulse 98, respiratory 24, temperature 98.2, weight 127. Intermittent evidence of air hunger with variable respiratory rate. Highly conversant. Mucous membranes moist. No pharyngeal erythema. S1 and S2 regular. Pulmonary exam with diminished air movement bases, no rales or wheezes. Abdomen without masses or tenderness. Bilateral medial thighs with erythema. Decreased sensation digital tips, trace erythema palmar aspect distal digits. No tracking. No peripheral edema.    Impression and plan:   02 dependent COPD, oxygenation stable on 02 4L.   Recent pneumonia, no evidence of infection.   Memory deficit, cognition slowly improving.   Chronic pain  syndrome with pain pump, reflex sympathetic dystrophy.   Hand digital neuropathic symptoms, magnesium and B12 level ordered.   Chronic renal insufficiency, CMP in 72 hours.   Tinea medial thighs, nystatin b.i.d. until resolved.   Multiple situational concerns regarding potential  return to home setting and long-term prognosis reviewed with patient and daughter in attendance, total time of evaluation greater than 35 minutes, greater than 50% of time spent in coordination of care and counseling.        Electronically signed by: Holley Monsivais MD

## 2021-06-19 NOTE — PROGRESS NOTES
[facility]    Facility:   [facility] [[id number]]   Code Status: DNR      CHIEF COMPLAINT/REASON FOR VISIT:  Chief Complaint   Patient presents with     Problem Visit     Digit pain       HISTORY:      HPI: Shana is a 67 y.o. female who is 02 dependent with COPD on home 02 at 4L, memory deficit, ongoing tobacco habituation, seizure disorder, reflex sympathetic dystrophy right lower extremity with intrathecal pain pump, presented to hospital 6/22 with complaints of shortness of breath, initiated on IV antibiotic for left lower lobe pneumonia, worsening respiratory status, required intubation 6/25 - 7/3, Haemophilus influenza and  pneumococcus, multiple antibiotic administration, required bronchoscopy with lavage for mucous plugging, stabilized, subsequently transferred to [place] for 15 days, returned to baseline 4 L 02, continues prednisone taper, followed by pulmonologist, recommendation for CT scan follow-up in two weeks to ensure resolution of pneumonia and rule out underlying process, required NG tube for severe malnutrition, diet regular with thin liquids as of 7/18, chronic pain syndrome with reflex sympathetic dystrophy managed with intrathecal pump, hemoglobin 9.6, anemia thought to be multifactorial, chronic diarrhea, C. difficile negative, no seizure activity continuing on 2 antiepileptics, mild memory deficit continued, continued on simvastatin for dyslipidemia, transferred to TCU for ongoing rehabilitation and management of medical problems.  Today patient is being evaluated for digit pain. She has had specific digit pain and discomfort as an ongoing issue. However, at this time she does not want to have further intervention. She just wants the provider team to be aware of it. She has had some sort of rash that was treated with hydrocortisone and has since resolved in the same digit. Again, she does not want further intervention and adamantly refuses  any further diagnostics offered to her such as x-ray. She feels she is otherwise stable. She denies any other concerns including fevers/chills, cough or cold symptoms, headaches, vision changes, chest pain/pressure, difficulty breathing, SOB, abdominal pain, nausea, vomiting, diarrhea, dysuria, increasing weakness, increasing pain.     Past Medical History:   Diagnosis Date     Chronic respiratory failure (H)      COPD (chronic obstructive pulmonary disease) (H)      Dementia      GERD (gastroesophageal reflux disease)      Parkinson's disease (H)      RSD (reflex sympathetic dystrophy)      Seizure disorder (H)              Family History   Problem Relation Age of Onset     Alzheimer's disease Father      Social History     Social History     Marital status:      Spouse name: Dru Horne     Number of children: 5     Years of education: N/A     Social History Main Topics     Smoking status: Current Every Day Smoker     Packs/day: 1.00     Years: 20.00     Types: Cigarettes     Smokeless tobacco: Never Used     Alcohol use No     Drug use: No     Sexual activity: Not Currently     Other Topics Concern     Not on file     Social History Narrative       REVIEW OF SYSTEM:  Pertinent items are noted in HPI.    PHYSICAL EXAM:   /66  Pulse 92  Temp 98.2  F (36.8  C)  Resp 18  Wt 130 lb 4.8 oz (59.1 kg)  LMP  (LMP Unknown)  SpO2 (!) 89%  BMI 22.37 kg/m2  General appearance: alert, appears stated age and cooperative  Head: Normocephalic, without obvious abnormality, atraumatic  Lungs: respirations without effort  Extremities: extremities normal, atraumatic, no cyanosis or edema  Pulses: 2+ and symmetric  Skin: Skin color, texture, turgor normal. No rashes or lesions  Neurologic: Grossly normal      LABS:   None today.    ASSESSMENT:      ICD-10-CM    1. Complex regional pain syndrome type 1 of lower extremity, unspecified laterality G90.529        PLAN:    Finger Pain  -Patient elects not to have  treatment or evaluation at this time.   -Patient would like to take wait and see approach.   -Discontinue hydrocortisone cream per patient wishes.   -Follow up as needed.     Otherwise continue current care plan for all other chronic medical conditions, as they are stable. Encouraged patient to engage in healthy lifestyle behaviors such as engaging in social activities, exercising (PT/OT), eating well, and following care plan. Follow up for routine check-up, or sooner if needed. Will continue to monitor patient and work with nursing staff collaboratively to work toward positive patient outcomes.    Electronically signed by: Kalyani Medel CNP

## 2021-06-19 NOTE — PROGRESS NOTES
[facility]    Facility:   [facility] [[id number]]   Code Status: POLST AVAILABLE     Reassessment of 67-year-old female with 02 dependent COPD, recent prolonged hospitalization for pneumonia, intubated, tube feedings, recovery at [facility], transferred to TCU for rehabilitation, management of medical problems which include seizure disorder, memory deficit, deconditioning, chronic pain syndrome.    Review of systems: breathing status improved. At times takes off oxygen, becomes winded with talking. Concerns regarding relationship with sister, sister visited today. No cognitive impairment. No fever sweats or chills.New onset dysuria, recent medial thigh irritation improved. Continued sense of irritation digits and interdigital space. Remainder of 12 point review of systems obtained negative.    Exam: blood pressure 121/70, pulse 81, 02 saturation off supplemental 02 71%, on 4L recovers to 88%. No evidence of respiratory distress when oxygen is off and hypoxia present. No facial asymmetry. S1 and S2 regular. Pulmonary exam with diminished air movement bases, no rales or wheezes. Periphery without edema. Abdomen without masses or tenderness. Periphery without edema. Dryness of interdigital spaces, decreased sensation digital tips. No hand drift.    Sodium 139, potassium 4.0, creatinine 0.89, TSH 2.12, magnesium 2.2.    Impression and plan:   02 dependent COPD, significant hypoxia when off 02.   Mild memory deficit, cognition stable.   Seizure disorder without evidence of seizure activity.   Deconditioning with ongoing need for rehabilitation.   New onset dysuria, urine culture ordered.   Dryness interdigital spaces, using hydrocortisone for pruritic lesions digits, lubricant to be used PRN   Dysesthesias digital tips, magnesium and TSH normal, glucose normal, B12 pending.   Multiple concerns reviewed.      Electronically signed by: Holley Monsivais MD

## 2021-06-19 NOTE — PROGRESS NOTES
[facility]    Facility:   [facility] [[id number]]   Code Status: POLST AVAILABLE      Reassessment of 67-year-old female with COPD, home 02 at 4L, admitted to hospital with pneumonia, required prolonged intubation, G-tube feeding, [facility] recovery, transferred to TCU for rehabilitation, management of medical problems which include memory deficit, seizure disorder on two antiepileptics, chronic pain syndrome with pain pump.    Review of systems: highly distraught. Believes therapists have assessed her as not taking therapy seriously, they reported this, by her account, to her  who is upset with her. Patient denies lack of participation in therapy, becomes easily winded with walking, does have a delightful sense of humor, frequently jokes, she believes this has been interpreted as lack of sincerity. Continues with mild confusion. Tearful due to poor reports from therapy. Chronic pain without change and adequately controlled. Denies exertional chest discomfort. No seizure activity. No sweats or chills. Believes cognition is mildly improved. Remainder of 12 point review of systems obtained negative.    Exam: patient alert and oriented times three, highly distraught, supplemental 02 in place. Blood pressure 110/70, temperature 98.6, pulse 86, respiratory 25, 02 91% on 4L. Mild air hunger, frequently with use of accessory muscles following several steps. S1 and S2 regular. Pulmonary exam with diminished air movement bases, no rales rhonchi or wheezes. Abdomen without masses or tenderness. Periphery without edema. Gait mildly antalgic. No hand drift. Eczematous changes left index finger, irritation to palpation.    Impression and plan:   advanced COPD, recent prolonged intubation post pneumonia, required bronchoscopy for mucous plugs, 02 satisfactory on 4 L, continues nebulizer PRN, low-dose steroid. No evidence of cardiac symptomatology.   Dementia, cognition  improved from time of admission.   Seizure disorder on two antiepileptics, no seizure activity.   Multiple concerns regarding evaluation by physical therapy and occupational therapy, patient is indeed participating fully, concerns reviewed at length, acute anxiety related to what she perceives as poor reviews.   Eczema digital, 1% hydrocortisone cream Q day PRN, increase to BID as necessary.   Total time of evaluation greater than 45 minutes, greater than 50% of time spent in coordination of care and counseling.    Electronically signed by: Holley Monsivais MD

## 2021-06-19 NOTE — PROGRESS NOTES
[facility]    Facility:   [facility] [[id number]]   Code Status: POLST AVAILABLE    Admission evaluation to TCU of 67-year-old female. History is taken from accompanying hospital notes and from patient who gives a limited history. 02 dependent COPD on home 02 at 4L, memory deficit, ongoing tobacco habituation, seizure disorder, reflex sympathetic dystrophy right lower extremity with intrathecal pain pump, presented to hospital 6/22 with complaints of shortness of breath, initiated on IV antibiotic for left lower lobe pneumonia, worsening respiratory status, required intubation 6/25 - 7/3, Haemophilus influenza and  pneumococcus, multiple antibiotic administration, required bronchoscopy  with lavage for mucous plugging, stabilized, subsequently transferred to [facility] for 15 days, returned to baseline 4 L 02, continues prednisone taper, followed by pulmonologist, recommendation for CT scan follow-up in two weeks to ensure resolution of pneumonia and rule out underlying process, required NG tube for severe malnutrition, diet regular with thin liquids as of 7/18, chronic pain syndrome with reflex sympathetic dystrophy managed with intrathecal pump, hemoglobin 9.6, anemia thought to be multifactorial, chronic diarrhea, C. difficile negative, no seizure activity continuing on 2 antiepileptics, mild memory deficit continued, continued on simvastatin for dyslipidemia, transferred to TCU for ongoing rehabilitation and management of medical problems.    Past medical history, current medical problem list, drug allergies, current medication list, social history, family history, code status reviewed in epic.    Review of systems: aware of mild memory deficit, confused as to surroundings and activities in TCU. Feels restless. Sense of dyspnea at rest, denies wheezing cough or sputum production. No exertional chest discomfort. Chronic loose stooling, no active abdominal pain.  Recent thrush, no oral discomfort. Unaware of aspiration. Tolerating intrathecal pump, chronic pain symptomatology tolerable. Denies seizure activity. No focal neurologic deficits. Remainder of 12 point review of systems obtained negative.    Exam: patient highly conversant, pleasant, in room, attempting to rearrange  objects in her purse, humor intact. Highly cooperative. Oriented times two. Vital signs reviewed over past 72 hours. No facial asymmetry, supplemental 02 in place, no use of accessory muscles at rest. No HJR or cervical adenopathy. Thyroid without nodularity or tenderness. S1 and S2 regular. Pulmonary exam with delayed inspiratory flow, no rhonchi, no wheezes, diminished air movement bases. Abdomen without hepatosplenomegaly masses or tenderness. Lists to right with walking and standing, no focal lumbar tenderness, no sensory level. Hyper sensitivity to touch distal right greater than left lower extremity. Pedal pulses diminished. Strength and muscle tone diminished and symmetrical. No hand drift. Skin turgor intact. Thyroid without nodularity. Modest DJD changes of digital joints.    Impression and plan:   recent acute respiratory failure with left lower lobe pneumonia, prolonged intubation, chronic hypoxic respiratory failure, oxygenation satisfactory on 4 L supplemental 02, tolerating steroid taper.   Continuous use of opiate for reflex sympathetic dystrophy, additionally on gabapentin, pain adequately controlled.   Seizure disorder continuing antiepileptics, no recent seizure activity.   Malnutrition, recent tube feedings, tolerating oral intake, monitor for satisfactory caloric and nutritional intake.   Chronic renal insufficiency stage III, recent creatinine stable.   Anemia of chronic disease, hemoglobin currently 9.6.   Hyperlipidemia on statin.   Dementia, no behavioral abnormalities.   Multiple complex medical issues are reviewed, outside medical records reviewed, total time of admission  evaluation greater than 45 minutes, greater than 50% of time spent in coordination of care and counseling. Issues of concern additionally discussed with nursing staff. Will require follow-up CT scan as noted in [facility] dictation      Electronically signed by: Holley Monsivais MD

## (undated) DEVICE — SU DERMABOND ADVANCED .7ML DNX12

## (undated) DEVICE — PACK MINOR CUSTOM RIDGES SBA32RMRMA

## (undated) DEVICE — ATOMIZER MUCOSAL MEMBRANE MAD100

## (undated) DEVICE — NDL 18GA 1.5" 305196

## (undated) DEVICE — DRSG VASELINE 3X18" 8884414600

## (undated) DEVICE — SU PROLENE 2-0 CT-2 30" 8411H

## (undated) DEVICE — LINEN TOWEL PACK X5 5464

## (undated) DEVICE — ESU GROUND PAD ADULT W/CORD E7507

## (undated) DEVICE — Device

## (undated) DEVICE — LINEN ORTHO PACK 5446

## (undated) DEVICE — DRSG GAUZE 4X4" TRAY

## (undated) DEVICE — PREP SKIN SCRUB TRAY 4461A

## (undated) DEVICE — CAST PADDING 4" STERILE 9044S

## (undated) DEVICE — SU VICRYL 3-0 SH 27" UND J416H

## (undated) DEVICE — BNDG ABDOMINAL BINDER 9X30-45" 79-89070

## (undated) DEVICE — LINEN FULL SHEET 5511

## (undated) DEVICE — GLOVE PROTEXIS BLUE W/NEU-THERA 8.0  2D73EB80

## (undated) DEVICE — BAG CLEAR TRASH 1.3M 39X33" P4040C

## (undated) DEVICE — GLOVE PROTEXIS POWDER FREE 7.5 ORTHOPEDIC 2D73ET75

## (undated) DEVICE — GLOVE PROTEXIS W/NEU-THERA 7.5  2D73TE75

## (undated) DEVICE — SU VICRYL 3-0 PS-2 27" UND J427H

## (undated) DEVICE — LINEN HALF SHEET 5512

## (undated) DEVICE — NDL BLUNT 18GA 1" W/O FILTER 305181

## (undated) DEVICE — BNDG ELASTIC 4"X5YDS UNSTERILE 6611-40

## (undated) DEVICE — SPECIMEN CONTAINER 3OZ

## (undated) DEVICE — GLOVE PROTEXIS POWDER FREE SMT 7.0  2D72PT70X

## (undated) DEVICE — APPLICATOR COTTON TIP 6"X2 STERILE LF 6012

## (undated) DEVICE — SU VICRYL 4-0 PS-2 18" UND J496H

## (undated) DEVICE — SU PROLENE 3-0 PS-1 18" 8663G

## (undated) DEVICE — PACK LOWER EXTREMITY RIDGES

## (undated) DEVICE — TOURNIQUET CUFF 30" REPRO BLUE 60-7070-105

## (undated) DEVICE — SYR 50ML SLIP TIP W/O NDL 309654

## (undated) DEVICE — SOL NACL 0.9% IRRIG 1000ML BOTTLE 2F7124

## (undated) DEVICE — SYR 05ML LL W/O NDL

## (undated) DEVICE — NDL 27GA 1.25" 305136

## (undated) DEVICE — SYR 10ML SLIP TIP W/O NDL

## (undated) DEVICE — SYR 20ML LL W/O NDL

## (undated) DEVICE — PREP CHLORAPREP 26ML TINTED ORANGE  260815

## (undated) DEVICE — TUBING OXYGEN CANNULA NASAL 7FT

## (undated) DEVICE — SUCTION TIP YANKAUER W/O VENT K86

## (undated) DEVICE — PREP SCRUB SOL EXIDINE 4% CHG 4OZ 29002-404

## (undated) DEVICE — GLOVE BIOGEL 6.5 LATEX

## (undated) DEVICE — SPECIMEN TRAP MUCOUS SIMILAC NTRL CARE GRAD 80ML 0045860

## (undated) DEVICE — LINEN DRAPE 54X72" 5467

## (undated) DEVICE — TUBING SUCTION 12"X1/4" N612

## (undated) DEVICE — DRSG TELFA 3X8" 1238

## (undated) DEVICE — DRAPE LAP PEDS DISP 29492

## (undated) DEVICE — BLADE KNIFE SURG 10 371110

## (undated) DEVICE — LINEN TOWEL PACK X10 5473

## (undated) RX ORDER — FENTANYL CITRATE 50 UG/ML
INJECTION, SOLUTION INTRAMUSCULAR; INTRAVENOUS
Status: DISPENSED
Start: 2018-01-01

## (undated) RX ORDER — ONDANSETRON 2 MG/ML
INJECTION INTRAMUSCULAR; INTRAVENOUS
Status: DISPENSED
Start: 2017-10-19

## (undated) RX ORDER — PROPOFOL 10 MG/ML
INJECTION, EMULSION INTRAVENOUS
Status: DISPENSED
Start: 2017-10-19

## (undated) RX ORDER — BUPIVACAINE HYDROCHLORIDE AND EPINEPHRINE 2.5; 5 MG/ML; UG/ML
INJECTION, SOLUTION EPIDURAL; INFILTRATION; INTRACAUDAL; PERINEURAL
Status: DISPENSED
Start: 2018-01-01

## (undated) RX ORDER — GLYCOPYRROLATE 0.2 MG/ML
INJECTION INTRAMUSCULAR; INTRAVENOUS
Status: DISPENSED
Start: 2018-01-01

## (undated) RX ORDER — CEFAZOLIN SODIUM 1 G/3ML
INJECTION, POWDER, FOR SOLUTION INTRAMUSCULAR; INTRAVENOUS
Status: DISPENSED
Start: 2018-01-01

## (undated) RX ORDER — OXYCODONE HYDROCHLORIDE 5 MG/1
TABLET ORAL
Status: DISPENSED
Start: 2017-10-19

## (undated) RX ORDER — DEXAMETHASONE SODIUM PHOSPHATE 4 MG/ML
INJECTION, SOLUTION INTRA-ARTICULAR; INTRALESIONAL; INTRAMUSCULAR; INTRAVENOUS; SOFT TISSUE
Status: DISPENSED
Start: 2018-01-01

## (undated) RX ORDER — EPHEDRINE SULFATE 50 MG/ML
INJECTION, SOLUTION INTRAMUSCULAR; INTRAVENOUS; SUBCUTANEOUS
Status: DISPENSED
Start: 2017-10-19

## (undated) RX ORDER — PHENYLEPHRINE HCL IN 0.9% NACL 1 MG/10 ML
SYRINGE (ML) INTRAVENOUS
Status: DISPENSED
Start: 2017-10-19

## (undated) RX ORDER — ONDANSETRON 2 MG/ML
INJECTION INTRAMUSCULAR; INTRAVENOUS
Status: DISPENSED
Start: 2018-01-01

## (undated) RX ORDER — BUPIVACAINE HYDROCHLORIDE AND EPINEPHRINE 2.5; 5 MG/ML; UG/ML
INJECTION, SOLUTION EPIDURAL; INFILTRATION; INTRACAUDAL; PERINEURAL
Status: DISPENSED
Start: 2017-10-19

## (undated) RX ORDER — ACETAMINOPHEN 10 MG/ML
INJECTION, SOLUTION INTRAVENOUS
Status: DISPENSED
Start: 2017-10-19

## (undated) RX ORDER — EPHEDRINE SULFATE 50 MG/ML
INJECTION, SOLUTION INTRAMUSCULAR; INTRAVENOUS; SUBCUTANEOUS
Status: DISPENSED
Start: 2018-01-01

## (undated) RX ORDER — LIDOCAINE HYDROCHLORIDE 10 MG/ML
INJECTION, SOLUTION EPIDURAL; INFILTRATION; INTRACAUDAL; PERINEURAL
Status: DISPENSED
Start: 2017-10-19

## (undated) RX ORDER — GLYCOPYRROLATE 0.2 MG/ML
INJECTION INTRAMUSCULAR; INTRAVENOUS
Status: DISPENSED
Start: 2017-10-19

## (undated) RX ORDER — FENTANYL CITRATE 50 UG/ML
INJECTION, SOLUTION INTRAMUSCULAR; INTRAVENOUS
Status: DISPENSED
Start: 2017-10-19

## (undated) RX ORDER — PROPOFOL 10 MG/ML
INJECTION, EMULSION INTRAVENOUS
Status: DISPENSED
Start: 2018-01-01

## (undated) RX ORDER — LIDOCAINE HYDROCHLORIDE 10 MG/ML
INJECTION, SOLUTION EPIDURAL; INFILTRATION; INTRACAUDAL; PERINEURAL
Status: DISPENSED
Start: 2018-01-01

## (undated) RX ORDER — DEXAMETHASONE SODIUM PHOSPHATE 4 MG/ML
INJECTION, SOLUTION INTRA-ARTICULAR; INTRALESIONAL; INTRAMUSCULAR; INTRAVENOUS; SOFT TISSUE
Status: DISPENSED
Start: 2017-10-19